# Patient Record
Sex: FEMALE | Race: BLACK OR AFRICAN AMERICAN | Employment: OTHER | URBAN - METROPOLITAN AREA
[De-identification: names, ages, dates, MRNs, and addresses within clinical notes are randomized per-mention and may not be internally consistent; named-entity substitution may affect disease eponyms.]

---

## 2017-01-26 ENCOUNTER — HOSPITAL ENCOUNTER (EMERGENCY)
Age: 82
Discharge: HOME | End: 2017-01-26
Attending: EMERGENCY MEDICINE
Payer: MEDICARE

## 2017-01-26 VITALS
OXYGEN SATURATION: 99 % | WEIGHT: 170 LBS | TEMPERATURE: 98.3 F | SYSTOLIC BLOOD PRESSURE: 113 MMHG | BODY MASS INDEX: 26.68 KG/M2 | RESPIRATION RATE: 18 BRPM | HEART RATE: 72 BPM | DIASTOLIC BLOOD PRESSURE: 55 MMHG | HEIGHT: 67 IN

## 2017-01-26 DIAGNOSIS — M25.562 ARTHRALGIA OF BOTH LOWER LEGS: Primary | ICD-10-CM

## 2017-01-26 DIAGNOSIS — M25.561 ARTHRALGIA OF BOTH LOWER LEGS: Primary | ICD-10-CM

## 2017-01-26 PROCEDURE — 99282 EMERGENCY DEPT VISIT SF MDM: CPT

## 2017-01-26 PROCEDURE — 93970 EXTREMITY STUDY: CPT

## 2017-01-26 NOTE — ED NOTES
RN reviewed discharge instructions with patient. Patient verbalized understanding. Time allotted for questions. A&O at time of discharge. VSS. Patient wheeled off unit.

## 2017-01-26 NOTE — PROCEDURES
[address]  *** FINAL REPORT ***    Name: Shelby Traore  MRN: [id number]  : 1933  HIS Order #: [id number]  [address] [facility] Visit #: [id number]  Date: 2017    TYPE OF TEST: Peripheral Venous Testing    REASON FOR TEST  Pain in limb    Right Leg:-  Deep venous thrombosis:           No  Superficial venous thrombosis:    No  Deep venous insufficiency:        Not examined  Superficial venous insufficiency: Not examined    Left Leg:-  Deep venous thrombosis:           No  Superficial venous thrombosis:    No  Deep venous insufficiency:        Not examined  Superficial venous insufficiency: Not examined      INTERPRETATION/FINDINGS  PROCEDURE:  Color duplex ultrasound imaging of lower extremity veins. FINDINGS:       Right: The common femoral, deep femoral, femoral, popliteal,  posterior tibial, peroneal, and great saphenous are patent and without   evidence of thrombus where visualized;  each is compressible and  there is no narrowing of the flow channel on color Doppler imaging. Phasic flow is observed in the common femoral vein. Left:   The common femoral, deep femoral, femoral, popliteal,  posterior tibial, peroneal, and great saphenous are patent and without   evidence of thrombus where visualized;  each is compressible and  there is no narrowing of the flow channel on color Doppler imaging. Phasic flow is observed in the common femoral vein. IMPRESSION:  No evidence of right or left lower extremity vein  thrombosis. ADDITIONAL COMMENTS    I have personally reviewed the data relevant to the interpretation of  this  study.     TECHNOLOGIST: SHANIKA Romo, RCS  Signed: 2017 01:57 PM    PHYSICIAN: Frieda Chang MD  Signed: 2017 07:34 AM

## 2017-01-26 NOTE — ED TRIAGE NOTES
Pt arrives with bilat leg pain, R knee pain & pain to top of feet. Also reports some swelling (not noticeable in triage). This all started on Sunday after traveling to [place] in a van.

## 2017-01-26 NOTE — ED PROVIDER NOTES
HPI Comments: 80 y.o. female with past medical history significant for CHF, HTN who presents with chief complaint of leg pain. Patient complains of b/l leg pain and some swelling with the most sore area at the insoles of her b/l feet. Patient states \"it feels like spurs at the top of my feet. \" Patient states she went up to [place] 5 days ago on 17 for a . Patient states she did not wear heels during the . Patient has no other complaints. There are no other acute medical concerns at this time. Social hx: never smoker, no alcohol  PCP: Ino Hamm MD    Note written by Vera Dominguez, as dictated by Angelo Bourgeois MD 12:58 PM     The history is provided by the patient. No  was used. Past Medical History:   Diagnosis Date    Arrhythmia      bradycardia,S/P pacemaker    CHF (congestive heart failure) ([address]     Heart failure ([address]     Hypertension     Irregular heart rate        Past Surgical History:   Procedure Laterality Date    Hx orthopaedic       left total knee replacement    Hx pacemaker           Family History:   Problem Relation Age of Onset    Hypertension Mother     Cancer Father      prostate    Hypertension Father        Social History     Social History    Marital status:      Spouse name: N/A    Number of children: N/A    Years of education: N/A     Occupational History    Not on file. Social History Main Topics    Smoking status: Never Smoker    Smokeless tobacco: Never Used    Alcohol use No    Drug use: No    Sexual activity: Not Currently      Comment: ,2 children,2children,lives at home     Other Topics Concern    Not on file     Social History Narrative         ALLERGIES: Codeine; Morphine; Pcn [penicillins]; and Pseudoephedrine    Review of Systems   Constitutional: Negative for chills and fever. HENT: Negative for ear pain and sore throat. Eyes: Negative for photophobia and pain. Respiratory: Negative for chest tightness and shortness of breath. Cardiovascular: Positive for leg swelling. Negative for chest pain. Gastrointestinal: Negative for abdominal pain, nausea and vomiting. Genitourinary: Negative for dysuria and flank pain. Musculoskeletal: Positive for arthralgias (b/l legs and feet). Negative for back pain and neck pain. Skin: Negative for rash and wound. Neurological: Negative for dizziness, light-headedness and headaches. Vitals:    01/26/17 1248   BP: 126/72   Pulse: 64   Resp: 18   Temp: 98.6 °F (37 °C)   SpO2: 97%   Weight: 77.1 kg (170 lb)   Height: 5' 7\" (1.702 m)            Physical Exam   Constitutional: She is oriented to person, place, and time. She appears well-developed and well-nourished. No distress. HENT:   Head: Normocephalic and atraumatic. Eyes: Conjunctivae and EOM are normal.   Neck: Normal range of motion. Cardiovascular: Normal rate, regular rhythm, normal heart sounds and intact distal pulses. No murmur heard. Pulmonary/Chest: Effort normal and breath sounds normal. No stridor. No respiratory distress. Abdominal: Soft. Bowel sounds are normal. There is no tenderness. Musculoskeletal: Normal range of motion. She exhibits no edema or deformity. Mild right calf tenderness   Neurological: She is alert and oriented to person, place, and time. No cranial nerve deficit. Skin: Skin is warm and dry. She is not diaphoretic. Psychiatric: She has a normal mood and affect. Nursing note and vitals reviewed. MDM  Number of Diagnoses or Management Options  Diagnosis management comments: Patient with leg pain and subjective swelling after long van trip a few days ago - doppler to r/o DVT  Next provider to review test result and disposition the patient.     1345 - Prelim doppler report neg for DVT, will d/c home       Amount and/or Complexity of Data Reviewed  Tests in the radiology section of CPT®: ordered and reviewed    Patient Progress  Patient progress: stable    ED Course       Procedures

## 2017-01-26 NOTE — DISCHARGE INSTRUCTIONS
Musculoskeletal Pain: Care Instructions  Your Care Instructions  Different problems with the bones, muscles, nerves, ligaments, and tendons in the body can cause pain. One or more areas of your body may ache or burn. Or they may feel tired, stiff, or sore. The medical term for this type of pain is musculoskeletal pain. It can have many different causes. Sometimes the pain is caused by an injury such as a strain or sprain. Or you might have pain from using one part of your body in the same way over and over again. This is called overuse. In some cases, the cause of the pain is another health problem such as arthritis or fibromyalgia. The doctor will examine you and ask you questions about your health to help find the cause of your pain. Blood tests or imaging tests like an X-ray may also be helpful. But sometimes doctors can't find a cause of the pain. Treatment depends on your symptoms and the cause of the pain, if known. The doctor has checked you carefully, but problems can develop later. If you notice any problems or new symptoms, get medical treatment right away. Follow-up care is a key part of your treatment and safety. Be sure to make and go to all appointments, and call your doctor if you are having problems. It's also a good idea to know your test results and keep a list of the medicines you take. How can you care for yourself at home? · Rest until you feel better. · Do not do anything that makes the pain worse. Return to exercise gradually if you feel better and your doctor says it's okay. · Be safe with medicines. Read and follow all instructions on the label. ¨ If the doctor gave you a prescription medicine for pain, take it as prescribed. ¨ If you are not taking a prescription pain medicine, ask your doctor if you can take an over-the-counter medicine. · Put ice or a cold pack on the area for 10 to 20 minutes at a time to ease pain. Put a thin cloth between the ice and your skin.   When should you call for help? Call your doctor now or seek immediate medical care if:  · You have new pain, or your pain gets worse. · You have new symptoms such as a fever, a rash, or chills. Watch closely for changes in your health, and be sure to contact your doctor if:  · You do not get better as expected. Where can you learn more? Go to [employer]  Enter Q624 in the search box to learn more about \"Musculoskeletal Pain: Care Instructions. \"   © 9258-2082 Healthwise, Incorporated. Care instructions adapted under license by Nick [employer] (which disclaims liability or warranty for this information). This care instruction is for use with your licensed healthcare professional. If you have questions about a medical condition or this instruction, always ask your healthcare professional. [address] any warranty or liability for your use of this information. Content Version: 24.5.258311; Current as of: November 20, 2015             We hope that we have addressed all of your medical concerns. The examination and treatment you received in the Emergency Department were for an emergent problem and were not intended as complete care. It is important that you follow up with your healthcare provider(s) for ongoing care. If your symptoms worsen or do not improve as expected, and you are unable to reach your usual health care provider(s), you should return to the Emergency Department. Today's healthcare is undergoing tremendous change, and patient satisfaction surveys are one of the many tools to assess the quality of medical care. You may receive a survey from the [employer] organization regarding your experience in the Emergency Department. I hope that your experience has been completely positive, particularly the medical care that I provided. As such, please participate in the survey; anything less than excellent does not meet my expectations or intentions. [address] [facility] and [address] [place] [address] participate in nationally recognized quality of care measures. If your blood pressure is greater than 120/80, as reported below, we urge that you seek medical care to address the potential of high blood pressure, commonly known as hypertension. Hypertension can be hereditary or can be caused by certain medical conditions, pain, stress, or \"white coat syndrome. \"       Please make an appointment with your health care provider(s) for follow up of your Emergency Department visit. VITALS:   Patient Vitals for the past 8 hrs:   Temp Pulse Resp BP SpO2   01/26/17 1248 98.6 °F (37 °C) 64 18 126/72 97 %          Thank you for allowing us to provide you with medical care today. We realize that you have many choices for your emergency care needs. Please choose us in the future for any continued health care needs. Cici Rico [facility], [address] [place] [address].   Office: [phone]

## 2017-03-27 ENCOUNTER — HOSPITAL ENCOUNTER (OUTPATIENT)
Dept: LAB | Age: 82
Discharge: HOME | End: 2017-03-27
Payer: MEDICARE

## 2017-03-27 ENCOUNTER — OFFICE VISIT (OUTPATIENT)
Dept: INTERNAL MEDICINE CLINIC | Age: 82
End: 2017-03-27

## 2017-03-27 VITALS
SYSTOLIC BLOOD PRESSURE: 110 MMHG | WEIGHT: 172 LBS | HEIGHT: 67 IN | OXYGEN SATURATION: 99 % | RESPIRATION RATE: 20 BRPM | DIASTOLIC BLOOD PRESSURE: 64 MMHG | TEMPERATURE: 97.5 F | BODY MASS INDEX: 27 KG/M2 | HEART RATE: 63 BPM

## 2017-03-27 DIAGNOSIS — M75.01 ADHESIVE CAPSULITIS OF BOTH SHOULDERS: Primary | ICD-10-CM

## 2017-03-27 DIAGNOSIS — E78.00 PURE HYPERCHOLESTEROLEMIA: ICD-10-CM

## 2017-03-27 DIAGNOSIS — I50.20 SYSTOLIC CONGESTIVE HEART FAILURE, UNSPECIFIED CONGESTIVE HEART FAILURE CHRONICITY: ICD-10-CM

## 2017-03-27 DIAGNOSIS — M25.511 CHRONIC PAIN OF BOTH SHOULDERS: ICD-10-CM

## 2017-03-27 DIAGNOSIS — I10 ESSENTIAL HYPERTENSION: ICD-10-CM

## 2017-03-27 DIAGNOSIS — M75.02 ADHESIVE CAPSULITIS OF BOTH SHOULDERS: Primary | ICD-10-CM

## 2017-03-27 DIAGNOSIS — M25.512 CHRONIC PAIN OF BOTH SHOULDERS: ICD-10-CM

## 2017-03-27 DIAGNOSIS — G89.29 CHRONIC PAIN OF BOTH SHOULDERS: ICD-10-CM

## 2017-03-27 DIAGNOSIS — E55.9 VITAMIN D DEFICIENCY: ICD-10-CM

## 2017-03-27 DIAGNOSIS — R68.2 DRY MOUTH: ICD-10-CM

## 2017-03-27 PROCEDURE — 36415 COLL VENOUS BLD VENIPUNCTURE: CPT

## 2017-03-27 PROCEDURE — 80061 LIPID PANEL: CPT

## 2017-03-27 PROCEDURE — 82306 VITAMIN D 25 HYDROXY: CPT

## 2017-03-27 PROCEDURE — 80053 COMPREHEN METABOLIC PANEL: CPT

## 2017-03-27 RX ORDER — AMLODIPINE BESYLATE 10 MG/1
10 TABLET ORAL DAILY
Qty: 90 TAB | Refills: 2 | Status: SHIPPED | OUTPATIENT
Start: 2017-03-27 | End: 2017-09-25 | Stop reason: SDUPTHER

## 2017-03-27 RX ORDER — DICLOFENAC SODIUM 10 MG/G
GEL TOPICAL
Qty: 100 G | Refills: 2 | Status: SHIPPED | OUTPATIENT
Start: 2017-03-27 | End: 2017-06-26 | Stop reason: SDUPTHER

## 2017-03-27 RX ORDER — TRAMADOL HYDROCHLORIDE 50 MG/1
50 TABLET ORAL
Qty: 30 TAB | Refills: 0 | Status: SHIPPED | OUTPATIENT
Start: 2017-03-27 | End: 2017-06-26 | Stop reason: SDUPTHER

## 2017-03-27 NOTE — LETTER
3/31/2017 1:33 PM 
 
Ms. Lisbeth Dance [address] [facility] Dear Lisbeth Dance: 
 
Please find your most recent results below. Resulted Orders METABOLIC PANEL, COMPREHENSIVE Result Value Ref Range Glucose 91 65 - 99 mg/dL BUN 14 8 - 27 mg/dL Creatinine 0.62 0.57 - 1.00 mg/dL GFR est non-AA 83 >59 mL/min/1.73 GFR est AA 96 >59 mL/min/1.73  
 BUN/Creatinine ratio 23 11 - 26 Comment: **Effective April 3, 2017 BUN/Creatinine Ratio** 
  reference interval will be changing to: Age                  Male          Female 
     0 days   -  7 days          9 - 25         9 - 26 
     8 days   - 30 days          8 - 28        10 - 33 
     1 month  -  6 months       11 - 62        11 - 47 
     7 months -  1 year         20 - 70        20 - 70 
     2 years  -  5 years        23 - 52        20 - 52 
     6 years  - 15 years        15 - 29        12 - 28 
    13 years  - 16 years        8 - 25        10 - 25 
    18 years  - 61 years         5 - 21         9 - 21 
               >59 years        10 - 25        15 - 29 Sodium 139 134 - 144 mmol/L Potassium 4.6 3.5 - 5.2 mmol/L Chloride 102 96 - 106 mmol/L  
 CO2 26 18 - 29 mmol/L Calcium 10.3 8.7 - 10.3 mg/dL Protein, total 7.8 6.0 - 8.5 g/dL Albumin 4.0 3.5 - 4.7 g/dL GLOBULIN, TOTAL 3.8 1.5 - 4.5 g/dL A-G Ratio 1.1 (L) 1.2 - 2.2 Comment: **Please note reference interval change** Bilirubin, total 0.8 0.0 - 1.2 mg/dL Alk. phosphatase 97 39 - 117 IU/L  
 AST (SGOT) 44 (H) 0 - 40 IU/L  
 ALT (SGPT) 46 (H) 0 - 32 IU/L Narrative Performed at:  [address]  [id number] : Fernando Koenig MD, Phone:  [phone] LIPID PANEL Result Value Ref Range Cholesterol, total 185 100 - 199 mg/dL Triglyceride 60 0 - 149 mg/dL HDL Cholesterol 83 >39 mg/dL VLDL, calculated 12 5 - 40 mg/dL LDL, calculated 90 0 - 99 mg/dL Narrative Performed at:  [address]  [id number] : Dunia Penaloza MD, Phone:  [phone] VITAMIN D, 25 HYDROXY Result Value Ref Range VITAMIN D, 25-HYDROXY 19.5 (L) 30.0 - 100.0 ng/mL Comment:  
   Vitamin D deficiency has been defined by the [address] [place] [address] practice guideline as a 
level of serum 25-OH vitamin D less than 20 ng/mL (1,2). The Endocrine Society went on to further define vitamin D 
insufficiency as a level between 21 and 29 ng/mL (2). 1. IOM ([employer]). 2010. Dietary reference 
   intakes for calcium and D. [facility]: [employer] 
   [employer]. 2. Lisa MF, Murray BENDER, Unique CONWAY, et al. 
   Evaluation, treatment, and prevention of vitamin D 
   deficiency: an Endocrine Society clinical practice 
   guideline. JCEM. 2011 Jul; 96(7):1911-30. Narrative Performed at:  [address]  [id number] : Dunia Penaloza MD, Phone:  [phone] CVD REPORT Result Value Ref Range INTERPRETATION Note Comment:  
   Supplement report is available. Narrative Performed at:  [address] [place] [address] [address] [place] [address]  [id number] : Sammie Ibarra PhD, Phone:  [phone] RECOMMENDATIONS: 
Alejandro Mirella your labs indicate that your liver functions are elevated, please avoid alcohol and tylenol for the next 2 months and we will repeat more labs at that time. I have enclosed that lab slip for your convenience. Also, Your Vitamin D level is low, start taking OTC Vitamin D 2000 units 1 x a day for 4 months. Also increase your consumption of milk and exposure to the sun for 20 minutes a day. We will recheck your Vitamin D level in 4 months All other labs are stable Please call me if you have any questions: [phone] Sincerely, Maura Saavedra MD

## 2017-03-27 NOTE — PROGRESS NOTES
HISTORY OF PRESENT ILLNESS  Tomás Meyers is a 80 y.o. female here to follow up. reports both shoulder stiffness and decreased ROM. has moderate pain ,more at night,askign tramadol refill. She has recent fall and she hit on right shoulder and chest wall which hurts. She accidentally missed steps in her house and fell. Reports dry mouth,using biotin drop,helps. Has HTN,stable. all lab done. has elevated lipid,want to know what food to eat. Has elevated lipid,need to get it checked. Need labs. Follow-up     Hypertension    Pertinent negatives include no palpitations and no blurred vision. CHF     Fall   Pertinent negatives include no fever. Review of Systems   Constitutional: Negative. Negative for chills and fever. HENT: Negative. Eyes: Negative. Negative for blurred vision and double vision. Respiratory: Negative. Cardiovascular: Negative. Negative for palpitations. Gastrointestinal: Negative. Genitourinary: Negative. Musculoskeletal: Positive for falls and joint pain. Skin: Negative. Neurological: Negative. Endo/Heme/Allergies: Negative. Psychiatric/Behavioral: Negative. Physical Exam   Constitutional: She appears well-developed and well-nourished. No distress. HENT:        Neck: Normal range of motion. Neck supple. No JVD present. No thyromegaly present. Cardiovascular: Normal rate, regular rhythm, normal heart sounds and intact distal pulses. Pulmonary/Chest: Effort normal and breath sounds normal. No respiratory distress. She has no wheezes. She has no rales. Musculoskeletal: She exhibits tenderness. She exhibits no edema. B/L shoulder:tender. ROM restricted in all direction. Psychiatric: She has a normal mood and affect. Her behavior is normal.       JHONATHAN and Franck Flood was seen today for follow-up, hypertension, chf and pain (chronic).     Diagnoses and all orders for this visit:    Adhesive capsulitis of both shoulders  Heating pad and exercise. Will call in,  -     diclofenac (VOLTAREN) 1 % gel; Apply  to affected area two (2) times daily as needed. -     REFERRAL TO PHYSICAL THERAPY  -     REFERRAL TO ORTHOPEDICS    Essential hypertension  Stable. will do,  -     METABOLIC PANEL, COMPREHENSIVE  -     LIPID PANEL  -     amLODIPine (NORVASC) 10 mg tablet; Take 1 Tab by mouth daily. Systolic congestive heart failure, unspecified congestive heart failure chronicity ([address]  Stable.,compensated. Dry mouth  On biotin drop. no thrush. Vitamin D deficiency  -     VITAMIN D, 25 HYDROXY    Pure hypercholesterolemia    Will check,  -     LIPID PANEL    Right shoulder injury, subsequent  -     traMADol (ULTRAM) 50 mg tablet; Take 1 Tab by mouth daily as needed for Pain. Discussed expected course/resolution/complications of diagnosis in detail with patient. Medication risks/benefits/costs/interactions/alternatives discussed with patient. Pt was given an after visit summary which includes diagnoses, current medications & vitals. Pt expressed understanding with the diagnosis and plan.

## 2017-03-27 NOTE — PROGRESS NOTES
Health Maintenance Due   Topic Date Due    DTaP/Tdap/Td series (1 - Tdap) 02/02/1954    ZOSTER VACCINE AGE 60>  02/02/1993    GLAUCOMA SCREENING Q2Y  02/02/1998    MEDICARE YEARLY EXAM  02/02/1998    INFLUENZA AGE 9 TO ADULT  08/01/2016    Pneumococcal 65+ Low/Medium Risk (2 of 2 - PPSV23) 10/28/2016       Chief Complaint   Patient presents with    Follow-up     4 month, states has dry mouth    Hypertension    CHF    Pain (Chronic)     had fall Nov 2015 bilateral shoulders are painful with decreased ROM and continues with bilateral knee oain       1. Have you been to the ER, urgent care clinic since your last visit? Hospitalized since your last visit? No    2. Have you seen or consulted any other health care providers outside of the [address] [place] [place] [address] since your last visit? Include any pap smears or colon screening. No    3) Do you have an Advance Directive on file? no    4) Are you interested in receiving information on Advance Directives? NO      Patient is accompanied by self I have received verbal consent from Fabiana Graham to discuss any/all medical information while they are present in the room.

## 2017-03-27 NOTE — PATIENT INSTRUCTIONS
Frozen Shoulder: Exercises  Your Care Instructions  Here are some examples of typical rehabilitation exercises for your condition. Start each exercise slowly. Ease off the exercise if you start to have pain. Your doctor or physical therapist will tell you when you can start these exercises and which ones will work best for you. How to do the exercises  Neck stretches    1. Look straight ahead, and tip your right ear to your right shoulder. Do not let your left shoulder rise up as you tip your head to the right. 2. Hold 15 to 30 seconds. 3. Tilt your head to the left. Do not let your right shoulder rise up as you tip your head to the left. 4. Hold for 15 to 30 seconds. 5. Repeat 2 to 4 times to each side. Shoulder rolls    1. Sit comfortably with your feet shoulder-width apart. You can also do this exercise while standing. 2. Roll your shoulders up, then back, and then down in a smooth, circular motion. 3. Repeat 2 to 4 times. Shoulder flexion (lying down)    Note: For this exercise, you will need a wand. To make a wand, use a piece of PVC pipe or a broom handle with the broom removed. Make the wand about a foot wider than your shoulders. 1. Lie on your back, holding a wand with your hands. Your palms should face down as you hold the wand. Place your hands slightly wider than your shoulders. 2. Keeping your elbows straight, slowly raise your arms over your head until you feel a stretch in your shoulders, upper back, and chest.  3. Hold 15 to 30 seconds. 4. Repeat 2 to 4 times. Shoulder rotation (lying down)    Note: To make a wand, use a piece of PVC pipe or a broom handle with the broom removed. Make the wand about a foot wider than your shoulders. 1. Lie on your back and hold a wand in both hands with your elbows bent and your palms up. 2. Keeping your elbows close to your body, move the wand across your body toward the arm that has pain. 3. Hold for 15 to 30 seconds.   4. Repeat 2 to 4 times.  Shoulder internal rotation with towel    1. Roll up a towel lengthwise. Hold the towel above and behind your head with the arm that is not sore. 2. With your sore arm, reach behind your back and grasp the towel. 3. Using the arm above your head, pull the towel upward until you feel a stretch on the front and outside of your sore shoulder. 4. Hold 15 to 30 seconds. 5. Relax and move the towel back down to the starting position. 6. Repeat 2 to 4 times. Shoulder blade squeeze    1. While standing with your arms at your sides, squeeze your shoulder blades together. Do not raise your shoulders up as you are squeezing. 2. Hold for 6 seconds. 3. Repeat 8 to 12 times. Follow-up care is a key part of your treatment and safety. Be sure to make and go to all appointments, and call your doctor if you are having problems. It's also a good idea to know your test results and keep a list of the medicines you take. Where can you learn more? Go to http://francis-gene.info/. Enter R144 in the search box to learn more about \"Frozen Shoulder: Exercises. \"  Current as of: May 23, 2016  Content Version: 11.1  © 1926-8477 [employer]. Care instructions adapted under license by [employer] (which disclaims liability or warranty for this information). If you have questions about a medical condition or this instruction, always ask your healthcare professional. [place] [address] any warranty or liability for your use of this information.

## 2017-03-27 NOTE — MR AVS SNAPSHOT
Visit Information Date & Time Provider Department Dept. Phone Encounter #  
 3/27/2017 10:30 AM Hernando Patterson MD [facility] Internal Medicine [phone] [id number] Upcoming Health Maintenance Date Due DTaP/Tdap/Td series (1 - Tdap) 2/2/1954 ZOSTER VACCINE AGE 60> 2/2/1993 GLAUCOMA SCREENING Q2Y 2/2/1998 MEDICARE YEARLY EXAM 2/2/1998 INFLUENZA AGE 9 TO ADULT 8/1/2016 Pneumococcal 65+ Low/Medium Risk (2 of 2 - PPSV23) 10/28/2016 Allergies as of 3/27/2017  Review Complete On: 3/27/2017 By: Hernando Patterson MD  
  
 Severity Noted Reaction Type Reactions Codeine  09/23/2011    Unknown (comments) Morphine  09/23/2011    Unknown (comments) Pcn [Penicillins]  09/23/2011    Unknown (comments) Pseudoephedrine  09/23/2011    Unknown (comments) Current Immunizations  Reviewed on 11/28/2016 Name Date Influenza High Dose Vaccine PF 10/28/2015 Pneumococcal Conjugate (PCV-13) 10/28/2015 Not reviewed this visit You Were Diagnosed With   
  
 Codes Comments Adhesive capsulitis of both shoulders    -  Primary ICD-10-CM: M75.01, M75.02 
ICD-9-CM: 726.0 Essential hypertension     ICD-10-CM: I10 
ICD-9-CM: 401.9 Systolic congestive heart failure, unspecified congestive heart failure chronicity (HCC)     ICD-10-CM: I50.20 ICD-9-CM: 428.20, 428.0 Dry mouth     ICD-10-CM: R68.2 ICD-9-CM: 527.7 Vitamin D deficiency     ICD-10-CM: E55.9 ICD-9-CM: 268.9 Pure hypercholesterolemia     ICD-10-CM: E78.00 ICD-9-CM: 272.0 Right shoulder injury, initial encounter     ICD-10-CM: S49.91XA ICD-9-CM: 187. 2 Vitals BP Pulse Temp Resp Height(growth percentile) Weight(growth percentile) 110/64 (BP 1 Location: Left arm, BP Patient Position: Sitting) 63 97.5 °F (36.4 °C) (Oral) 20 5' 7\" (1.702 m) 172 lb (78 kg) SpO2 BMI OB Status Smoking Status 99% 26.94 kg/m2 Postmenopausal Never Smoker Vitals History BMI and BSA Data Body Mass Index Body Surface Area  
 26.94 kg/m 2 1.92 m 2 Preferred Pharmacy Pharmacy Name Phone [id number] Lozano [phone] Your Updated Medication List  
  
   
This list is accurate as of: 3/27/17 11:13 AM.  Always use your most recent med list.  
  
  
  
  
 acetaminophen 500 mg tablet Commonly known as:  TYLENOL Take 1 Tab by mouth two (2) times daily as needed for Pain. albuterol 90 mcg/actuation inhaler Commonly known as:  PROAIR HFA Take 1 Puff by inhalation every four (4) hours as needed. amLODIPine 10 mg tablet Commonly known as:  Jennifer Pace Take 1 Tab by mouth daily. aspirin 81 mg tablet Take 81 mg by mouth.  
  
 calcium carbonate 500 mg calcium (1,250 mg) tablet Commonly known as:  OS-AYALA Take 1 Tab by mouth daily. calcium carbonate-vitamin D3 600 mg(1,500mg) -800 unit Tab Commonly known as:  CALTRATE WITH VITAMIN D3 Take 1 Tab by mouth daily. cetirizine 10 mg tablet Commonly known as:  ZYRTEC  
TAKE 1 TABLET BY MOUTH EVERY DAY  
  
 diclofenac 1 % Gel Commonly known as:  VOLTAREN Apply  to affected area two (2) times daily as needed. ergocalciferol 50,000 unit capsule Commonly known as:  ERGOCALCIFEROL Take 1 Cap by mouth every seven (7) days. fluticasone 50 mcg/actuation nasal spray Commonly known as:  Daniel Metro 2 Sprays by Both Nostrils route daily. nystatin 100,000 unit/mL suspension Commonly known as:  MYCOSTATIN Take 5 mL by mouth four (4) times daily. swish and spit  
  
 traMADol 50 mg tablet Commonly known as:  ULTRAM  
Take 1 Tab by mouth daily as needed for Pain. Prescriptions Printed Refills  
 traMADol (ULTRAM) 50 mg tablet 0 Sig: Take 1 Tab by mouth daily as needed for Pain. Class: Print Route: Oral  
  
Prescriptions Sent to Pharmacy  Refills  
 diclofenac (VOLTAREN) 1 % gel 2  
 Sig: Apply  to affected area two (2) times daily as needed. Class: Normal  
 Pharmacy: [address] [place] [address] Ph #: [phone] Route: Topical  
 amLODIPine (NORVASC) 10 mg tablet 2 Sig: Take 1 Tab by mouth daily. Class: Normal  
 Pharmacy: [address] [place] [address] Ph #: [phone] Route: Oral  
  
We Performed the Following LIPID PANEL [64168 CPT(R)] METABOLIC PANEL, COMPREHENSIVE [97652 CPT(R)] REFERRAL TO ORTHOPEDICS [THB940 Custom] Comments:  
 Please evaluate patient for frozen shoulder REFERRAL TO PHYSICAL THERAPY [IKH10 Custom] Comments:  
 Please evaluate patient for b/L frozen shoulder VITAMIN D, 25 HYDROXY [id number] CPT(R)] Referral Information Referral ID Referred By Referred To  
  
 [id number] Kylah Marino Not Available Visits Status Start Date End Date 1 New Request 3/27/17 3/27/18 If your referral has a status of pending review or denied, additional information will be sent to support the outcome of this decision. Referral ID Referred By Referred To  
 [id number] Dolly ZUNIGA, [address] [place] [address] Phone: [phone] Fax: [phone] Visits Status Start Date End Date 1 New Request 3/27/17 3/27/18 If your referral has a status of pending review or denied, additional information will be sent to support the outcome of this decision. Patient Instructions Frozen Shoulder: Exercises Your Care Instructions Here are some examples of typical rehabilitation exercises for your condition. Start each exercise slowly. Ease off the exercise if you start to have pain. Your doctor or physical therapist will tell you when you can start these exercises and which ones will work best for you. How to do the exercises Neck stretches 1. Look straight ahead, and tip your right ear to your right shoulder. Do not let your left shoulder rise up as you tip your head to the right. 2. Hold 15 to 30 seconds. 3. Tilt your head to the left. Do not let your right shoulder rise up as you tip your head to the left. 4. Hold for 15 to 30 seconds. 5. Repeat 2 to 4 times to each side. Shoulder rolls 1. Sit comfortably with your feet shoulder-width apart. You can also do this exercise while standing. 2. Roll your shoulders up, then back, and then down in a smooth, circular motion. 3. Repeat 2 to 4 times. Shoulder flexion (lying down) Note: For this exercise, you will need a wand. To make a wand, use a piece of PVC pipe or a broom handle with the broom removed. Make the wand about a foot wider than your shoulders. 1. Lie on your back, holding a wand with your hands. Your palms should face down as you hold the wand. Place your hands slightly wider than your shoulders. 2. Keeping your elbows straight, slowly raise your arms over your head until you feel a stretch in your shoulders, upper back, and chest. 
3. Hold 15 to 30 seconds. 4. Repeat 2 to 4 times. Shoulder rotation (lying down) Note: To make a wand, use a piece of PVC pipe or a broom handle with the broom removed. Make the wand about a foot wider than your shoulders. 1. Lie on your back and hold a wand in both hands with your elbows bent and your palms up. 2. Keeping your elbows close to your body, move the wand across your body toward the arm that has pain. 3. Hold for 15 to 30 seconds. 4. Repeat 2 to 4 times. Shoulder internal rotation with towel 1. Roll up a towel lengthwise. Hold the towel above and behind your head with the arm that is not sore. 2. With your sore arm, reach behind your back and grasp the towel. 3. Using the arm above your head, pull the towel upward until you feel a stretch on the front and outside of your sore shoulder. 4. Hold 15 to 30 seconds. 5. Relax and move the towel back down to the starting position. 6. Repeat 2 to 4 times. Shoulder blade squeeze 1. While standing with your arms at your sides, squeeze your shoulder blades together. Do not raise your shoulders up as you are squeezing. 2. Hold for 6 seconds. 3. Repeat 8 to 12 times. Follow-up care is a key part of your treatment and safety. Be sure to make and go to all appointments, and call your doctor if you are having problems. It's also a good idea to know your test results and keep a list of the medicines you take. Where can you learn more? Go to http://francis-gene.info/. Enter R144 in the search box to learn more about \"Frozen Shoulder: Exercises. \" Current as of: May 23, 2016 Content Version: 11.1 © 3569-0078 [employer]. Care instructions adapted under license by [employer] (which disclaims liability or warranty for this information). If you have questions about a medical condition or this instruction, always ask your healthcare professional. [address] any warranty or liability for your use of this information. Introducing [facility]! [facility] introduces [employer] patient portal. Now you can access parts of your medical record, email your doctor's office, and request medication refills online. 1. In your internet browser, go to [email or web address] 2. Click on the First Time User? Click Here link in the Sign In box. You will see the New Member Sign Up page. 3. Enter your [employer] Access Code exactly as it appears below. You will not need to use this code after youve completed the sign-up process. If you do not sign up before the expiration date, you must request a new code. · [employer] Access Code: [email or web address] Expires: 6/18/2017 11:19 AM 
 
4.  Enter the last four digits of your Social Security Number (xxxx) and Date of Birth (mm/dd/yyyy) as indicated and click Submit. You will be taken to the next sign-up page. 5. Create a [employer] ID. This will be your [employer] login ID and cannot be changed, so think of one that is secure and easy to remember. 6. Create a [employer] password. You can change your password at any time. 7. Enter your Password Reset Question and Answer. This can be used at a later time if you forget your password. 8. Enter your e-mail address. You will receive e-mail notification when new information is available in [address] 9. Click Sign Up. You can now view and download portions of your medical record. 10. Click the Download Summary menu link to download a portable copy of your medical information. If you have questions, please visit the Frequently Asked Questions section of the [employer] website. Remember, [employer] is NOT to be used for urgent needs. For medical emergencies, dial 911. Now available from your iPhone and Android! Please provide this summary of care documentation to your next provider. Your primary care clinician is listed as Elayne Soulier. If you have any questions after today's visit, please call [phone].

## 2017-03-28 LAB
25(OH)D3+25(OH)D2 SERPL-MCNC: 19.5 NG/ML (ref 30–100)
ALBUMIN SERPL-MCNC: 4 G/DL (ref 3.5–4.7)
ALBUMIN/GLOB SERPL: 1.1 {RATIO} (ref 1.2–2.2)
ALP SERPL-CCNC: 97 IU/L (ref 39–117)
ALT SERPL-CCNC: 46 IU/L (ref 0–32)
AST SERPL-CCNC: 44 IU/L (ref 0–40)
BILIRUB SERPL-MCNC: 0.8 MG/DL (ref 0–1.2)
BUN SERPL-MCNC: 14 MG/DL (ref 8–27)
BUN/CREAT SERPL: 23 (ref 11–26)
CALCIUM SERPL-MCNC: 10.3 MG/DL (ref 8.7–10.3)
CHLORIDE SERPL-SCNC: 102 MMOL/L (ref 96–106)
CHOLEST SERPL-MCNC: 185 MG/DL (ref 100–199)
CO2 SERPL-SCNC: 26 MMOL/L (ref 18–29)
CREAT SERPL-MCNC: 0.62 MG/DL (ref 0.57–1)
GLOBULIN SER CALC-MCNC: 3.8 G/DL (ref 1.5–4.5)
GLUCOSE SERPL-MCNC: 91 MG/DL (ref 65–99)
HDLC SERPL-MCNC: 83 MG/DL
INTERPRETATION, 910389: NORMAL
LDLC SERPL CALC-MCNC: 90 MG/DL (ref 0–99)
POTASSIUM SERPL-SCNC: 4.6 MMOL/L (ref 3.5–5.2)
PROT SERPL-MCNC: 7.8 G/DL (ref 6–8.5)
SODIUM SERPL-SCNC: 139 MMOL/L (ref 134–144)
TRIGL SERPL-MCNC: 60 MG/DL (ref 0–149)
VLDLC SERPL CALC-MCNC: 12 MG/DL (ref 5–40)

## 2017-03-30 NOTE — PROGRESS NOTES
Slightly elevated LFT. watch alcohol and avoid too much tylenol. repeat LFT in 2 months. Low vit D. Adv to take OTC vit D 2000 unit po every day for 4 months.

## 2017-03-31 DIAGNOSIS — R74.8 ELEVATED LIVER ENZYMES: Primary | ICD-10-CM

## 2017-04-07 ENCOUNTER — TELEPHONE (OUTPATIENT)
Dept: INTERNAL MEDICINE CLINIC | Age: 82
End: 2017-04-07

## 2017-05-31 DIAGNOSIS — R74.8 ELEVATED LIVER ENZYMES: ICD-10-CM

## 2017-06-26 ENCOUNTER — OFFICE VISIT (OUTPATIENT)
Dept: INTERNAL MEDICINE CLINIC | Age: 82
End: 2017-06-26

## 2017-06-26 VITALS
WEIGHT: 174.6 LBS | RESPIRATION RATE: 20 BRPM | SYSTOLIC BLOOD PRESSURE: 119 MMHG | OXYGEN SATURATION: 100 % | HEART RATE: 64 BPM | TEMPERATURE: 96.7 F | HEIGHT: 67 IN | DIASTOLIC BLOOD PRESSURE: 58 MMHG | BODY MASS INDEX: 27.4 KG/M2

## 2017-06-26 DIAGNOSIS — M25.511 CHRONIC PAIN OF BOTH SHOULDERS: ICD-10-CM

## 2017-06-26 DIAGNOSIS — I10 ESSENTIAL HYPERTENSION: Primary | ICD-10-CM

## 2017-06-26 DIAGNOSIS — Z23 ENCOUNTER FOR IMMUNIZATION: ICD-10-CM

## 2017-06-26 DIAGNOSIS — I50.20 SYSTOLIC CONGESTIVE HEART FAILURE, UNSPECIFIED CONGESTIVE HEART FAILURE CHRONICITY: ICD-10-CM

## 2017-06-26 DIAGNOSIS — G89.29 CHRONIC PAIN OF BOTH SHOULDERS: ICD-10-CM

## 2017-06-26 DIAGNOSIS — M25.512 CHRONIC PAIN OF BOTH SHOULDERS: ICD-10-CM

## 2017-06-26 DIAGNOSIS — Z00.00 MEDICARE ANNUAL WELLNESS VISIT, INITIAL: ICD-10-CM

## 2017-06-26 DIAGNOSIS — Z13.39 SCREENING FOR ALCOHOLISM: ICD-10-CM

## 2017-06-26 DIAGNOSIS — M17.11 PRIMARY OSTEOARTHRITIS OF RIGHT KNEE: ICD-10-CM

## 2017-06-26 DIAGNOSIS — R68.2 DRY MOUTH: ICD-10-CM

## 2017-06-26 DIAGNOSIS — Z13.31 SCREENING FOR DEPRESSION: ICD-10-CM

## 2017-06-26 DIAGNOSIS — Z00.00 ROUTINE GENERAL MEDICAL EXAMINATION AT A HEALTH CARE FACILITY: ICD-10-CM

## 2017-06-26 DIAGNOSIS — M75.02 ADHESIVE CAPSULITIS OF BOTH SHOULDERS: ICD-10-CM

## 2017-06-26 DIAGNOSIS — M75.01 ADHESIVE CAPSULITIS OF BOTH SHOULDERS: ICD-10-CM

## 2017-06-26 DIAGNOSIS — E78.00 PURE HYPERCHOLESTEROLEMIA: ICD-10-CM

## 2017-06-26 RX ORDER — TRAMADOL HYDROCHLORIDE 50 MG/1
50 TABLET ORAL
Qty: 30 TAB | Refills: 0 | Status: SHIPPED | OUTPATIENT
Start: 2017-06-26 | End: 2018-01-22 | Stop reason: SDUPTHER

## 2017-06-26 RX ORDER — DICLOFENAC SODIUM 10 MG/G
GEL TOPICAL
Qty: 100 G | Refills: 2 | Status: SHIPPED | OUTPATIENT
Start: 2017-06-26 | End: 2020-05-13 | Stop reason: SDUPTHER

## 2017-06-26 RX ORDER — ACETAMINOPHEN 500 MG
500 TABLET ORAL
Qty: 60 TAB | Refills: 5 | Status: SHIPPED | OUTPATIENT
Start: 2017-06-26 | End: 2020-05-04 | Stop reason: SDUPTHER

## 2017-06-26 NOTE — PROGRESS NOTES
This is an Initial Medicare Annual Wellness Exam (AWV) (Performed 12 months after IPPE or effective date of Medicare Part B enrollment, Once in a lifetime)    I have reviewed the patient's medical history in detail and updated the computerized patient record. History     Past Medical History:   Diagnosis Date    Arrhythmia     bradycardia,S/P pacemaker    [facility]     Heart failure ([facility] [facility]     Hypertension     Irregular heart rate     Pure hypercholesterolemia 3/27/2017      Past Surgical History:   Procedure Laterality Date    HX ORTHOPAEDIC      left total knee replacement    HX PACEMAKER       Current Outpatient Prescriptions   Medication Sig Dispense Refill    varicella zoster vacine live (ZOSTAVAX) 19,400 unit/0.65 mL susr injection 1 Vial by SubCUTAneous route once for 1 dose. 0.65 mL 0    acetaminophen (TYLENOL) 500 mg tablet Take 1 Tab by mouth two (2) times daily as needed for Pain. 60 Tab 5    traMADol (ULTRAM) 50 mg tablet Take 1 Tab by mouth daily as needed for Pain. 30 Tab 0    diclofenac (VOLTAREN) 1 % gel Apply  to affected area two (2) times daily as needed. 100 g 2    amLODIPine (NORVASC) 10 mg tablet Take 1 Tab by mouth daily. 90 Tab 2    calcium carbonate (OS-AYALA) 500 mg calcium (1,250 mg) tablet Take 1 Tab by mouth daily.  ergocalciferol (ERGOCALCIFEROL) 50,000 unit capsule Take 1 Cap by mouth every seven (7) days. (Patient taking differently: Take 50,000 Units by mouth every seven (7) days. FRIDAYS) 4 Cap 2    nystatin (MYCOSTATIN) 100,000 unit/mL suspension Take 5 mL by mouth four (4) times daily. swish and spit 240 mL 0    fluticasone (FLONASE) 50 mcg/actuation nasal spray 2 Sprays by Both Nostrils route daily. 1 Bottle 1    albuterol (PROAIR HFA) 90 mcg/actuation inhaler Take 1 Puff by inhalation every four (4) hours as needed.  1 Inhaler 3    calcium carbonate-vitamin D3 (CALTRATE WITH VITAMIN D3) 600 mg(1,500mg) -800 unit tab Take 1 Tab by mouth daily. 30 Tab 12    cetirizine (ZYRTEC) 10 mg tablet TAKE 1 TABLET BY MOUTH EVERY DAY 30 Tab 0    aspirin 81 mg tablet Take 81 mg by mouth. Allergies   Allergen Reactions    Codeine Unknown (comments)    Morphine Unknown (comments)    Pcn [Penicillins] Unknown (comments)    Pseudoephedrine Unknown (comments)     Family History   Problem Relation Age of Onset    Hypertension Mother    [facility] Father      prostate    Hypertension Father      Social History   Substance Use Topics    Smoking status: Never Smoker    Smokeless tobacco: Never Used    Alcohol use No     Patient Active Problem List   Diagnosis Code    Unstable angina ([facility]) I20.0    CHF (congestive heart failure) ([facility]) I50.9    Pacemaker Z95.0    Essential hypertension I10    Advance care planning Z71.89    Pure hypercholesterolemia E78.00         Depression Risk Factor Screening:     PHQ over the last two weeks 6/26/2017   Little interest or pleasure in doing things Not at all   Feeling down, depressed or hopeless Not at all   Total Score PHQ 2 0     Alcohol Risk Factor Screening: On any occasion during the past 3 months, have you had more than 3 drinks containing alcohol? No    Do you average more than 7 drinks per week? No      Functional Ability and Level of Safety:     Hearing Loss   none    Activities of Daily Living   Self-care.    Requires assistance with:   ADL Assessment 6/26/2017   Feeding yourself No Help Needed   Getting from bed to chair No Help Needed   Getting dressed No Help Needed   Bathing or showering No Help Needed   Walk across the room (includes cane/walker) Help Needed   Using the telphone No Help Needed   Taking your medications No Help Needed   Preparing meals No Help Needed   Managing money (expenses/bills) No Help Needed   Moderately strenuous housework (laundry) No Help Needed   Shopping for personal items (toiletries/medicines) No Help Needed   Shopping for groceries No Help Needed   Driving No Help Needed   Climbing a flight of stairs No Help Needed   Getting to places beyond walking distances Help Needed     Patient states she does not have an exercise program, but states she stays very busy with her daughter. Patient states her daughter had a stroke in 2005 and the patient is now caring for her on a daily basis. Patient lives with her  and daughter. Patient states uses a cane for ambulation, but will also use her daughter's rollator. Fall Risk   Fall Risk Assessment, last 12 mths 6/26/2017   Able to walk? Yes   Fall in past 12 months? No   Fall with injury? -   Number of falls in past 12 months -   Fall Risk Score -     Abuse Screen      Abuse Screening Questionnaire 6/26/2017   Do you ever feel afraid of your partner? N   Are you in a relationship with someone who physically or mentally threatens you? N   Is it safe for you to go home? Y         Review of Systems   Not required  Annual Medicare Wellness Visit    Physical Examination     No exam data present    Evaluation of Cognitive Function:  Mood/affect:  happy  Appearance: age appropriate, well dressed and within normal Limits  Family member/caregiver input: none present    No exam performed today, Annual Medicare Wellness Visit. Patient Care Team:  Franklin Pierce MD as PCP - General (Internal Medicine)  Rani Rojas LPN as Ambulatory Care Navigator (Internal Medicine)  Aliyah Ibarra RN as Nurse Navigator (Internal Medicine)    Advice/Referrals/Counseling   Education and counseling provided:  Are appropriate based on today's review and evaluation  End-of-Life planning (with patient's consent)  Pneumococcal Vaccine  Screening Mammography  Colorectal cancer screening tests  Screening for glaucoma      Assessment/Plan   1. Discussed advance care planning. Patient states her niece is assisting with completion of the paperwork. Patient states once it is complete she will bring copy for office to scan.   2. Discussed preventative screenings. Patient is due for mammogram. Patient states she will think about it and let office know if she wishes to continue the screening. Patient states she last saw her eye doctor(Dr. Zhang Weir) in September 2016. Patient states she has never had a colonoscopy and states the time she had one it was unsuccessful. 3. Discussed immunizations. Patient is due for Pneumovax, ordered by Dr. Nica Anderson and given by the LPN. Dr. Nica Anderson also ordered Zostavax and has sent vaccine to pharmacy. AVS and preventative screenings table printed, reviewed, and handed to patient. Patient verbalized understanding to all information.

## 2017-06-26 NOTE — PROGRESS NOTES
HISTORY OF PRESENT ILLNESS  Justina Kirkpatrick is a 80 y.o. female here to follow up. reports both shoulder stiffness and decreased ROM. has moderate pain ,more at night,need tramadol refill. Need shingle vaccine. Reports dry mouth,using biotin drop,helps. Has HTN,stable. all lab done. has elevated lipid,want to know what food to eat. Has elevated lipid,need to get it checked. Need labs. Hypertension    Pertinent negatives include no palpitations and no blurred vision. Cholesterol Problem     CHF     Shoulder Pain      Follow-up     Fall   Pertinent negatives include no fever. Review of Systems   Constitutional: Negative. Negative for chills and fever. HENT: Negative. Eyes: Negative. Negative for blurred vision and double vision. Respiratory: Negative. Cardiovascular: Negative. Negative for palpitations. Gastrointestinal: Negative. Genitourinary: Negative. Musculoskeletal: Positive for falls and joint pain. Skin: Negative. Neurological: Negative. Endo/Heme/Allergies: Negative. Psychiatric/Behavioral: Negative. Physical Exam   Constitutional: She appears well-developed and well-nourished. No distress. HENT:        Neck: Normal range of motion. Neck supple. No JVD present. No thyromegaly present. Cardiovascular: Normal rate, regular rhythm, normal heart sounds and intact distal pulses. Pulmonary/Chest: Effort normal and breath sounds normal. No respiratory distress. She has no wheezes. She has no rales. Musculoskeletal: She exhibits tenderness. She exhibits no edema. B/L shoulder:tender. ROM restricted in all direction. Psychiatric: She has a normal mood and affect. Her behavior is normal.       Yvonne Lovett was seen today for hypertension, cholesterol problem, chf, shoulder pain and annual wellness visit. Diagnoses and all orders for this visit:    Essential hypertension    Stable. on meds.     Systolic congestive heart failure, unspecified congestive heart failure chronicity (HCC)    Compensated. Pure hypercholesterolemia  On statin. Encounter for immunization  -     PNEUMOCOCCAL POLYSACCHARIDE VACCINE, 23-VALENT, ADULT OR IMMUNOSUPPRESSED PT DOSE,  -     varicella zoster vacine live (ZOSTAVAX) 19,400 unit/0.65 mL susr injection; 1 Vial by SubCUTAneous route once for 1 dose. Chronic pain of both shoulders  Will call in,  -     traMADol (ULTRAM) 50 mg tablet; Take 1 Tab by mouth daily as needed for Pain. #30 no refill. F/U in 3 months. Primary osteoarthritis of right knee  -     acetaminophen (TYLENOL) 500 mg tablet; Take 1 Tab by mouth two (2) times daily as needed for Pain. Adhesive capsulitis of both shoulders  -     diclofenac (VOLTAREN) 1 % gel; Apply  to affected area two (2) times daily as needed. Medicare annual wellness visit, initial  -     Depression Screen Annual    Dry mouth  On biotin spray. Routine general medical examination at a health care facility    Screening for alcoholism    Screening for depression  -     Depression Screen Annual    Discussed expected course/resolution/complications of diagnosis in detail with patient. Medication risks/benefits/costs/interactions/alternatives discussed with patient. Pt was given an after visit summary which includes diagnoses, current medications & vitals. Pt expressed understanding with the diagnosis and plan.

## 2017-06-26 NOTE — MR AVS SNAPSHOT
Visit Information Date & Time Provider Department Dept. Phone Encounter #  
 6/26/2017 10:15 AM Fatou Phillips, [facility] [phone] [id number] Upcoming Health Maintenance Date Due DTaP/Tdap/Td series (1 - Tdap) [birth year]2/1954 ZOSTER VACCINE AGE 60> 2/2/1993 Pneumococcal 65+ Low/Medium Risk (2 of 2 - PPSV23) 10/28/2016 INFLUENZA AGE 9 TO ADULT 8/1/2017 MEDICARE YEARLY EXAM 6/27/2018 GLAUCOMA SCREENING Q2Y 9/15/2018 Allergies as of 6/26/2017  Review Complete On: 6/26/2017 By: Fatou Phillips MD  
  
 Severity Noted Reaction Type Reactions Codeine  09/23/2011    Unknown (comments) Morphine  09/23/2011    Unknown (comments) Pcn [Penicillins]  09/23/2011    Unknown (comments) Pseudoephedrine  09/23/2011    Unknown (comments) Current Immunizations  Reviewed on 6/26/2017 Name Date Influenza High Dose Vaccine PF 10/28/2015 Pneumococcal Conjugate (PCV-13) 10/28/2015 Pneumococcal Polysaccharide (PPSV-23) 6/26/2017 Reviewed by Fatou Phillips MD on 6/26/2017 at 10:53 AM  
You Were Diagnosed With   
  
 Codes Comments Essential hypertension    -  Primary ICD-10-CM: I10 
ICD-9-CM: 401.9 Systolic congestive heart failure, unspecified congestive heart failure chronicity (HCC)     ICD-10-CM: I50.20 ICD-9-CM: 428.20, 428.0 Pure hypercholesterolemia     ICD-10-CM: E78.00 ICD-9-CM: 272.0 Encounter for immunization     ICD-10-CM: I69 ICD-9-CM: V03.89 Chronic pain of both shoulders     ICD-10-CM: M25.512, G89.29, M25.511 ICD-9-CM: 719.41, 338.29 Primary osteoarthritis of right knee     ICD-10-CM: M17.11 ICD-9-CM: 715.16 Adhesive capsulitis of both shoulders     ICD-10-CM: M75.01, M75.02 
ICD-9-CM: 726.0 Medicare annual wellness visit, initial     ICD-10-CM: Z00.00 ICD-9-CM: V70.0 Dry mouth     ICD-10-CM: R68.2 ICD-9-CM: 527.7 Vitals BP Pulse Temp Resp Height(growth percentile) Weight(growth percentile) 119/58 (BP 1 Location: Left arm, BP Patient Position: Sitting) 64 96.7 °F (35.9 °C) (Oral) 20 5' 7\" (1.702 m) 174 lb 9.6 oz (79.2 kg) SpO2 BMI OB Status Smoking Status 100% 27.35 kg/m2 Postmenopausal Never Smoker Vitals History BMI and BSA Data Body Mass Index Body Surface Area  
 27.35 kg/m 2 1.93 m 2 Preferred Pharmacy Pharmacy Name Phone [id number] [phone] Your Updated Medication List  
  
   
This list is accurate as of: 6/26/17 11:19 AM.  Always use your most recent med list.  
  
  
  
  
 acetaminophen 500 mg tablet Commonly known as:  TYLENOL Take 1 Tab by mouth two (2) times daily as needed for Pain. albuterol 90 mcg/actuation inhaler Commonly known as:  PROAIR HFA Take 1 Puff by inhalation every four (4) hours as needed. amLODIPine 10 mg tablet Commonly known as:  Amanda Million Take 1 Tab by mouth daily. aspirin 81 mg tablet Take 81 mg by mouth.  
  
 calcium carbonate 500 mg calcium (1,250 mg) tablet Commonly known as:  OS-AYALA Take 1 Tab by mouth daily. calcium carbonate-vitamin D3 600 mg(1,500mg) -800 unit Tab Commonly known as:  CALTRATE WITH VITAMIN D3 Take 1 Tab by mouth daily. cetirizine 10 mg tablet Commonly known as:  ZYRTEC  
TAKE 1 TABLET BY MOUTH EVERY DAY  
  
 diclofenac 1 % Gel Commonly known as:  VOLTAREN Apply  to affected area two (2) times daily as needed. ergocalciferol 50,000 unit capsule Commonly known as:  ERGOCALCIFEROL Take 1 Cap by mouth every seven (7) days. fluticasone 50 mcg/actuation nasal spray Commonly known as:  Jaclyn Posey 2 Sprays by Both Nostrils route daily. nystatin 100,000 unit/mL suspension Commonly known as:  MYCOSTATIN Take 5 mL by mouth four (4) times daily. swish and spit  
  
 traMADol 50 mg tablet Commonly known as:  ULTRAM  
Take 1 Tab by mouth daily as needed for Pain.  
  
 varicella zoster vacine live 19,400 unit/0.65 mL Susr injection Commonly known as:  ZOSTAVAX  
1 Vial by SubCUTAneous route once for 1 dose. Prescriptions Printed Refills  
 traMADol (ULTRAM) 50 mg tablet 0 Sig: Take 1 Tab by mouth daily as needed for Pain. Class: Print Route: Oral  
  
Prescriptions Sent to Pharmacy Refills  
 varicella zoster vacine live (ZOSTAVAX) 19,400 unit/0.65 mL susr injection 0 Si Vial by SubCUTAneous route once for 1 dose. Class: Normal  
 Pharmacy: [address] [place] [address] Ph #: [phone] Route: SubCUTAneous  
 acetaminophen (TYLENOL) 500 mg tablet 5 Sig: Take 1 Tab by mouth two (2) times daily as needed for Pain. Class: Normal  
 Pharmacy: [address] [place] [address] Ph #: [phone] Route: Oral  
 diclofenac (VOLTAREN) 1 % gel 2 Sig: Apply  to affected area two (2) times daily as needed. Class: Normal  
 Pharmacy: [address] [place] [address] Ph #: [phone] Route: Topical  
  
We Performed the Following PNEUMOCOCCAL POLYSACCHARIDE VACCINE, 23-VALENT, ADULT OR IMMUNOSUPPRESSED PT DOSE, [35632 CPT(R)] Patient Instructions Shoulder Arthritis: Exercises Your Care Instructions Here are some examples of typical rehabilitation exercises for your condition. Start each exercise slowly. Ease off the exercise if you start to have pain. Your doctor or physical therapist will tell you when you can start these exercises and which ones will work best for you. How to do the exercises Shoulder flexion (lying down) Note: To make a wand for this exercise, use a piece of PVC pipe or a broom handle with the broom removed. Make the wand about a foot wider than your shoulders. 1. Lie on your back, holding a wand with both hands. Your palms should face down as you hold the wand. 2. Keeping your elbows straight, slowly raise your arms over your head. Raise them until you feel a stretch in your shoulders, upper back, and chest. 
3. Hold for 15 to 30 seconds. 4. Repeat 2 to 4 times. Shoulder rotation (lying down) Note: To make a wand for this exercise, use a piece of PVC pipe or a broom handle with the broom removed. Make the wand about a foot wider than your shoulders. 1. Lie on your back. Hold a wand with both hands with your elbows bent and palms up. 2. Keep your elbows close to your body, and move the wand across your body toward the sore arm. 3. Hold for 8 to 12 seconds. 4. Repeat 2 to 4 times. Shoulder internal rotation with towel 1. Hold a towel above and behind your head with the arm that is not sore. 2. With your sore arm, reach behind your back and grasp the towel. 3. With the arm above your head, pull the towel upward. Do this until you feel a stretch on the front and outside of your sore shoulder. 4. Hold 15 to 30 seconds. 5. Repeat 2 to 4 times. Shoulder blade squeeze 1. Stand with your arms at your sides, and squeeze your shoulder blades together. Do not raise your shoulders up as you squeeze. 2. Hold 6 seconds. 3. Repeat 8 to 12 times. Resisted rows Note: For this exercise, you will need elastic exercise material, such as surgical tubing or Thera-Band. 1. Put the band around a solid object at about waist level. (A bedpost will work well.) Each hand should hold an end of the band. 2. With your elbows at your sides and bent to 90 degrees, pull the band back. Your shoulder blades should move toward each other. Return to the starting position. 3. Repeat 8 to 12 times. External rotator strengthening exercise 1. Start by tying a piece of elastic exercise material to a doorknob.  You can use surgical tubing or Thera-Band. (You may also hold one end of the band in each hand.) 2. Stand or sit with your shoulder relaxed and your elbow bent 90 degrees. Your upper arm should rest comfortably against your side. Squeeze a rolled towel between your elbow and your body for comfort. This will help keep your arm at your side. 3. Hold one end of the elastic band with the hand of the painful arm. 4. Start with your forearm across your belly. Slowly rotate the forearm out away from your body. Keep your elbow and upper arm tucked against the towel roll or the side of your body until you begin to feel tightness in your shoulder. Slowly move your arm back to where you started. 5. Repeat 8 to 12 times. Internal rotator strengthening exercise 1. Start by tying a piece of elastic exercise material to a doorknob. You can use surgical tubing or Thera-Band. 2. Stand or sit with your shoulder relaxed and your elbow bent 90 degrees. Your upper arm should rest comfortably against your side. Squeeze a rolled towel between your elbow and your body for comfort. This will help keep your arm at your side. 3. Hold one end of the elastic band in the hand of the painful arm. 4. Slowly rotate your forearm toward your body until it touches your belly. Slowly move it back to where you started. 5. Keep your elbow and upper arm firmly tucked against the towel roll or at your side. 6. Repeat 8 to 12 times. Pendulum swing Note: If you have pain in your back, do not do this exercise. 1. Hold on to a table or the back of a chair with your good arm. Then bend forward a little and let your sore arm hang straight down. This exercise does not use the arm muscles. Rather, use your legs and your hips to create movement that makes your arm swing freely. 2. Use the movement from your hips and legs to guide the slightly swinging arm back and forth like a pendulum (or elephant trunk).  Then guide it in circles that start small (about the size of a dinner plate). Make the circles a bit larger each day, as your pain allows. 3. Do this exercise for 5 minutes, 5 to 7 times each day. 4. As you have less pain, try bending over a little farther to do this exercise. This will increase the amount of movement at your shoulder. Follow-up care is a key part of your treatment and safety. Be sure to make and go to all appointments, and call your doctor if you are having problems. It's also a good idea to know your test results and keep a list of the medicines you take. Where can you learn more? Go to http://francis-gene.info/. Enter H562 in the search box to learn more about \"Shoulder Arthritis: Exercises. \" Current as of: March 21, 2017 Content Version: 11.3 © 1548-8652 [employer]. Care instructions adapted under license by [employer] (which disclaims liability or warranty for this information). If you have questions about a medical condition or this instruction, always ask your healthcare professional. [place] [address] any warranty or liability for your use of this information. Medicare Part B Preventive Services Guidelines/Limitations Date last completed and Frequency Due Date Bone Mass Measurement 
(age 72 & older, biennial) Requires diagnosis related to osteoporosis or estrogen deficiency. Biennial benefit unless patient has history of long-term glucocorticoid tx or baseline is needed because initial test was by other method Completed 11/13/2015 Recommended every 2 years Due 11/13/2017 Cardiovascular Screening Blood Tests (every 5 years) Total cholesterol, HDL, Triglycerides Order as a panel if possible Completed 3/27/2017 Recommended annually Due 3/27/2018 Colorectal Cancer Screening 
-Fecal occult blood test (annual) -Flexible sigmoidoscopy (5y) 
-Screening colonoscopy (10y) -Barium Enema  Completed Recommended every  years  Complete Counseling to Prevent Tobacco Use (up to 8 sessions per year) - Counseling greater than 3 and up to 10 minutes - Counseling greater than 10 minutes Patients must be asymptomatic of tobacco-related conditions to receive as preventive service  Not applicable Diabetes Screening Tests (at least every 3 years, Medicare covers annually or at 6-month intervals for prediabetic patients) Fasting blood sugar (FBS) or glucose tolerance test (GTT) Patient must be diagnosed with one of the following: 
-Hypertension, Dyslipidemia, obesity, previous impaired FBS or GTT 
Or any two of the following: overweight, FH of diabetes, age ? 72, history of gestational diabetes, birth of baby weighing more than 9 pounds Completed: 3/27/2017 Recommended every 3 years for non-diabetics Recommended every 3-6 months for Pre-Diabetics and Diabetics Due 3/27/2020 Diabetes Self-Management Training (DSMT) (no USPSTF recommendation) Requires referral by treating physician for patient with diabetes or renal disease. 10 hours of initial DSMT session of no less than 30 minutes each in a continuous 12-month period. 2 hours of follow-up DSMT in subsequent years. Not applicable Glaucoma Screening (no USPSTF recommendation) Diabetes mellitus, family history, , age 48 or over,  American, age 72 or over Completed Recommended annually Continue to follow up with Dr. Yousuf Grady as recommended Human Immunodeficiency Virus (HIV) Screening (annually for increased risk patients) HIV-1 and HIV-2 by EIA, KOLTON, rapid antibody test, or oral mucosa transudate Patient must be at increased risk for HIV infection per USPSTF guidelines or pregnant. Tests covered annually for patients at increased risk. Pregnant patients may receive up to 3 tests during pregnancy. Not applicable Medical Nutrition Therapy (MNT) (for diabetes or renal disease not recommended schedule) Requires referral by treating physician for patient with diabetes or renal disease. Can be provided in same year as diabetes self-management training (DSMT), and CMS recommends medical nutrition therapy take place after DSMT. Up to 3 hours for initial year and 2 hours in subsequent years. Not applicable Prostate Cancer Screening (annually up to age 76) - Digital rectal exam (NICK) - Prostate specific antigen (PSA) Annually (age 48 or over), NICK not paid separately when covered E/M service is provided on same date Completed Recommended annually to age 76 Not applicable Seasonal Influenza Vaccination (annually)  Completed 10/28/2015 Recommended Annually Due Fall 2017 Pneumococcal Vaccination (once after 65)  Pneumococcal 23 never Recommended once over the age of 72 Prevnar 13  10/28/2015 Recommended once over the age of 72 Complete Complete Hepatitis B Vaccinations (if medium/high risk) Medium/high risk factors:  End-stage renal disease, Hemophiliacs who received Factor VIII or IX concentrates, Clients of institutions for the mentally retarded, Persons who live in the same house as a HepB virus carrier, Homosexual men, Illicit injectable drug abusers. Not applicable Screening Mammography (biennial age 54-69)? Annually (age 36 or over) Completed Please let us know if you are interested in completing Screening Pap Tests and Pelvic Examination (up to age 79 and after 79 if unknown history or abnormal study last 10 years) Every 24 months except high risk Completed  Complete Ultrasound Screening for Abdominal Aortic Aneurysm (AAA) (once) Patient must be referred through IPPE and not have had a screening for abdominal aortic aneurysm before under Medicare. Limited to patients who meet one of the following criteria: 
- Men who are 73-68 years old and have smoked more than 100 cigarettes in their lifetime. 
-Anyone with a FH of AAA -Anyone recommended for screening by USPSTF  Not applicable Family Practice Management 2011 When you completed your advance medical directive please bring a copy to the office to add to your medical record. Introducing [facility] SERVICES! [employer] introduces [email or web address] patient portal. Now you can access parts of your medical record, email your doctor's office, and request medication refills online. 1. In your internet browser, go to [email or web address] 2. Click on the First Time User? Click Here link in the Sign In box. You will see the New Member Sign Up page. 3. Enter your [email or web address] Access Code exactly as it appears below. You will not need to use this code after youve completed the sign-up process. If you do not sign up before the expiration date, you must request a new code. · [email or web address] Access Code: [email or web address] Expires: 9/24/2017 10:57 AM 
 
4. Enter the last four digits of your Social Security Number (xxxx) and Date of Birth (mm/dd/yyyy) as indicated and click Submit. You will be taken to the next sign-up page. 5. Create a Teez.by ID. This will be your [email or web address] login ID and cannot be changed, so think of one that is secure and easy to remember. 6. Create a Teez.by password. You can change your password at any time. 7. Enter your Password Reset Question and Answer. This can be used at a later time if you forget your password. 8. Enter your e-mail address. You will receive e-mail notification when new information is available in [address] 9. Click Sign Up. You can now view and download portions of your medical record. 10. Click the Download Summary menu link to download a portable copy of your medical information. If you have questions, please visit the Frequently Asked Questions section of the [email or web address] website. Remember, [email or web address] is NOT to be used for urgent needs. For medical emergencies, dial 911. Now available from your iPhone and Android! Please provide this summary of care documentation to your next provider. Your primary care clinician is listed as Darcie Richardson. If you have any questions after today's visit, please call [phone].

## 2017-06-26 NOTE — PATIENT INSTRUCTIONS
Shoulder Arthritis: Exercises  Your Care Instructions  Here are some examples of typical rehabilitation exercises for your condition. Start each exercise slowly. Ease off the exercise if you start to have pain. Your doctor or physical therapist will tell you when you can start these exercises and which ones will work best for you. How to do the exercises  Shoulder flexion (lying down)    Note: To make a wand for this exercise, use a piece of PVC pipe or a broom handle with the broom removed. Make the wand about a foot wider than your shoulders. 1. Lie on your back, holding a wand with both hands. Your palms should face down as you hold the wand. 2. Keeping your elbows straight, slowly raise your arms over your head. Raise them until you feel a stretch in your shoulders, upper back, and chest.  3. Hold for 15 to 30 seconds. 4. Repeat 2 to 4 times. Shoulder rotation (lying down)    Note: To make a wand for this exercise, use a piece of PVC pipe or a broom handle with the broom removed. Make the wand about a foot wider than your shoulders. 1. Lie on your back. Hold a wand with both hands with your elbows bent and palms up. 2. Keep your elbows close to your body, and move the wand across your body toward the sore arm. 3. Hold for 8 to 12 seconds. 4. Repeat 2 to 4 times. Shoulder internal rotation with towel    1. Hold a towel above and behind your head with the arm that is not sore. 2. With your sore arm, reach behind your back and grasp the towel. 3. With the arm above your head, pull the towel upward. Do this until you feel a stretch on the front and outside of your sore shoulder. 4. Hold 15 to 30 seconds. 5. Repeat 2 to 4 times. Shoulder blade squeeze    1. Stand with your arms at your sides, and squeeze your shoulder blades together. Do not raise your shoulders up as you squeeze. 2. Hold 6 seconds. 3. Repeat 8 to 12 times.   Resisted rows    Note: For this exercise, you will need elastic exercise material, such as surgical tubing or Thera-Band. 1. Put the band around a solid object at about waist level. (A bedpost will work well.) Each hand should hold an end of the band. 2. With your elbows at your sides and bent to 90 degrees, pull the band back. Your shoulder blades should move toward each other. Return to the starting position. 3. Repeat 8 to 12 times. External rotator strengthening exercise    1. Start by tying a piece of elastic exercise material to a doorknob. You can use surgical tubing or Thera-Band. (You may also hold one end of the band in each hand.)  2. Stand or sit with your shoulder relaxed and your elbow bent 90 degrees. Your upper arm should rest comfortably against your side. Squeeze a rolled towel between your elbow and your body for comfort. This will help keep your arm at your side. 3. Hold one end of the elastic band with the hand of the painful arm. 4. Start with your forearm across your belly. Slowly rotate the forearm out away from your body. Keep your elbow and upper arm tucked against the towel roll or the side of your body until you begin to feel tightness in your shoulder. Slowly move your arm back to where you started. 5. Repeat 8 to 12 times. Internal rotator strengthening exercise    1. Start by tying a piece of elastic exercise material to a doorknob. You can use surgical tubing or Thera-Band. 2. Stand or sit with your shoulder relaxed and your elbow bent 90 degrees. Your upper arm should rest comfortably against your side. Squeeze a rolled towel between your elbow and your body for comfort. This will help keep your arm at your side. 3. Hold one end of the elastic band in the hand of the painful arm. 4. Slowly rotate your forearm toward your body until it touches your belly. Slowly move it back to where you started. 5. Keep your elbow and upper arm firmly tucked against the towel roll or at your side. 6. Repeat 8 to 12 times.   Pendulum swing    Note: If you have pain in your back, do not do this exercise. 1. Hold on to a table or the back of a chair with your good arm. Then bend forward a little and let your sore arm hang straight down. This exercise does not use the arm muscles. Rather, use your legs and your hips to create movement that makes your arm swing freely. 2. Use the movement from your hips and legs to guide the slightly swinging arm back and forth like a pendulum (or elephant trunk). Then guide it in circles that start small (about the size of a dinner plate). Make the circles a bit larger each day, as your pain allows. 3. Do this exercise for 5 minutes, 5 to 7 times each day. 4. As you have less pain, try bending over a little farther to do this exercise. This will increase the amount of movement at your shoulder. Follow-up care is a key part of your treatment and safety. Be sure to make and go to all appointments, and call your doctor if you are having problems. It's also a good idea to know your test results and keep a list of the medicines you take. Where can you learn more? Go to http://francis-gene.info/. Enter H562 in the search box to learn more about \"Shoulder Arthritis: Exercises. \"  Current as of: March 21, 2017  Content Version: 11.3  © 2524-9677 [employer]. Care instructions adapted under license by [employer] (which disclaims liability or warranty for this information). If you have questions about a medical condition or this instruction, always ask your healthcare professional. [place] [address] any warranty or liability for your use of this information. Medicare Part B Preventive Services Guidelines/Limitations Date last completed and Frequency Due Date   Bone Mass Measurement  (age 72 & older, biennial) Requires diagnosis related to osteoporosis or estrogen deficiency.  Biennial benefit unless patient has history of long-term glucocorticoid tx or baseline is needed because initial test was by other method Completed 11/13/2015  Recommended every 2 years Due 11/13/2017   Cardiovascular Screening Blood Tests (every 5 years)  Total cholesterol, HDL, Triglycerides Order as a panel if possible Completed 3/27/2017  Recommended annually Due 3/27/2018   Colorectal Cancer Screening  -Fecal occult blood test (annual)  -Flexible sigmoidoscopy (5y)  -Screening colonoscopy (10y)  -Barium Enema  Completed   Recommended every  years  Complete    Counseling to Prevent Tobacco Use (up to 8 sessions per year)  - Counseling greater than 3 and up to 10 minutes  - Counseling greater than 10 minutes Patients must be asymptomatic of tobacco-related conditions to receive as preventive service  Not applicable   Diabetes Screening Tests (at least every 3 years, Medicare covers annually or at 6-month intervals for prediabetic patients)    Fasting blood sugar (FBS) or glucose tolerance test (GTT) Patient must be diagnosed with one of the following:  -Hypertension, Dyslipidemia, obesity, previous impaired FBS or GTT  Or any two of the following: overweight, FH of diabetes, age ? 72, history of gestational diabetes, birth of baby weighing more than 9 pounds Completed: 3/27/2017    Recommended every 3 years for non-diabetics    Recommended every 3-6 months for Pre-Diabetics and Diabetics Due 3/27/2020   Diabetes Self-Management Training (DSMT) (no USPSTF recommendation) Requires referral by treating physician for patient with diabetes or renal disease. 10 hours of initial DSMT session of no less than 30 minutes each in a continuous 12-month period. 2 hours of follow-up DSMT in subsequent years.   Not applicable   Glaucoma Screening (no USPSTF recommendation) Diabetes mellitus, family history, , age 48 or over,  American, age 72 or over Completed  Recommended annually Continue to follow up with Dr. Tj Goodman as recommended   Human Immunodeficiency Virus (HIV) Screening (annually for increased risk patients)  HIV-1 and HIV-2 by EIA, KOLTON, rapid antibody test, or oral mucosa transudate Patient must be at increased risk for HIV infection per USPSTF guidelines or pregnant. Tests covered annually for patients at increased risk. Pregnant patients may receive up to 3 tests during pregnancy. Not applicable   Medical Nutrition Therapy (MNT) (for diabetes or renal disease not recommended schedule) Requires referral by treating physician for patient with diabetes or renal disease. Can be provided in same year as diabetes self-management training (DSMT), and CMS recommends medical nutrition therapy take place after DSMT. Up to 3 hours for initial year and 2 hours in subsequent years. Not applicable   Prostate Cancer Screening (annually up to age 76)  - Digital rectal exam (NICK)  - Prostate specific antigen (PSA) Annually (age 48 or over), NICK not paid separately when covered E/M service is provided on same date Completed  Recommended annually to age 76 Not applicable   Seasonal Influenza Vaccination (annually)  Completed 10/28/2015  Recommended Annually Due Fall 2017   Pneumococcal Vaccination (once after 72)  Pneumococcal 23 never    Recommended once over the age of 72    Prevnar 15  10/28/2015  Recommended once over the age of 72 Complete            Complete   Hepatitis B Vaccinations (if medium/high risk) Medium/high risk factors:  End-stage renal disease,  Hemophiliacs who received Factor VIII or IX concentrates, Clients of institutions for the mentally retarded, Persons who live in the same house as a HepB virus carrier, Homosexual men, Illicit injectable drug abusers. Not applicable   Screening Mammography (biennial age 54-69)?  Annually (age 36 or over) Completed    Please let us know if you are interested in completing   Screening Pap Tests and Pelvic Examination (up to age 79 and after 79 if unknown history or abnormal study last 10 years) Every 24 months except high risk Completed Complete   Ultrasound Screening for Abdominal Aortic Aneurysm (AAA) (once) Patient must be referred through IPPE and not have had a screening for abdominal aortic aneurysm before under Medicare. Limited to patients who meet one of the following criteria:  - Men who are 73-68 years old and have smoked more than 100 cigarettes in their lifetime.  -Anyone with a FH of AAA  -Anyone recommended for screening by USPSTF  Not applicable   Family Practice Management 2011  When you completed your advance medical directive please bring a copy to the office to add to your medical record.

## 2017-06-26 NOTE — PROGRESS NOTES
Health Maintenance Due   Topic Date Due    DTaP/Tdap/Td series (1 - Tdap) 02/02/1954    ZOSTER VACCINE AGE 60>  02/02/1993    GLAUCOMA SCREENING Q2Y  02/02/1998    MEDICARE YEARLY EXAM  02/02/1998    Pneumococcal 65+ Low/Medium Risk (2 of 2 - PPSV23) 10/28/2016       Chief Complaint   Patient presents with    Hypertension     3 month follow up    Cholesterol Problem    CHF       1. Have you been to the ER, urgent care clinic since your last visit? Hospitalized since your last visit? No    2. Have you seen or consulted any other health care providers outside of the [address] [place] [address] since your last visit? Include any pap smears or colon screening. No    3) Do you have an Advance Directive on file? no    4) Are you interested in receiving information on Advance Directives? NO      Patient is accompanied by self I have received verbal consent from Ebony Vera to discuss any/all medical information while they are present in the room. Ebony Vera is a 80 y.o. female  who presents for routine immunizations. She denies any symptoms , reactions or allergies that would exclude them from being immunized today. Risks and adverse reactions were discussed and the VIS was given to them. All questions were addressed. She was observed for 10 min post injection. There were no reactions observed.     Adam Mckeon LPN

## 2017-09-25 ENCOUNTER — OFFICE VISIT (OUTPATIENT)
Dept: INTERNAL MEDICINE CLINIC | Age: 82
End: 2017-09-25

## 2017-09-25 VITALS
HEART RATE: 62 BPM | WEIGHT: 168 LBS | DIASTOLIC BLOOD PRESSURE: 62 MMHG | TEMPERATURE: 97.4 F | SYSTOLIC BLOOD PRESSURE: 122 MMHG | BODY MASS INDEX: 26.37 KG/M2 | OXYGEN SATURATION: 100 % | HEIGHT: 67 IN | RESPIRATION RATE: 20 BRPM

## 2017-09-25 DIAGNOSIS — E55.9 VITAMIN D DEFICIENCY: ICD-10-CM

## 2017-09-25 DIAGNOSIS — Z23 ENCOUNTER FOR IMMUNIZATION: ICD-10-CM

## 2017-09-25 DIAGNOSIS — E78.00 PURE HYPERCHOLESTEROLEMIA: ICD-10-CM

## 2017-09-25 DIAGNOSIS — I10 ESSENTIAL HYPERTENSION: Primary | ICD-10-CM

## 2017-09-25 DIAGNOSIS — I50.20 SYSTOLIC CONGESTIVE HEART FAILURE, UNSPECIFIED CONGESTIVE HEART FAILURE CHRONICITY: ICD-10-CM

## 2017-09-25 DIAGNOSIS — B37.0 ORAL THRUSH: ICD-10-CM

## 2017-09-25 DIAGNOSIS — R68.2 DRY MOUTH: ICD-10-CM

## 2017-09-25 RX ORDER — NYSTATIN 100000 [USP'U]/ML
1 SUSPENSION ORAL 4 TIMES DAILY
Qty: 240 ML | Refills: 0 | Status: SHIPPED | OUTPATIENT
Start: 2017-09-25 | End: 2018-01-22 | Stop reason: ALTCHOICE

## 2017-09-25 RX ORDER — AMLODIPINE BESYLATE 10 MG/1
10 TABLET ORAL DAILY
Qty: 90 TAB | Refills: 2 | Status: SHIPPED | OUTPATIENT
Start: 2017-09-25 | End: 2019-03-14 | Stop reason: SDUPTHER

## 2017-09-25 NOTE — PROGRESS NOTES
HISTORY OF PRESENT ILLNESS  Buddy Homans is a 80 y.o. female here to follow up. Reports dry mouth.midl oral thrush. need refill on med. Lost weight. active,ambulate OK. Has HTN,stable. Has elevated lipid,need to get it checked. Need labs. Need flu shot. Hypertension    Pertinent negatives include no palpitations and no blurred vision. CHF     Cholesterol Problem     Follow-up         Review of Systems   Constitutional: Negative. Negative for chills and fever. HENT: Negative. Eyes: Negative. Negative for blurred vision and double vision. Respiratory: Negative. Cardiovascular: Negative. Negative for palpitations. Gastrointestinal: Negative. Genitourinary: Negative. Musculoskeletal: Positive for falls and joint pain. Skin: Negative. Neurological: Negative. Endo/Heme/Allergies: Negative. Psychiatric/Behavioral: Negative. Physical Exam   Constitutional: She appears well-developed and well-nourished. No distress. HENT:   Dry oral mucosa. mild thrush     Neck: Normal range of motion. Neck supple. No JVD present. No thyromegaly present. Cardiovascular: Normal rate, regular rhythm, normal heart sounds and intact distal pulses. Pulmonary/Chest: Effort normal and breath sounds normal. No respiratory distress. She has no wheezes. She has no rales. Musculoskeletal: She exhibits no edema or tenderness. Psychiatric: She has a normal mood and affect. Her behavior is normal.       ASSESSMENT and PLAN    Diagnoses and all orders for this visit:    1. Essential hypertension  Stable. will refill,  -     METABOLIC PANEL, COMPREHENSIVE  -     amLODIPine (NORVASC) 10 mg tablet; Take 1 Tab by mouth daily. 2. Pure hypercholesterolemia  Will check,  -     METABOLIC PANEL, COMPREHENSIVE    3. Systolic congestive heart failure, unspecified congestive heart failure chronicity ([address]    compensated. -     METABOLIC PANEL, COMPREHENSIVE    4.  Encounter for immunization  - INFLUENZA VIRUS VACCINE, HIGH DOSE SEASONAL, PRESERVATIVE FREE    5. Dry mouth    Need tod rink more fluid. 6. Vitamin D deficiency  -     VITAMIN D, 25 HYDROXY    7. Oral thrush  Not much. adv to take if has too much whitish patch.  -     nystatin (MYCOSTATIN) 100,000 unit/mL suspension; Take 5 mL by mouth four (4) times daily. swish and spit              Discussed expected course/resolution/complications of diagnosis in detail with patient. Medication risks/benefits/costs/interactions/alternatives discussed with patient. Pt was given an after visit summary which includes diagnoses, current medications & vitals. Pt expressed understanding with the diagnosis and plan.

## 2017-09-25 NOTE — PATIENT INSTRUCTIONS
Vaccine Information Statement    Influenza (Flu) Vaccine (Inactivated or Recombinant): What you need to know    Many Vaccine Information Statements are available in Japanese and other languages. See www.immunize.org/vis  Hojas de Información Sobre Vacunas están disponibles en Español y en muchos otros idiomas. Visite www.immunize.org/vis    1. Why get vaccinated? Influenza (flu) is a contagious disease that spreads around the [place] every year, usually between October and May. Flu is caused by influenza viruses, and is spread mainly by coughing, sneezing, and close contact. Anyone can get flu. Flu strikes suddenly and can last several days. Symptoms vary by age, but can include:   fever/chills   sore throat   muscle aches   fatigue   cough   headache    runny or stuffy nose    Flu can also lead to pneumonia and blood infections, and cause diarrhea and seizures in children. If you have a medical condition, such as heart or lung disease, flu can make it worse. Flu is more dangerous for some people. Infants and young children, people 72years of age and older, pregnant women, and people with certain health conditions or a weakened immune system are at greatest risk. Each year thousands of people in the [facility] die from flu, and many more are hospitalized. Flu vaccine can:   keep you from getting flu,   make flu less severe if you do get it, and   keep you from spreading flu to your family and other people. 2. Inactivated and recombinant flu vaccines    A dose of flu vaccine is recommended every flu season. Children 6 months through 6years of age may need two doses during the same flu season. Everyone else needs only one dose each flu season.        Some inactivated flu vaccines contain a very small amount of a mercury-based preservative called thimerosal. Studies have not shown thimerosal in vaccines to be harmful, but flu vaccines that do not contain thimerosal are available. There is no live flu virus in flu shots. They cannot cause the flu. There are many flu viruses, and they are always changing. Each year a new flu vaccine is made to protect against three or four viruses that are likely to cause disease in the upcoming flu season. But even when the vaccine doesnt exactly match these viruses, it may still provide some protection    Flu vaccine cannot prevent:   flu that is caused by a virus not covered by the vaccine, or   illnesses that look like flu but are not. It takes about 2 weeks for protection to develop after vaccination, and protection lasts through the flu season. 3. Some people should not get this vaccine    Tell the person who is giving you the vaccine:     If you have any severe, life-threatening allergies. If you ever had a life-threatening allergic reaction after a dose of flu vaccine, or have a severe allergy to any part of this vaccine, you may be advised not to get vaccinated. Most, but not all, types of flu vaccine contain a small amount of egg protein.  If you ever had Guillain-Barré Syndrome (also called GBS). Some people with a history of GBS should not get this vaccine. This should be discussed with your doctor.  If you are not feeling well. It is usually okay to get flu vaccine when you have a mild illness, but you might be asked to come back when you feel better. 4. Risks of a vaccine reaction    With any medicine, including vaccines, there is a chance of reactions. These are usually mild and go away on their own, but serious reactions are also possible. Most people who get a flu shot do not have any problems with it.      Minor problems following a flu shot include:    soreness, redness, or swelling where the shot was given     hoarseness   sore, red or itchy eyes   cough   fever   aches   headache   itching   fatigue  If these problems occur, they usually begin soon after the shot and last 1 or 2 days. More serious problems following a flu shot can include the following:     There may be a small increased risk of Guillain-Barré Syndrome (GBS) after inactivated flu vaccine. This risk has been estimated at 1 or 2 additional cases per million people vaccinated. This is much lower than the risk of severe complications from flu, which can be prevented by flu vaccine.  Young children who get the flu shot along with pneumococcal vaccine (PCV13) and/or DTaP vaccine at the same time might be slightly more likely to have a seizure caused by fever. Ask your doctor for more information. Tell your doctor if a child who is getting flu vaccine has ever had a seizure. Problems that could happen after any injected vaccine:      People sometimes faint after a medical procedure, including vaccination. Sitting or lying down for about 15 minutes can help prevent fainting, and injuries caused by a fall. Tell your doctor if you feel dizzy, or have vision changes or ringing in the ears.  Some people get severe pain in the shoulder and have difficulty moving the arm where a shot was given. This happens very rarely.  Any medication can cause a severe allergic reaction. Such reactions from a vaccine are very rare, estimated at about 1 in a million doses, and would happen within a few minutes to a few hours after the vaccination. As with any medicine, there is a very remote chance of a vaccine causing a serious injury or death. The safety of vaccines is always being monitored. For more information, visit: www.cdc.gov/vaccinesafety/    5. What if there is a serious reaction? What should I look for?  Look for anything that concerns you, such as signs of a severe allergic reaction, very high fever, or unusual behavior.     Signs of a severe allergic reaction can include hives, swelling of the face and throat, difficulty breathing, a fast heartbeat, dizziness, and weakness  usually within a few minutes to a few hours after the vaccination. What should I do?  If you think it is a severe allergic reaction or other emergency that cant wait, call 9-1-1 and get the person to the nearest hospital. Otherwise, call your doctor.  Reactions should be reported to the Vaccine Adverse Event Reporting System (VAERS). Your doctor should file this report, or you can do it yourself through  the VAERS web site at www.vaers. [facility].gov, or by calling 7-778.563.8784. VAERS does not give medical advice. 6. The National Vaccine Injury Compensation Program    The [facility] Vaccine Injury Compensation Program (VICP) is a federal program that was created to compensate people who may have been injured by certain vaccines. Persons who believe they may have been injured by a vaccine can learn about the program and about filing a claim by calling 6-516.101.5664 or visiting the [employer] [address] website at www.[facility].gov/vaccinecompensation. There is a time limit to file a claim for compensation. 7. How can I learn more?  Ask your healthcare provider. He or she can give you the vaccine package insert or suggest other sources of information.  Call your local or state health department.  Contact the Centers for Disease Control and Prevention (CDC):  - Call 7-759.878.7883 (1-800-CDC-INFO) or  - Visit CDCs website at www.cdc.gov/flu    Vaccine Information Statement   Inactivated Influenza Vaccine   8/7/2015  42 RAJAN Graham [id number]    Department of Health and Human Services  Centers for Disease Control and Prevention    Office Use Only

## 2017-09-25 NOTE — PROGRESS NOTES
Health Maintenance Due   Topic Date Due    DTaP/Tdap/Td series (1 - Tdap) 02/02/1954    ZOSTER VACCINE AGE 60>  12/02/1992    INFLUENZA AGE 9 TO ADULT  08/01/2017       Chief Complaint   Patient presents with    Hypertension     3 month follow up    CHF    Cholesterol Problem       1. Have you been to the ER, urgent care clinic since your last visit? Hospitalized since your last visit? No    2. Have you seen or consulted any other health care providers outside of the [address] [place] [address] since your last visit? Include any pap smears or colon screening. No    3) Do you have an Advance Directive on file? no    4) Are you interested in receiving information on Advance Directives? NO      Patient is accompanied by self I have received verbal consent from Awilda Castro to discuss any/all medical information while they are present in the room. Awilda Castro is a 80 y.o. female  who presents for routine immunizations. She denies any symptoms , reactions or allergies that would exclude them from being immunized today. Risks and adverse reactions were discussed and the VIS was given to them. All questions were addressed. She was observed for 10 min post injection. There were no reactions observed.     Clemencia Valentine LPN

## 2017-09-25 NOTE — MR AVS SNAPSHOT
Visit Information Date & Time Provider Department Dept. Phone Encounter #  
 9/25/2017 10:15 AM Ami Dinh, [address] [facility] Internal Medicine [phone] [id number] Upcoming Health Maintenance Date Due DTaP/Tdap/Td series (1 - Tdap) 2/2/1954 ZOSTER VACCINE AGE 60> 12/2/1992 INFLUENZA AGE 9 TO ADULT 8/1/2017 MEDICARE YEARLY EXAM 6/27/2018 GLAUCOMA SCREENING Q2Y 9/15/2018 Allergies as of 9/25/2017  Review Complete On: 9/25/2017 By: Ami Dinh MD  
  
 Severity Noted Reaction Type Reactions Codeine  09/23/2011    Unknown (comments) Morphine  09/23/2011    Unknown (comments) Pcn [Penicillins]  09/23/2011    Unknown (comments) Pseudoephedrine  09/23/2011    Unknown (comments) Current Immunizations  Reviewed on 6/26/2017 Name Date Influenza High Dose Vaccine PF  Incomplete, 10/28/2015 Pneumococcal Conjugate (PCV-13) 10/28/2015 Pneumococcal Polysaccharide (PPSV-23) 6/26/2017 Not reviewed this visit You Were Diagnosed With   
  
 Codes Comments Essential hypertension    -  Primary ICD-10-CM: I10 
ICD-9-CM: 401.9 Pure hypercholesterolemia     ICD-10-CM: E78.00 ICD-9-CM: 272.0 Systolic congestive heart failure, unspecified congestive heart failure chronicity (HCC)     ICD-10-CM: I50.20 ICD-9-CM: 428.20, 428.0 Encounter for immunization     ICD-10-CM: D50 ICD-9-CM: V03.89 Dry mouth     ICD-10-CM: R68.2 ICD-9-CM: 527.7 Vitamin D deficiency     ICD-10-CM: E55.9 ICD-9-CM: 268.9 Oral thrush     ICD-10-CM: B37.0 ICD-9-CM: 112.0 Vitals BP Pulse Temp Resp Height(growth percentile) Weight(growth percentile) 122/62 (BP 1 Location: Left arm, BP Patient Position: Sitting) 62 97.4 °F (36.3 °C) (Oral) 20 5' 7\" (1.702 m) 168 lb (76.2 kg) SpO2 BMI OB Status Smoking Status 100% 26.31 kg/m2 Postmenopausal Never Smoker Vitals History BMI and BSA Data Body Mass Index Body Surface Area 26.31 kg/m 2 1.9 m 2 Preferred Pharmacy Pharmacy Name Phone [facility]Y [id number] [place] [phone] Your Updated Medication List  
  
   
This list is accurate as of: 9/25/17 10:58 AM.  Always use your most recent med list.  
  
  
  
  
 acetaminophen 500 mg tablet Commonly known as:  TYLENOL Take 1 Tab by mouth two (2) times daily as needed for Pain. albuterol 90 mcg/actuation inhaler Commonly known as:  PROAIR HFA Take 1 Puff by inhalation every four (4) hours as needed. amLODIPine 10 mg tablet Commonly known as:  Arnold Rosalina Take 1 Tab by mouth daily. aspirin 81 mg tablet Take 81 mg by mouth.  
  
 calcium carbonate 500 mg calcium (1,250 mg) tablet Commonly known as:  OS-AYALA Take 1 Tab by mouth daily. cetirizine 10 mg tablet Commonly known as:  ZYRTEC  
TAKE 1 TABLET BY MOUTH EVERY DAY  
  
 diclofenac 1 % Gel Commonly known as:  VOLTAREN Apply  to affected area two (2) times daily as needed. ergocalciferol 50,000 unit capsule Commonly known as:  ERGOCALCIFEROL Take 1 Cap by mouth every seven (7) days. fluticasone 50 mcg/actuation nasal spray Commonly known as:  Chu Kenner 2 Sprays by Both Nostrils route daily. nystatin 100,000 unit/mL suspension Commonly known as:  MYCOSTATIN Take 5 mL by mouth four (4) times daily. swish and spit  
  
 traMADol 50 mg tablet Commonly known as:  ULTRAM  
Take 1 Tab by mouth daily as needed for Pain. Prescriptions Sent to Pharmacy Refills  
 amLODIPine (NORVASC) 10 mg tablet 2 Sig: Take 1 Tab by mouth daily. Class: Normal  
 Pharmacy: [address] Ph #: [phone] Route: Oral  
 nystatin (MYCOSTATIN) 100,000 unit/mL suspension 0 Sig: Take 5 mL by mouth four (4) times daily. swish and spit  Class: Normal  
 Pharmacy: [facility]/pharmacy #[id number] - [place] [address] Ph #: [phone] Route: Oral  
  
We Performed the Following INFLUENZA VIRUS VACCINE, HIGH DOSE SEASONAL, PRESERVATIVE FREE [66433 CPT(R)] METABOLIC PANEL, COMPREHENSIVE [46263 CPT(R)] VITAMIN D, 25 HYDROXY G772129 CPT(R)] Patient Instructions Vaccine Information Statement Influenza (Flu) Vaccine (Inactivated or Recombinant): What you need to know Many Vaccine Information Statements are available in Icelandic and other languages. See www.immunize.org/vis Hojas de Información Sobre Vacunas están disponibles en Español y en muchos otros idiomas. Visite www.immunize.org/vis 1. Why get vaccinated? Influenza (flu) is a contagious disease that spreads around the [place] [facility] every year, usually between October and May. Flu is caused by influenza viruses, and is spread mainly by coughing, sneezing, and close contact. Anyone can get flu. Flu strikes suddenly and can last several days. Symptoms vary by age, but can include: 
 fever/chills  sore throat  muscle aches  fatigue  cough  headache  runny or stuffy nose Flu can also lead to pneumonia and blood infections, and cause diarrhea and seizures in children. If you have a medical condition, such as heart or lung disease, flu can make it worse. Flu is more dangerous for some people. Infants and young children, people 72years of age and older, pregnant women, and people with certain health conditions or a weakened immune system are at greatest risk. Each year thousands of people in the [facility] die from flu, and many more are hospitalized. Flu vaccine can: 
 keep you from getting flu, 
 make flu less severe if you do get it, and 
 keep you from spreading flu to your family and other people. 2. Inactivated and recombinant flu vaccines A dose of flu vaccine is recommended every flu season.  Children 6 months through 6years of age may need two doses during the same flu season. Everyone else needs only one dose each flu season. Some inactivated flu vaccines contain a very small amount of a mercury-based preservative called thimerosal. Studies have not shown thimerosal in vaccines to be harmful, but flu vaccines that do not contain thimerosal are available. There is no live flu virus in flu shots. They cannot cause the flu. There are many flu viruses, and they are always changing. Each year a new flu vaccine is made to protect against three or four viruses that are likely to cause disease in the upcoming flu season. But even when the vaccine doesnt exactly match these viruses, it may still provide some protection Flu vaccine cannot prevent: 
 flu that is caused by a virus not covered by the vaccine, or 
 illnesses that look like flu but are not. It takes about 2 weeks for protection to develop after vaccination, and protection lasts through the flu season. 3. Some people should not get this vaccine Tell the person who is giving you the vaccine:  If you have any severe, life-threatening allergies. If you ever had a life-threatening allergic reaction after a dose of flu vaccine, or have a severe allergy to any part of this vaccine, you may be advised not to get vaccinated. Most, but not all, types of flu vaccine contain a small amount of egg protein.  If you ever had Guillain-Barré Syndrome (also called GBS). Some people with a history of GBS should not get this vaccine. This should be discussed with your doctor.  If you are not feeling well. It is usually okay to get flu vaccine when you have a mild illness, but you might be asked to come back when you feel better. 4. Risks of a vaccine reaction With any medicine, including vaccines, there is a chance of reactions. These are usually mild and go away on their own, but serious reactions are also possible. Most people who get a flu shot do not have any problems with it. Minor problems following a flu shot include:  
 soreness, redness, or swelling where the shot was given  hoarseness  sore, red or itchy eyes  cough  fever  aches  headache  itching  fatigue If these problems occur, they usually begin soon after the shot and last 1 or 2 days. More serious problems following a flu shot can include the following:  There may be a small increased risk of Guillain-Barré Syndrome (GBS) after inactivated flu vaccine. This risk has been estimated at 1 or 2 additional cases per million people vaccinated. This is much lower than the risk of severe complications from flu, which can be prevented by flu vaccine.  Young children who get the flu shot along with pneumococcal vaccine (PCV13) and/or DTaP vaccine at the same time might be slightly more likely to have a seizure caused by fever. Ask your doctor for more information. Tell your doctor if a child who is getting flu vaccine has ever had a seizure. Problems that could happen after any injected vaccine:  People sometimes faint after a medical procedure, including vaccination. Sitting or lying down for about 15 minutes can help prevent fainting, and injuries caused by a fall. Tell your doctor if you feel dizzy, or have vision changes or ringing in the ears.  Some people get severe pain in the shoulder and have difficulty moving the arm where a shot was given. This happens very rarely.  Any medication can cause a severe allergic reaction. Such reactions from a vaccine are very rare, estimated at about 1 in a million doses, and would happen within a few minutes to a few hours after the vaccination. As with any medicine, there is a very remote chance of a vaccine causing a serious injury or death. The safety of vaccines is always being monitored.  For more information, visit: www.cdc.gov/vaccinesafety/ 
 
 5. What if there is a serious reaction? What should I look for?  Look for anything that concerns you, such as signs of a severe allergic reaction, very high fever, or unusual behavior. Signs of a severe allergic reaction can include hives, swelling of the face and throat, difficulty breathing, a fast heartbeat, dizziness, and weakness  usually within a few minutes to a few hours after the vaccination. What should I do?  If you think it is a severe allergic reaction or other emergency that cant wait, call 9-1-1 and get the person to the nearest hospital. Otherwise, call your doctor.  Reactions should be reported to the Vaccine Adverse Event Reporting System (VAERS). Your doctor should file this report, or you can do it yourself through  the VAERS web site at www.vaers. [facility].gov, or by calling 2-956.283.4113. VAERS does not give medical advice. 6. The National Vaccine Injury Compensation Program 
 
The [facility] Vaccine Injury Compensation Program (VICP) is a federal program that was created to compensate people who may have been injured by certain vaccines. Persons who believe they may have been injured by a vaccine can learn about the program and about filing a claim by calling 5-221.432.2849 or visiting the [facility] website at www.[facility].gov/vaccinecompensation. There is a time limit to file a claim for compensation. 7. How can I learn more?  Ask your healthcare provider. He or she can give you the vaccine package insert or suggest other sources of information.  Call your local or state health department.  Contact the Centers for Disease Control and Prevention (CDC): 
- Call 5-134.321.5703 (1-800-CDC-INFO) or 
- Visit CDCs website at www.cdc.gov/flu Vaccine Information Statement Inactivated Influenza Vaccine 8/7/2015 
42 RAJAN Luque Everardo [id number] Department of [facility] and [employer] Centers for Disease Control and Prevention Office Use Only Introducing [facility]! Viv De La Garza introduces InstaGIS patient portal. Now you can access parts of your medical record, email your doctor's office, and request medication refills online. 1. In your internet browser, go to [email or web address] 2. Click on the First Time User? Click Here link in the Sign In box. You will see the New Member Sign Up page. 3. Enter your InstaGIS Access Code exactly as it appears below. You will not need to use this code after youve completed the sign-up process. If you do not sign up before the expiration date, you must request a new code. · InstaGIS Access Code: [email or web address] Expires: 12/24/2017 10:53 AM 
 
4. Enter the last four digits of your Social Security Number (xxxx) and Date of Birth (mm/dd/yyyy) as indicated and click Submit. You will be taken to the next sign-up page. 5. Create a InstaGIS ID. This will be your InstaGIS login ID and cannot be changed, so think of one that is secure and easy to remember. 6. Create a Insta[employer] password. You can change your password at any time. 7. Enter your Password Reset Question and Answer. This can be used at a later time if you forget your password. 8. Enter your e-mail address. You will receive e-mail notification when new information is available in [address] 9. Click Sign Up. You can now view and download portions of your medical record. 10. Click the Download Summary menu link to download a portable copy of your medical information. If you have questions, please visit the Frequently Asked Questions section of the Insta[employer] website. Remember, InstaGIS is NOT to be used for urgent needs. For medical emergencies, dial 911. Now available from your iPhone and Android! Please provide this summary of care documentation to your next provider. Your primary care clinician is listed as Fariba Romo. If you have any questions after today's visit, please call [phone].

## 2017-09-27 ENCOUNTER — HOSPITAL ENCOUNTER (OUTPATIENT)
Dept: LAB | Age: 82
Discharge: HOME | End: 2017-09-27
Payer: MEDICARE

## 2017-09-27 PROCEDURE — 80053 COMPREHEN METABOLIC PANEL: CPT

## 2017-09-27 PROCEDURE — 82306 VITAMIN D 25 HYDROXY: CPT

## 2017-09-27 PROCEDURE — 36415 COLL VENOUS BLD VENIPUNCTURE: CPT

## 2017-09-28 LAB
25(OH)D3+25(OH)D2 SERPL-MCNC: 19.6 NG/ML (ref 30–100)
ALBUMIN SERPL-MCNC: 4 G/DL (ref 3.5–4.7)
ALBUMIN/GLOB SERPL: 1.1 {RATIO} (ref 1.2–2.2)
ALP SERPL-CCNC: 82 IU/L (ref 39–117)
ALT SERPL-CCNC: 20 IU/L (ref 0–32)
AST SERPL-CCNC: 30 IU/L (ref 0–40)
BILIRUB SERPL-MCNC: 0.8 MG/DL (ref 0–1.2)
BUN SERPL-MCNC: 10 MG/DL (ref 8–27)
BUN/CREAT SERPL: 14 (ref 12–28)
CALCIUM SERPL-MCNC: 10.1 MG/DL (ref 8.7–10.3)
CHLORIDE SERPL-SCNC: 103 MMOL/L (ref 96–106)
CO2 SERPL-SCNC: 26 MMOL/L (ref 18–29)
CREAT SERPL-MCNC: 0.74 MG/DL (ref 0.57–1)
GLOBULIN SER CALC-MCNC: 3.7 G/DL (ref 1.5–4.5)
GLUCOSE SERPL-MCNC: 86 MG/DL (ref 65–99)
POTASSIUM SERPL-SCNC: 4.2 MMOL/L (ref 3.5–5.2)
PROT SERPL-MCNC: 7.7 G/DL (ref 6–8.5)
SODIUM SERPL-SCNC: 140 MMOL/L (ref 134–144)

## 2017-10-02 DIAGNOSIS — E55.9 VITAMIN D DEFICIENCY: ICD-10-CM

## 2017-10-02 RX ORDER — ERGOCALCIFEROL 1.25 MG/1
50000 CAPSULE ORAL
Qty: 4 CAP | Refills: 3 | Status: SHIPPED | OUTPATIENT
Start: 2017-10-02 | End: 2019-07-08 | Stop reason: ALTCHOICE

## 2017-10-03 DIAGNOSIS — J30.9 ALLERGIC RHINITIS: ICD-10-CM

## 2017-10-04 RX ORDER — ALBUTEROL SULFATE 90 UG/1
AEROSOL, METERED RESPIRATORY (INHALATION)
Qty: 8.5 INHALER | Refills: 0 | Status: SHIPPED | OUTPATIENT
Start: 2017-10-04 | End: 2020-05-04 | Stop reason: SDUPTHER

## 2018-01-22 ENCOUNTER — OFFICE VISIT (OUTPATIENT)
Dept: INTERNAL MEDICINE CLINIC | Age: 83
End: 2018-01-22

## 2018-01-22 ENCOUNTER — HOSPITAL ENCOUNTER (OUTPATIENT)
Dept: LAB | Age: 83
Discharge: HOME | End: 2018-01-22
Payer: MEDICARE

## 2018-01-22 VITALS
HEART RATE: 67 BPM | TEMPERATURE: 97.2 F | BODY MASS INDEX: 26.84 KG/M2 | WEIGHT: 171 LBS | DIASTOLIC BLOOD PRESSURE: 60 MMHG | HEIGHT: 67 IN | SYSTOLIC BLOOD PRESSURE: 129 MMHG | RESPIRATION RATE: 20 BRPM | OXYGEN SATURATION: 100 %

## 2018-01-22 DIAGNOSIS — M85.80 OSTEOPENIA, UNSPECIFIED LOCATION: ICD-10-CM

## 2018-01-22 DIAGNOSIS — I10 ESSENTIAL HYPERTENSION: ICD-10-CM

## 2018-01-22 DIAGNOSIS — L72.9 CYST OF SKIN: ICD-10-CM

## 2018-01-22 DIAGNOSIS — M25.511 CHRONIC PAIN OF BOTH SHOULDERS: ICD-10-CM

## 2018-01-22 DIAGNOSIS — M25.512 CHRONIC PAIN OF BOTH SHOULDERS: ICD-10-CM

## 2018-01-22 DIAGNOSIS — G89.29 CHRONIC PAIN OF BOTH SHOULDERS: ICD-10-CM

## 2018-01-22 DIAGNOSIS — M19.011 PRIMARY OSTEOARTHRITIS OF BOTH SHOULDERS: Primary | ICD-10-CM

## 2018-01-22 DIAGNOSIS — E55.9 VITAMIN D DEFICIENCY: ICD-10-CM

## 2018-01-22 DIAGNOSIS — J30.1 ALLERGIC RHINITIS DUE TO POLLEN, UNSPECIFIED CHRONICITY, UNSPECIFIED SEASONALITY: ICD-10-CM

## 2018-01-22 DIAGNOSIS — M19.012 PRIMARY OSTEOARTHRITIS OF BOTH SHOULDERS: Primary | ICD-10-CM

## 2018-01-22 PROCEDURE — 36415 COLL VENOUS BLD VENIPUNCTURE: CPT

## 2018-01-22 PROCEDURE — 82306 VITAMIN D 25 HYDROXY: CPT

## 2018-01-22 PROCEDURE — 85027 COMPLETE CBC AUTOMATED: CPT

## 2018-01-22 PROCEDURE — 80053 COMPREHEN METABOLIC PANEL: CPT

## 2018-01-22 RX ORDER — FLUTICASONE PROPIONATE 50 MCG
2 SPRAY, SUSPENSION (ML) NASAL DAILY
Qty: 1 BOTTLE | Refills: 1 | Status: SHIPPED | OUTPATIENT
Start: 2018-01-22 | End: 2018-05-22 | Stop reason: SDUPTHER

## 2018-01-22 RX ORDER — TRAMADOL HYDROCHLORIDE 50 MG/1
50 TABLET ORAL
Qty: 45 TAB | Refills: 0 | Status: SHIPPED | OUTPATIENT
Start: 2018-01-22 | End: 2019-07-08 | Stop reason: SDUPTHER

## 2018-01-22 RX ORDER — MELOXICAM 7.5 MG/1
7.5 TABLET ORAL DAILY
Qty: 30 TAB | Refills: 5 | Status: SHIPPED | OUTPATIENT
Start: 2018-01-22 | End: 2020-05-04 | Stop reason: ALTCHOICE

## 2018-01-22 RX ORDER — CALCIUM CARBONATE 500(1250)
1 TABLET ORAL 2 TIMES DAILY
Qty: 60 TAB | Refills: 12 | Status: SHIPPED | OUTPATIENT
Start: 2018-01-22 | End: 2019-12-02 | Stop reason: SDUPTHER

## 2018-01-22 NOTE — PROGRESS NOTES
Health Maintenance Due   Topic Date Due    DTaP/Tdap/Td series (1 - Tdap) 02/02/1954    ZOSTER VACCINE AGE 60>  12/02/1992       Chief Complaint   Patient presents with    Hypertension     4 month follow up    CHF    Cholesterol Problem    Shoulder Pain     left shoulder increase in pain    Skin Problem     states has \"bump or black head\" on the back of her neck wants to be checked       1. Have you been to the ER, urgent care clinic since your last visit? Hospitalized since your last visit? No    2. Have you seen or consulted any other health care providers outside of the [address] [place] [address] since your last visit? Include any pap smears or colon screening. No    3) Do you have an Advance Directive on file? no    4) Are you interested in receiving information on Advance Directives? NO      Patient is accompanied by self I have received verbal consent from Yimi Holland to discuss any/all medical information while they are present in the room.

## 2018-01-22 NOTE — PATIENT INSTRUCTIONS
Shoulder Arthritis: Exercises  Your Care Instructions  Here are some examples of typical rehabilitation exercises for your condition. Start each exercise slowly. Ease off the exercise if you start to have pain. Your doctor or physical therapist will tell you when you can start these exercises and which ones will work best for you. How to do the exercises  Shoulder flexion (lying down)    To make a wand for this exercise, use a piece of PVC pipe or a broom handle with the broom removed. Make the wand about a foot wider than your shoulders. 1. Lie on your back, holding a wand with both hands. Your palms should face down as you hold the wand. 2. Keeping your elbows straight, slowly raise your arms over your head. Raise them until you feel a stretch in your shoulders, upper back, and chest.  3. Hold for 15 to 30 seconds. 4. Repeat 2 to 4 times. Shoulder rotation (lying down)    To make a wand for this exercise, use a piece of PVC pipe or a broom handle with the broom removed. Make the wand about a foot wider than your shoulders. 1. Lie on your back. Hold a wand with both hands with your elbows bent and palms up. 2. Keep your elbows close to your body, and move the wand across your body toward the sore arm. 3. Hold for 8 to 12 seconds. 4. Repeat 2 to 4 times. Shoulder internal rotation with towel    1. Hold a towel above and behind your head with the arm that is not sore. 2. With your sore arm, reach behind your back and grasp the towel. 3. With the arm above your head, pull the towel upward. Do this until you feel a stretch on the front and outside of your sore shoulder. 4. Hold 15 to 30 seconds. 5. Repeat 2 to 4 times. Shoulder blade squeeze    1. Stand with your arms at your sides, and squeeze your shoulder blades together. Do not raise your shoulders up as you squeeze. 2. Hold 6 seconds. 3. Repeat 8 to 12 times.   Resisted rows    For this exercise, you will need elastic exercise material, such as surgical tubing or Thera-Band. 1. Put the band around a solid object at about waist level. (A bedpost will work well.) Each hand should hold an end of the band. 2. With your elbows at your sides and bent to 90 degrees, pull the band back. Your shoulder blades should move toward each other. Return to the starting position. 3. Repeat 8 to 12 times. External rotator strengthening exercise    1. Start by tying a piece of elastic exercise material to a doorknob. You can use surgical tubing or Thera-Band. (You may also hold one end of the band in each hand.)  2. Stand or sit with your shoulder relaxed and your elbow bent 90 degrees. Your upper arm should rest comfortably against your side. Squeeze a rolled towel between your elbow and your body for comfort. This will help keep your arm at your side. 3. Hold one end of the elastic band with the hand of the painful arm. 4. Start with your forearm across your belly. Slowly rotate the forearm out away from your body. Keep your elbow and upper arm tucked against the towel roll or the side of your body until you begin to feel tightness in your shoulder. Slowly move your arm back to where you started. 5. Repeat 8 to 12 times. Internal rotator strengthening exercise    1. Start by tying a piece of elastic exercise material to a doorknob. You can use surgical tubing or Thera-Band. 2. Stand or sit with your shoulder relaxed and your elbow bent 90 degrees. Your upper arm should rest comfortably against your side. Squeeze a rolled towel between your elbow and your body for comfort. This will help keep your arm at your side. 3. Hold one end of the elastic band in the hand of the painful arm. 4. Slowly rotate your forearm toward your body until it touches your belly. Slowly move it back to where you started. 5. Keep your elbow and upper arm firmly tucked against the towel roll or at your side. 6. Repeat 8 to 12 times.   Pendulum swing    If you have pain in your back, do not do this exercise. 1. Hold on to a table or the back of a chair with your good arm. Then bend forward a little and let your sore arm hang straight down. This exercise does not use the arm muscles. Rather, use your legs and your hips to create movement that makes your arm swing freely. 2. Use the movement from your hips and legs to guide the slightly swinging arm back and forth like a pendulum (or elephant trunk). Then guide it in circles that start small (about the size of a dinner plate). Make the circles a bit larger each day, as your pain allows. 3. Do this exercise for 5 minutes, 5 to 7 times each day. 4. As you have less pain, try bending over a little farther to do this exercise. This will increase the amount of movement at your shoulder. Follow-up care is a key part of your treatment and safety. Be sure to make and go to all appointments, and call your doctor if you are having problems. It's also a good idea to know your test results and keep a list of the medicines you take. Where can you learn more? Go to http://francis-gene.info/. Enter H562 in the search box to learn more about \"Shoulder Arthritis: Exercises. \"  Current as of: March 21, 2017  Content Version: 11.4  © 8757-7721 Healthwise, [employer]. Care instructions adapted under license by [employer] (which disclaims liability or warranty for this information). If you have questions about a medical condition or this instruction, always ask your healthcare professional. [place] [address] any warranty or liability for your use of this information.

## 2018-01-22 NOTE — PROGRESS NOTES
HISTORY OF PRESENT ILLNESS  Johnny Arreola is a 80 y.o. female here to follow up. Noticed to have a cyst on her nape. Not painful. reports both shoulder stiffness and decreased ROM. has moderate pain ,more at night,need tramadol refill. She is using Tylenol, not helping her. Has HTN,stable. all lab done. has elevated lipid,want to know what food to eat. Has elevated lipid, no chest pain palpitation or shortness of breath. Need labs. Reports compliance with medications and diet. Med list and most recent labs/studies reviewed with pt. active physically to control weight. Needs med refills. Reports no other new c/o. Hypertension    Pertinent negatives include no palpitations and no blurred vision. Shoulder Pain      Skin Problem     Follow-up         Review of Systems   Constitutional: Negative. Negative for chills and fever. HENT: Negative. Eyes: Negative. Negative for blurred vision and double vision. Respiratory: Negative. Cardiovascular: Negative. Negative for palpitations. Gastrointestinal: Negative. Genitourinary: Negative. Musculoskeletal: Positive for falls and joint pain. Skin: Negative. Neurological: Negative. Endo/Heme/Allergies: Negative. Psychiatric/Behavioral: Negative. Physical Exam   Constitutional: She appears well-developed and well-nourished. No distress. HENT:        Neck: Normal range of motion. Neck supple. No JVD present. No thyromegaly present. Cardiovascular: Normal rate, regular rhythm, normal heart sounds and intact distal pulses. Pulmonary/Chest: Effort normal and breath sounds normal. No respiratory distress. She has no wheezes. She has no rales. Musculoskeletal: She exhibits tenderness. She exhibits no edema. B/L shoulder:tender. ROM restricted in all direction. Skin:   Nape: Approximately 1 cm sized cyst with blood discoloration in the middle. Not tender. Fluctuant. Psychiatric: She has a normal mood and affect.  Her behavior is normal.       ASSESSMENT and PLAN    Diagnoses and all orders for this visit:    1. Primary osteoarthritis of both shoulders    Taking Tylenol twice a day and tramadol as needed. Pain is worse, we will try,  -     meloxicam (MOBIC) 7.5 mg tablet; Take 1 Tab by mouth daily.  -     REFERRAL TO ORTHOPEDICS for intra-articular shot. 2. Chronic pain of both shoulders  -     traMADol (ULTRAM) 50 mg tablet; Take 1 Tab by mouth daily as needed for Pain. 3. Essential hypertension  Stable on current regimen. Will check,      -     METABOLIC PANEL, COMPREHENSIVE  -     CBC W/O DIFF    4. Cyst of skin    Probable sebaceous cyst.  Will refer,  -     REFERRAL TO DERMATOLOGY    5. Osteopenia, unspecified     We will refill,  -     calcium carbonate (OS-AYALA) 500 mg calcium (1,250 mg) tablet; Take 1 Tab by mouth two (2) times a day. 6. Allergic rhinitis due to pollen, unspecified chronicity, unspecified seasonality    Will refill,  -     fluticasone (FLONASE) 50 mcg/actuation nasal spray; 2 Sprays by Both Nostrils route daily. 7. Vitamin D deficiency    Took supplement. Will check,  -     VITAMIN D, 25 HYDROXY        Discussed expected course/resolution/complications of diagnosis in detail with patient. Medication risks/benefits/costs/interactions/alternatives discussed with patient. Pt was given an after visit summary which includes diagnoses, current medications & vitals. Pt expressed understanding with the diagnosis and plan.

## 2018-01-22 NOTE — LETTER
1/25/2018 1:25 PM 
 
Ms. Parikh Dus [address] [facility] Dear Jl Jamil: 
 
Please find your most recent results below. Resulted Orders METABOLIC PANEL, COMPREHENSIVE Result Value Ref Range Glucose 92 65 - 99 mg/dL BUN 16 8 - 27 mg/dL Creatinine 0.65 0.57 - 1.00 mg/dL GFR est non-AA 82 >59 mL/min/1.73 GFR est AA 94 >59 mL/min/1.73  
 BUN/Creatinine ratio 25 12 - 28 Sodium 142 134 - 144 mmol/L Potassium 4.3 3.5 - 5.2 mmol/L Chloride 105 96 - 106 mmol/L  
 CO2 27 18 - 29 mmol/L Calcium 9.5 8.7 - 10.3 mg/dL Protein, total 7.6 6.0 - 8.5 g/dL Albumin 3.9 3.5 - 4.7 g/dL GLOBULIN, TOTAL 3.7 1.5 - 4.5 g/dL A-G Ratio 1.1 (L) 1.2 - 2.2 Bilirubin, total 0.6 0.0 - 1.2 mg/dL Alk. phosphatase 113 39 - 117 IU/L  
 AST (SGOT) 27 0 - 40 IU/L  
 ALT (SGPT) 18 0 - 32 IU/L Narrative Performed at:  [address]  [id number] : Michaela Acosta MD, Phone:  [phone] CBC W/O DIFF Result Value Ref Range WBC 3.8 3.4 - 10.8 x10E3/uL  
 RBC 5.38 (H) 3.77 - 5.28 x10E6/uL HGB 12.0 11.1 - 15.9 g/dL HCT 39.5 34.0 - 46.6 % MCV 73 (L) 79 - 97 fL  
 MCH 22.3 (L) 26.6 - 33.0 pg  
 MCHC 30.4 (L) 31.5 - 35.7 g/dL  
 RDW 16.0 (H) 12.3 - 15.4 % PLATELET 226 943 - 334 x10E3/uL Narrative Performed at:  [address]  [id number] : Michaela Acosta MD, Phone:  [phone] VITAMIN D, 25 HYDROXY Result Value Ref Range VITAMIN D, 25-HYDROXY 20.7 (L) 30.0 - 100.0 ng/mL Comment:  
   Vitamin D deficiency has been defined by the [address] practice guideline as a 
level of serum 25-OH vitamin D less than 20 ng/mL (1,2). The Endocrine Society went on to further define vitamin D 
insufficiency as a level between 21 and 29 ng/mL (2). 1. IOM ([place] of Medicine). 2010. Dietary reference 
   intakes for calcium and D. [facility]: The 
   [employer]. 2. Lisa MF, [place], [employer] CONWAY, et al. 
   Evaluation, treatment, and prevention of vitamin D 
   deficiency: an Endocrine Society clinical practice 
   guideline. JCEM. 2011 Jul; 96(7):1911-30. Narrative Performed at:  [address]  [id number] : Effie Dawson MD, Phone:  [phone] RECOMMENDATIONS: 
Shalonda Ramon your labs indicate that Your Vitamin D level is low, start taking OTC Vitamin D 2000 units 1 x a day for 4 months. Also increase your consumption of milk and exposure to the sun for 20 minutes a day. We will recheck your Vitamin D level in 4 months All other labs are stable Please call me if you have any questions: [phone] Sincerely, Jaylyn Marie MD

## 2018-01-22 NOTE — MR AVS SNAPSHOT
[age] y [address] [facility] 102 1400 [address] [place] [address] 
[phone] Patient: Michelet Kee MRN: [id number] Visit Information Date & Time Provider Department Dept. Phone Encounter #  
 1/22/2018  9:45 AM Jil Doss MD [facility] Internal Medicine [phone] [id number] Upcoming Health Maintenance Date Due DTaP/Tdap/Td series (1 - Tdap) 2/2/1954 ZOSTER VACCINE AGE 60> 12/2/1992 MEDICARE YEARLY EXAM 6/27/2018 GLAUCOMA SCREENING Q2Y 9/15/2018 Allergies as of 1/22/2018  Review Complete On: 1/22/2018 By: Jil Doss MD  
  
 Severity Noted Reaction Type Reactions Codeine  09/23/2011    Unknown (comments) Morphine  09/23/2011    Unknown (comments) Pcn [Penicillins]  09/23/2011    Unknown (comments) Pseudoephedrine  09/23/2011    Unknown (comments) Current Immunizations  Reviewed on 6/26/2017 Name Date Influenza High Dose Vaccine PF 9/25/2017, 10/28/2015 Pneumococcal Conjugate (PCV-13) 10/28/2015 Pneumococcal Polysaccharide (PPSV-23) 6/26/2017 Not reviewed this visit You Were Diagnosed With   
  
 Codes Comments Primary osteoarthritis of both shoulders    -  Primary ICD-10-CM: M19.011, M19.012 
ICD-9-CM: 715.11 Chronic pain of both shoulders     ICD-10-CM: M25.511, G89.29, M25.512 ICD-9-CM: 719.41, 338.29 Essential hypertension     ICD-10-CM: I10 
ICD-9-CM: 401.9 Cyst of skin     ICD-10-CM: L72.9 ICD-9-CM: 706.2 Osteopenia, unspecified location     ICD-10-CM: M85.80 ICD-9-CM: 733.90 Allergic rhinitis due to pollen, unspecified chronicity, unspecified seasonality     ICD-10-CM: J30.1 ICD-9-CM: 477.0 Vitamin D deficiency     ICD-10-CM: E55.9 ICD-9-CM: 268.9 Vitals BP Pulse Temp Resp Height(growth percentile) Weight(growth percentile)  129/60 (BP 1 Location: Left arm, BP Patient Position: Sitting) 67 97.2 °F (36.2 °C) (Oral) 20 5' 7\" (1.702 m) 171 lb (77.6 kg) SpO2 BMI OB Status Smoking Status 100% 26.78 kg/m2 Postmenopausal Never Smoker Vitals History BMI and BSA Data Body Mass Index Body Surface Area  
 26.78 kg/m 2 1.92 m 2 Preferred Pharmacy Pharmacy Name Phone [id number] [phone] Your Updated Medication List  
  
   
This list is accurate as of: 1/22/18 10:25 AM.  Always use your most recent med list.  
  
  
  
  
 acetaminophen 500 mg tablet Commonly known as:  TYLENOL Take 1 Tab by mouth two (2) times daily as needed for Pain. amLODIPine 10 mg tablet Commonly known as:  Lurdes Glatter Take 1 Tab by mouth daily. aspirin 81 mg tablet Take 81 mg by mouth.  
  
 calcium carbonate 500 mg calcium (1,250 mg) tablet Commonly known as:  OS-AYALA Take 1 Tab by mouth two (2) times a day. cetirizine 10 mg tablet Commonly known as:  ZYRTEC  
TAKE 1 TABLET BY MOUTH EVERY DAY  
  
 diclofenac 1 % Gel Commonly known as:  VOLTAREN Apply  to affected area two (2) times daily as needed. ergocalciferol 50,000 unit capsule Commonly known as:  ERGOCALCIFEROL Take 1 Cap by mouth every seven (7) days. FRIDAYS  
  
 fluticasone 50 mcg/actuation nasal spray Commonly known as:  Arnulfo Remedies 2 Sprays by Both Nostrils route daily. meloxicam 7.5 mg tablet Commonly known as:  MOBIC Take 1 Tab by mouth daily. PROAIR HFA 90 mcg/actuation inhaler Generic drug:  albuterol INHALE 1 PUFF BY INHALATION EVERY FOUR (4) HOURS AS NEEDED.  
  
 traMADol 50 mg tablet Commonly known as:  ULTRAM  
Take 1 Tab by mouth daily as needed for Pain. Prescriptions Printed Refills  
 traMADol (ULTRAM) 50 mg tablet 0 Sig: Take 1 Tab by mouth daily as needed for Pain. Class: Print Route: Oral  
  
Prescriptions Sent to Pharmacy Refills fluticasone (FLONASE) 50 mcg/actuation nasal spray 1 Si Sprays by Both Nostrils route daily. Class: Normal  
 Pharmacy: [address] [place] [address] Ph #: [phone] Route: Both Nostrils  
 calcium carbonate (OS-AYALA) 500 mg calcium (1,250 mg) tablet 12 Sig: Take 1 Tab by mouth two (2) times a day. Class: Normal  
 Pharmacy: [address] [place] [address] Ph #: [phone] Route: Oral  
 meloxicam (MOBIC) 7.5 mg tablet 5 Sig: Take 1 Tab by mouth daily. Class: Normal  
 Pharmacy: [address] [place] [address] Ph #: [phone] Route: Oral  
  
We Performed the Following CBC W/O DIFF [95124 CPT(R)] METABOLIC PANEL, COMPREHENSIVE [47637 CPT(R)] REFERRAL TO DERMATOLOGY [REF19 Custom] Comments:  
 Cyst skin REFERRAL TO ORTHOPEDICS [IKU372 Custom] Comments:  
 Shoulder arthritis VITAMIN D, 25 HYDROXY [id number] CPT(R)] Referral Information Referral ID Referred By Referred To  
  
 [id number] NADIA [address], [address] Phone: [phone] Fax: [phone] Visits Status Start Date End Date 1 New Request 18 If your referral has a status of pending review or denied, additional information will be sent to support the outcome of this decision. Referral ID Referred By Referred To  
 [id number] Angela ZUNIGA MD  
   [address] Phone: [phone] Fax: [phone] Visits Status Start Date End Date 1 New Request 18 If your referral has a status of pending review or denied, additional information will be sent to support the outcome of this decision. Patient Instructions Shoulder Arthritis: Exercises Your Care Instructions Here are some examples of typical rehabilitation exercises for your condition. Start each exercise slowly. Ease off the exercise if you start to have pain. Your doctor or physical therapist will tell you when you can start these exercises and which ones will work best for you. How to do the exercises Shoulder flexion (lying down) To make a wand for this exercise, use a piece of PVC pipe or a broom handle with the broom removed. Make the wand about a foot wider than your shoulders. 1. Lie on your back, holding a wand with both hands. Your palms should face down as you hold the wand. 2. Keeping your elbows straight, slowly raise your arms over your head. Raise them until you feel a stretch in your shoulders, upper back, and chest. 
3. Hold for 15 to 30 seconds. 4. Repeat 2 to 4 times. Shoulder rotation (lying down) To make a wand for this exercise, use a piece of PVC pipe or a broom handle with the broom removed. Make the wand about a foot wider than your shoulders. 1. Lie on your back. Hold a wand with both hands with your elbows bent and palms up. 2. Keep your elbows close to your body, and move the wand across your body toward the sore arm. 3. Hold for 8 to 12 seconds. 4. Repeat 2 to 4 times. Shoulder internal rotation with towel 1. Hold a towel above and behind your head with the arm that is not sore. 2. With your sore arm, reach behind your back and grasp the towel. 3. With the arm above your head, pull the towel upward. Do this until you feel a stretch on the front and outside of your sore shoulder. 4. Hold 15 to 30 seconds. 5. Repeat 2 to 4 times. Shoulder blade squeeze 1. Stand with your arms at your sides, and squeeze your shoulder blades together. Do not raise your shoulders up as you squeeze. 2. Hold 6 seconds. 3. Repeat 8 to 12 times. Resisted rows For this exercise, you will need elastic exercise material, such as surgical tubing or Thera-Band. 1. Put the band around a solid object at about waist level. (A bedpost will work well.) Each hand should hold an end of the band. 2. With your elbows at your sides and bent to 90 degrees, pull the band back. Your shoulder blades should move toward each other. Return to the starting position. 3. Repeat 8 to 12 times. External rotator strengthening exercise 1. Start by tying a piece of elastic exercise material to a doorknob. You can use surgical tubing or Thera-Band. (You may also hold one end of the band in each hand.) 2. Stand or sit with your shoulder relaxed and your elbow bent 90 degrees. Your upper arm should rest comfortably against your side. Squeeze a rolled towel between your elbow and your body for comfort. This will help keep your arm at your side. 3. Hold one end of the elastic band with the hand of the painful arm. 4. Start with your forearm across your belly. Slowly rotate the forearm out away from your body. Keep your elbow and upper arm tucked against the towel roll or the side of your body until you begin to feel tightness in your shoulder. Slowly move your arm back to where you started. 5. Repeat 8 to 12 times. Internal rotator strengthening exercise 1. Start by tying a piece of elastic exercise material to a doorknob. You can use surgical tubing or Thera-Band. 2. Stand or sit with your shoulder relaxed and your elbow bent 90 degrees. Your upper arm should rest comfortably against your side. Squeeze a rolled towel between your elbow and your body for comfort. This will help keep your arm at your side. 3. Hold one end of the elastic band in the hand of the painful arm. 4. Slowly rotate your forearm toward your body until it touches your belly. Slowly move it back to where you started. 5. Keep your elbow and upper arm firmly tucked against the towel roll or at your side. 6. Repeat 8 to 12 times. Pendulum swing If you have pain in your back, do not do this exercise. 1. Hold on to a table or the back of a chair with your good arm. Then bend forward a little and let your sore arm hang straight down. This exercise does not use the arm muscles. Rather, use your legs and your hips to create movement that makes your arm swing freely. 2. Use the movement from your hips and legs to guide the slightly swinging arm back and forth like a pendulum (or elephant trunk). Then guide it in circles that start small (about the size of a dinner plate). Make the circles a bit larger each day, as your pain allows. 3. Do this exercise for 5 minutes, 5 to 7 times each day. 4. As you have less pain, try bending over a little farther to do this exercise. This will increase the amount of movement at your shoulder. Follow-up care is a key part of your treatment and safety. Be sure to make and go to all appointments, and call your doctor if you are having problems. It's also a good idea to know your test results and keep a list of the medicines you take. Where can you learn more? Go to http://francis-gene.info/. Enter H562 in the search box to learn more about \"Shoulder Arthritis: Exercises. \" Current as of: March 21, 2017 Content Version: 11.4 © 2308-5876 Healthwise, Incorporated. Care instructions adapted under license by [employer] (which disclaims liability or warranty for this information). If you have questions about a medical condition or this instruction, always ask your healthcare professional. [place] [address] any warranty or liability for your use of this information. Introducing hospitals & HEALTH SERVICES! [address] introduces Opent[employer] patient portal. Now you can access parts of your medical record, email your doctor's office, and request medication refills online. 1. In your internet browser, go to [email or web address] 2. Click on the First Time User? Click Here link in the Sign In box.  You will see the New Member Sign Up page. 3. Enter your Dctio Access Code exactly as it appears below. You will not need to use this code after youve completed the sign-up process. If you do not sign up before the expiration date, you must request a new code. · Dctio Access Code: [email or web address] Expires: 4/22/2018 10:25 AM 
 
4. Enter the last four digits of your Social Security Number (xxxx) and Date of Birth (mm/dd/yyyy) as indicated and click Submit. You will be taken to the next sign-up page. 5. Create a Dctio ID. This will be your Dctio login ID and cannot be changed, so think of one that is secure and easy to remember. 6. Create a Dctio password. You can change your password at any time. 7. Enter your Password Reset Question and Answer. This can be used at a later time if you forget your password. 8. Enter your e-mail address. You will receive e-mail notification when new information is available in [address] 9. Click Sign Up. You can now view and download portions of your medical record. 10. Click the Download Summary menu link to download a portable copy of your medical information. If you have questions, please visit the Frequently Asked Questions section of the Dctio website. Remember, Dctio is NOT to be used for urgent needs. For medical emergencies, dial 911. Now available from your iPhone and Android! Please provide this summary of care documentation to your next provider. Your primary care clinician is listed as Denice Hernandez. If you have any questions after today's visit, please call [phone].

## 2018-01-23 LAB
25(OH)D3+25(OH)D2 SERPL-MCNC: 20.7 NG/ML (ref 30–100)
ALBUMIN SERPL-MCNC: 3.9 G/DL (ref 3.5–4.7)
ALBUMIN/GLOB SERPL: 1.1 {RATIO} (ref 1.2–2.2)
ALP SERPL-CCNC: 113 IU/L (ref 39–117)
ALT SERPL-CCNC: 18 IU/L (ref 0–32)
AST SERPL-CCNC: 27 IU/L (ref 0–40)
BILIRUB SERPL-MCNC: 0.6 MG/DL (ref 0–1.2)
BUN SERPL-MCNC: 16 MG/DL (ref 8–27)
BUN/CREAT SERPL: 25 (ref 12–28)
CALCIUM SERPL-MCNC: 9.5 MG/DL (ref 8.7–10.3)
CHLORIDE SERPL-SCNC: 105 MMOL/L (ref 96–106)
CO2 SERPL-SCNC: 27 MMOL/L (ref 18–29)
CREAT SERPL-MCNC: 0.65 MG/DL (ref 0.57–1)
ERYTHROCYTE [DISTWIDTH] IN BLOOD BY AUTOMATED COUNT: 16 % (ref 12.3–15.4)
GLOBULIN SER CALC-MCNC: 3.7 G/DL (ref 1.5–4.5)
GLUCOSE SERPL-MCNC: 92 MG/DL (ref 65–99)
HCT VFR BLD AUTO: 39.5 % (ref 34–46.6)
HGB BLD-MCNC: 12 G/DL (ref 11.1–15.9)
MCH RBC QN AUTO: 22.3 PG (ref 26.6–33)
MCHC RBC AUTO-ENTMCNC: 30.4 G/DL (ref 31.5–35.7)
MCV RBC AUTO: 73 FL (ref 79–97)
PLATELET # BLD AUTO: 190 X10E3/UL (ref 150–379)
POTASSIUM SERPL-SCNC: 4.3 MMOL/L (ref 3.5–5.2)
PROT SERPL-MCNC: 7.6 G/DL (ref 6–8.5)
RBC # BLD AUTO: 5.38 X10E6/UL (ref 3.77–5.28)
SODIUM SERPL-SCNC: 142 MMOL/L (ref 134–144)
WBC # BLD AUTO: 3.8 X10E3/UL (ref 3.4–10.8)

## 2018-05-22 DIAGNOSIS — J30.1 ALLERGIC RHINITIS DUE TO POLLEN: ICD-10-CM

## 2018-05-22 RX ORDER — FLUTICASONE PROPIONATE 50 MCG
SPRAY, SUSPENSION (ML) NASAL
Qty: 1 BOTTLE | Refills: 1 | Status: SHIPPED | OUTPATIENT
Start: 2018-05-22 | End: 2018-08-01 | Stop reason: SDUPTHER

## 2018-08-01 DIAGNOSIS — J30.1 ALLERGIC RHINITIS DUE TO POLLEN: ICD-10-CM

## 2018-08-01 RX ORDER — FLUTICASONE PROPIONATE 50 MCG
SPRAY, SUSPENSION (ML) NASAL
Qty: 1 BOTTLE | Refills: 1 | Status: SHIPPED | OUTPATIENT
Start: 2018-08-01 | End: 2018-10-07 | Stop reason: SDUPTHER

## 2018-10-07 DIAGNOSIS — J30.1 ALLERGIC RHINITIS DUE TO POLLEN: ICD-10-CM

## 2018-10-08 RX ORDER — FLUTICASONE PROPIONATE 50 MCG
SPRAY, SUSPENSION (ML) NASAL
Qty: 1 BOTTLE | Refills: 11 | Status: SHIPPED | OUTPATIENT
Start: 2018-10-08 | End: 2020-05-04 | Stop reason: SDUPTHER

## 2019-02-01 ENCOUNTER — TELEPHONE (OUTPATIENT)
Dept: INTERNAL MEDICINE CLINIC | Age: 84
End: 2019-02-01

## 2019-02-01 NOTE — TELEPHONE ENCOUNTER
Call placed to [facility], advised that patient would require a face to face appointment for knee braces, no OV noted where patient has had c/o knee pain since 06/2017

## 2019-03-14 DIAGNOSIS — I10 ESSENTIAL HYPERTENSION: ICD-10-CM

## 2019-03-14 RX ORDER — AMLODIPINE BESYLATE 10 MG/1
TABLET ORAL
Qty: 90 TAB | Refills: 0 | Status: SHIPPED | OUTPATIENT
Start: 2019-03-14 | End: 2019-07-08 | Stop reason: SDUPTHER

## 2019-07-08 ENCOUNTER — OFFICE VISIT (OUTPATIENT)
Dept: INTERNAL MEDICINE CLINIC | Age: 84
End: 2019-07-08

## 2019-07-08 ENCOUNTER — HOSPITAL ENCOUNTER (OUTPATIENT)
Dept: LAB | Age: 84
Discharge: HOME | End: 2019-07-08
Payer: MEDICARE

## 2019-07-08 VITALS
TEMPERATURE: 98.4 F | BODY MASS INDEX: 26.9 KG/M2 | HEIGHT: 67 IN | DIASTOLIC BLOOD PRESSURE: 86 MMHG | SYSTOLIC BLOOD PRESSURE: 130 MMHG | WEIGHT: 171.4 LBS | OXYGEN SATURATION: 98 % | RESPIRATION RATE: 16 BRPM | HEART RATE: 60 BPM

## 2019-07-08 DIAGNOSIS — E55.9 VITAMIN D DEFICIENCY: ICD-10-CM

## 2019-07-08 DIAGNOSIS — Z00.00 MEDICARE ANNUAL WELLNESS VISIT, SUBSEQUENT: ICD-10-CM

## 2019-07-08 DIAGNOSIS — M25.511 CHRONIC PAIN OF BOTH SHOULDERS: ICD-10-CM

## 2019-07-08 DIAGNOSIS — R68.2 DRY MOUTH: ICD-10-CM

## 2019-07-08 DIAGNOSIS — M85.80 OSTEOPENIA, UNSPECIFIED LOCATION: ICD-10-CM

## 2019-07-08 DIAGNOSIS — Z71.89 ACP (ADVANCE CARE PLANNING): ICD-10-CM

## 2019-07-08 DIAGNOSIS — G89.29 CHRONIC PAIN OF BOTH SHOULDERS: ICD-10-CM

## 2019-07-08 DIAGNOSIS — I10 ESSENTIAL HYPERTENSION: Primary | ICD-10-CM

## 2019-07-08 DIAGNOSIS — E78.00 PURE HYPERCHOLESTEROLEMIA: ICD-10-CM

## 2019-07-08 DIAGNOSIS — M25.512 CHRONIC PAIN OF BOTH SHOULDERS: ICD-10-CM

## 2019-07-08 DIAGNOSIS — Z13.5 SCREENING FOR GLAUCOMA: ICD-10-CM

## 2019-07-08 PROCEDURE — 82306 VITAMIN D 25 HYDROXY: CPT

## 2019-07-08 PROCEDURE — 36415 COLL VENOUS BLD VENIPUNCTURE: CPT

## 2019-07-08 PROCEDURE — 85027 COMPLETE CBC AUTOMATED: CPT

## 2019-07-08 PROCEDURE — 80053 COMPREHEN METABOLIC PANEL: CPT

## 2019-07-08 PROCEDURE — 80061 LIPID PANEL: CPT

## 2019-07-08 RX ORDER — TRAMADOL HYDROCHLORIDE 50 MG/1
50 TABLET ORAL
Qty: 45 TAB | Refills: 0 | Status: SHIPPED | OUTPATIENT
Start: 2019-07-08 | End: 2019-10-06

## 2019-07-08 RX ORDER — MELATONIN
1000 DAILY
Qty: 30 TAB | Refills: 3 | Status: SHIPPED | OUTPATIENT
Start: 2019-07-08 | End: 2019-10-07 | Stop reason: SDUPTHER

## 2019-07-08 RX ORDER — AMLODIPINE BESYLATE 10 MG/1
10 TABLET ORAL DAILY
Qty: 90 TAB | Refills: 1 | Status: SHIPPED | OUTPATIENT
Start: 2019-07-08 | End: 2020-05-04 | Stop reason: ALTCHOICE

## 2019-07-08 RX ORDER — FERROUS SULFATE, DRIED 160(50) MG
1 TABLET, EXTENDED RELEASE ORAL 2 TIMES DAILY WITH MEALS
Qty: 60 TAB | Refills: 12 | Status: SHIPPED | OUTPATIENT
Start: 2019-07-08 | End: 2020-08-09 | Stop reason: SDUPTHER

## 2019-07-08 NOTE — PATIENT INSTRUCTIONS
Medicare Wellness Visit, Female The best way to live healthy is to have a lifestyle where you eat a well-balanced diet, exercise regularly, limit alcohol use, and quit all forms of tobacco/nicotine, if applicable. Regular preventive services are another way to keep healthy. Preventive services (vaccines, screening tests, monitoring & exams) can help personalize your care plan, which helps you manage your own care. Screening tests can find health problems at the earliest stages, when they are easiest to treat. [facility] follows the current, evidence-based guidelines published by the [facility] ([facility]) when recommending preventive services for our patients. Because we follow these guidelines, sometimes recommendations change over time as research supports it. (For example, mammograms used to be recommended annually. Even though Medicare will still pay for an annual mammogram, the newer guidelines recommend a mammogram every two years for women of average risk.) Of course, you and your doctor may decide to screen more often for some diseases, based on your risk and your health status. Preventive services for you include: - Medicare offers their members a free annual wellness visit, which is time for you and your primary care provider to discuss and plan for your preventive service needs. Take advantage of this benefit every year! 
-All adults over the age of 72 should receive the recommended pneumonia vaccines. Current USPSTF guidelines recommend a series of two vaccines for the best pneumonia protection.  
-All adults should have a flu vaccine yearly and a tetanus vaccine every 10 years. All adults age 61 and older should receive a shingles vaccine once in their lifetime.   
-A bone mass density test is recommended when a woman turns 65 to screen for osteoporosis. This test is only recommended one time, as a screening. Some providers will use this same test as a disease monitoring tool if you already have osteoporosis. -All adults age 38-68 who are overweight should have a diabetes screening test once every three years.  
-Other screening tests and preventive services for persons with diabetes include: an eye exam to screen for diabetic retinopathy, a kidney function test, a foot exam, and stricter control over your cholesterol.  
-Cardiovascular screening for adults with routine risk involves an electrocardiogram (ECG) at intervals determined by your doctor.  
-Colorectal cancer screenings should be done for adults age 54-65 with no increased risk factors for colorectal cancer. There are a number of acceptable methods of screening for this type of cancer. Each test has its own benefits and drawbacks. Discuss with your doctor what is most appropriate for you during your annual wellness visit. The different tests include: colonoscopy (considered the best screening method), a fecal occult blood test, a fecal DNA test, and sigmoidoscopy. -Breast cancer screenings are recommended every other year for women of normal risk, age 54-69. 
-Cervical cancer screenings for women over age 72 are only recommended with certain risk factors.  
-All adults born between [facility] should be screened once for Hepatitis C. Here is a list of your current Health Maintenance items (your personalized list of preventive services) with a due date: 
Health Maintenance Due Topic Date Due  
 DTaP/Tdap/Td  (1 - Tdap) 02[birth year]02/1954  Shingles Vaccine (1 of 2) 02/02/1983  Glaucoma Screening   09/15/2018

## 2019-07-08 NOTE — ACP (ADVANCE CARE PLANNING)
Advance Care Planning (ACP) Provider Conversation Snapshot    Date of ACP Conversation: 07/08/19  Persons included in Conversation:  patient  Length of ACP Conversation in minutes:  16 minutes    Authorized Decision Maker (if patient is incapable of making informed decisions):    This person is:   Reji Welsh            For Patients with Decision Making Capacity:   Values/Goals: Exploration of values, goals, and preferences if recovery is not expected, even with continued medical treatment in the event of:  Imminent death    Conversation Outcomes / Follow-Up Plan:   Recommended completion of Advance Directive form after review of ACP materials and conversation with prospective healthcare agent

## 2019-07-08 NOTE — PROGRESS NOTES
Health Maintenance Due   Topic Date Due    DTaP/Tdap/Td series (1 - Tdap) 02/02/1954    Shingrix Vaccine Age 50> (1 of 2) 02/02/1983    MEDICARE YEARLY EXAM  06/27/2018    GLAUCOMA SCREENING Q2Y  09/15/2018       Chief Complaint   Patient presents with    Hypertension    Cholesterol Problem    Medication Refill       1. Have you been to the ER, urgent care clinic since your last visit? Hospitalized since your last visit? No    2. Have you seen or consulted any other health care providers outside of the [address] [place] [address] since your last visit? Include any pap smears or colon screening. No    3) Do you have an Advance Directive on file? no    4) Are you interested in receiving information on Advance Directives? NO      Patient is accompanied by self I have received verbal consent from Kris Draper to discuss any/all medical information while they are present in the room.

## 2019-07-08 NOTE — PROGRESS NOTES
This is the Subsequent Medicare Annual Wellness Exam, performed 12 months or more after the Initial AWV or the last Subsequent AWV    I have reviewed the patient's medical history in detail and updated the computerized patient record. History     Past Medical History:   Diagnosis Date    Arrhythmia     bradycardia,S/P pacemaker    [facility]est[facility]) [facility]     Heart failure ([facility]     Hypertension     Irregular heart rate     Pure hypercholesterolemia 3/27/2017      Past Surgical History:   Procedure Laterality Date    HX ORTHOPAEDIC      left total knee replacement    HX PACEMAKER       Current Outpatient Medications   Medication Sig Dispense Refill    amLODIPine (NORVASC) 10 mg tablet TAKE 1 TABLET BY MOUTH DAILY. 90 Tab 0    fluticasone (FLONASE) 50 mcg/actuation nasal spray USE 2 SPRAYS IN EACH NOSTRIL EVERY DAY 1 Bottle 11    traMADol (ULTRAM) 50 mg tablet Take 1 Tab by mouth daily as needed for Pain. 45 Tab 0    calcium carbonate (OS-AYALA) 500 mg calcium (1,250 mg) tablet Take 1 Tab by mouth two (2) times a day. 60 Tab 12    PROAIR HFA 90 mcg/actuation inhaler INHALE 1 PUFF BY INHALATION EVERY FOUR (4) HOURS AS NEEDED. 8.5 Inhaler 0    acetaminophen (TYLENOL) 500 mg tablet Take 1 Tab by mouth two (2) times daily as needed for Pain. 60 Tab 5    cetirizine (ZYRTEC) 10 mg tablet TAKE 1 TABLET BY MOUTH EVERY DAY 30 Tab 0    aspirin 81 mg tablet Take 81 mg by mouth.  meloxicam (MOBIC) 7.5 mg tablet Take 1 Tab by mouth daily. 30 Tab 5    diclofenac (VOLTAREN) 1 % gel Apply  to affected area two (2) times daily as needed.  100 g 2     Allergies   Allergen Reactions    Codeine Unknown (comments)    Morphine Unknown (comments)    Pcn [Penicillins] Unknown (comments)    Pseudoephedrine Unknown (comments)     Family History   Problem Relation Age of Onset    Hypertension Mother     Cancer Father         prostate    Hypertension Father      Social History     Tobacco Use    Smoking status: Never Smoker    Smokeless tobacco: Never Used   Substance Use Topics    Alcohol use: No     Patient Active Problem List   Diagnosis Code    Unstable angina (HCC) I20.0    CHF (congestive heart failure) (HCC) I50.9    Pacemaker Z95.0    Essential hypertension I10    Advance care planning Z71.89    Pure hypercholesterolemia E78.00       Depression Risk Factor Screening:     3 most recent PHQ Screens 7/8/2019   Little interest or pleasure in doing things Not at all   Feeling down, depressed, irritable, or hopeless Not at all   Total Score PHQ 2 0     Alcohol Risk Factor Screening: You do not drink alcohol or very rarely. Functional Ability and Level of Safety:   Hearing Loss  Hearing is good. Activities of Daily Living  The home contains: use quad cane  Patient does total self care    Fall Risk  Fall Risk Assessment, last 12 mths 7/8/2019   Able to walk? Yes   Fall in past 12 months?  No   Fall with injury? -   Number of falls in past 12 months -   Fall Risk Score -       Abuse Screen  Patient is not abused    Cognitive Screening   Evaluation of Cognitive Function:  Has your family/caregiver stated any concerns about your memory: no  Normal    Patient Care Team   Patient Care Team:  Dana Juarez MD as PCP - General (Internal Medicine)  Salena Mckeon LPN as Ambulatory Care Navigator (Internal Medicine)  Renetta Purcell RN as Nurse Navigator (Internal Medicine)    Assessment/Plan   Education and counseling provided:  Are appropriate based on today's review and evaluation  End-of-Life planning (with patient's consent)  Pneumococcal Vaccine  Screening Mammography  Bone mass measurement (DEXA)  Screening for glaucoma        Health Maintenance Due   Topic Date Due    DTaP/Tdap/Td series (1 - Tdap) 02[birth year]02/1954    Shingrix Vaccine Age 50> (1 of 2) 02/02/1983    GLAUCOMA SCREENING Q2Y  09/15/2018

## 2019-07-09 LAB
25(OH)D3+25(OH)D2 SERPL-MCNC: 19.9 NG/ML (ref 30–100)
ALBUMIN SERPL-MCNC: 4 G/DL (ref 3.5–4.7)
ALBUMIN/GLOB SERPL: 1.1 {RATIO} (ref 1.2–2.2)
ALP SERPL-CCNC: 88 IU/L (ref 39–117)
ALT SERPL-CCNC: 16 IU/L (ref 0–32)
AST SERPL-CCNC: 27 IU/L (ref 0–40)
BILIRUB SERPL-MCNC: 0.9 MG/DL (ref 0–1.2)
BUN SERPL-MCNC: 12 MG/DL (ref 8–27)
BUN/CREAT SERPL: 16 (ref 12–28)
CALCIUM SERPL-MCNC: 9.9 MG/DL (ref 8.7–10.3)
CHLORIDE SERPL-SCNC: 105 MMOL/L (ref 96–106)
CHOLEST SERPL-MCNC: 209 MG/DL (ref 100–199)
CO2 SERPL-SCNC: 25 MMOL/L (ref 20–29)
CREAT SERPL-MCNC: 0.76 MG/DL (ref 0.57–1)
ERYTHROCYTE [DISTWIDTH] IN BLOOD BY AUTOMATED COUNT: 16 % (ref 12.3–15.4)
GLOBULIN SER CALC-MCNC: 3.7 G/DL (ref 1.5–4.5)
GLUCOSE SERPL-MCNC: 85 MG/DL (ref 65–99)
HCT VFR BLD AUTO: 41.8 % (ref 34–46.6)
HDLC SERPL-MCNC: 73 MG/DL
HGB BLD-MCNC: 12.6 G/DL (ref 11.1–15.9)
INTERPRETATION, 910389: NORMAL
LDLC SERPL CALC-MCNC: 123 MG/DL (ref 0–99)
MCH RBC QN AUTO: 22.1 PG (ref 26.6–33)
MCHC RBC AUTO-ENTMCNC: 30.1 G/DL (ref 31.5–35.7)
MCV RBC AUTO: 74 FL (ref 79–97)
PLATELET # BLD AUTO: 187 X10E3/UL (ref 150–450)
POTASSIUM SERPL-SCNC: 3.6 MMOL/L (ref 3.5–5.2)
PROT SERPL-MCNC: 7.7 G/DL (ref 6–8.5)
RBC # BLD AUTO: 5.69 X10E6/UL (ref 3.77–5.28)
SODIUM SERPL-SCNC: 142 MMOL/L (ref 134–144)
TRIGL SERPL-MCNC: 63 MG/DL (ref 0–149)
VLDLC SERPL CALC-MCNC: 13 MG/DL (ref 5–40)
WBC # BLD AUTO: 3.5 X10E3/UL (ref 3.4–10.8)

## 2019-07-11 NOTE — PROGRESS NOTES
LDL is high with high total cholesterol. adv to be on low cholesterol diet and exercise. will repeat lipid in 6 months. Low vit D. Adv to take OTC vit D 2000 unit po every day for 4 months.

## 2019-10-07 RX ORDER — CHOLECALCIFEROL (VITAMIN D3) 25 MCG
TABLET ORAL
Qty: 90 TAB | Refills: 1 | Status: SHIPPED | OUTPATIENT
Start: 2019-10-07 | End: 2019-12-02 | Stop reason: SDUPTHER

## 2019-11-12 ENCOUNTER — APPOINTMENT (OUTPATIENT)
Dept: VASCULAR SURGERY | Age: 84
End: 2019-11-12
Attending: STUDENT IN AN ORGANIZED HEALTH CARE EDUCATION/TRAINING PROGRAM
Payer: MEDICARE

## 2019-11-12 ENCOUNTER — HOSPITAL ENCOUNTER (EMERGENCY)
Age: 84
Discharge: HOME | End: 2019-11-12
Attending: STUDENT IN AN ORGANIZED HEALTH CARE EDUCATION/TRAINING PROGRAM | Admitting: STUDENT IN AN ORGANIZED HEALTH CARE EDUCATION/TRAINING PROGRAM
Payer: MEDICARE

## 2019-11-12 ENCOUNTER — APPOINTMENT (OUTPATIENT)
Dept: GENERAL RADIOLOGY | Age: 84
End: 2019-11-12
Attending: STUDENT IN AN ORGANIZED HEALTH CARE EDUCATION/TRAINING PROGRAM
Payer: MEDICARE

## 2019-11-12 VITALS
HEART RATE: 64 BPM | BODY MASS INDEX: 26.9 KG/M2 | DIASTOLIC BLOOD PRESSURE: 64 MMHG | SYSTOLIC BLOOD PRESSURE: 134 MMHG | WEIGHT: 171.4 LBS | RESPIRATION RATE: 16 BRPM | TEMPERATURE: 98 F | HEIGHT: 67 IN

## 2019-11-12 DIAGNOSIS — M79.89 LEG SWELLING: Primary | ICD-10-CM

## 2019-11-12 LAB
ALBUMIN SERPL-MCNC: 3.7 G/DL (ref 3.5–5)
ALBUMIN/GLOB SERPL: 0.8 {RATIO} (ref 1.1–2.2)
ALP SERPL-CCNC: 97 U/L (ref 45–117)
ALT SERPL-CCNC: 24 U/L (ref 12–78)
ANION GAP SERPL CALC-SCNC: 3 MMOL/L (ref 5–15)
AST SERPL-CCNC: 27 U/L (ref 15–37)
BASOPHILS # BLD: 0.1 K/UL (ref 0–0.1)
BASOPHILS NFR BLD: 2 % (ref 0–1)
BILIRUB SERPL-MCNC: 0.6 MG/DL (ref 0.2–1)
BNP SERPL-MCNC: 187 PG/ML
BUN SERPL-MCNC: 16 MG/DL (ref 6–20)
BUN/CREAT SERPL: 20 (ref 12–20)
CALCIUM SERPL-MCNC: 9.6 MG/DL (ref 8.5–10.1)
CHLORIDE SERPL-SCNC: 107 MMOL/L (ref 97–108)
CO2 SERPL-SCNC: 27 MMOL/L (ref 21–32)
COMMENT, HOLDF: NORMAL
CREAT SERPL-MCNC: 0.81 MG/DL (ref 0.55–1.02)
DIFFERENTIAL METHOD BLD: ABNORMAL
EOSINOPHIL # BLD: 0.3 K/UL (ref 0–0.4)
EOSINOPHIL NFR BLD: 9 % (ref 0–7)
ERYTHROCYTE [DISTWIDTH] IN BLOOD BY AUTOMATED COUNT: 16.1 % (ref 11.5–14.5)
GLOBULIN SER CALC-MCNC: 4.5 G/DL (ref 2–4)
GLUCOSE SERPL-MCNC: 84 MG/DL (ref 65–100)
HCT VFR BLD AUTO: 40 % (ref 35–47)
HGB BLD-MCNC: 12.3 G/DL (ref 11.5–16)
IMM GRANULOCYTES # BLD AUTO: 0 K/UL (ref 0–0.04)
IMM GRANULOCYTES NFR BLD AUTO: 0 % (ref 0–0.5)
LYMPHOCYTES # BLD: 0.9 K/UL (ref 0.8–3.5)
LYMPHOCYTES NFR BLD: 26 % (ref 12–49)
MAGNESIUM SERPL-MCNC: 1.8 MG/DL (ref 1.6–2.4)
MCH RBC QN AUTO: 22.3 PG (ref 26–34)
MCHC RBC AUTO-ENTMCNC: 30.8 G/DL (ref 30–36.5)
MCV RBC AUTO: 72.6 FL (ref 80–99)
MONOCYTES # BLD: 0.6 K/UL (ref 0–1)
MONOCYTES NFR BLD: 17 % (ref 5–13)
NEUTS SEG # BLD: 1.6 K/UL (ref 1.8–8)
NEUTS SEG NFR BLD: 46 % (ref 32–75)
NRBC # BLD: 0 K/UL (ref 0–0.01)
NRBC BLD-RTO: 0 PER 100 WBC
PLATELET # BLD AUTO: 188 K/UL (ref 150–400)
PMV BLD AUTO: 10 FL (ref 8.9–12.9)
POTASSIUM SERPL-SCNC: 4 MMOL/L (ref 3.5–5.1)
PROT SERPL-MCNC: 8.2 G/DL (ref 6.4–8.2)
RBC # BLD AUTO: 5.51 M/UL (ref 3.8–5.2)
SAMPLES BEING HELD,HOLD: NORMAL
SODIUM SERPL-SCNC: 137 MMOL/L (ref 136–145)
TROPONIN I SERPL-MCNC: <0.05 NG/ML
WBC # BLD AUTO: 3.5 K/UL (ref 3.6–11)

## 2019-11-12 PROCEDURE — 80053 COMPREHEN METABOLIC PANEL: CPT

## 2019-11-12 PROCEDURE — 83735 ASSAY OF MAGNESIUM: CPT

## 2019-11-12 PROCEDURE — 99283 EMERGENCY DEPT VISIT LOW MDM: CPT

## 2019-11-12 PROCEDURE — 74011250637 HC RX REV CODE- 250/637: Performed by: STUDENT IN AN ORGANIZED HEALTH CARE EDUCATION/TRAINING PROGRAM

## 2019-11-12 PROCEDURE — 93005 ELECTROCARDIOGRAM TRACING: CPT

## 2019-11-12 PROCEDURE — 36415 COLL VENOUS BLD VENIPUNCTURE: CPT

## 2019-11-12 PROCEDURE — 83880 ASSAY OF NATRIURETIC PEPTIDE: CPT

## 2019-11-12 PROCEDURE — 71046 X-RAY EXAM CHEST 2 VIEWS: CPT

## 2019-11-12 PROCEDURE — 84484 ASSAY OF TROPONIN QUANT: CPT

## 2019-11-12 PROCEDURE — 85025 COMPLETE CBC W/AUTO DIFF WBC: CPT

## 2019-11-12 PROCEDURE — 93971 EXTREMITY STUDY: CPT

## 2019-11-12 RX ORDER — ACETAMINOPHEN 325 MG/1
650 TABLET ORAL ONCE
Status: COMPLETED | OUTPATIENT
Start: 2019-11-12 | End: 2019-11-12

## 2019-11-12 RX ADMIN — ACETAMINOPHEN 650 MG: 325 TABLET, FILM COATED ORAL at 12:49

## 2019-11-12 NOTE — ED PROVIDER NOTES
Osei Willson is a 80 y.o. female with past medical history notable for chf had a normal coronary angiography with an EF of 60% 2014 presenting with 1 wk L>R leg swelling without pain. [facility] and prescribed Keflex which she is finishing. Symptoms have not improved. States that she had shortness of breath today although it may be associated with anxiety. 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 States that she does not have any orthopnea, nocturnal dyspnea, walking makes her shortness of breath better. Denies pleurisy. Denies any chest discomfort. No history of VTE. No leg pain however she has had knee discomfort which is chronic and fluctuates. No fevers or chills.            Past Medical History:   Diagnosis Date    Arrhythmia     bradycardia,S/P pacemaker    [facility]     Heart failure ([facility] [facility]     Hypertension     Irregular heart rate     Pure hypercholesterolemia 3/27/2017       Past Surgical History:   Procedure Laterality Date    HX ORTHOPAEDIC      left total knee replacement    HX PACEMAKER           Family History:   Problem Relation Age of Onset    Hypertension Mother     Cancer Father         prostate    Hypertension Father        Social History     Socioeconomic History    Marital status:      Spouse name: Not on file    Number of children: Not on file    Years of education: Not on file    Highest education level: Not on file   Occupational History    Not on file   Social Needs    Financial resource strain: Not on file    Food insecurity:     Worry: Not on file     Inability: Not on file    Transportation needs:     Medical: Not on file     Non-medical: Not on file   Tobacco Use    Smoking status: Never Smoker    Smokeless tobacco: Never Used   Substance and Sexual Activity    Alcohol use: No    Drug use: No    Sexual activity: Not Currently     Comment: ,2 children,2children,lives at home   Lifestyle    Physical activity:     Days per week: Not on file     Minutes per session: Not on file    Stress: Not on file   Relationships    Social connections:     Talks on phone: Not on file     Gets together: Not on file     Attends Yarsanism service: Not on file     Active member of club or organization: Not on file     Attends meetings of clubs or organizations: Not on file     Relationship status: Not on file    Intimate partner violence:     Fear of current or ex partner: Not on file     Emotionally abused: Not on file     Physically abused: Not on file     Forced sexual activity: Not on file   Other Topics Concern    Not on file   Social History Narrative    Not on file         ALLERGIES: Codeine; Morphine; Pcn [penicillins]; and Pseudoephedrine    Review of Systems   Constitutional: Negative for chills and fever. Eyes: Negative for photophobia. Respiratory: Positive for shortness of breath. Cardiovascular: Negative for chest pain and leg swelling. Gastrointestinal: Negative for abdominal pain, nausea and vomiting. Genitourinary: Negative for dysuria. Musculoskeletal: Negative for back pain. Neurological: Negative for headaches. Psychiatric/Behavioral: Negative for confusion. All other systems reviewed and are negative. Vitals:    11/12/19 1154   BP: 134/64   Pulse: 64   Resp: 16   Temp: 98 °F (36.7 °C)   Weight: 77.7 kg (171 lb 6.4 oz)   Height: 5' 7\" (1.702 m)            Physical Exam   Constitutional: She appears well-nourished. No distress. HENT:   Head: Normocephalic. Eyes: Pupils are equal, round, and reactive to light. Cardiovascular: Normal rate, regular rhythm and intact distal pulses. Pulmonary/Chest: Effort normal and breath sounds normal.   Abdominal: Soft. She exhibits no distension. Musculoskeletal: She exhibits edema. Left-sided 1+ pitting edema. Intact pedal pulses. Right lower extremity within normal limits. Left knee with a vertical scar from previous total knee replacement. Neurological: She is alert. Skin: Skin is warm. Psychiatric: Her behavior is normal.        MDM  Number of Diagnoses or Management Options  Leg swelling:   Diagnosis management comments: Left lower extremity swelling without pain. There is no warmth or evidence of cellulitis on exam.  Her feet have bilateral onychomycosis but no evidence of ulcers or active infection. Vital signs within normal limits. Plan to check DVT ultrasound, chest x-ray, BNP, EKG. If reassuring patient may follow-up as an outpatient. Differential includes DVT, venous insufficiency, less likely cellulitis, necrotizing infection, arterial insufficiency. 2:24 PM   No evidence of DVT, lab work reassuring. Patient discharged with conservative measures, compression stockings. Primary care follow-up.          Procedures

## 2019-11-12 NOTE — ED TRIAGE NOTES
Pt arrives with bilat leg swelling that [facility] for [facility] and prescribed Cephalexin 250 mg. Today is still having leg swelling but also is feeling itchy bilaterally.

## 2019-11-12 NOTE — DISCHARGE INSTRUCTIONS
There is no sign of a blood clot, severe heart failure. Please follow-up with your primary care doctor for further management. Compression stockings may be helpful as well as elevation. If your symptoms worsen or you develop any new concerning symptoms please return immediately to the emergency department.

## 2019-11-13 ENCOUNTER — APPOINTMENT (OUTPATIENT)
Dept: GENERAL RADIOLOGY | Age: 84
End: 2019-11-13
Attending: EMERGENCY MEDICINE
Payer: MEDICARE

## 2019-11-13 ENCOUNTER — HOSPITAL ENCOUNTER (EMERGENCY)
Age: 84
Discharge: HOME | End: 2019-11-14
Attending: EMERGENCY MEDICINE | Admitting: EMERGENCY MEDICINE
Payer: MEDICARE

## 2019-11-13 DIAGNOSIS — R09.81 MILD NASAL CONGESTION: Primary | ICD-10-CM

## 2019-11-13 LAB
ATRIAL RATE: 65 BPM
CALCULATED P AXIS, ECG09: 62 DEGREES
CALCULATED R AXIS, ECG10: -9 DEGREES
CALCULATED T AXIS, ECG11: 26 DEGREES
DIAGNOSIS, 93000: NORMAL
P-R INTERVAL, ECG05: 168 MS
Q-T INTERVAL, ECG07: 442 MS
QRS DURATION, ECG06: 82 MS
QTC CALCULATION (BEZET), ECG08: 459 MS
VENTRICULAR RATE, ECG03: 65 BPM

## 2019-11-13 PROCEDURE — 93005 ELECTROCARDIOGRAM TRACING: CPT

## 2019-11-13 PROCEDURE — 71046 X-RAY EXAM CHEST 2 VIEWS: CPT

## 2019-11-13 PROCEDURE — 74011250637 HC RX REV CODE- 250/637: Performed by: EMERGENCY MEDICINE

## 2019-11-13 PROCEDURE — 99284 EMERGENCY DEPT VISIT MOD MDM: CPT

## 2019-11-13 RX ORDER — FLUTICASONE PROPIONATE 50 MCG
2 SPRAY, SUSPENSION (ML) NASAL ONCE
Status: COMPLETED | OUTPATIENT
Start: 2019-11-13 | End: 2019-11-13

## 2019-11-13 RX ADMIN — FLUTICASONE PROPIONATE 2 SPRAY: 50 SPRAY, METERED NASAL at 23:45

## 2019-11-14 VITALS
DIASTOLIC BLOOD PRESSURE: 88 MMHG | RESPIRATION RATE: 16 BRPM | HEART RATE: 75 BPM | OXYGEN SATURATION: 100 % | SYSTOLIC BLOOD PRESSURE: 140 MMHG | TEMPERATURE: 98 F

## 2019-11-14 LAB
ATRIAL RATE: 60 BPM
CALCULATED P AXIS, ECG09: 63 DEGREES
CALCULATED R AXIS, ECG10: -11 DEGREES
CALCULATED T AXIS, ECG11: 21 DEGREES
DIAGNOSIS, 93000: NORMAL
P-R INTERVAL, ECG05: 174 MS
Q-T INTERVAL, ECG07: 436 MS
QRS DURATION, ECG06: 74 MS
QTC CALCULATION (BEZET), ECG08: 436 MS
VENTRICULAR RATE, ECG03: 60 BPM

## 2019-11-14 NOTE — ED NOTES
MD spoke with patient regarding caregiver fatigue. The pt stated that she is asymptomatic at this time and does not wish to speak with a . At this time she would like to go home. She states she will follow up with a primary care doctor later regarding caregiver fatigue.

## 2019-11-14 NOTE — ED PROVIDER NOTES
10year-old female who presents for transient episode of nasal congestion prior to arrival.  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 Notes that he is a smoker and when he came around her after smoking a cigarette she experienced nasal congestion prompting ER visit. Reports his symptoms have improved, and no recent illnesses. No sick contacts, no cough, fever, chills, rhinorrhea. Speaking full sentences, no increased work of breathing noted during exam.  Reports she was seen yesterday for lower extremity swelling which is unchanged at this time.              Past Medical History:   Diagnosis Date    Arrhythmia     bradycardia,S/P pacemaker    CHF (congestive heart failure) ([facility]     Heart failure ([facility]     Hypertension     Irregular heart rate     Pure hypercholesterolemia 3/27/2017       Past Surgical History:   Procedure Laterality Date    HX ORTHOPAEDIC      left total knee replacement    HX PACEMAKER           Family History:   Problem Relation Age of Onset    Hypertension Mother     Cancer Father         prostate    Hypertension Father        Social History     Socioeconomic History    Marital status:      Spouse name: Not on file    Number of children: Not on file    Years of education: Not on file    Highest education level: Not on file   Occupational History    Not on file   Social Needs    Financial resource strain: Not on file    Food insecurity:     Worry: Not on file     Inability: Not on file    Transportation needs:     Medical: Not on file     Non-medical: Not on file   Tobacco Use    Smoking status: Never Smoker    Smokeless tobacco: Never Used   Substance and Sexual Activity    Alcohol use: No    Drug use: No    Sexual activity: Not Currently     Comment: ,2 children,2children,lives at home   Lifestyle    Physical activity:     Days per week: Not on file     Minutes per session: Not on file    Stress: Not on file   Relationships    Social connections:     Talks on phone: Not on file     Gets together: Not on file     Attends Baptism service: Not on file     Active member of club or organization: Not on file     Attends meetings of clubs or organizations: Not on file     Relationship status: Not on file    Intimate partner violence:     Fear of current or ex partner: Not on file     Emotionally abused: Not on file     Physically abused: Not on file     Forced sexual activity: Not on file   Other Topics Concern    Not on file   Social History Narrative    Not on file         ALLERGIES: Codeine; Morphine; Pcn [penicillins]; and Pseudoephedrine    Review of Systems   Constitutional: Negative for chills and fever. HENT: Positive for congestion. Negative for drooling, facial swelling, nosebleeds, postnasal drip, rhinorrhea, sinus pressure, sinus pain, sneezing, sore throat, tinnitus, trouble swallowing and voice change. Eyes: Negative for pain and itching. Respiratory: Negative for choking and stridor. Cardiovascular: Negative for leg swelling. Gastrointestinal: Negative for abdominal distention and rectal pain. Endocrine: Negative for heat intolerance and polyphagia. Genitourinary: Negative for enuresis and genital sores. Musculoskeletal: Negative for arthralgias and joint swelling. Skin: Negative for color change. Allergic/Immunologic: Negative for immunocompromised state. Neurological: Negative for tremors and speech difficulty. Hematological: Negative for adenopathy. Psychiatric/Behavioral: Negative for dysphoric mood and sleep disturbance. Vitals:    11/13/19 2255   BP: 144/66   Pulse: 62   Resp: 18   Temp: 98 °F (36.7 °C)   SpO2: 100%            Physical Exam   Constitutional: She appears well-developed and well-nourished. HENT:   Head: Normocephalic. Nose: Nose normal. No mucosal edema, rhinorrhea, sinus tenderness or nasal deformity.    Mouth/Throat: No oropharyngeal exudate or posterior oropharyngeal edema. Eyes: Conjunctivae and EOM are normal.   Neck: Normal range of motion. Neck supple. Cardiovascular: Regular rhythm and normal heart sounds. Pulmonary/Chest: Effort normal. No respiratory distress. Abdominal: Soft. She exhibits no distension. Musculoskeletal: Normal range of motion. She exhibits no deformity. Neurological: She is alert. Coordination normal.   Skin: Skin is warm and dry. Psychiatric: She has a normal mood and affect. Her behavior is normal.   Nursing note and vitals reviewed. MDM  Number of Diagnoses or Management Options  Mild nasal congestion:   Diagnosis management comments: She reports nasal congestion after being exposed to cigarette smoke that has resolved prior to my evaluation. Flonase administered and patient tolerated. No evidence of URI, and patient is well-appearing. No evidence of lower extremity cellulitis during my examination. Long discussion with patient regarding caregiver fatigue. Patient reports she is tired being primary caregiver for her daughter and . Is refusing to meet with the  at this time for further resources. Reports she will discuss with her primary care doctor later this week. Also notes she will discuss with her daughter's primary care physician who will visit home later this week to see if she can arrange for home care. She is ambulating with a steady gait to the bathroom during ED stay. Knows she can return at any time for further help. She is stable for discharge at this time. ED Course as of Nov 14 0004   Thu Nov 14, 2019   0002 ED EKG interpretation:  Rhythm: atrial paced rhythm; and regular . Rate (approx.): 60; Axis: normal; ST/T wave: normal; No evidence of acute coronary ischemia. [AL]      ED Course User Index  [AL] Nunu Haines MD       Procedures    Patient's results have been reviewed with them.   Patient and/or family have verbally conveyed their understanding and agreement of the patient's signs, symptoms, diagnosis, treatment and prognosis and additionally agree to follow up as recommended or return to the Emergency Room should their condition change prior to follow-up. Discharge instructions have also been provided to the patient with some educational information regarding their diagnosis as well a list of reasons why they would want to return to the ER prior to their follow-up appointment should their condition change.

## 2019-11-14 NOTE — ED TRIAGE NOTES
Patient arrives ambulatory from home with CC nasal congestion and cough and shortness of breath that started yesterday. Denies chest pain. Denies fevers. Denies N/V/D. Pt reports her  is a smoker and \"it bothers her breathing\". Respirations even and unlabored in triage, speaking in clear easy sentences. Patient was seen here yesterday and diagnosed with cellulitis.

## 2019-11-14 NOTE — DISCHARGE INSTRUCTIONS
Please follow up with your family doctor later this week to discuss caregiver fatigue that you are experiencing. She will be able to provide you with resources to better manage her symptoms. If it anytime you want to return to discuss with the  please do so. Use Flonase as needed for the nasal congestion. Please recommend that you  stop smoking around you which is likely the cause of his symptoms like you reported today. Thank you.

## 2019-12-02 ENCOUNTER — OFFICE VISIT (OUTPATIENT)
Dept: INTERNAL MEDICINE CLINIC | Age: 84
End: 2019-12-02

## 2019-12-02 VITALS
RESPIRATION RATE: 15 BRPM | WEIGHT: 161.4 LBS | TEMPERATURE: 98.2 F | BODY MASS INDEX: 25.33 KG/M2 | HEIGHT: 67 IN | SYSTOLIC BLOOD PRESSURE: 126 MMHG | HEART RATE: 97 BPM | DIASTOLIC BLOOD PRESSURE: 84 MMHG | OXYGEN SATURATION: 99 %

## 2019-12-02 DIAGNOSIS — M19.012 PRIMARY OSTEOARTHRITIS OF BOTH SHOULDERS: ICD-10-CM

## 2019-12-02 DIAGNOSIS — I10 ESSENTIAL HYPERTENSION: Primary | ICD-10-CM

## 2019-12-02 DIAGNOSIS — Z78.0 POST-MENOPAUSE: ICD-10-CM

## 2019-12-02 DIAGNOSIS — M19.011 PRIMARY OSTEOARTHRITIS OF BOTH SHOULDERS: ICD-10-CM

## 2019-12-02 DIAGNOSIS — E78.00 PURE HYPERCHOLESTEROLEMIA: ICD-10-CM

## 2019-12-02 DIAGNOSIS — M85.80 OSTEOPENIA, UNSPECIFIED LOCATION: ICD-10-CM

## 2019-12-02 RX ORDER — CEPHALEXIN 250 MG/1
CAPSULE ORAL
COMMUNITY
Start: 2019-11-02 | End: 2019-12-02 | Stop reason: ALTCHOICE

## 2019-12-02 NOTE — PROGRESS NOTES
Health Maintenance Due   Topic Date Due    DTaP/Tdap/Td series (1 - Tdap) 02/02/1944    Shingrix Vaccine Age 50> (1 of 2) 02/02/1983    GLAUCOMA SCREENING Q2Y  09/15/2018    Influenza Age 9 to Adult  08/01/2019       Chief Complaint   Patient presents with    Hypertension    Cholesterol Problem    COPD       1. Have you been to the ER, urgent care clinic since your last visit? Hospitalized since your last visit? Yes When: [facility] of November for bilateral leg swelling and dx w/ cellulitis    2. Have you seen or consulted any other health care providers outside of the [address] [place] [address] since your last visit? Include any pap smears or colon screening. No    3) Do you have an Advance Directive on file? no    4) Are you interested in receiving information on Advance Directives? NO      Patient is accompanied by self I have received verbal consent from Dominik Ly to discuss any/all medical information while they are present in the room.

## 2019-12-02 NOTE — PROGRESS NOTES
HISTORY OF PRESENT ILLNESS  Cornelio Grimm is a 80 y.o. female here to follow up. She went to emergency room with aches and pains. Had a EKG done was normal.  Lab work stable. 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 She has to lift her daughters. She was advised to talk to her daughter's  for for skilled nursing for her. She underwent foot surgery/capsulotomy by Dr. Slava Phipps several months back. Surgical sites healed up. She went to urgent care last week. Was prescribed Bactrim DS for cellulitis. She is wondering if she should continue it. There is no cellulitis right now. Report multiple aches and pains in the joints. Has DJD in multiple joints. Taking Tylenol every day, not helping all the time. Has HTN,stable. all lab done. did not do bone density yet. Has elevated lipid, no chest pain palpitation or shortness of breath. Reports compliance with medications and diet. Med list and most recent labs/studies reviewed with pt. active physically to control weight. Needs med refills. Reports no other new c/o. [place] Hypertension    Pertinent negatives include no palpitations and no blurred vision. Skin Problem     Follow-up     COPD         Review of Systems   Constitutional: Negative. Negative for chills and fever. HENT: Negative. Eyes: Negative. Negative for blurred vision and double vision. Respiratory: Negative. Cardiovascular: Negative. Negative for palpitations. Gastrointestinal: Negative. Genitourinary: Negative. Musculoskeletal: Positive for joint pain. Skin: Positive for rash. Neurological: Negative. Endo/Heme/Allergies: Negative. Psychiatric/Behavioral: Negative. Physical Exam  Constitutional:       General: She is not in acute distress. Appearance: She is well-developed. HENT:      Nose: Nose normal.   Neck:      Musculoskeletal: Normal range of motion and neck supple. Thyroid: No thyromegaly. Vascular: No JVD. Cardiovascular:      Rate and Rhythm: Normal rate and regular rhythm. Heart sounds: Normal heart sounds. Pulmonary:      Effort: Pulmonary effort is normal. No respiratory distress. Breath sounds: Normal breath sounds. No wheezing or rales. Musculoskeletal:         General: Tenderness present. Comments: B/L shoulder:tender. ROM restricted in all direction. Skin:     General: Skin is warm. Comments: Both feet:Surgical site healed up completely. No sign of cellulitis. Psychiatric:         Mood and Affect: Mood normal.         Behavior: Behavior normal.         ASSESSMENT and PLAN    Diagnoses and all orders for this visit:    1. Essential hypertension      On amlodipine. Stable blood pressure. Be on DASH diet. 2. Primary osteoarthritis of both shoulders  She is only taking Tylenol. Not helping her at all. She is quite miserable. Will call in meloxicam 7.5 mg once a day. 3. Osteopenia, unspecified location     taking calcium with vitamin D. Will order,      -     DEXA BONE DENSITY STUDY AXIAL; Future    4. Pure hypercholesterolemia  Be on low-cholesterol diet. 5. Post-menopause  Will order,    -     DEXA BONE DENSITY STUDY AXIAL; Future    6. Cellulitis     resolved. No need to take Bactrim DS now. Discussed expected course/resolution/complications of diagnosis in detail with patient. Medication risks/benefits/costs/interactions/alternatives discussed with patient. Pt was given an after visit summary which includes diagnoses, current medications & vitals. Pt expressed understanding with the diagnosis and plan.

## 2020-05-04 ENCOUNTER — HOSPITAL ENCOUNTER (EMERGENCY)
Age: 85
Discharge: HOME | End: 2020-05-05
Attending: EMERGENCY MEDICINE | Admitting: EMERGENCY MEDICINE
Payer: MEDICARE

## 2020-05-04 ENCOUNTER — VIRTUAL VISIT (OUTPATIENT)
Dept: INTERNAL MEDICINE CLINIC | Age: 85
End: 2020-05-04

## 2020-05-04 VITALS — BODY MASS INDEX: 25.28 KG/M2 | HEIGHT: 67 IN

## 2020-05-04 DIAGNOSIS — M17.11 PRIMARY OSTEOARTHRITIS OF RIGHT KNEE: ICD-10-CM

## 2020-05-04 DIAGNOSIS — I10 ESSENTIAL HYPERTENSION: ICD-10-CM

## 2020-05-04 DIAGNOSIS — R06.02 SOB (SHORTNESS OF BREATH): Primary | ICD-10-CM

## 2020-05-04 DIAGNOSIS — H65.90 FLUID COLLECTION OF MIDDLE EAR: Primary | ICD-10-CM

## 2020-05-04 DIAGNOSIS — J30.1 ALLERGIC RHINITIS DUE TO POLLEN, UNSPECIFIED SEASONALITY: ICD-10-CM

## 2020-05-04 DIAGNOSIS — R51.9 NONINTRACTABLE HEADACHE, UNSPECIFIED CHRONICITY PATTERN, UNSPECIFIED HEADACHE TYPE: ICD-10-CM

## 2020-05-04 DIAGNOSIS — R06.2 WHEEZING: ICD-10-CM

## 2020-05-04 DIAGNOSIS — F43.21 GRIEF: ICD-10-CM

## 2020-05-04 PROCEDURE — 99285 EMERGENCY DEPT VISIT HI MDM: CPT

## 2020-05-04 PROCEDURE — 99284 EMERGENCY DEPT VISIT MOD MDM: CPT

## 2020-05-04 RX ORDER — PREDNISONE 5 MG/1
5 TABLET ORAL DAILY
Qty: 7 TAB | Refills: 0 | Status: SHIPPED | OUTPATIENT
Start: 2020-05-04 | End: 2020-05-27 | Stop reason: ALTCHOICE

## 2020-05-04 RX ORDER — CETIRIZINE HCL 10 MG
10 TABLET ORAL
Qty: 30 TAB | Refills: 0 | Status: SHIPPED | OUTPATIENT
Start: 2020-05-04 | End: 2020-05-26

## 2020-05-04 RX ORDER — DILTIAZEM HYDROCHLORIDE 180 MG/1
CAPSULE, COATED, EXTENDED RELEASE ORAL
COMMUNITY
Start: 2020-02-26 | End: 2021-01-06 | Stop reason: ALTCHOICE

## 2020-05-04 RX ORDER — FLUTICASONE PROPIONATE 50 MCG
2 SPRAY, SUSPENSION (ML) NASAL DAILY
Qty: 1 BOTTLE | Refills: 3 | Status: SHIPPED | OUTPATIENT
Start: 2020-05-04 | End: 2020-07-27

## 2020-05-04 RX ORDER — ALBUTEROL SULFATE 90 UG/1
2 AEROSOL, METERED RESPIRATORY (INHALATION)
Qty: 1 INHALER | Refills: 1 | Status: SHIPPED | OUTPATIENT
Start: 2020-05-04 | End: 2020-06-29

## 2020-05-04 RX ORDER — ACETAMINOPHEN 500 MG
500 TABLET ORAL
Qty: 60 TAB | Refills: 5 | Status: SHIPPED | OUTPATIENT
Start: 2020-05-04

## 2020-05-04 NOTE — PROGRESS NOTES
Emily Posadas is a 80 y.o. female who was seen by synchronous (real-time) audio-video technology on 5/4/2020. Consent: Emily Posadas, who was seen by synchronous (real-time) audio-video technology, and/or her healthcare decision maker, is aware that this patient-initiated, Telehealth encounter on 5/4/2020 is a billable service, with coverage as determined by her insurance carrier. She is aware that she may receive a bill and has provided verbal consent to proceed: Yes. Assessment & Plan:   Diagnoses and all orders for this visit:    1. Fluid collection of middle ear  -     predniSONE (DELTASONE) 5 mg tablet; Take 1 Tab by mouth daily. 2. Allergic rhinitis due to pollen, unspecified seasonality  -     cetirizine (ZYRTEC) 10 mg tablet; Take 1 Tab by mouth daily as needed for Allergies. Will call in Flonase  3. Grief  Just lost her . She is sad, but she mentioned that she is able to sleep well. She refused to take any medicine. .    5. Wheezing  -     albuterol (ProAir HFA) 90 mcg/actuation inhaler; Take 2 Puffs by inhalation every six (6) hours as needed for Wheezing. 6. Primary osteoarthritis of right knee  -     acetaminophen (TYLENOL) 500 mg tablet; Take 1 Tab by mouth two (2) times daily as needed for Pain. 7. Nonintractable headache, unspecified chronicity pattern, unspecified headache type  -     acetaminophen (TYLENOL) 500 mg tablet; Take 1 Tab by mouth two (2) times daily as needed for Pain. I spent at least 40 minutes on this visit with this established patient. ([id number])    Subjective:   Emily Posadas is a 80 y.o. female who was seen for Ringing in Ear (humming in ears; \"equilibrium is off\"); Constipation; Hypertension; [facility] ( passed away last month); and Allergies (SOB, possibly related to dust while cleaning)   Ms. Milka Andrews reports ringing in both ears and nasal congestion. She feels her both ears are  Blocked.   She has been suffering from it for past 2 to 3 weeks. She used to take Zyrtec and Flonase for allergy, almost ran out of it. Has on and off headache and knee pain. He would like to get refill on Tylenol. She has lost her  1 month back. Very sad and grieving. She is working too hard at home in order to forget him. Her son mentioned because she is cleaning whole house, she is probably exposed exposing to more dust and possible asbestos since her house is old. That might be the reason she is having more allergy flareup. Has hypertension, compliant with medications. Denies chest pain palpitation or shortness of breath. She wheezes sometimes. Would like to do refill on inhaler. Prior to Admission medications    Medication Sig Start Date End Date Taking? Authorizing Provider   dilTIAZem ER (CARDIZEM CD) 180 mg capsule TAKE 1 CAPSULE BY MOUTH EVERY DAY 2/26/20  Yes Provider, Historical   cetirizine (ZYRTEC) 10 mg tablet Take 1 Tab by mouth daily as needed for Allergies. 5/4/20  Yes Lucrecia Arthur MD   predniSONE (DELTASONE) 5 mg tablet Take 1 Tab by mouth daily. 5/4/20  Yes Lucrecia Arthur MD   fluticasone propionate (FLONASE) 50 mcg/actuation nasal spray 2 Sprays by Both Nostrils route daily. 5/4/20  Yes Lucrecia Arthur MD   albuterol (ProAir HFA) 90 mcg/actuation inhaler Take 2 Puffs by inhalation every six (6) hours as needed for Wheezing. 5/4/20  Yes Lucrecia Arthur MD   acetaminophen (TYLENOL) 500 mg tablet Take 1 Tab by mouth two (2) times daily as needed for Pain. 5/4/20  Yes Lucrecia Arthur MD   calcium-vitamin D (OYSTER SHELL) 500 mg(1,250mg) -200 unit per tablet Take 1 Tab by mouth two (2) times daily (with meals). 7/8/19  Yes Lucrecia Arthur MD   diclofenac (VOLTAREN) 1 % gel Apply  to affected area two (2) times daily as needed. 6/26/17  Yes Lucrecia Arthur MD   aspirin 81 mg tablet Take 81 mg by mouth. Yes Other, MD Royer   amLODIPine (NORVASC) 10 mg tablet Take 1 Tab by mouth daily.  7/8/19 5/4/20  Lucrecia Arthur MD fluticasone (FLONASE) 50 mcg/actuation nasal spray USE 2 SPRAYS IN EACH NOSTRIL EVERY DAY 10/8/18 5/4/20  Elizabeth Lacy NP   meloxicam (MOBIC) 7.5 mg tablet Take 1 Tab by mouth daily. 1/22/18 5/4/20  Rolan Lizarraga MD   PROAIR HFA 90 mcg/actuation inhaler INHALE 1 PUFF BY INHALATION EVERY FOUR (4) HOURS AS NEEDED. 10/4/17 5/4/20  Rolan Lizarraga MD   acetaminophen (TYLENOL) 500 mg tablet Take 1 Tab by mouth two (2) times daily as needed for Pain. 6/26/17 5/4/20  Rolan Lizarraga MD   cetirizine (ZYRTEC) 10 mg tablet TAKE 1 TABLET BY MOUTH EVERY DAY 1/27/16 5/4/20  Rolan Lizarraga MD     Allergies   Allergen Reactions    Codeine Unknown (comments)    Morphine Unknown (comments)    Pcn [Penicillins] Unknown (comments)    Pseudoephedrine Unknown (comments)       Past Medical History:   Diagnosis Date    Arrhythmia     bradycardia,S/P pacemaker    [facility] [address] [facility]     Heart failure [facility] [address]     Hypertension     Irregular heart rate     Pure hypercholesterolemia 3/27/2017       ROS    Objective:   Vital Signs: (As obtained by patient/caregiver at home)  Visit Vitals  Ht 5' 7\" (1.702 m)   BMI 25.28 kg/m²          Constitutional: [x] Appears well-developed and well-nourished [x] No apparent distress      [] Abnormal -     Mental status: [x] Alert and awake  [x] Oriented to person/place/time [x] Able to follow commands    [] Abnormal -       HENT: [x] Normocephalic, atraumatic  [] Abnormal -   [x] Mouth/Throat: Mucous membranes are moist    External Ears [x] Normal  [] Abnormal -  Mild nasal congestion present.   Neck: [x] No visualized mass [] Abnormal -     Pulmonary/Chest: [x] Respiratory effort normal   [x] No visualized signs of difficulty breathing or respiratory distress        [] Abnormal -      Musculoskeletal:   [x] Normal gait with no signs of ataxia         [x] Normal range of motion of neck        [] Abnormal -     Neurological:        [x] No Facial Asymmetry (Cranial nerve 7 motor function) (limited exam due to video visit)          [x] No gaze palsy        [] Abnormal -          Skin:        [x] No significant exanthematous lesions or discoloration noted on facial skin         [] Abnormal -            Psychiatric: She is sad and grieving. She lost her  1 month back. Other pertinent observable physical exam findings:-        We discussed the expected course, resolution and complications of the diagnosis(es) in detail. Medication risks, benefits, costs, interactions, and alternatives were discussed as indicated. I advised her to contact the office if her condition worsens, changes or fails to improve as anticipated. She expressed understanding with the diagnosis(es) and plan. Mary Medina is a 80 y.o. female who was evaluated by a video visit encounter for concerns as above. Patient identification was verified prior to start of the visit. A caregiver was present when appropriate. Due to this being a TeleHealth encounter (During [facility] public [facility] emergency), evaluation of the following organ systems was limited: Vitals/Constitutional/EENT/Resp/CV/GI//MS/Neuro/Skin/Heme-Lymph-Imm. Pursuant to the emergency declaration under the [facility] waiver authority and the [employer] Act, this Virtual  Visit was conducted, with patient's (and/or legal guardian's) consent, to reduce the patient's risk of exposure to COVID-19 and provide necessary medical care. Services were provided through a video synchronous discussion virtually to substitute for in-person clinic visit. Patient and provider were located at their individual homes.       Balta Flood MD

## 2020-05-04 NOTE — PROGRESS NOTES
Health Maintenance Due   Topic Date Due    DTaP/Tdap/Td series (1 - Tdap) 02/02/1954    Shingrix Vaccine Age 50> (1 of 2) 02/02/1983    GLAUCOMA SCREENING Q2Y  09/15/2018       No chief complaint on file. 1. Have you been to the ER, urgent care clinic since your last visit? Hospitalized since your last visit? No    2. Have you seen or consulted any other health care providers outside of the [address] [place] [address] since your last visit? Include any pap smears or colon screening. No    3) Do you have an Advance Directive on file? no    4) Are you interested in receiving information on Advance Directives? NO      Patient is accompanied by self I have received verbal consent from Donita Neal to discuss any/all medical information while they are present in the room.

## 2020-05-05 ENCOUNTER — APPOINTMENT (OUTPATIENT)
Dept: GENERAL RADIOLOGY | Age: 85
End: 2020-05-05
Attending: EMERGENCY MEDICINE
Payer: MEDICARE

## 2020-05-05 ENCOUNTER — APPOINTMENT (OUTPATIENT)
Dept: CT IMAGING | Age: 85
End: 2020-05-05
Attending: EMERGENCY MEDICINE
Payer: MEDICARE

## 2020-05-05 ENCOUNTER — PATIENT OUTREACH (OUTPATIENT)
Dept: CASE MANAGEMENT | Age: 85
End: 2020-05-05

## 2020-05-05 VITALS
TEMPERATURE: 98 F | SYSTOLIC BLOOD PRESSURE: 119 MMHG | RESPIRATION RATE: 14 BRPM | HEART RATE: 60 BPM | DIASTOLIC BLOOD PRESSURE: 56 MMHG | OXYGEN SATURATION: 98 %

## 2020-05-05 LAB
ALBUMIN SERPL-MCNC: 3.7 G/DL (ref 3.5–5)
ALBUMIN/GLOB SERPL: 0.8 {RATIO} (ref 1.1–2.2)
ALP SERPL-CCNC: 94 U/L (ref 45–117)
ALT SERPL-CCNC: 45 U/L (ref 12–78)
ANION GAP SERPL CALC-SCNC: 7 MMOL/L (ref 5–15)
AST SERPL-CCNC: 40 U/L (ref 15–37)
ATRIAL RATE: 65 BPM
BASOPHILS # BLD: 0.1 K/UL (ref 0–0.1)
BASOPHILS NFR BLD: 2 % (ref 0–1)
BILIRUB SERPL-MCNC: 0.8 MG/DL (ref 0.2–1)
BNP SERPL-MCNC: 89 PG/ML
BUN SERPL-MCNC: 12 MG/DL (ref 6–20)
BUN/CREAT SERPL: 14 (ref 12–20)
CALCIUM SERPL-MCNC: 10 MG/DL (ref 8.5–10.1)
CALCULATED P AXIS, ECG09: 47 DEGREES
CALCULATED R AXIS, ECG10: -11 DEGREES
CALCULATED T AXIS, ECG11: 25 DEGREES
CHLORIDE SERPL-SCNC: 106 MMOL/L (ref 97–108)
CO2 SERPL-SCNC: 24 MMOL/L (ref 21–32)
COMMENT, HOLDF: NORMAL
CREAT SERPL-MCNC: 0.86 MG/DL (ref 0.55–1.02)
D DIMER PPP FEU-MCNC: 1.16 MG/L FEU (ref 0–0.65)
DIAGNOSIS, 93000: NORMAL
DIFFERENTIAL METHOD BLD: ABNORMAL
EOSINOPHIL # BLD: 0.3 K/UL (ref 0–0.4)
EOSINOPHIL NFR BLD: 7 % (ref 0–7)
ERYTHROCYTE [DISTWIDTH] IN BLOOD BY AUTOMATED COUNT: 17.3 % (ref 11.5–14.5)
GLOBULIN SER CALC-MCNC: 4.5 G/DL (ref 2–4)
GLUCOSE SERPL-MCNC: 99 MG/DL (ref 65–100)
HCT VFR BLD AUTO: 41.2 % (ref 35–47)
HGB BLD-MCNC: 12.8 G/DL (ref 11.5–16)
IMM GRANULOCYTES # BLD AUTO: 0 K/UL (ref 0–0.04)
IMM GRANULOCYTES NFR BLD AUTO: 0 % (ref 0–0.5)
LYMPHOCYTES # BLD: 1.1 K/UL (ref 0.8–3.5)
LYMPHOCYTES NFR BLD: 27 % (ref 12–49)
MCH RBC QN AUTO: 21.5 PG (ref 26–34)
MCHC RBC AUTO-ENTMCNC: 31.1 G/DL (ref 30–36.5)
MCV RBC AUTO: 69.1 FL (ref 80–99)
MONOCYTES # BLD: 0.7 K/UL (ref 0–1)
MONOCYTES NFR BLD: 18 % (ref 5–13)
NEUTS SEG # BLD: 1.8 K/UL (ref 1.8–8)
NEUTS SEG NFR BLD: 46 % (ref 32–75)
NRBC # BLD: 0 K/UL (ref 0–0.01)
NRBC BLD-RTO: 0 PER 100 WBC
P-R INTERVAL, ECG05: 182 MS
PLATELET # BLD AUTO: 210 K/UL (ref 150–400)
PMV BLD AUTO: 10 FL (ref 8.9–12.9)
POTASSIUM SERPL-SCNC: 3.6 MMOL/L (ref 3.5–5.1)
PROT SERPL-MCNC: 8.2 G/DL (ref 6.4–8.2)
Q-T INTERVAL, ECG07: 432 MS
QRS DURATION, ECG06: 84 MS
QTC CALCULATION (BEZET), ECG08: 449 MS
RBC # BLD AUTO: 5.96 M/UL (ref 3.8–5.2)
RBC MORPH BLD: ABNORMAL
RBC MORPH BLD: ABNORMAL
SAMPLES BEING HELD,HOLD: NORMAL
SODIUM SERPL-SCNC: 137 MMOL/L (ref 136–145)
TROPONIN I SERPL-MCNC: <0.05 NG/ML
TROPONIN I SERPL-MCNC: <0.05 NG/ML
VENTRICULAR RATE, ECG03: 65 BPM
WBC # BLD AUTO: 4 K/UL (ref 3.6–11)

## 2020-05-05 PROCEDURE — 83880 ASSAY OF NATRIURETIC PEPTIDE: CPT

## 2020-05-05 PROCEDURE — 84484 ASSAY OF TROPONIN QUANT: CPT

## 2020-05-05 PROCEDURE — 85379 FIBRIN DEGRADATION QUANT: CPT

## 2020-05-05 PROCEDURE — 85025 COMPLETE CBC W/AUTO DIFF WBC: CPT

## 2020-05-05 PROCEDURE — 74011636320 HC RX REV CODE- 636/320: Performed by: RADIOLOGY

## 2020-05-05 PROCEDURE — 36415 COLL VENOUS BLD VENIPUNCTURE: CPT

## 2020-05-05 PROCEDURE — 93005 ELECTROCARDIOGRAM TRACING: CPT

## 2020-05-05 PROCEDURE — 74011000258 HC RX REV CODE- 258: Performed by: RADIOLOGY

## 2020-05-05 PROCEDURE — 71275 CT ANGIOGRAPHY CHEST: CPT

## 2020-05-05 PROCEDURE — 71045 X-RAY EXAM CHEST 1 VIEW: CPT

## 2020-05-05 PROCEDURE — 80053 COMPREHEN METABOLIC PANEL: CPT

## 2020-05-05 RX ORDER — SODIUM CHLORIDE 0.9 % (FLUSH) 0.9 %
10 SYRINGE (ML) INJECTION
Status: COMPLETED | OUTPATIENT
Start: 2020-05-05 | End: 2020-05-05

## 2020-05-05 RX ADMIN — IOPAMIDOL 70 ML: 755 INJECTION, SOLUTION INTRAVENOUS at 01:09

## 2020-05-05 RX ADMIN — SODIUM CHLORIDE 100 ML: 900 INJECTION, SOLUTION INTRAVENOUS at 01:19

## 2020-05-05 RX ADMIN — Medication 10 ML: at 01:19

## 2020-05-05 NOTE — DISCHARGE INSTRUCTIONS
Patient Education      Work up in the emergency room was reassuring tonight- chest xray, EKG, cat scan of your chest and blood work. The exact cause of your symptoms was not found. We offered admission to rule out this being a symptom of a heart issue, but you have decided to follow up with Dr. Andressa Tello in the office instead. Please call in the morning, let them know you were in the Emergency Room with shortness of breath and need close follow up in the office this week. If you feel your symptoms are getting worse in any way-such as persistent shortness of breath, chest pain or fever,  return here. Shortness of Breath: Care Instructions  Your Care Instructions  Shortness of breath has many causes. Sometimes conditions such as anxiety can lead to shortness of breath. Some people get mild shortness of breath when they exercise. Trouble breathing also can be a symptom of a serious problem, such as asthma, lung disease, emphysema, heart problems, and pneumonia. If your shortness of breath continues, you may need tests and treatment. Watch for any changes in your breathing and other symptoms. Follow-up care is a key part of your treatment and safety. Be sure to make and go to all appointments, and call your doctor if you are having problems. It's also a good idea to know your test results and keep a list of the medicines you take. How can you care for yourself at home? · Do not smoke or allow others to smoke around you. If you need help quitting, talk to your doctor about stop-smoking programs and medicines. These can increase your chances of quitting for good. · Get plenty of rest and sleep. · Take your medicines exactly as prescribed. Call your doctor if you think you are having a problem with your medicine. · Find healthy ways to deal with stress. ? Exercise daily. ? Get plenty of sleep. ? Eat regularly and well. When should you call for help? Call 911 anytime you think you may need emergency care.  For example, call if:    · You have severe shortness of breath.     · You have symptoms of a heart attack. These may include:  ? Chest pain or pressure, or a strange feeling in the chest.  ? Sweating. ? Shortness of breath. ? Nausea or vomiting. ? Pain, pressure, or a strange feeling in the back, neck, jaw, or upper belly or in one or both shoulders or arms. ? Lightheadedness or sudden weakness. ? A fast or irregular heartbeat. After you call  911, the  may tell you to chew 1 adult-strength or 2 to 4 low-dose aspirin. Wait for an ambulance. Do not try to drive yourself.    Call your doctor now or seek immediate medical care if:    · Your shortness of breath gets worse or you start to wheeze. Wheezing is a high-pitched sound when you breathe.     · You wake up at night out of breath or have to prop your head up on several pillows to breathe.     · You are short of breath after only light activity or while at rest.    Watch closely for changes in your health, and be sure to contact your doctor if:    · You do not get better over the next 1 to 2 days. Where can you learn more? Go to http://francis-gene.info/  Enter S780 in the search box to learn more about \"Shortness of Breath: Care Instructions. \"  Current as of: June 9, 2019Content Version: 12.4  © 2579-3754 Healthwise, Incorporated. Care instructions adapted under license by [employer] (which disclaims liability or warranty for this information). If you have questions about a medical condition or this instruction, always ask your healthcare professional. [address] any warranty or liability for your use of this information.

## 2020-05-05 NOTE — PROGRESS NOTES
20   Care transitions nurse- Call to and reached pt who said she is feeling better - she went to ER for ear pain and for shortness of breath - She had labs, CT, and CXR - and she will see cardiology NP tomorrow  -   She does not have a scale - and we discussed the benefits of getting one. She has a bp cuff - will take with her to the visit - re: using it correctly. Patient contacted regarding recent discharge and COVID-19 risk   Care Transition Nurse/ Ambulatory Care Manager contacted the patient by telephone to perform post discharge assessment. Verified name and  with patient as identifiers. Patient has following risk factors of: heart failure. CTN/ACM reviewed discharge instructions, medical action plan and red flags related to discharge diagnosis. Reviewed and educated them on any new and changed medications related to discharge diagnosis. Advised obtaining a 90-day supply of all daily and as-needed medications. Education provided regarding infection prevention, and signs and symptoms of COVID-19 and when to seek medical attention with patient who verbalized understanding. Discussed exposure protocols and quarantine from [address] you at higher risk for severe illness  and given an opportunity for questions and concerns. The patient agrees to contact the COVID-19 hotline [phone] or PCP office for questions related to their healthcare. CTN/ACM provided contact information for future reference. From CDC: Are you at higher risk for severe illness?  Wash your hands often.  Avoid close contact (6 feet, which is about two arm lengths) with people who are sick.  Put distance between yourself and other people if COVID-19 is spreading in your community.  Clean and disinfect frequently touched surfaces.  Avoid all cruise travel and non-essential air travel.    Call your healthcare professional if you have concerns about COVID-19 and your underlying condition or if you are sick. For more information on steps you can take to protect yourself, see CDC's How to Protect Yourself      Patient/family/caregiver given information for [employer] Loop and agrees to enroll no/ no email or technology  Patient's preferred e-mail:  n/a  Patient's preferred phone number: none  Based on Loop alert triggers, patient will be contacted by nurse care manager for worsening symptoms. Plan for follow-up call in 7-14 days based on severity of symptoms and risk factors. Follow up in 14 days - re: Covid s/s - and cardiology follow up. Luca Oliver RN, [facility], [facility]  Care transitions nurse [phone]  [facility] Coordination T5/4/20 Chest CT  IMPRESSION: No evidence of pulmonary embolus. No acute intrathoracic pathology. _____________________________________________________________________________________  5/4/20 CXR  FINDINGS: A portable AP radiograph of the chest was obtained at 0009 hours. The  patient is on a cardiac monitor. The subclavian pacemaker is noted. The lungs  are clear. . The cardiac and mediastinal contours and pulmonary vascularity are  normal.  Osteoarthritis of bilateral shoulders. .     NT pro-BNP 89   <450 PG/ML Final     Troponin-I, Qt. <0.05   <0.05 ng/mL Final     D-dimer 1.16  High   0.00 - 0.65 mg/L FEU Final

## 2020-05-05 NOTE — ED PROVIDER NOTES
HPI   45-year-old female with a history of congestive heart failure, hypertension, pacemaker, high cholesterol, presents the emergency department with intermittent episodes of shortness of breath for 1 week. She states today she had discomfort in both ears and pressure in her face. She denies associated chest pain nausea or diaphoresis. She denies increase in swelling in her legs. She denies a fever. She has had a cough. She denies nausea vomiting or diarrhea. She denies a sore throat. She denies any exposure to COVID-19 that she is aware of. She states she has been homebound. He denies any underlying lung disease. She states she has been eating and drinking normally. She states her shortness of breath usually comes on when she is doing housework. She denies a history of coronary artery disease or stents.     Past Medical History:   Diagnosis Date    Arrhythmia     bradycardia,S/P pacemaker    [facility] [address]     Heart failure ([facility] [address]     Hypertension     Irregular heart rate     Pure hypercholesterolemia 3/27/2017       Past Surgical History:   Procedure Laterality Date    HX ORTHOPAEDIC      left total knee replacement    HX OTHER SURGICAL  11/2019    Toe surgery    HX PACEMAKER           Family History:   Problem Relation Age of Onset    Hypertension Mother     Cancer Father         prostate    Hypertension Father        Social History     Socioeconomic History    Marital status:      Spouse name: Not on file    Number of children: Not on file    Years of education: Not on file    Highest education level: Not on file   Occupational History    Not on file   Social Needs    Financial resource strain: Not on file    Food insecurity     Worry: Not on file     Inability: Not on file    Transportation needs     Medical: Not on file     Non-medical: Not on file   Tobacco Use    Smoking status: Never Smoker    Smokeless tobacco: Never Used   Substance and Sexual Activity    Alcohol use: No    Drug use: No    Sexual activity: Not Currently     Comment: ,2 children,2children,lives at home   Lifestyle    Physical activity     Days per week: Not on file     Minutes per session: Not on file    Stress: Not on file   Relationships    Social connections     Talks on phone: Not on file     Gets together: Not on file     Attends Oriental orthodox service: Not on file     Active member of club or organization: Not on file     Attends meetings of clubs or organizations: Not on file     Relationship status: Not on file    Intimate partner violence     Fear of current or ex partner: Not on file     Emotionally abused: Not on file     Physically abused: Not on file     Forced sexual activity: Not on file   Other Topics Concern    Not on file   Social History Narrative    Not on file         ALLERGIES: Codeine; Morphine; Pcn [penicillins]; and Pseudoephedrine    Review of Systems   Constitutional: Negative for fever. HENT: Positive for ear pain. Negative for congestion and sore throat. Respiratory: Positive for cough and shortness of breath. Negative for chest tightness. Cardiovascular: Negative for chest pain, palpitations and leg swelling. Gastrointestinal: Negative for abdominal pain, nausea and vomiting. Genitourinary: Negative for dysuria. Musculoskeletal: Negative for gait problem. Skin: Negative for rash. Neurological: Negative for headaches. Psychiatric/Behavioral: Negative for dysphoric mood. Vitals:    05/04/20 2353   BP: 149/57   Pulse: 63   Resp: 16   Temp: 97.8 °F (36.6 °C)   SpO2: 99%            Physical Exam  Constitutional:       General: She is not in acute distress. Appearance: She is well-developed. HENT:      Head: Normocephalic and atraumatic. Right Ear: Tympanic membrane normal.      Left Ear: Tympanic membrane normal.      Mouth/Throat:      Pharynx: No oropharyngeal exudate. Eyes:      General: No scleral icterus. Right eye: No discharge. Left eye: No discharge. Pupils: Pupils are equal, round, and reactive to light. Neck:      Musculoskeletal: Normal range of motion and neck supple. Vascular: No JVD. Cardiovascular:      Rate and Rhythm: Normal rate and regular rhythm. Heart sounds: Normal heart sounds. No murmur. Pulmonary:      Effort: Pulmonary effort is normal. No respiratory distress. Breath sounds: Normal breath sounds. No stridor. No wheezing or rales. Chest:      Chest wall: No tenderness. Abdominal:      General: Bowel sounds are normal. There is no distension. Palpations: Abdomen is soft. There is no mass. Tenderness: There is no abdominal tenderness. There is no guarding or rebound. Musculoskeletal: Normal range of motion. Right lower leg: Edema present. Left lower leg: Edema present. Skin:     General: Skin is warm and dry. Capillary Refill: Capillary refill takes less than 2 seconds. Findings: No rash. Neurological:      Mental Status: She is oriented to person, place, and time. Psychiatric:         Behavior: Behavior normal.         Thought Content: Thought content normal.         Judgment: Judgment normal.          MDM       Procedures    ED EKG interpretation:  Rhythm: paced; and regular . Rate (approx.): 65; Axis: left axis deviation; P wave: normal; QRS interval: normal ; ST/T wave: non-specific changes This EKG was interpreted by Alvarado March MD,ED Provider. Work up reassuring thus far including troponin, bnp and CTA of chest.  Patient states she feels good. She had a clean cath in 2014. Offered admission for further work up of her episodic sob but she states she has a duaghter she cares for and does not want to be admitted. Will check serial troponin, if that is negative, she states she can follow up in Dr. Mulu Segura office for further work up.

## 2020-05-05 NOTE — ED TRIAGE NOTES
She is complaining of shortness of breath for a week intermittently. She also has an earache. The shortness of breath resolved en route.

## 2020-05-09 ENCOUNTER — APPOINTMENT (OUTPATIENT)
Dept: GENERAL RADIOLOGY | Age: 85
End: 2020-05-09
Attending: EMERGENCY MEDICINE
Payer: MEDICARE

## 2020-05-09 ENCOUNTER — HOSPITAL ENCOUNTER (EMERGENCY)
Age: 85
Discharge: HOME | End: 2020-05-09
Attending: EMERGENCY MEDICINE | Admitting: EMERGENCY MEDICINE
Payer: MEDICARE

## 2020-05-09 VITALS
TEMPERATURE: 97.6 F | SYSTOLIC BLOOD PRESSURE: 130 MMHG | RESPIRATION RATE: 18 BRPM | DIASTOLIC BLOOD PRESSURE: 62 MMHG | HEART RATE: 68 BPM | OXYGEN SATURATION: 100 %

## 2020-05-09 DIAGNOSIS — R06.02 SOB (SHORTNESS OF BREATH): Primary | ICD-10-CM

## 2020-05-09 LAB
ALBUMIN SERPL-MCNC: 3.5 G/DL (ref 3.5–5)
ALBUMIN/GLOB SERPL: 0.8 {RATIO} (ref 1.1–2.2)
ALP SERPL-CCNC: 97 U/L (ref 45–117)
ALT SERPL-CCNC: 38 U/L (ref 12–78)
ANION GAP SERPL CALC-SCNC: 5 MMOL/L (ref 5–15)
AST SERPL-CCNC: 36 U/L (ref 15–37)
ATRIAL RATE: 66 BPM
BASOPHILS # BLD: 0.1 K/UL (ref 0–0.1)
BASOPHILS NFR BLD: 2 % (ref 0–1)
BILIRUB SERPL-MCNC: 0.6 MG/DL (ref 0.2–1)
BNP SERPL-MCNC: 84 PG/ML
BUN SERPL-MCNC: 16 MG/DL (ref 6–20)
BUN/CREAT SERPL: 19 (ref 12–20)
CALCIUM SERPL-MCNC: 9.9 MG/DL (ref 8.5–10.1)
CALCULATED P AXIS, ECG09: 113 DEGREES
CALCULATED R AXIS, ECG10: -17 DEGREES
CALCULATED T AXIS, ECG11: 18 DEGREES
CHLORIDE SERPL-SCNC: 107 MMOL/L (ref 97–108)
CO2 SERPL-SCNC: 27 MMOL/L (ref 21–32)
COMMENT, HOLDF: NORMAL
CREAT SERPL-MCNC: 0.85 MG/DL (ref 0.55–1.02)
DIAGNOSIS, 93000: NORMAL
DIFFERENTIAL METHOD BLD: ABNORMAL
EOSINOPHIL # BLD: 0.3 K/UL (ref 0–0.4)
EOSINOPHIL NFR BLD: 8 % (ref 0–7)
ERYTHROCYTE [DISTWIDTH] IN BLOOD BY AUTOMATED COUNT: 17.3 % (ref 11.5–14.5)
GLOBULIN SER CALC-MCNC: 4.4 G/DL (ref 2–4)
GLUCOSE SERPL-MCNC: 91 MG/DL (ref 65–100)
HCT VFR BLD AUTO: 40.1 % (ref 35–47)
HGB BLD-MCNC: 12.1 G/DL (ref 11.5–16)
IMM GRANULOCYTES # BLD AUTO: 0 K/UL (ref 0–0.04)
IMM GRANULOCYTES NFR BLD AUTO: 0 % (ref 0–0.5)
INR PPP: 1.1 (ref 0.9–1.1)
LYMPHOCYTES # BLD: 1.2 K/UL (ref 0.8–3.5)
LYMPHOCYTES NFR BLD: 30 % (ref 12–49)
MCH RBC QN AUTO: 21.4 PG (ref 26–34)
MCHC RBC AUTO-ENTMCNC: 30.2 G/DL (ref 30–36.5)
MCV RBC AUTO: 71 FL (ref 80–99)
MONOCYTES # BLD: 0.8 K/UL (ref 0–1)
MONOCYTES NFR BLD: 21 % (ref 5–13)
NEUTS SEG # BLD: 1.5 K/UL (ref 1.8–8)
NEUTS SEG NFR BLD: 39 % (ref 32–75)
NRBC # BLD: 0 K/UL (ref 0–0.01)
NRBC BLD-RTO: 0 PER 100 WBC
P-R INTERVAL, ECG05: 174 MS
PLATELET # BLD AUTO: 194 K/UL (ref 150–400)
PMV BLD AUTO: 10.4 FL (ref 8.9–12.9)
POTASSIUM SERPL-SCNC: 4.3 MMOL/L (ref 3.5–5.1)
PROT SERPL-MCNC: 7.9 G/DL (ref 6.4–8.2)
PROTHROMBIN TIME: 10.9 SEC (ref 9–11.1)
Q-T INTERVAL, ECG07: 416 MS
QRS DURATION, ECG06: 80 MS
QTC CALCULATION (BEZET), ECG08: 436 MS
RBC # BLD AUTO: 5.65 M/UL (ref 3.8–5.2)
RBC MORPH BLD: ABNORMAL
SAMPLES BEING HELD,HOLD: NORMAL
SODIUM SERPL-SCNC: 139 MMOL/L (ref 136–145)
TROPONIN I SERPL-MCNC: <0.05 NG/ML
VENTRICULAR RATE, ECG03: 66 BPM
WBC # BLD AUTO: 3.9 K/UL (ref 3.6–11)

## 2020-05-09 PROCEDURE — 85610 PROTHROMBIN TIME: CPT

## 2020-05-09 PROCEDURE — 85025 COMPLETE CBC W/AUTO DIFF WBC: CPT

## 2020-05-09 PROCEDURE — 36415 COLL VENOUS BLD VENIPUNCTURE: CPT

## 2020-05-09 PROCEDURE — 93005 ELECTROCARDIOGRAM TRACING: CPT

## 2020-05-09 PROCEDURE — 71045 X-RAY EXAM CHEST 1 VIEW: CPT

## 2020-05-09 PROCEDURE — 83880 ASSAY OF NATRIURETIC PEPTIDE: CPT

## 2020-05-09 PROCEDURE — 84484 ASSAY OF TROPONIN QUANT: CPT

## 2020-05-09 PROCEDURE — 99284 EMERGENCY DEPT VISIT MOD MDM: CPT

## 2020-05-09 PROCEDURE — 80053 COMPREHEN METABOLIC PANEL: CPT

## 2020-05-09 NOTE — ED TRIAGE NOTES
Patient arrives via EMS from home for intermittent shortness of breath, muscle aches, and fatigue for \"a couple weeks\". Patient recently seen last week for similar symptoms.  Denies current SOB, states \"my shoulders hurt\"

## 2020-05-09 NOTE — ED PROVIDER NOTES
55-year-old female presents from home via EMS with chief complaint of shortness of breath. Symptoms have been intermittent over and on over the past couple of weeks. She denies any cough, fever, nausea, vomiting, diarrhea, chest pain. There seem to be no exacerbating or alleviating factors. Patient was seen here on May 5 for the symptoms and had a work-up that included cardiac enzymes, chest x-ray, CTA of her chest, that were unremarkable. She was discharged home to follow-up with her cardiologist as previously scheduled. Patient states she saw the cardiology nurse practitioner yesterday who reassured her that things checked out okay. She presents today because of ongoing symptoms. She says she feels no better or worse from when she was here May 5. She has no known exposure to COVID-19 and has not been tested for it. Past medical history significant for bradycardia, congestive heart failure, hypertension.            Past Medical History:   Diagnosis Date    Arrhythmia     bradycardia,S/P pacemaker    CHF (congestive heart failure) ([address]     Heart failure ([address]     Hypertension     Irregular heart rate     Pure hypercholesterolemia 3/27/2017       Past Surgical History:   Procedure Laterality Date    HX ORTHOPAEDIC      left total knee replacement    HX OTHER SURGICAL  11/2019    Toe surgery    HX PACEMAKER           Family History:   Problem Relation Age of Onset    Hypertension Mother     Cancer Father         prostate    Hypertension Father        Social History     Socioeconomic History    Marital status:      Spouse name: Not on file    Number of children: Not on file    Years of education: Not on file    Highest education level: Not on file   Occupational History    Not on file   Social Needs    Financial resource strain: Not on file    Food insecurity     Worry: Not on file     Inability: Not on file    Transportation needs     Medical: Not on file     Non-medical: Not on file   Tobacco Use    Smoking status: Never Smoker    Smokeless tobacco: Never Used   Substance and Sexual Activity    Alcohol use: No    Drug use: No    Sexual activity: Not Currently     Comment: ,2 children,2children,lives at home   Lifestyle    Physical activity     Days per week: Not on file     Minutes per session: Not on file    Stress: Not on file   Relationships    Social connections     Talks on phone: Not on file     Gets together: Not on file     Attends Christianity service: Not on file     Active member of club or organization: Not on file     Attends meetings of clubs or organizations: Not on file     Relationship status: Not on file    Intimate partner violence     Fear of current or ex partner: Not on file     Emotionally abused: Not on file     Physically abused: Not on file     Forced sexual activity: Not on file   Other Topics Concern    Not on file   Social History Narrative    Not on file         ALLERGIES: Codeine; Morphine; Pcn [penicillins]; and Pseudoephedrine    Review of Systems   Constitutional: Negative for fever. HENT: Negative for facial swelling. Eyes: Negative for visual disturbance. Respiratory: Positive for shortness of breath. Negative for chest tightness. Cardiovascular: Negative for chest pain. Gastrointestinal: Negative for abdominal pain. Genitourinary: Negative for difficulty urinating and dysuria. Musculoskeletal: Negative for arthralgias. Skin: Negative for rash. Neurological: Negative for headaches. Hematological: Negative for adenopathy. Psychiatric/Behavioral: Negative for suicidal ideas. Vitals:    05/09/20 1248   BP: 134/65   Pulse: 70   Resp: 20   Temp: 97.9 °F (36.6 °C)   SpO2: 100%            Physical Exam  Vitals signs and nursing note reviewed. Constitutional:       General: She is not in acute distress. Appearance: She is well-developed. HENT:      Head: Normocephalic and atraumatic.    Eyes:      General: No scleral icterus. Conjunctiva/sclera: Conjunctivae normal.      Pupils: Pupils are equal, round, and reactive to light. Neck:      Musculoskeletal: Normal range of motion and neck supple. Cardiovascular:      Rate and Rhythm: Normal rate. Heart sounds: No murmur. Pulmonary:      Effort: Pulmonary effort is normal. No respiratory distress. Abdominal:      General: There is no distension. Musculoskeletal: Normal range of motion. Skin:     General: Skin is warm and dry. Findings: No rash. Neurological:      Mental Status: She is alert and oriented to person, place, and time. MDM  Number of Diagnoses or Management Options  SOB (shortness of breath):      Amount and/or Complexity of Data Reviewed  Clinical lab tests: reviewed  Tests in the medicine section of CPT®: reviewed      ED Course as of May 09 1400   Sat May 09, 2020   1302 ED EKG interpretation:  Rhythm: atrial paced. Rate (approx.): 66  Axis: normal.  ST segment:  No concerning ST elevations or depressions. This EKG was interpreted by Emmanuelle Steinberg MD,ED Provider. EKG 12 LEAD INITIAL [JM]      ED Course User Index  [JM] Zayda Collier MD     MEDICAL DECISION MAKIN y.o. female with past medical history significant for CHF, htn, pacemaker, htn presents with sob  Differential diagnosis includes but not limited to:  Pneumonia, CHF, bronchitis, effusions, metabolic disorder       LABORATORY TESTS:  Labs Reviewed   CBC WITH AUTOMATED DIFF - Abnormal; Notable for the following components:       Result Value    RBC 5.65 (*)     MCV 71.0 (*)     MCH 21.4 (*)     RDW 17.3 (*)     MONOCYTES 21 (*)     EOSINOPHILS 8 (*)     BASOPHILS 2 (*)     ABS.  NEUTROPHILS 1.5 (*)     All other components within normal limits   METABOLIC PANEL, COMPREHENSIVE - Abnormal; Notable for the following components:    Globulin 4.4 (*)     A-G Ratio 0.8 (*)     All other components within normal limits   PROTHROMBIN TIME + INR   NT-PRO BNP TROPONIN I   SAMPLES BEING HELD   SAMPLE TO BLOOD BANK       IMAGING RESULTS:  Xr Chest Port    Result Date: 5/9/2020  INDICATION: Shortness of breath. Portable AP upright view of the chest. Direct comparison made to prior chest x-ray dated 5/4/2020. Cardiomediastinal silhouette is stable. Intact pacer leads overlie the right atrium and right ventricle. Lungs are clear bilaterally. No pleural fluid is visualized. The osseous structures are diffusely demineralized, but intact. IMPRESSION: No acute cardiopulmonary disease.        MEDICATIONS GIVEN:  Medications - No data to display    ED COURSE:  Triage note reviewed  Medical Records Reviewed  Vital signs reviewed  ECG reviewed  Lab work reviewed  Radiology report reviewed  Radiology images reviewed by me  IVF given  Discussed results and reviewed plan with the patient  Discharged    IMPRESSION:  1. SOB (shortness of breath)        PLAN:  -   Discharge  Current Discharge Medication List        Follow-up Information     Follow up With Specialties Details Why Contact Info    Eric Callejas MD Internal Medicine Schedule an appointment as soon as possible for a visit   [place] [address]  [address] [facility] [address]  [phone]      [facility] [address] [facility] [address] [facility] Emergency Medicine  If symptoms worsen [address] [place] [address]  [phone]        Return precautions given    CONDITION:  stable    Total critical care time spent exclusive of procedures:  0 minutes    Sesar Orlando MD        Procedures

## 2020-05-11 ENCOUNTER — PATIENT OUTREACH (OUTPATIENT)
Dept: FAMILY MEDICINE CLINIC | Age: 85
End: 2020-05-11

## 2020-05-11 NOTE — PROGRESS NOTES
Patient contacted regarding recent discharge and COVID-19 risk   Care Transition Nurse/ Ambulatory Care Manager contacted the patient by telephone to perform post discharge assessment. Verified name and  with patient as identifiers. Patient has following risk factors of: SOB, muscle aches, fatique. CTN/ACM reviewed discharge instructions, medical action plan and red flags related to discharge diagnosis. Reviewed and educated them on any new and changed medications related to discharge diagnosis. Advised obtaining a 90-day supply of all daily and as-needed medications. Education provided regarding infection prevention, and signs and symptoms of COVID-19 and when to seek medical attention with patient who verbalized understanding. Discussed exposure protocols and quarantine from [address] you at higher risk for severe illness  and given an opportunity for questions and concerns. The patient agrees to contact the COVID-19 hotline [phone] or PCP office for questions related to their healthcare. CTN/ACM provided contact information for future reference. From CDC: Are you at higher risk for severe illness?  Wash your hands often.  Avoid close contact (6 feet, which is about two arm lengths) with people who are sick.  Put distance between yourself and other people if COVID-19 is spreading in your community.  Clean and disinfect frequently touched surfaces.  Avoid all cruise travel and non-essential air travel.  Call your healthcare professional if you have concerns about COVID-19 and your underlying condition or if you are sick.     For more information on steps you can take to protect yourself, see CDC's How to Protect Yourself      Patient/family/caregiver given information for Messi Perkins and agrees to enroll yes  Patient's preferred e-mail: [email or web address]  Patient's preferred phone number: [phone]  Based on Loop alert triggers, patient will be contacted by nurse care manager for worsening symptoms. Pt will be further monitored by COVID Loop Team based on severity of symptoms and risk factors.     Follow up:  5/13/2020 2:30 PM Payton Honeycutt MD

## 2020-05-13 ENCOUNTER — VIRTUAL VISIT (OUTPATIENT)
Dept: INTERNAL MEDICINE CLINIC | Age: 85
End: 2020-05-13

## 2020-05-13 VITALS — HEIGHT: 67 IN | BODY MASS INDEX: 25.28 KG/M2

## 2020-05-13 DIAGNOSIS — F41.8 ANXIETY WITH DEPRESSION: ICD-10-CM

## 2020-05-13 DIAGNOSIS — M19.012 OSTEOARTHRITIS OF LEFT SHOULDER, UNSPECIFIED OSTEOARTHRITIS TYPE: ICD-10-CM

## 2020-05-13 DIAGNOSIS — M75.01 ADHESIVE CAPSULITIS OF BOTH SHOULDERS: ICD-10-CM

## 2020-05-13 DIAGNOSIS — R26.89 BALANCE DISORDER: Primary | ICD-10-CM

## 2020-05-13 DIAGNOSIS — H93.11 TINNITUS OF RIGHT EAR: ICD-10-CM

## 2020-05-13 DIAGNOSIS — M75.02 ADHESIVE CAPSULITIS OF BOTH SHOULDERS: ICD-10-CM

## 2020-05-13 RX ORDER — SERTRALINE HYDROCHLORIDE 25 MG/1
25 TABLET, FILM COATED ORAL DAILY
Qty: 30 TAB | Refills: 1 | Status: SHIPPED | OUTPATIENT
Start: 2020-05-13 | End: 2020-05-27 | Stop reason: ALTCHOICE

## 2020-05-13 RX ORDER — DICLOFENAC SODIUM 10 MG/G
GEL TOPICAL
Qty: 100 G | Refills: 2 | Status: SHIPPED | OUTPATIENT
Start: 2020-05-13 | End: 2021-01-06 | Stop reason: ALTCHOICE

## 2020-05-13 NOTE — PROGRESS NOTES
Avelina Baumgarten is a 80 y.o. female who was seen by synchronous (real-time) audio-video technology on 5/13/2020. Consent: Avelina Baumgarten, who was seen by synchronous (real-time) audio-video technology, and/or her healthcare decision maker, is aware that this patient-initiated, Telehealth encounter on 5/13/2020 is a billable service, with coverage as determined by her insurance carrier. She is aware that she may receive a bill and has provided verbal consent to proceed: Yes. Assessment & Plan:   Diagnoses and all orders for this visit:    1. Balance disorder    Might have fluid in the ear. Need a Hallpike maneuver. Will refer,  -     REFERRAL TO ENT-OTOLARYNGOLOGY    2. Tinnitus of right ear    Will refer,  -     REFERRAL TO ENT-OTOLARYNGOLOGY    3. 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 Advised her to go back to the orthopedic. Will give,  -     diclofenac (VOLTAREN) 1 % gel; Apply  to affected area two (2) times daily as needed (shoulder pain). -     REFERRAL TO ORTHOPEDICS    4. Osteoarthritis of left shoulder, unspecified osteoarthritis type    Will refer,  -     REFERRAL TO ORTHOPEDICS    5. Anxiety with depression    Her  just passed away several weeks back. She remains very anxious. 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 She is not willing to take any high-dose of anxiety or depression medications. But she is willing to take a low-dose of Zoloft. Will try,  -     sertraline (ZOLOFT) 25 mg tablet; Take 1 Tab by mouth daily. Follow-up in a month. I spent at least 40 minutes on this visit with this established patient. ([id number])    Subjective:   Avelina Baumgarten is a 80 y.o. female who was seen for Shoulder Pain (right side   ); Breathing Problem; Sinus Pain; Ringing in Ear; and Dizziness  In past 2 weeks. She has been suffering from dizziness and off balance lately.   Had all blood work and CT scan done, all came back negative. Her dizziness did not improve. She has been taking Citrucel which is helping her a little bit. She reports ringing in her ear. She mentioned her right side of the sinus and right ear feels heavy. No pain,  No fever. She is not having any cough or wheezing. Report bilateral shoulder pain and stiffness. Shoulder pain is in moderate intensity. She had x-ray done in the shoulder in the past, showed early DJD several years back. Did not fall or did not have any injury. Prior to Admission medications    Medication Sig Start Date End Date Taking? Authorizing Provider   diclofenac (VOLTAREN) 1 % gel Apply  to affected area two (2) times daily as needed (shoulder pain). 5/13/20  Yes Evelyne Dillon MD   sertraline (ZOLOFT) 25 mg tablet Take 1 Tab by mouth daily. 5/13/20  Yes Evelyne Dillon MD   dilTIAZem ER (CARDIZEM CD) 180 mg capsule TAKE 1 CAPSULE BY MOUTH EVERY DAY 2/26/20  Yes Provider, Arin   cetirizine (ZYRTEC) 10 mg tablet Take 1 Tab by mouth daily as needed for Allergies. 5/4/20  Yes Evelyne Dillon MD   predniSONE (DELTASONE) 5 mg tablet Take 1 Tab by mouth daily. 5/4/20  Yes Evelyne Dillon MD   fluticasone propionate (FLONASE) 50 mcg/actuation nasal spray 2 Sprays by Both Nostrils route daily. 5/4/20  Yes Evelyne Dillon MD   albuterol (ProAir HFA) 90 mcg/actuation inhaler Take 2 Puffs by inhalation every six (6) hours as needed for Wheezing. 5/4/20  Yes Evelyne Dillon MD   acetaminophen (TYLENOL) 500 mg tablet Take 1 Tab by mouth two (2) times daily as needed for Pain. 5/4/20  Yes vEelyne Dillon MD   calcium-vitamin D (OYSTER SHELL) 500 mg(1,250mg) -200 unit per tablet Take 1 Tab by mouth two (2) times daily (with meals). 7/8/19  Yes Evelyne Dillon MD   aspirin 81 mg tablet Take 81 mg by mouth. Yes Other, MD Royer   diclofenac (VOLTAREN) 1 % gel Apply  to affected area two (2) times daily as needed.  6/26/17 5/13/20  Evelyne Dillon MD     Allergies   Allergen Reactions    Codeine Unknown (comments)    Morphine Unknown (comments)    Pcn [Penicillins] Unknown (comments)    Pseudoephedrine Unknown (comments)       Past Medical History:   Diagnosis Date    Arrhythmia     bradycardia,S/P pacemaker    [facility]     Heart failure ([facility])     Hypertension     Irregular heart rate     Pure hypercholesterolemia 3/27/2017       ROS significant for sinus pressure, dizziness, off balance and shoulder pain. Objective:   Vital Signs: (As obtained by patient/caregiver at home)  Visit Vitals  Ht 5' 7\" (1.702 m)   BMI 25.28 kg/m²            Constitutional: [x] Appears well-developed and well-nourished [x] No apparent distress      [] Abnormal -     Mental status: [x] Alert and awake  [x] Oriented to person/place/time [x] Able to follow commands    [] Abnormal -     Eyes:   EOM    [x]  Normal    [] Abnormal -   Sclera  [x]  Normal    [] Abnormal -          Discharge [x]  None visible   [] Abnormal -     HENT: [x] Normocephalic, atraumatic  [] Abnormal -   [x] Mouth/Throat: Mucous membranes are moist    External Ears [x] Normal  [] Abnormal -    Neck: [x] No visualized mass [] Abnormal -     Pulmonary/Chest: [x] Respiratory effort normal   [x] No visualized signs of difficulty breathing or respiratory distress        [] Abnormal -      Musculoskeletal:   [x] Normal gait with no signs of ataxia   Bilateral shoulder: Pain present. Range of motion mildly restricted.     Neurological:        [x] No Facial Asymmetry (Cranial nerve 7 motor function) (limited exam due to video visit)          [x] No gaze palsy        [] Abnormal -          Skin:        [x] No significant exanthematous lesions or discoloration noted on facial skin         [] Abnormal -            Psychiatric:       [x] Normal Affect [] Abnormal -        [x] No Hallucinations    Other pertinent observable physical exam findings:-        We discussed the expected course, resolution and complications of the diagnosis(es) in detail. Medication risks, benefits, costs, interactions, and alternatives were discussed as indicated. I advised her to contact the office if her condition worsens, changes or fails to improve as anticipated. She expressed understanding with the diagnosis(es) and plan. Thi Sandoval is a 80 y.o. female who was evaluated by a video visit encounter for concerns as above. Patient identification was verified prior to start of the visit. A caregiver was present when appropriate. Due to this being a TeleHealth encounter (During [facility] public [facility] emergency), evaluation of the following organ systems was limited: Vitals/Constitutional/EENT/Resp/CV/GI//MS/Neuro/Skin/Heme-Lymph-Imm. Pursuant to the emergency declaration under the [facility] waiver authority and the [employer] Act, this Virtual  Visit was conducted, with patient's (and/or legal guardian's) consent, to reduce the patient's risk of exposure to COVID-19 and provide necessary medical care. Services were provided through a video synchronous discussion virtually to substitute for in-person clinic visit. Patient and provider were located at their individual homes. Hilda Valle MD Ms.  Ashley Nurbs had a 2 ER visit

## 2020-05-13 NOTE — PROGRESS NOTES
Bre East is a 80 y.o. female    HIPAA verified by two patient identifiers. Health Maintenance Due   Topic Date Due    DTaP/Tdap/Td series (1 - Tdap) 02[birth year]02/1954    Shingrix Vaccine Age 50> (1 of 2) 02/02/1983    GLAUCOMA SCREENING Q2Y  09/15/2018       Chief Complaint   Patient presents with    Shoulder Pain     right side   /    phone only [phone]    Breathing Problem    Sinus Pain    Ringing in Ear    Dizziness         Visit Vitals  Ht 5' 7\" (1.702 m)   BMI 25.28 kg/m²       Pain Scale: 5/10  Pain Location: Shoulder    1. Have you been to the ER, urgent care clinic since your last visit? Hospitalized since your last visit? ER 5/9/2020 sob,muscle aches, fatigue     2. Have you seen or consulted any other health care providers outside of the [address] [place] [address] since your last visit? Include any pap smears or colon screening.  No

## 2020-05-19 ENCOUNTER — PATIENT OUTREACH (OUTPATIENT)
Dept: CASE MANAGEMENT | Age: 85
End: 2020-05-19

## 2020-05-19 NOTE — PROGRESS NOTES
5/19/20  Attempt to reach pt - Lm on all listed  # - request return call. Check on ear pain / medication reconciliation/ cardiology follow up 5/6 -   Covid resources - daily temperature - symptoms?      Kristina North RN, [facility], [facility]  Care transitions nurse [phone]  [facility] Coordination Team

## 2020-05-26 ENCOUNTER — TELEPHONE (OUTPATIENT)
Dept: INTERNAL MEDICINE CLINIC | Age: 85
End: 2020-05-26

## 2020-05-26 DIAGNOSIS — J30.1 ALLERGIC RHINITIS DUE TO POLLEN, UNSPECIFIED SEASONALITY: ICD-10-CM

## 2020-05-26 RX ORDER — CETIRIZINE HCL 10 MG
10 TABLET ORAL
Qty: 30 TAB | Refills: 0 | Status: SHIPPED | OUTPATIENT
Start: 2020-05-26 | End: 2020-06-22

## 2020-05-26 NOTE — TELEPHONE ENCOUNTER
Pt went to [facility] may 24th. She was treated for edema in her legs. Right leg is worse. pt told to elevate legs and wear compression socks. . Pt went to [address] last week for ringing in her ears  Pt was given b-12 and magnesium. Pt is scheduled for follow up on . Pt still grieving the loss of her  who  in March. Surgery on right toe .  Please advise what to do  Hurts to touch   [phone] 22-Jun-2019 20:12

## 2020-05-27 ENCOUNTER — VIRTUAL VISIT (OUTPATIENT)
Dept: INTERNAL MEDICINE CLINIC | Age: 85
End: 2020-05-27

## 2020-05-27 VITALS — BODY MASS INDEX: 25.28 KG/M2 | HEIGHT: 67 IN

## 2020-05-27 DIAGNOSIS — I50.9 CONGESTIVE HEART FAILURE, UNSPECIFIED HF CHRONICITY, UNSPECIFIED HEART FAILURE TYPE (HCC): ICD-10-CM

## 2020-05-27 DIAGNOSIS — I10 ESSENTIAL HYPERTENSION: ICD-10-CM

## 2020-05-27 DIAGNOSIS — R26.89 BALANCE DISORDER: Primary | ICD-10-CM

## 2020-05-27 DIAGNOSIS — I87.2 CHRONIC VENOUS INSUFFICIENCY: ICD-10-CM

## 2020-05-27 RX ORDER — LANOLIN ALCOHOL/MO/W.PET/CERES
400 CREAM (GRAM) TOPICAL DAILY
COMMUNITY
End: 2021-01-06 | Stop reason: ALTCHOICE

## 2020-05-27 RX ORDER — FUROSEMIDE 20 MG/1
TABLET ORAL
Qty: 20 TAB | Refills: 1 | Status: SHIPPED | OUTPATIENT
Start: 2020-05-27 | End: 2020-08-13

## 2020-05-27 RX ORDER — MECLIZINE HCL 12.5 MG 12.5 MG/1
12.5 TABLET ORAL
Qty: 20 TAB | Refills: 0 | Status: SHIPPED | OUTPATIENT
Start: 2020-05-27 | End: 2020-06-06

## 2020-05-27 NOTE — TELEPHONE ENCOUNTER
Patient called back stating that she was returning a call. please call her back at [phone] or [phone]

## 2020-05-27 NOTE — TELEPHONE ENCOUNTER
Called pt and after verifying HIPAA scheduled a same day VV for Dr. Kip Correa at 3:15 pm. The pt voiced thanks and understanding.

## 2020-05-27 NOTE — PROGRESS NOTES
Health Maintenance Due   Topic Date Due    DTaP/Tdap/Td series (1 - Tdap) 02/02/1954    Shingrix Vaccine Age 50> (1 of 2) 02/02/1983    GLAUCOMA SCREENING Q2Y  09/15/2018       No chief complaint on file. 1. Have you been to the ER, urgent care clinic since your last visit? Hospitalized since your last visit? No    2. Have you seen or consulted any other health care providers outside of the [address] [place] [address] since your last visit? Include any pap smears or colon screening. No    3) Do you have an Advance Directive on file? no    4) Are you interested in receiving information on Advance Directives? NO      Patient is accompanied by self I have received verbal consent from Kike Bateman to discuss any/all medical information while they are present in the room.

## 2020-06-01 ENCOUNTER — TELEPHONE (OUTPATIENT)
Dept: INTERNAL MEDICINE CLINIC | Age: 85
End: 2020-06-01

## 2020-06-01 DIAGNOSIS — M79.89 LEG SWELLING: Primary | ICD-10-CM

## 2020-06-01 NOTE — TELEPHONE ENCOUNTER
Pt needs a new referral  [facility]. Pt is requesting a referral to see a vascular md since the referral she got from [facility] she is unable to get an appointment.  Please contact pt with info

## 2020-06-03 ENCOUNTER — TELEPHONE (OUTPATIENT)
Dept: INTERNAL MEDICINE CLINIC | Age: 85
End: 2020-06-03

## 2020-06-03 NOTE — TELEPHONE ENCOUNTER
Patient called to let Hever Pina know that Arelis Lim has taken her off of lasix and put her on metoprolol.

## 2020-06-05 DIAGNOSIS — F41.8 ANXIETY WITH DEPRESSION: ICD-10-CM

## 2020-06-08 RX ORDER — SERTRALINE HYDROCHLORIDE 25 MG/1
25 TABLET, FILM COATED ORAL DAILY
Qty: 30 TAB | Refills: 2 | Status: SHIPPED | OUTPATIENT
Start: 2020-06-08 | End: 2020-07-14 | Stop reason: SDUPTHER

## 2020-06-13 ENCOUNTER — HOSPITAL ENCOUNTER (OUTPATIENT)
Dept: CT IMAGING | Age: 85
Discharge: HOME | End: 2020-06-13
Attending: OTOLARYNGOLOGY
Payer: MEDICARE

## 2020-06-13 ENCOUNTER — HOSPITAL ENCOUNTER (EMERGENCY)
Age: 85
Discharge: STILL ADMITTED OR ADMINISTRATIVE | End: 2020-06-13
Attending: EMERGENCY MEDICINE

## 2020-06-13 DIAGNOSIS — R42 DIZZINESS AND GIDDINESS: ICD-10-CM

## 2020-06-13 PROCEDURE — 70450 CT HEAD/BRAIN W/O DYE: CPT

## 2020-06-19 ENCOUNTER — VIRTUAL VISIT (OUTPATIENT)
Dept: INTERNAL MEDICINE CLINIC | Age: 85
End: 2020-06-19

## 2020-06-19 DIAGNOSIS — I10 ESSENTIAL HYPERTENSION: ICD-10-CM

## 2020-06-19 DIAGNOSIS — J30.1 ALLERGIC RHINITIS DUE TO POLLEN, UNSPECIFIED SEASONALITY: ICD-10-CM

## 2020-06-19 DIAGNOSIS — R21 RASH: ICD-10-CM

## 2020-06-19 DIAGNOSIS — B37.2 YEAST INFECTION OF THE SKIN: Primary | ICD-10-CM

## 2020-06-19 DIAGNOSIS — F41.8 ANXIETY WITH DEPRESSION: ICD-10-CM

## 2020-06-19 RX ORDER — TERBINAFINE HYDROCHLORIDE 250 MG/1
TABLET ORAL
COMMUNITY
Start: 2020-06-18 | End: 2021-01-06 | Stop reason: ALTCHOICE

## 2020-06-19 RX ORDER — TRAMADOL HYDROCHLORIDE 50 MG/1
TABLET ORAL
COMMUNITY
Start: 2020-06-09 | End: 2021-01-06 | Stop reason: ALTCHOICE

## 2020-06-19 RX ORDER — NYSTATIN 100000 U/G
CREAM TOPICAL 2 TIMES DAILY
Qty: 15 G | Refills: 0 | Status: SHIPPED | OUTPATIENT
Start: 2020-06-19 | End: 2021-01-06 | Stop reason: ALTCHOICE

## 2020-06-19 RX ORDER — METOPROLOL SUCCINATE 25 MG/1
TABLET, EXTENDED RELEASE ORAL
COMMUNITY
Start: 2020-06-03 | End: 2021-01-06 | Stop reason: SDUPTHER

## 2020-06-19 RX ORDER — HYDROCORTISONE 1 %
CREAM (GRAM) TOPICAL 2 TIMES DAILY
Qty: 30 G | Refills: 0 | Status: SHIPPED | OUTPATIENT
Start: 2020-06-19 | End: 2021-01-06 | Stop reason: ALTCHOICE

## 2020-06-19 NOTE — PROGRESS NOTES
Health Maintenance Due   Topic Date Due    DTaP/Tdap/Td series (1 - Tdap) 02/02/1954    Shingrix Vaccine Age 50> (1 of 2) 02/02/1983    GLAUCOMA SCREENING Q2Y  09/15/2018    Medicare Yearly Exam  07/08/2020       No chief complaint on file. 1. Have you been to the ER, urgent care clinic since your last visit? Hospitalized since your last visit? Yes When: 6/13/20 [facility] ED    2. Have you seen or consulted any other health care providers outside of the [address] [place] [address] since your last visit? Include any pap smears or colon screening. No    3) Do you have an Advance Directive on file? no    4) Are you interested in receiving information on Advance Directives? NO      Patient is accompanied by self I have received verbal consent from Mary Medina to discuss any/all medical information while they are present in the room.

## 2020-06-19 NOTE — PROGRESS NOTES
Garrett Cancino is a 80 y.o. female evaluated via audio only technology on 6/19/2020. Consent: She and/or her health care decision maker is aware that she may receive a bill for this audio only encounter, depending on her insurance coverage, and has provided verbal consent to proceed: Yes    I communicated with the patient and/or health care decision maker about the nature and details of the following:  Assessment & Plan:   Diagnoses and all orders for this visit:    1. Yeast infection of the skin  -     nystatin (MYCOSTATIN) topical cream; Apply  to affected area two (2) times a day. 2. Rash  -     hydrocortisone (CORTAID) 1 % topical cream; Apply  to affected area two (2) times a day. use thin layer    3. Anxiety with depression    4. Essential hypertension        12  Subjective:   Garrett Cancino is a 80 y.o. female who was seen for Rash (on bottom near rectum, c/o itchiness; wears Depends); Skin Problem (pruritis (generalized)); and ED Follow-up (6/13/2020)    Patient with a history of HTN, anxiety, depression, HF, osteoarthritis    Patient was spoken to today for complaints of itching around groin that extends to her return. Reports that she wears depends and has noticed it became worse with sitting in them. Denies fever, denies using new products. Denies dysuria     Also reports that she has itching to her shoulders. Reports that it appeared a week of so ago and only warm baths calms it down. Has not changed detergents or lotions     HTN: stable taking all medications, and monitoring salt     Prior to Admission medications    Medication Sig Start Date End Date Taking?  Authorizing Provider   traMADoL (ULTRAM) 50 mg tablet TAKE ONE HALF TO ONE TABLET BY MOUTH EVERY 6 HOURS AS NEEDED FOR POST OP PAIN 6/9/20  Yes Provider, Historical   terbinafine HCL (LAMISIL) 250 mg tablet  6/18/20  Yes Provider, Historical   metoprolol succinate (TOPROL-XL) 25 mg XL tablet TAKE 1/2 (12.5MG)TABLET BY ORAL ROUTE EVERY DAY 6/3/20  Yes Provider, Historical   sertraline (ZOLOFT) 25 mg tablet Take 1 Tab by mouth daily. 6/8/20  Yes Whitney Perkins MD   magnesium oxide (MAG-OX) 400 mg tablet Take 400 mg by mouth daily. Yes Provider, Historical   OTHER 15 mm Hg below need stockings for both legs. please measure leg width 5/27/20  Yes Whitney Perkins MD   furosemide (LASIX) 20 mg tablet Take 1 tab po Monday and Friday for leg edema 5/27/20  Yes Whitney Perkins MD   cetirizine (ZYRTEC) 10 mg tablet TAKE 1 TAB BY MOUTH DAILY AS NEEDED FOR ALLERGIES. 5/26/20  Yes Whitney Perkins MD   diclofenac (VOLTAREN) 1 % gel Apply  to affected area two (2) times daily as needed (shoulder pain). 5/13/20  Yes Whitney Perkins MD   dilTIAZem ER (CARDIZEM CD) 180 mg capsule TAKE 1 CAPSULE BY MOUTH EVERY DAY 2/26/20  Yes Provider, Historical   fluticasone propionate (FLONASE) 50 mcg/actuation nasal spray 2 Sprays by Both Nostrils route daily. 5/4/20  Yes Whitney Perkins MD   albuterol (ProAir HFA) 90 mcg/actuation inhaler Take 2 Puffs by inhalation every six (6) hours as needed for Wheezing. 5/4/20  Yes Whitney Perkins MD   acetaminophen (TYLENOL) 500 mg tablet Take 1 Tab by mouth two (2) times daily as needed for Pain. 5/4/20  Yes Whitney Perkins MD   calcium-vitamin D (OYSTER SHELL) 500 mg(1,250mg) -200 unit per tablet Take 1 Tab by mouth two (2) times daily (with meals). 7/8/19  Yes Whitney Perkins MD   aspirin 81 mg tablet Take 81 mg by mouth.    Yes Other, MD Royer     Allergies   Allergen Reactions    Codeine Unknown (comments)    Morphine Unknown (comments)    Pcn [Penicillins] Unknown (comments)    Pseudoephedrine Unknown (comments)       Patient Active Problem List   Diagnosis Code    Unstable angina (HCC) I20.0    CHF (congestive heart failure) ([facility] [address] I50.9    Pacemaker Z95.0    Essential hypertension I10    Advance care planning Z71.89    Pure hypercholesterolemia E78.00     Patient Active Problem List    Diagnosis Date Noted    Pure hypercholesterolemia 03/27/2017    Advance care planning 01/20/2016    Pacemaker 10/28/2015    Essential hypertension 10/28/2015    CHF (congestive heart failure) ([facility] [address] 07/16/2014    Unstable angina (HCC) 07/15/2014     Current Outpatient Medications   Medication Sig Dispense Refill    traMADoL (ULTRAM) 50 mg tablet TAKE ONE HALF TO ONE TABLET BY MOUTH EVERY 6 HOURS AS NEEDED FOR POST OP PAIN      terbinafine HCL (LAMISIL) 250 mg tablet       metoprolol succinate (TOPROL-XL) 25 mg XL tablet TAKE 1/2 (12.5MG)TABLET BY ORAL ROUTE EVERY DAY      nystatin (MYCOSTATIN) topical cream Apply  to affected area two (2) times a day. 15 g 0    hydrocortisone (CORTAID) 1 % topical cream Apply  to affected area two (2) times a day. use thin layer 30 g 0    sertraline (ZOLOFT) 25 mg tablet Take 1 Tab by mouth daily. 30 Tab 2    magnesium oxide (MAG-OX) 400 mg tablet Take 400 mg by mouth daily.  OTHER 15 mm Hg below need stockings for both legs. please measure leg width 2 Each 0    furosemide (LASIX) 20 mg tablet Take 1 tab po Monday and Friday for leg edema 20 Tab 1    cetirizine (ZYRTEC) 10 mg tablet TAKE 1 TAB BY MOUTH DAILY AS NEEDED FOR ALLERGIES. 30 Tab 0    diclofenac (VOLTAREN) 1 % gel Apply  to affected area two (2) times daily as needed (shoulder pain). 100 g 2    dilTIAZem ER (CARDIZEM CD) 180 mg capsule TAKE 1 CAPSULE BY MOUTH EVERY DAY      fluticasone propionate (FLONASE) 50 mcg/actuation nasal spray 2 Sprays by Both Nostrils route daily. 1 Bottle 3    albuterol (ProAir HFA) 90 mcg/actuation inhaler Take 2 Puffs by inhalation every six (6) hours as needed for Wheezing. 1 Inhaler 1    acetaminophen (TYLENOL) 500 mg tablet Take 1 Tab by mouth two (2) times daily as needed for Pain. 60 Tab 5    calcium-vitamin D (OYSTER SHELL) 500 mg(1,250mg) -200 unit per tablet Take 1 Tab by mouth two (2) times daily (with meals). 60 Tab 12    aspirin 81 mg tablet Take 81 mg by mouth.        Allergies   Allergen Reactions    Codeine Unknown (comments)    Morphine Unknown (comments)    Pcn [Penicillins] Unknown (comments)    Pseudoephedrine Unknown (comments)     Past Medical History:   Diagnosis Date    Arrhythmia     bradycardia,S/P pacemaker    [facility] failure) ([facility] [address]     Heart failure ([facility] [address]     Hypertension     Irregular heart rate     Pure hypercholesterolemia 3/27/2017     Past Surgical History:   Procedure Laterality Date    HX ORTHOPAEDIC      left total knee replacement    HX OTHER SURGICAL  11/2019    Toe surgery    HX PACEMAKER       Family History   Problem Relation Age of Onset    Hypertension Mother     Cancer Father         prostate    Hypertension Father      Social History     Tobacco Use    Smoking status: Never Smoker    Smokeless tobacco: Never Used   Substance Use Topics    Alcohol use: No       Review of Systems   Constitutional: Negative for chills and fever. Respiratory: Negative. Cardiovascular: Negative. Gastrointestinal: Negative. Genitourinary: Negative. Musculoskeletal: Positive for back pain and joint pain. Negative for falls. Skin: Positive for itching and rash. Neurological: Negative. Psychiatric/Behavioral: Negative. I affirm this is a Patient-Initiated Episode with a Patient who has not had a related appointment within my department in the past 7 days or scheduled within the next 24 hours.     Total Time: minutes: 21-30 minutes    Note: not billable if this call serves to triage the patient into an appointment for the relevant concern      Farrukh Terrazas NP

## 2020-06-22 RX ORDER — CETIRIZINE HCL 10 MG
10 TABLET ORAL
Qty: 30 TAB | Refills: 0 | Status: SHIPPED | OUTPATIENT
Start: 2020-06-22 | End: 2020-07-06 | Stop reason: SDUPTHER

## 2020-06-27 DIAGNOSIS — R06.2 WHEEZING: ICD-10-CM

## 2020-06-29 RX ORDER — ALBUTEROL SULFATE 90 UG/1
AEROSOL, METERED RESPIRATORY (INHALATION)
Qty: 6.7 INHALER | Refills: 1 | Status: SHIPPED | OUTPATIENT
Start: 2020-06-29

## 2020-07-06 DIAGNOSIS — J30.1 ALLERGIC RHINITIS DUE TO POLLEN, UNSPECIFIED SEASONALITY: ICD-10-CM

## 2020-07-06 NOTE — TELEPHONE ENCOUNTER
Pharmacy requesting 90 day supply  Requested Prescriptions     Pending Prescriptions Disp Refills    cetirizine (ZYRTEC) 10 mg tablet 30 Tab 0     Sig: Take 1 Tab by mouth daily as needed for Allergies.      06/19/2020 08/05/2020  [facility] on file

## 2020-07-07 RX ORDER — CETIRIZINE HCL 10 MG
10 TABLET ORAL
Qty: 30 TAB | Refills: 5 | Status: SHIPPED | OUTPATIENT
Start: 2020-07-07 | End: 2021-01-06 | Stop reason: ALTCHOICE

## 2020-07-14 DIAGNOSIS — F41.8 ANXIETY WITH DEPRESSION: ICD-10-CM

## 2020-07-15 RX ORDER — SERTRALINE HYDROCHLORIDE 25 MG/1
25 TABLET, FILM COATED ORAL DAILY
Qty: 30 TAB | Refills: 2 | Status: SHIPPED | OUTPATIENT
Start: 2020-07-15 | End: 2021-01-06 | Stop reason: ALTCHOICE

## 2020-07-16 ENCOUNTER — DOCUMENTATION ONLY (OUTPATIENT)
Dept: INTERNAL MEDICINE CLINIC | Age: 85
End: 2020-07-16

## 2020-07-16 NOTE — PROGRESS NOTES
[facility] received a request for coverage of Diclofenac Sodium 1% TD GEL for  you.   As long as you remain covered by your prescription drug plan and there are no  changes to your plan benefits, this request is approved for the following time period:  07/16/2020 - 07/16/2023

## 2020-07-26 DIAGNOSIS — J30.1 ALLERGIC RHINITIS DUE TO POLLEN, UNSPECIFIED SEASONALITY: ICD-10-CM

## 2020-07-27 RX ORDER — FLUTICASONE PROPIONATE 50 MCG
SPRAY, SUSPENSION (ML) NASAL
Qty: 1 BOTTLE | Refills: 1 | Status: SHIPPED | OUTPATIENT
Start: 2020-07-27 | End: 2020-08-18

## 2020-08-05 ENCOUNTER — VIRTUAL VISIT (OUTPATIENT)
Dept: INTERNAL MEDICINE CLINIC | Age: 85
End: 2020-08-05
Payer: MEDICARE

## 2020-08-05 DIAGNOSIS — I10 ESSENTIAL HYPERTENSION: Primary | ICD-10-CM

## 2020-08-05 DIAGNOSIS — Z78.0 POST-MENOPAUSE: ICD-10-CM

## 2020-08-05 DIAGNOSIS — R26.89 BALANCE PROBLEM: ICD-10-CM

## 2020-08-05 DIAGNOSIS — Z00.00 MEDICARE ANNUAL WELLNESS VISIT, SUBSEQUENT: ICD-10-CM

## 2020-08-05 DIAGNOSIS — E55.9 VITAMIN D DEFICIENCY: ICD-10-CM

## 2020-08-05 DIAGNOSIS — Z71.89 ACP (ADVANCE CARE PLANNING): ICD-10-CM

## 2020-08-05 DIAGNOSIS — I50.9 CONGESTIVE HEART FAILURE, UNSPECIFIED HF CHRONICITY, UNSPECIFIED HEART FAILURE TYPE (HCC): ICD-10-CM

## 2020-08-05 PROCEDURE — G0439 PPPS, SUBSEQ VISIT: HCPCS | Performed by: INTERNAL MEDICINE

## 2020-08-05 PROCEDURE — G8419 CALC BMI OUT NRM PARAM NOF/U: HCPCS | Performed by: INTERNAL MEDICINE

## 2020-08-05 PROCEDURE — 1090F PRES/ABSN URINE INCON ASSESS: CPT | Performed by: INTERNAL MEDICINE

## 2020-08-05 PROCEDURE — 99497 ADVNCD CARE PLAN 30 MIN: CPT | Performed by: INTERNAL MEDICINE

## 2020-08-05 PROCEDURE — G8510 SCR DEP NEG, NO PLAN REQD: HCPCS | Performed by: INTERNAL MEDICINE

## 2020-08-05 PROCEDURE — 99214 OFFICE O/P EST MOD 30 MIN: CPT | Performed by: INTERNAL MEDICINE

## 2020-08-05 PROCEDURE — 1100F PTFALLS ASSESS-DOCD GE2>/YR: CPT | Performed by: INTERNAL MEDICINE

## 2020-08-05 PROCEDURE — 3288F FALL RISK ASSESSMENT DOCD: CPT | Performed by: INTERNAL MEDICINE

## 2020-08-05 PROCEDURE — G8536 NO DOC ELDER MAL SCRN: HCPCS | Performed by: INTERNAL MEDICINE

## 2020-08-05 PROCEDURE — G8427 DOCREV CUR MEDS BY ELIG CLIN: HCPCS | Performed by: INTERNAL MEDICINE

## 2020-08-05 NOTE — ACP (ADVANCE CARE PLANNING)

## 2020-08-05 NOTE — PATIENT INSTRUCTIONS
Medicare Wellness Visit, Female The best way to live healthy is to have a lifestyle where you eat a well-balanced diet, exercise regularly, limit alcohol use, and quit all forms of tobacco/nicotine, if applicable. Regular preventive services are another way to keep healthy. Preventive services (vaccines, screening tests, monitoring & exams) can help personalize your care plan, which helps you manage your own care. Screening tests can find health problems at the earliest stages, when they are easiest to treat. [facility] follows the current, evidence-based guidelines published by the [facility] [employer] [facility] [employer] ([facility]) when recommending preventive services for our patients. Because we follow these guidelines, sometimes recommendations change over time as research supports it. (For example, mammograms used to be recommended annually. Even though Medicare will still pay for an annual mammogram, the newer guidelines recommend a mammogram every two years for women of average risk). Of course, you and your doctor may decide to screen more often for some diseases, based on your risk and your co-morbidities (chronic disease you are already diagnosed with). Preventive services for you include: - Medicare offers their members a free annual wellness visit, which is time for you and your primary care provider to discuss and plan for your preventive service needs. Take advantage of this benefit every year! 
-All adults over the age of 72 should receive the recommended pneumonia vaccines. Current USPSTF guidelines recommend a series of two vaccines for the best pneumonia protection.  
-All adults should have a flu vaccine yearly and a tetanus vaccine every 10 years.  
-All adults age 48 and older should receive the shingles vaccines (series of two vaccines). -All adults age 38-68 who are overweight should have a diabetes screening test once every three years. -All adults born between [birth year] and 1965 should be screened once for Hepatitis C. 
-Other screening tests and preventive services for persons with diabetes include: an eye exam to screen for diabetic retinopathy, a kidney function test, a foot exam, and stricter control over your cholesterol.  
-Cardiovascular screening for adults with routine risk involves an electrocardiogram (ECG) at intervals determined by your doctor.  
-Colorectal cancer screenings should be done for adults age 54-65 with no increased risk factors for colorectal cancer. There are a number of acceptable methods of screening for this type of cancer. Each test has its own benefits and drawbacks. Discuss with your doctor what is most appropriate for you during your annual wellness visit. The different tests include: colonoscopy (considered the best screening method), a fecal occult blood test, a fecal DNA test, and sigmoidoscopy. 
 
-A bone mass density test is recommended when a woman turns 65 to screen for osteoporosis. This test is only recommended one time, as a screening. Some providers will use this same test as a disease monitoring tool if you already have osteoporosis. -Breast cancer screenings are recommended every other year for women of normal risk, age 54-69. 
-Cervical cancer screenings for women over age 72 are only recommended with certain risk factors. Here is a list of your current Health Maintenance items (your personalized list of preventive services) with a due date: 
Health Maintenance Due Topic Date Due  
 DTaP/Tdap/Td  (1 - Tdap) 02/02/1954  Shingles Vaccine (1 of 2) 02/02/1983  Glaucoma Screening   09/15/2018  Flu Vaccine  08/01/2020

## 2020-08-05 NOTE — PROGRESS NOTES
Eliud Rueda is a 80 y.o. female who was seen by synchronous (real-time) audio-video technology on 8/5/2020 for Annual Wellness Visit; Hypertension; and Cholesterol Problem        Assessment & Plan:   Diagnoses and all orders for this visit:    1. Essential hypertension  Stable blood pressure. On Cardizem CD, Toprol-XL. Doing well. -     METABOLIC PANEL, COMPREHENSIVE    2. Congestive heart failure, unspecified HF chronicity, unspecified heart failure type ([address]    Compensated. On Lasix 2 times a week. No shortness of breath or orthopnea. Will continue same dosage. She will see cardiologist at some point.  -     METABOLIC PANEL, COMPREHENSIVE    3. Medicare annual wellness visit, subsequent    4. ACP (advance care planning)    5. Balance problem  -     REFERRAL TO PHYSICAL THERAPY    6. Post-menopause    Will order,  -     DEXA BONE DENSITY STUDY AXIAL; Future    7. Vitamin D deficiency  -     VITAMIN D, 25 HYDROXY        I spent at least 25 minutes on this visit with this established patient. Subjective:   Ms. Telma Silver is staying at home. She has hypertension and congestive heart failure. On multiple medications. No shortness of breath orthopnea. Denies chest pain or palpitation. She did not notice any leg swelling. Taking Lasix twice a week. Need bone density. Here for Medicare wellness visit. Has no living will. Prior to Admission medications    Medication Sig Start Date End Date Taking? Authorizing Provider   fluticasone propionate (FLONASE) 50 mcg/actuation nasal spray SPRAY 2 SPRAYS INTO EACH NOSTRIL EVERY DAY 7/27/20  Yes Carlota Mckeon MD   sertraline (ZOLOFT) 25 mg tablet Take 1 Tab by mouth daily. 7/15/20  Yes Carlota Mckeon MD   cetirizine (ZYRTEC) 10 mg tablet Take 1 Tab by mouth daily as needed for Allergies.  7/7/20  Yes Carlota Mckeon MD   albuterol (PROVENTIL HFA, VENTOLIN HFA, PROAIR HFA) 90 mcg/actuation inhaler INHALE 2 PUFFS EVERY 6 HOURS AS NEEDED FOR WHEEZE 6/29/20  Yes Madelyn Velasco MD   traMADoL (ULTRAM) 50 mg tablet TAKE ONE HALF TO ONE TABLET BY MOUTH EVERY 6 HOURS AS NEEDED FOR POST OP PAIN 6/9/20  Yes Provider, Historical   terbinafine HCL (LAMISIL) 250 mg tablet  6/18/20  Yes Provider, Historical   metoprolol succinate (TOPROL-XL) 25 mg XL tablet TAKE 1/2 (12.5MG)TABLET BY ORAL ROUTE EVERY DAY 6/3/20  Yes Provider, Historical   hydrocortisone (CORTAID) 1 % topical cream Apply  to affected area two (2) times a day. use thin layer 6/19/20  Yes Saundra Keller, NP   magnesium oxide (MAG-OX) 400 mg tablet Take 400 mg by mouth daily. Yes Provider, Historical   furosemide (LASIX) 20 mg tablet Take 1 tab po Monday and Friday for leg edema 5/27/20  Yes Madelyn Velasco MD   diclofenac (VOLTAREN) 1 % gel Apply  to affected area two (2) times daily as needed (shoulder pain). 5/13/20  Yes Madelyn Velasco MD   dilTIAZem ER (CARDIZEM CD) 180 mg capsule TAKE 1 CAPSULE BY MOUTH EVERY DAY 2/26/20  Yes Provider, Historical   acetaminophen (TYLENOL) 500 mg tablet Take 1 Tab by mouth two (2) times daily as needed for Pain. 5/4/20  Yes Madelyn Velasco MD   calcium-vitamin D (OYSTER SHELL) 500 mg(1,250mg) -200 unit per tablet Take 1 Tab by mouth two (2) times daily (with meals). 7/8/19  Yes Madelyn Velasco MD   aspirin 81 mg tablet Take 81 mg by mouth. Yes Other, MD Royer   nystatin (MYCOSTATIN) topical cream Apply  to affected area two (2) times a day. 6/19/20   Saundra Keller, NP   OTHER 15 mm Hg below need stockings for both legs. please measure leg width 5/27/20   Madelyn Velasco MD     Past Medical History:   Diagnosis Date    Arrhythmia     bradycardia,S/P pacemaker    CHF (congestive heart failure) ([address]     Heart failure ([facility] [address]     Hypertension     Irregular heart rate     Pure hypercholesterolemia 3/27/2017       ROS negative    Objective:     Patient-Reported Vitals 8/5/2020   Patient-Reported Height 5f7          Constitutional: [x] Appears well-developed and well-nourished [x] No apparent distress      [] Abnormal -     Mental status: [x] Alert and awake  [x] Oriented to person/place/time [x] Able to follow commands    [] Abnormal -       HENT: [x] Normocephalic, atraumatic  [] Abnormal -   [x] Mouth/Throat: Mucous membranes are moist    External Ears [x] Normal  [] Abnormal -    Neck: [x] No visualized mass [] Abnormal -     Pulmonary/Chest: [x] Respiratory effort normal   [x] No visualized signs of difficulty breathing or respiratory distress        [] Abnormal -      Musculoskeletal:   [x] Normal gait with no signs of ataxia         [x] Normal range of motion of neck        [] Abnormal -     Neurological:        [x] No Facial Asymmetry (Cranial nerve 7 motor function) (limited exam due to video visit)          [x] No gaze palsy        [] Abnormal -          Skin:        [x] No significant exanthematous lesions or discoloration noted on facial skin         [] Abnormal -            Psychiatric:       [x] Normal Affect [] Abnormal -        [x] No Hallucinations    Other pertinent observable physical exam findings:-        We discussed the expected course, resolution and complications of the diagnosis(es) in detail. Medication risks, benefits, costs, interactions, and alternatives were discussed as indicated. I advised her to contact the office if her condition worsens, changes or fails to improve as anticipated. She expressed understanding with the diagnosis(es) and plan. Celina Foster, who was evaluated through a patient-initiated, synchronous (real-time) audio-video encounter, and/or her healthcare decision maker, is aware that it is a billable service, with coverage as determined by her insurance carrier. She provided verbal consent to proceed: Yes, and patient identification was verified.  It was conducted pursuant to the emergency declaration under the [facility] waiver authority and the [employer] Appropriations Act. A caregiver was present when appropriate. Ability to conduct physical exam was limited. I was in the office. The patient was at home.       Michel Severin, MD

## 2020-08-05 NOTE — PROGRESS NOTES
This is the Subsequent Medicare Annual Wellness Exam, performed 12 months or more after the Initial AWV or the last Subsequent AWV    I have reviewed the patient's medical history in detail and updated the computerized patient record. History     Patient Active Problem List   Diagnosis Code    Unstable angina ([facility]) I20.0    CHF (congestive heart failure) ([facility]) I50.9    Pacemaker Z95.0    Essential hypertension I10    Advance care planning Z71.89    Pure hypercholesterolemia E78.00     Past Medical History:   Diagnosis Date    Arrhythmia     bradycardia,S/P pacemaker    [facility]) [facility] [address]     Heart failure ([facility] [address]     Hypertension     Irregular heart rate     Pure hypercholesterolemia 3/27/2017      Past Surgical History:   Procedure Laterality Date    HX ORTHOPAEDIC      left total knee replacement    HX OTHER SURGICAL  11/2019    Toe surgery    HX PACEMAKER       Current Outpatient Medications   Medication Sig Dispense Refill    fluticasone propionate (FLONASE) 50 mcg/actuation nasal spray SPRAY 2 SPRAYS INTO EACH NOSTRIL EVERY DAY 1 Bottle 1    sertraline (ZOLOFT) 25 mg tablet Take 1 Tab by mouth daily. 30 Tab 2    cetirizine (ZYRTEC) 10 mg tablet Take 1 Tab by mouth daily as needed for Allergies. 30 Tab 5    albuterol (PROVENTIL HFA, VENTOLIN HFA, PROAIR HFA) 90 mcg/actuation inhaler INHALE 2 PUFFS EVERY 6 HOURS AS NEEDED FOR WHEEZE 6.7 Inhaler 1    traMADoL (ULTRAM) 50 mg tablet TAKE ONE HALF TO ONE TABLET BY MOUTH EVERY 6 HOURS AS NEEDED FOR POST OP PAIN      terbinafine HCL (LAMISIL) 250 mg tablet       metoprolol succinate (TOPROL-XL) 25 mg XL tablet TAKE 1/2 (12.5MG)TABLET BY ORAL ROUTE EVERY DAY      hydrocortisone (CORTAID) 1 % topical cream Apply  to affected area two (2) times a day. use thin layer 30 g 0    magnesium oxide (MAG-OX) 400 mg tablet Take 400 mg by mouth daily.       furosemide (LASIX) 20 mg tablet Take 1 tab po Monday and Friday for leg edema 20 Tab 1    diclofenac (VOLTAREN) 1 % gel Apply  to affected area two (2) times daily as needed (shoulder pain). 100 g 2    dilTIAZem ER (CARDIZEM CD) 180 mg capsule TAKE 1 CAPSULE BY MOUTH EVERY DAY      acetaminophen (TYLENOL) 500 mg tablet Take 1 Tab by mouth two (2) times daily as needed for Pain. 60 Tab 5    calcium-vitamin D (OYSTER SHELL) 500 mg(1,250mg) -200 unit per tablet Take 1 Tab by mouth two (2) times daily (with meals). 60 Tab 12    aspirin 81 mg tablet Take 81 mg by mouth.  nystatin (MYCOSTATIN) topical cream Apply  to affected area two (2) times a day. 15 g 0    OTHER 15 mm Hg below need stockings for both legs. please measure leg width 2 Each 0     Allergies   Allergen Reactions    Codeine Unknown (comments)    Morphine Unknown (comments)    Pcn [Penicillins] Unknown (comments)    Pseudoephedrine Unknown (comments)       Family History   Problem Relation Age of Onset    Hypertension Mother     Cancer Father         prostate    Hypertension Father      Social History     Tobacco Use    Smoking status: Never Smoker    Smokeless tobacco: Never Used   Substance Use Topics    Alcohol use: No       Depression Risk Factor Screening:     3 most recent PHQ Screens 8/5/2020   Little interest or pleasure in doing things Not at all   Feeling down, depressed, irritable, or hopeless Not at all   Total Score PHQ 2 0       Alcohol Risk Factor Screening:   Do you average 1 drink per night or more than 7 drinks a week:  No    On any one occasion in the past three months have you have had more than 3 drinks containing alcohol:  No      Functional Ability and Level of Safety:   Hearing: Hearing is good. Activities of Daily Living: The home contains: cane  Patient does total self care     Ambulation: with mild difficulty     Fall Risk:  Fall Risk Assessment, last 12 mths 8/5/2020   Able to walk? Yes   Fall in past 12 months? Yes   Fall with injury?  Yes   Number of falls in past 12 months 1   Fall Risk Score 2     Abuse Screen:  Patient is not abused       Cognitive Screening   Has your family/caregiver stated any concerns about your memory: no    Cognitive Screening: Normal - MMSE (Mini Mental Status Exam)    Patient Care Team   Patient Care Team:  Zaira Carlson MD as PCP - General (Internal Medicine)  Zaira Carlson MD as PCP - [facility], [facility], [facility] as [facility] (Internal Medicine)  Boris Guerrier RN as Nurse Navigator (Internal Medicine)    Assessment/Plan   Education and counseling provided:  Are appropriate based on today's review and evaluation  End-of-Life planning (with patient's consent)  Bone mass measurement (DEXA)  Screening for glaucoma        Health Maintenance Due   Topic Date Due    DTaP/Tdap/Td series (1 - Tdap) 02/02/1954    Shingrix Vaccine Age 50> (1 of 2) 02/02/1983    GLAUCOMA SCREENING Q2Y  09/15/2018    Influenza Age 9 to Adult  08/01/2020       Libertyramila Garcia, who was evaluated through a synchronous (real-time) audio-video encounter, and/or her healthcare decision maker, is aware that it is a billable service, with coverage as determined by her insurance carrier. She provided verbal consent to proceed: Yes, and patient identification was verified. It was conducted pursuant to the emergency declaration under the [facility], [address] [place] [address] authority and the [employer] Act. A caregiver was present when appropriate. Ability to conduct physical exam was limited. I was in the office. The patient was at home.     Evans Pineda MD

## 2020-08-05 NOTE — PROGRESS NOTES
Health Maintenance Due   Topic Date Due    DTaP/Tdap/Td series (1 - Tdap) 02/02/1954    Shingrix Vaccine Age 50> (1 of 2) 02/02/1983    GLAUCOMA SCREENING Q2Y  09/15/2018    Influenza Age 5 to Adult  08/01/2020    Medicare Yearly Exam  07/08/2020       Chief Complaint   Patient presents with    Annual Wellness Visit    Hypertension    Cholesterol Problem       1. Have you been to the ER, urgent care clinic since your last visit? Hospitalized since your last visit? No    2. Have you seen or consulted any other health care providers outside of the [address] [place] [address] since your last visit? Include any pap smears or colon screening. No    3) Do you have an Advance Directive on file? yes    4) Are you interested in receiving information on Advance Directives? NO      Patient is accompanied by self I have received verbal consent from Summer Moore to discuss any/all medical information while they are present in the room.

## 2020-08-08 DIAGNOSIS — M85.80 OSTEOPENIA, UNSPECIFIED LOCATION: ICD-10-CM

## 2020-08-09 RX ORDER — LANOLIN ALCOHOL/MO/W.PET/CERES
CREAM (GRAM) TOPICAL
Qty: 180 TAB | Refills: 4 | Status: SHIPPED | OUTPATIENT
Start: 2020-08-09 | End: 2022-09-19 | Stop reason: ALTCHOICE

## 2020-08-13 DIAGNOSIS — I87.2 CHRONIC VENOUS INSUFFICIENCY: ICD-10-CM

## 2020-08-13 RX ORDER — FUROSEMIDE 20 MG/1
TABLET ORAL
Qty: 24 TAB | Refills: 1 | Status: SHIPPED | OUTPATIENT
Start: 2020-08-13 | End: 2021-01-06 | Stop reason: ALTCHOICE

## 2020-08-18 DIAGNOSIS — J30.1 ALLERGIC RHINITIS DUE TO POLLEN, UNSPECIFIED SEASONALITY: ICD-10-CM

## 2020-08-18 RX ORDER — FLUTICASONE PROPIONATE 50 MCG
SPRAY, SUSPENSION (ML) NASAL
Qty: 1 BOTTLE | Refills: 1 | Status: SHIPPED | OUTPATIENT
Start: 2020-08-18

## 2020-12-10 ENCOUNTER — TELEPHONE (OUTPATIENT)
Dept: INTERNAL MEDICINE CLINIC | Age: 85
End: 2020-12-10

## 2020-12-10 NOTE — TELEPHONE ENCOUNTER
Pt on medication for swelling in legs. Pt states she is losing too much weight. Please contact pt to advise. Last week she was 164.  This morning weight was 156

## 2020-12-11 ENCOUNTER — VIRTUAL VISIT (OUTPATIENT)
Dept: INTERNAL MEDICINE CLINIC | Age: 85
End: 2020-12-11
Payer: MEDICARE

## 2020-12-11 DIAGNOSIS — R63.4 WEIGHT LOSS: ICD-10-CM

## 2020-12-11 DIAGNOSIS — R63.4 WEIGHT LOSS: Primary | ICD-10-CM

## 2020-12-11 DIAGNOSIS — R63.0 DECREASED APPETITE: ICD-10-CM

## 2020-12-11 DIAGNOSIS — I10 ESSENTIAL HYPERTENSION: ICD-10-CM

## 2020-12-11 DIAGNOSIS — I50.9 CONGESTIVE HEART FAILURE, UNSPECIFIED HF CHRONICITY, UNSPECIFIED HEART FAILURE TYPE (HCC): ICD-10-CM

## 2020-12-11 PROCEDURE — 99442 PR PHYS/QHP TELEPHONE EVALUATION 11-20 MIN: CPT | Performed by: NURSE PRACTITIONER

## 2020-12-11 NOTE — PROGRESS NOTES
Arden Laird is a 80 y.o. female, evaluated via audio-only technology on 12/11/2020 for Weight Loss and Decreased Appetite  . Assessment & Plan:   Diagnoses and all orders for this visit:    1. Weight loss    2. Decreased appetite    3. Congestive heart failure, unspecified HF chronicity, unspecified heart failure type ([facility] [address]    4. Essential hypertension    Will order   CBC WITH AUTOMATED DIFF    METABOLIC PANEL, COMPREHENSIVE    TSH 3RD GENERATION    MAGNESIUM    PHOSPHORUS     Encourage oral intake, as tolerated. Follow-up with cardiology to discuss CHF, fluid status, diuretic dosing, as scheduled. Follow-up with Dr. Cameron Carlton in 2 weeks, or sooner as needed. Patient encouraged to call or return to office if symptoms do not improve or worsen. Reviewed plan of care with patient who acknowledges understanding and agrees. 12  Subjective: This is a patient of Dr. Cameron Carlton who presents today with concerns about weight loss. The patient states her weight was 179 lbs in October 2020 and is 154 lbs now. She is concerned that her fluid pill may be causing her to loose too much weight. She is currently taking Lasix 20 mg daily. She states dosing was increased to daily from twice weekly related to fluid overload by cardiology some months back. She says she has appt scheduled for follow-up with Dr. Derrick Billings later this month. Patient still has some intermittent leg swelling when she has been up on her feet. She states swelling resolves when she props her feet up. Patient believes she has lost weight all over, mentioning that her arms are now skinny and her clothes too big. She states her appetite is fair, but she is not eating regular meals. She says she often eats cereal in the morning and then has a few snacks the rest of the day. 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     She denies any nausea, vomiting, diarrhea or constipation. She has no chest pain, shortness of breath, or fevers. Prior to Admission medications    Medication Sig Start Date End Date Taking? Authorizing Provider   fluticasone propionate (FLONASE) 50 mcg/actuation nasal spray SPRAY 2 SPRAYS INTO EACH NOSTRIL EVERY DAY 8/18/20  Yes Nicolette Keller NP   furosemide (LASIX) 20 mg tablet TAKE 1 TAB BY MOUTH MONDAY AND FRIDAY FOR LEG EDEMA 8/13/20  Yes Zoë De La Rosa MD   [facility] Calcium-Vit D3 500 mg(1,250mg) -200 unit per tablet TAKE 1 TABLET BY MOUTH TWICE A DAY WITH MEALS 8/9/20  Yes Zoë De La Rosa MD   cetirizine (ZYRTEC) 10 mg tablet Take 1 Tab by mouth daily as needed for Allergies. 7/7/20  Yes Zoë De La Rosa MD   albuterol (PROVENTIL HFA, VENTOLIN HFA, PROAIR HFA) 90 mcg/actuation inhaler INHALE 2 PUFFS EVERY 6 HOURS AS NEEDED FOR WHEEZE 6/29/20  Yes Zoë De La Rosa MD   terbinafine HCL (LAMISIL) 250 mg tablet  6/18/20  Yes Provider, Historical   metoprolol succinate (TOPROL-XL) 25 mg XL tablet TAKE 1/2 (12.5MG)TABLET BY ORAL ROUTE EVERY DAY 6/3/20  Yes Provider, Historical   nystatin (MYCOSTATIN) topical cream Apply  to affected area two (2) times a day. 6/19/20  Yes Nicolette Keller NP   hydrocortisone (CORTAID) 1 % topical cream Apply  to affected area two (2) times a day. use thin layer 6/19/20  Yes Nicolette Keller NP   magnesium oxide (MAG-OX) 400 mg tablet Take 400 mg by mouth daily. Yes Provider, Historical   OTHER 15 mm Hg below need stockings for both legs. please measure leg width 5/27/20  Yes Zoë De La Rosa MD   diclofenac (VOLTAREN) 1 % gel Apply  to affected area two (2) times daily as needed (shoulder pain). 5/13/20  Yes Zoë De La Rosa MD   dilTIAZem ER (CARDIZEM CD) 180 mg capsule TAKE 1 CAPSULE BY MOUTH EVERY DAY 2/26/20  Yes Provider, Historical   acetaminophen (TYLENOL) 500 mg tablet Take 1 Tab by mouth two (2) times daily as needed for Pain. 5/4/20  Yes Zoë De La Rosa MD   aspirin 81 mg tablet Take 81 mg by mouth.    Yes Other, MD Royer sertraline (ZOLOFT) 25 mg tablet Take 1 Tab by mouth daily. 7/15/20   Jacobo Kaur MD   traMADoL (ULTRAM) 50 mg tablet TAKE ONE HALF TO ONE TABLET BY MOUTH EVERY 6 HOURS AS NEEDED FOR POST OP PAIN 6/9/20   Provider, Historical     Allergies   Allergen Reactions    Codeine Unknown (comments)    Morphine Unknown (comments)    Pcn [Penicillins] Unknown (comments)    Pseudoephedrine Unknown (comments)     Past Medical History:   Diagnosis Date    Arrhythmia     bradycardia,S/P pacemaker    [facility]congest[facility] failure) ([facility] [address]     Heart failure ([facility] [address]     Hypertension     Irregular heart rate     Pure hypercholesterolemia 3/27/2017     Past Surgical History:   Procedure Laterality Date    HX ORTHOPAEDIC      left total knee replacement    HX OTHER SURGICAL  11/2019    Toe surgery    HX PACEMAKER         Review of Systems   Constitutional: Positive for weight loss. Negative for chills, fever and malaise/fatigue. HENT: Negative. Respiratory: Negative. Cardiovascular: Negative. Genitourinary: Negative. Musculoskeletal: Positive for joint pain. Skin: Negative. Neurological: Negative for dizziness, tingling, tremors, sensory change, speech change, focal weakness and headaches. Balance problem, ongoing   Endo/Heme/Allergies: Negative. Psychiatric/Behavioral: Negative. Patient-Reported Vitals 12/11/2020   Patient-Reported Weight 154lb   Patient-Reported Height -        Ashley Ortega, who was evaluated through a patient-initiated, synchronous (real-time) audio only encounter, and/or her healthcare decision maker, is aware that it is a billable service, with coverage as determined by her insurance carrier. She provided verbal consent to proceed: Yes. She has not had a related appointment within my department in the past 7 days or scheduled within the next 24 hours.       Total Time: minutes: 11-20 minutes    Sarah Clarke NP

## 2020-12-11 NOTE — TELEPHONE ENCOUNTER
Call placed to patient and she states her appetite is decreased as well as weight loss, virtual visit scheduled with Charissa ANDERSON for further evaluation

## 2020-12-11 NOTE — PROGRESS NOTES
Health Maintenance Due   Topic Date Due    DTaP/Tdap/Td series (1 - Tdap) 02/02/1954    Shingrix Vaccine Age 50> (1 of 2) 02/02/1983    GLAUCOMA SCREENING Q2Y  09/15/2018    Flu Vaccine (1) 09/01/2020       Chief Complaint   Patient presents with    Weight Loss    Decreased Appetite       1. Have you been to the ER, urgent care clinic since your last visit? Hospitalized since your last visit? No    2. Have you seen or consulted any other health care providers outside of the [address] [place] [address] since your last visit? Include any pap smears or colon screening. No    3) Do you have an Advance Directive on file? no    4) Are you interested in receiving information on Advance Directives? NO      Patient is accompanied by self I have received verbal consent from Dorothy Stallworth to discuss any/all medical information while they are present in the room.

## 2020-12-24 ENCOUNTER — HOSPITAL ENCOUNTER (OUTPATIENT)
Dept: LAB | Age: 85
Discharge: HOME | End: 2020-12-24
Payer: MEDICARE

## 2020-12-24 PROCEDURE — 83735 ASSAY OF MAGNESIUM: CPT

## 2020-12-24 PROCEDURE — 84100 ASSAY OF PHOSPHORUS: CPT

## 2020-12-24 PROCEDURE — 36415 COLL VENOUS BLD VENIPUNCTURE: CPT

## 2020-12-24 PROCEDURE — 80053 COMPREHEN METABOLIC PANEL: CPT

## 2020-12-24 PROCEDURE — 85025 COMPLETE CBC W/AUTO DIFF WBC: CPT

## 2020-12-24 PROCEDURE — 84443 ASSAY THYROID STIM HORMONE: CPT

## 2020-12-25 LAB
ALBUMIN SERPL-MCNC: 4 G/DL (ref 3.6–4.6)
ALBUMIN/GLOB SERPL: 1.1 {RATIO} (ref 1.2–2.2)
ALP SERPL-CCNC: 85 IU/L (ref 39–117)
ALT SERPL-CCNC: 24 IU/L (ref 0–32)
AST SERPL-CCNC: 42 IU/L (ref 0–40)
BASOPHILS # BLD AUTO: 0 X10E3/UL (ref 0–0.2)
BASOPHILS NFR BLD AUTO: 1 %
BILIRUB SERPL-MCNC: 0.6 MG/DL (ref 0–1.2)
BUN SERPL-MCNC: 14 MG/DL (ref 8–27)
BUN/CREAT SERPL: 19 (ref 12–28)
CALCIUM SERPL-MCNC: 10.5 MG/DL (ref 8.7–10.3)
CHLORIDE SERPL-SCNC: 105 MMOL/L (ref 96–106)
CO2 SERPL-SCNC: 24 MMOL/L (ref 20–29)
CREAT SERPL-MCNC: 0.73 MG/DL (ref 0.57–1)
EOSINOPHIL # BLD AUTO: 0.1 X10E3/UL (ref 0–0.4)
EOSINOPHIL NFR BLD AUTO: 3 %
ERYTHROCYTE [DISTWIDTH] IN BLOOD BY AUTOMATED COUNT: 15.1 % (ref 11.7–15.4)
GLOBULIN SER CALC-MCNC: 3.8 G/DL (ref 1.5–4.5)
GLUCOSE SERPL-MCNC: 114 MG/DL (ref 65–99)
HCT VFR BLD AUTO: 42.1 % (ref 34–46.6)
HGB BLD-MCNC: 12.5 G/DL (ref 11.1–15.9)
IMM GRANULOCYTES # BLD AUTO: 0 X10E3/UL (ref 0–0.1)
IMM GRANULOCYTES NFR BLD AUTO: 0 %
LYMPHOCYTES # BLD AUTO: 1.1 X10E3/UL (ref 0.7–3.1)
LYMPHOCYTES NFR BLD AUTO: 29 %
MAGNESIUM SERPL-MCNC: 1.6 MG/DL (ref 1.6–2.3)
MCH RBC QN AUTO: 22 PG (ref 26.6–33)
MCHC RBC AUTO-ENTMCNC: 29.7 G/DL (ref 31.5–35.7)
MCV RBC AUTO: 74 FL (ref 79–97)
MONOCYTES # BLD AUTO: 0.6 X10E3/UL (ref 0.1–0.9)
MONOCYTES NFR BLD AUTO: 17 %
NEUTROPHILS # BLD AUTO: 1.8 X10E3/UL (ref 1.4–7)
NEUTROPHILS NFR BLD AUTO: 50 %
PHOSPHATE SERPL-MCNC: 2.5 MG/DL (ref 3–4.3)
PLATELET # BLD AUTO: 186 X10E3/UL (ref 150–450)
POTASSIUM SERPL-SCNC: 4 MMOL/L (ref 3.5–5.2)
PROT SERPL-MCNC: 7.8 G/DL (ref 6–8.5)
RBC # BLD AUTO: 5.67 X10E6/UL (ref 3.77–5.28)
SODIUM SERPL-SCNC: 140 MMOL/L (ref 134–144)
TSH SERPL DL<=0.005 MIU/L-ACNC: 2.04 UIU/ML (ref 0.45–4.5)
WBC # BLD AUTO: 3.7 X10E3/UL (ref 3.4–10.8)

## 2021-01-05 NOTE — PROGRESS NOTES
Calcium level high, please have her calcium supplement. Low phosphorus. Please call in K-Phos 1 tablet twice a day with meal for 2 weeks. Repeat phosphorus level after that.

## 2021-01-06 ENCOUNTER — VIRTUAL VISIT (OUTPATIENT)
Dept: INTERNAL MEDICINE CLINIC | Age: 86
End: 2021-01-06
Payer: MEDICARE

## 2021-01-06 DIAGNOSIS — M85.80 OSTEOPENIA, UNSPECIFIED LOCATION: ICD-10-CM

## 2021-01-06 DIAGNOSIS — I10 ESSENTIAL HYPERTENSION: ICD-10-CM

## 2021-01-06 DIAGNOSIS — M20.41 HAMMER TOE OF RIGHT FOOT: ICD-10-CM

## 2021-01-06 DIAGNOSIS — I50.9 CONGESTIVE HEART FAILURE, UNSPECIFIED HF CHRONICITY, UNSPECIFIED HEART FAILURE TYPE (HCC): Primary | ICD-10-CM

## 2021-01-06 DIAGNOSIS — F41.8 ANXIETY WITH DEPRESSION: ICD-10-CM

## 2021-01-06 DIAGNOSIS — E83.39 HYPOPHOSPHATEMIA: ICD-10-CM

## 2021-01-06 DIAGNOSIS — H91.90 HEARING LOSS, UNSPECIFIED HEARING LOSS TYPE, UNSPECIFIED LATERALITY: ICD-10-CM

## 2021-01-06 PROCEDURE — 1090F PRES/ABSN URINE INCON ASSESS: CPT | Performed by: INTERNAL MEDICINE

## 2021-01-06 PROCEDURE — G8536 NO DOC ELDER MAL SCRN: HCPCS | Performed by: INTERNAL MEDICINE

## 2021-01-06 PROCEDURE — 1100F PTFALLS ASSESS-DOCD GE2>/YR: CPT | Performed by: INTERNAL MEDICINE

## 2021-01-06 PROCEDURE — 99214 OFFICE O/P EST MOD 30 MIN: CPT | Performed by: INTERNAL MEDICINE

## 2021-01-06 PROCEDURE — G8427 DOCREV CUR MEDS BY ELIG CLIN: HCPCS | Performed by: INTERNAL MEDICINE

## 2021-01-06 PROCEDURE — G8419 CALC BMI OUT NRM PARAM NOF/U: HCPCS | Performed by: INTERNAL MEDICINE

## 2021-01-06 PROCEDURE — G8510 SCR DEP NEG, NO PLAN REQD: HCPCS | Performed by: INTERNAL MEDICINE

## 2021-01-06 PROCEDURE — G0463 HOSPITAL OUTPT CLINIC VISIT: HCPCS | Performed by: INTERNAL MEDICINE

## 2021-01-06 PROCEDURE — 3288F FALL RISK ASSESSMENT DOCD: CPT | Performed by: INTERNAL MEDICINE

## 2021-01-06 RX ORDER — METOPROLOL SUCCINATE 25 MG/1
12.5 TABLET, EXTENDED RELEASE ORAL DAILY
Qty: 45 TAB | Refills: 1 | Status: SHIPPED | OUTPATIENT
Start: 2021-01-06 | End: 2021-07-26

## 2021-01-06 NOTE — PROGRESS NOTES
Ana Luisa Alves is a 80 y.o. female who was seen by synchronous (real-time) audio-video technology on 1/6/2021 for Weight Loss and Hearing Problem        Assessment & Plan:   Diagnoses and all orders for this visit:    1. Congestive heart failure, unspecified HF chronicity, unspecified heart failure type ([facility] [address]    Compensated. On Lasix twice a week. Will refer,  -     REFERRAL TO CARDIOLOGY    2. Anxiety with depression  Not on Zoloft anymore. She is not sure about her medicine. 3. Essential hypertension    She was on Cardizem CD she mentioned she is not taking it anymore. She is only on Toprol-XL. Not able to check her blood pressure.  -     metoprolol succinate (TOPROL-XL) 25 mg XL tablet; Take 0.5 Tabs by mouth daily.  -     REFERRAL TO CARDIOLOGY    4. Osteopenia, unspecified location  -     DEXA BONE DENSITY STUDY AXIAL; Future    5. Hearing loss, unspecified hearing loss type, unspecified laterality    6. Hypophosphatemia    Recent lab work done, all reviewed with her. Has low phosphorus. Will give,  -     phosphorus (K PHOS NEUTRAL) 250 mg tablet; Take 1 Tab by mouth two (2) times a day for 14 days. 7. Hammer toe of right foot  Has recent hammertoe surgery done. Having numbness and tingling in the feet. Advised her to see podiatrist and get orthotics. I spent at least 25 minutes on this visit with this established patient. Subjective:     Ms. Ulisses Bai is here for follow-up. Report tingling sensation in both feet. She recently had hammertoe surgery done. She believes her toes has healed up but still having tingling and burning. Has hypertension, taking Toprol XL only half tablet a day. She was on Cardizem CD. She mentioned she is not taking it anymore. Has history of CHF, seeing cardiologist Dr. Paras Lindquist. Did not see him for long time. No shortness of breath orthopnea. Has anxiety and depression. Not taking Zoloft anymore. She thinks she is doing well.   She is active, ambulating well.  Labs discussed with her. No anemia. Prior to Admission medications    Medication Sig Start Date End Date Taking? Authorizing Provider   metoprolol succinate (TOPROL-XL) 25 mg XL tablet Take 0.5 Tabs by mouth daily. 1/6/21  Yes Rolan Lizarraga MD   phosphorus (K PHOS NEUTRAL) 250 mg tablet Take 1 Tab by mouth two (2) times a day for 14 days. 1/6/21 1/20/21 Yes Rolan Lizarraga MD   fluticasone propionate [facility]) 50 mcg/actuation nasal spray SPRAY 2 SPRAYS INTO EACH NOSTRIL EVERY DAY 8/18/20  Yes Darrick Keller NP   [facility] Calcium-Vit D3 500 mg(1,250mg) -200 unit per tablet TAKE 1 TABLET BY MOUTH TWICE A DAY WITH MEALS 8/9/20  Yes Rolan Lizarraga MD   albuterol (PROVENTIL HFA, VENTOLIN HFA, PROAIR HFA) 90 mcg/actuation inhaler INHALE 2 PUFFS EVERY 6 HOURS AS NEEDED FOR WHEEZE 6/29/20  Yes Rolan Lizarraga MD   OTHER 15 mm Hg below need stockings for both legs. please measure leg width 5/27/20  Yes Rolan Lizarraga MD   acetaminophen (TYLENOL) 500 mg tablet Take 1 Tab by mouth two (2) times daily as needed for Pain. 5/4/20  Yes Rolan Lizarraga MD   aspirin 81 mg tablet Take 81 mg by mouth. Yes Royer Grace MD   furosemide (LASIX) 20 mg tablet TAKE 1 TAB BY MOUTH MONDAY AND FRIDAY FOR LEG EDEMA 8/13/20 1/6/21  Rolan Lizarraga MD   sertraline (ZOLOFT) 25 mg tablet Take 1 Tab by mouth daily. 7/15/20 1/6/21  Rolan Lizarraga MD   cetirizine (ZYRTEC) 10 mg tablet Take 1 Tab by mouth daily as needed for Allergies. 7/7/20 1/6/21  Rolan Lizarraga MD   traMADoL (ULTRAM) 50 mg tablet TAKE ONE HALF TO ONE TABLET BY MOUTH EVERY 6 HOURS AS NEEDED FOR POST OP PAIN 6/9/20 1/6/21  Provider, Historical   terbinafine HCL (LAMISIL) 250 mg tablet  6/18/20 1/6/21  Provider, Historical   metoprolol succinate (TOPROL-XL) 25 mg XL tablet TAKE 1/2 (12.5MG)TABLET BY ORAL ROUTE EVERY DAY 6/3/20 1/6/21  Provider, Historical   nystatin (MYCOSTATIN) topical cream Apply  to affected area two (2) times a day.  6/19/20 1/6/21  Vikas Sparrow NP hydrocortisone (CORTAID) 1 % topical cream Apply  to affected area two (2) times a day. use thin layer 6/19/20 1/6/21  Demetrio Keller NP   magnesium oxide (MAG-OX) 400 mg tablet Take 400 mg by mouth daily. 1/6/21  Provider, Historical   diclofenac (VOLTAREN) 1 % gel Apply  to affected area two (2) times daily as needed (shoulder pain).  5/13/20 1/6/21  Devang Reinoso MD   dilTIAZem ER (CARDIZEM CD) 180 mg capsule TAKE 1 CAPSULE BY MOUTH EVERY DAY 2/26/20 1/6/21  Provider, Historical     Past Medical History:   Diagnosis Date    Arrhythmia     bradycardia,S/P pacemaker    CHF (congestive heart failure) ([address]     Heart failure ([facility] [address]     Hypertension     Irregular heart rate     Pure hypercholesterolemia 3/27/2017       ROS    Objective:     Patient-Reported Vitals 1/6/2021   Patient-Reported Weight 158lb   Patient-Reported Height -        Constitutional: [x] Appears well-developed and well-nourished [x] No apparent distress      [] Abnormal -     Mental status: [x] Alert and awake  [x] Oriented to person/place/time [x] Able to follow commands    [] Abnormal -        HENT: [x] Normocephalic, atraumatic  [] Abnormal -   [x] Mouth/Throat: Mucous membranes are moist    External Ears [x] Normal  [] Abnormal -    Neck: [x] No visualized mass [] Abnormal -     Pulmonary/Chest: [x] Respiratory effort normal   [x] No visualized signs of difficulty breathing or respiratory distress        [] Abnormal -      Musculoskeletal:   [x] Normal gait with no signs of ataxia         [x] Normal range of motion of neck        [] Abnormal -     Neurological:        [x] No Facial Asymmetry (Cranial nerve 7 motor function) (limited exam due to video visit)          [x] No gaze palsy        [] Abnormal -          Skin:        [x] No significant exanthematous lesions or discoloration noted on facial skin         [] Abnormal -            Psychiatric:       [x] Normal Affect [] Abnormal -        [x] No Hallucinations    Other pertinent observable physical exam findings:-        We discussed the expected course, resolution and complications of the diagnosis(es) in detail. Medication risks, benefits, costs, interactions, and alternatives were discussed as indicated. I advised her to contact the office if her condition worsens, changes or fails to improve as anticipated. She expressed understanding with the diagnosis(es) and plan. General Cavazos, who was evaluated through a patient-initiated, synchronous (real-time) audio-video encounter, and/or her healthcare decision maker, is aware that it is a billable service, with coverage as determined by her insurance carrier. She provided verbal consent to proceed: Yes, and patient identification was verified. It was conducted pursuant to the emergency declaration under the [address] [place] [address] authority and the [employer] Act. A caregiver was present when appropriate. Ability to conduct physical exam was limited. I was in the office. The patient was at home.       Norma Mora MD

## 2021-01-06 NOTE — PROGRESS NOTES
Health Maintenance Due   Topic Date Due    DTaP/Tdap/Td series (1 - Tdap) 02/02/1954    Shingrix Vaccine Age 50> (1 of 2) 02/02/1983    GLAUCOMA SCREENING Q2Y  09/15/2018    Flu Vaccine (1) 09/01/2020       Chief Complaint   Patient presents with    Weight Loss    Hearing Problem       1. Have you been to the ER, urgent care clinic since your last visit? Hospitalized since your last visit? No    2. Have you seen or consulted any other health care providers outside of the [address] [place] [address] since your last visit? Include any pap smears or colon screening. No    3) Do you have an Advance Directive on file? no    4) Are you interested in receiving information on Advance Directives? NO      Patient is accompanied by self I have received verbal consent from Haylee Sprague to discuss any/all medical information while they are present in the room.

## 2021-01-08 DIAGNOSIS — E83.39 LOW PHOSPHATE LEVELS: ICD-10-CM

## 2021-01-08 DIAGNOSIS — E83.52 HIGH CALCIUM LEVELS: ICD-10-CM

## 2021-01-08 DIAGNOSIS — E83.52 HIGH CALCIUM LEVELS: Primary | ICD-10-CM

## 2021-01-08 NOTE — PROGRESS NOTES
Reviewed results and recommendations with patient via telephone after confirming name and date of birth.

## 2021-01-27 ENCOUNTER — VIRTUAL VISIT (OUTPATIENT)
Dept: INTERNAL MEDICINE CLINIC | Age: 86
End: 2021-01-27
Payer: MEDICARE

## 2021-01-27 DIAGNOSIS — I50.9 CONGESTIVE HEART FAILURE, UNSPECIFIED HF CHRONICITY, UNSPECIFIED HEART FAILURE TYPE (HCC): ICD-10-CM

## 2021-01-27 DIAGNOSIS — R04.0 EPISTAXIS: ICD-10-CM

## 2021-01-27 DIAGNOSIS — I10 ESSENTIAL HYPERTENSION: Primary | ICD-10-CM

## 2021-01-27 DIAGNOSIS — R60.0 BILATERAL LEG EDEMA: ICD-10-CM

## 2021-01-27 DIAGNOSIS — E83.39 HYPOPHOSPHATEMIA: ICD-10-CM

## 2021-01-27 PROCEDURE — 3288F FALL RISK ASSESSMENT DOCD: CPT | Performed by: INTERNAL MEDICINE

## 2021-01-27 PROCEDURE — G8427 DOCREV CUR MEDS BY ELIG CLIN: HCPCS | Performed by: INTERNAL MEDICINE

## 2021-01-27 PROCEDURE — G8510 SCR DEP NEG, NO PLAN REQD: HCPCS | Performed by: INTERNAL MEDICINE

## 2021-01-27 PROCEDURE — 1100F PTFALLS ASSESS-DOCD GE2>/YR: CPT | Performed by: INTERNAL MEDICINE

## 2021-01-27 PROCEDURE — G0463 HOSPITAL OUTPT CLINIC VISIT: HCPCS | Performed by: INTERNAL MEDICINE

## 2021-01-27 PROCEDURE — G8419 CALC BMI OUT NRM PARAM NOF/U: HCPCS | Performed by: INTERNAL MEDICINE

## 2021-01-27 PROCEDURE — 1090F PRES/ABSN URINE INCON ASSESS: CPT | Performed by: INTERNAL MEDICINE

## 2021-01-27 PROCEDURE — G8536 NO DOC ELDER MAL SCRN: HCPCS | Performed by: INTERNAL MEDICINE

## 2021-01-27 PROCEDURE — 99214 OFFICE O/P EST MOD 30 MIN: CPT | Performed by: INTERNAL MEDICINE

## 2021-01-27 RX ORDER — FUROSEMIDE 20 MG/1
20 TABLET ORAL
COMMUNITY
End: 2021-04-16

## 2021-01-27 NOTE — PROGRESS NOTES
Nely Vincent is a 80 y.o. female who was seen by synchronous (real-time) audio-video technology on 1/27/2021 for Weight Loss, Medication Evaluation, Epistaxis, and Leg Swelling        Assessment & Plan:   Diagnoses and all orders for this visit:    1. Essential hypertension    Stable blood pressure. On Toprol-XL half tablet a day. She is on monitoring blood pressure. Advised her to monitor blood pressure and notify me about her blood pressure and pulse. Be on DASH diet. 2. Congestive heart failure, unspecified HF chronicity, unspecified heart failure type ([address]  Compensated. No leg swelling. No orthopnea PND. She is losing too much weight and feeling very dry with a dry skin. Advised her to take Lasix 4 days a week. Be on low-salt diet. 3. Hypophosphatemia  I have given her K-Phos for 2 weeks. Patient is worried about it and not taking medicine. Advised her to take K-Phos for 2 weeks. 4. Epistaxis    From dry environment. Advised to use nasal saline and use Vaseline in the nose. -     sodium chloride-aloe vera (AYR) topical gel; Apply  to affected area two (2) times daily as needed (nose bleeding). 5. Bilateral leg edema  No leg edema. Taking Lasix. Will reduce Lasix dosage. I spent at least 30 minutes on this visit with this established patient. Subjective:     Ms. Arturo Tracey is here for follow-up. She has lost 6 pound weight. She is worried about it. Taking Lasix 20 mg every day. No leg swelling, no orthopnea or shortness of breath. Has hypertension, taking Toprol-XL half tablet a day. Not monitoring blood pressure. Denies chest pain or palpitation. Has asthma, using albuterol inhaler as needed. Doing well. Noticed to have some dry nose though with some blood in the nose which is crusted sometimes. She is not on blood thinner. No sinus pain or pressure pressure. Need lab work. Bone density is up-to-date. She is active, able to do all ADL. Doing well.   Needs Covid vaccine. Prior to Admission medications    Medication Sig Start Date End Date Taking? Authorizing Provider   furosemide (LASIX) 20 mg tablet Take 20 mg by mouth daily. Yes Provider, Historical   sodium chloride-aloe vera (AYR) topical gel Apply  to affected area two (2) times daily as needed (nose bleeding). 1/27/21  Yes Theresa Paul MD   metoprolol succinate (TOPROL-XL) 25 mg XL tablet Take 0.5 Tabs by mouth daily. 1/6/21  Yes Theresa Paul MD   fluticasone propionate [facility]) 50 mcg/actuation nasal spray SPRAY 2 SPRAYS INTO EACH NOSTRIL EVERY DAY 8/18/20  Yes Corine Keller, NP   [employer] Calcium-Vit D3 500 mg(1,250mg) -200 unit per tablet TAKE 1 TABLET BY MOUTH TWICE A DAY WITH MEALS 8/9/20  Yes Theresa Paul MD   albuterol (PROVENTIL HFA, VENTOLIN HFA, PROAIR HFA) 90 mcg/actuation inhaler INHALE 2 PUFFS EVERY 6 HOURS AS NEEDED FOR WHEEZE 6/29/20  Yes Theresa Paul MD   OTHER 15 mm Hg below need stockings for both legs. please measure leg width 5/27/20  Yes Theresa Paul MD   aspirin 81 mg tablet Take 81 mg by mouth. Yes Royer Grace MD   acetaminophen (TYLENOL) 500 mg tablet Take 1 Tab by mouth two (2) times daily as needed for Pain. 5/4/20   Theresa Paul MD     Past Medical History:   Diagnosis Date    Arrhythmia     bradycardia,S/P pacemaker    [facility] [address]     Heart failure ([facility] [address]     Hypertension     Irregular heart rate     Pure hypercholesterolemia 3/27/2017       ROS significant for weight loss, and nosebleed.     Objective:     Patient-Reported Vitals 1/27/2021   Patient-Reported Weight 155lb   Patient-Reported Height -        Constitutional: [x] Appears well-developed and well-nourished [x] No apparent distress      [] Abnormal -     Mental status: [x] Alert and awake  [x] Oriented to person/place/time [x] Able to follow commands    [] Abnormal -       HENT: [x] Normocephalic, atraumatic  [] Abnormal -   [x] Mouth/Throat: Mucous membranes are moist    External Ears [x] Normal  [] Abnormal -    Neck: [x] No visualized mass [] Abnormal -     Pulmonary/Chest: [x] Respiratory effort normal   [x] No visualized signs of difficulty breathing or respiratory distress        [] Abnormal -      Musculoskeletal:   [x] Normal gait with no signs of ataxia         [x] Normal range of motion of neck        [] Abnormal -     Neurological:        [x] No Facial Asymmetry (Cranial nerve 7 motor function) (limited exam due to video visit)          [x] No gaze palsy        [] Abnormal -          Skin:        [x] No significant exanthematous lesions or discoloration noted on facial skin         [] Abnormal -            Psychiatric:       [x] Normal Affect [] Abnormal -        [x] No Hallucinations    Other pertinent observable physical exam findings:-        We discussed the expected course, resolution and complications of the diagnosis(es) in detail. Medication risks, benefits, costs, interactions, and alternatives were discussed as indicated. I advised her to contact the office if her condition worsens, changes or fails to improve as anticipated. She expressed understanding with the diagnosis(es) and plan. Veronica Monserrat, who was evaluated through a patient-initiated, synchronous (real-time) audio-video encounter, and/or her healthcare decision maker, is aware that it is a billable service, with coverage as determined by her insurance carrier. She provided verbal consent to proceed: Yes, and patient identification was verified. It was conducted pursuant to the emergency declaration under the [address] [place] [address], [address] [place] [address] authority and the [employer] General Act. A caregiver was present when appropriate. Ability to conduct physical exam was limited. I was in the office. The patient was at home.       Manuelito Graham MD

## 2021-02-12 ENCOUNTER — VIRTUAL VISIT (OUTPATIENT)
Dept: INTERNAL MEDICINE CLINIC | Age: 86
End: 2021-02-12
Payer: MEDICARE

## 2021-02-12 DIAGNOSIS — R04.0 EPISTAXIS: ICD-10-CM

## 2021-02-12 DIAGNOSIS — M79.89 LEG SWELLING: Primary | ICD-10-CM

## 2021-02-12 DIAGNOSIS — R63.5 WEIGHT GAIN: ICD-10-CM

## 2021-02-12 DIAGNOSIS — I50.9 CONGESTIVE HEART FAILURE, UNSPECIFIED HF CHRONICITY, UNSPECIFIED HEART FAILURE TYPE (HCC): ICD-10-CM

## 2021-02-12 DIAGNOSIS — J30.9 ALLERGIC RHINITIS, UNSPECIFIED SEASONALITY, UNSPECIFIED TRIGGER: ICD-10-CM

## 2021-02-12 PROCEDURE — 99443 PR PHYS/QHP TELEPHONE EVALUATION 21-30 MIN: CPT | Performed by: NURSE PRACTITIONER

## 2021-02-12 RX ORDER — LORATADINE 10 MG/1
10 TABLET ORAL
Qty: 30 TAB | Refills: 0 | Status: SHIPPED | OUTPATIENT
Start: 2021-02-12 | End: 2021-03-10

## 2021-02-12 NOTE — PROGRESS NOTES
Meghana Chakraborty is a 80 y.o. female, evaluated via audio-only technology on 2/12/2021 for Swelling (legs and knee ), Epistaxis, CHF, and Cholesterol Problem  . Assessment & Plan:   Diagnoses and all orders for this visit:    1. Leg swelling  Increase Lasix to 20 mg every other day. Repeat BMP in 1 week, as ordered    Elevate lower extremities when able. Exercise by walking as tolerated. Compression stockings/ socks may be worn during the day. Low salt intake. 2. Weight gain  As above. Continue to monitor weight daily. Notify of >3 lbs weight gain in one day and 5 lbs in 1 week. 3. Congestive heart failure, unspecified HF chronicity, unspecified heart failure type ([address]  As above    4. Allergic rhinitis, unspecified seasonality, unspecified trigger  Will order  -     loratadine (CLARITIN) 10 mg tablet; Take 1 Tab by mouth nightly as needed for Allergies. 5. Epistaxis  Will order  -     CBC WITH AUTOMATED DIFF; Future    Recommend humidifier in home. May continue saline gel to nose PRN. Patient encouraged to call or return to office if symptoms do not improve or worsen. Reviewed medications. If experiencing severe shortness of breath, chest pain, or other emergency, present to the ER. Reviewed plan of care with patient who acknowledges understanding and agrees. Follow-up with Dr. Naty Wharton in 2 weeks or sooner as needed. Other orders          1  Subjective: This is a patient of Dr. Naty Wharton who presents today with complaints of leg swelling and nasal congestion. The patient states she has had intermittent swelling in her feet and ankles for about 1 year. Over the last two weeks, she has had increased swelling to her feet and also in her knees. She states swelling does improve overnight with elevation. She says her weight is up some too, to 163 lbs. She is currently taking Lasix 20 mg on Mondays and Fridays. She denies shortness of breath when lying flat.   She denies shortness of breath with activity. Patient states she continues to care for her daughter who has history of stroke. The patient also says she has had some rhinorrhea. She describes clear drainage, with history of allergies. She used Flonase in the past, but stopped this due to nose bleeds. She has been using Saline gel, this has helped, but she did notice small amount of blood when she blew her nose this morning. Prior to Admission medications    Medication Sig Start Date End Date Taking? Authorizing Provider   furosemide (LASIX) 20 mg tablet Take 20 mg by mouth daily. Yes Provider, Historical   sodium chloride-aloe vera (AYR) topical gel Apply  to affected area two (2) times daily as needed (nose bleeding). 1/27/21  Yes Terell Perrin MD   metoprolol succinate (TOPROL-XL) 25 mg XL tablet Take 0.5 Tabs by mouth daily. 1/6/21  Yes Terell Perrin MD   fluticasone propionate [facility]) 50 mcg/actuation nasal spray SPRAY 2 SPRAYS INTO EACH NOSTRIL EVERY DAY 8/18/20  Yes Matteo Keller NP   [facility] Calcium-Vit D3 500 mg(1,250mg) -200 unit per tablet TAKE 1 TABLET BY MOUTH TWICE A DAY WITH MEALS 8/9/20  Yes Terell Perrin MD   albuterol (PROVENTIL HFA, VENTOLIN HFA, PROAIR HFA) 90 mcg/actuation inhaler INHALE 2 PUFFS EVERY 6 HOURS AS NEEDED FOR WHEEZE 6/29/20  Yes Terell Perrin MD   OTHER 15 mm Hg below need stockings for both legs. please measure leg width 5/27/20  Yes Terell Perrin MD   acetaminophen (TYLENOL) 500 mg tablet Take 1 Tab by mouth two (2) times daily as needed for Pain. 5/4/20  Yes Terell Perrin MD   aspirin 81 mg tablet Take 81 mg by mouth. Yes Other, MD Royer     Allergies   Allergen Reactions    Codeine Unknown (comments)     Other reaction(s): GI Intolerance    Morphine Unknown (comments)     Other reaction(s):  Other (See Comments)  Pt states that she was unable to speak    Pcn [Penicillins] Unknown (comments)    Pseudoephedrine Unknown (comments)     Past Medical History:   Diagnosis Date    Arrhythmia     bradycardia,S/P pacemaker    [facility]     Heart failure ([facility]     Hypertension     Irregular heart rate     Pure hypercholesterolemia 3/27/2017     Past Surgical History:   Procedure Laterality Date    HX ORTHOPAEDIC      left total knee replacement    HX OTHER SURGICAL  11/2019    Toe surgery    HX PACEMAKER         Review of Systems   Constitutional: Negative for chills, fever and malaise/fatigue. HENT: Positive for congestion and nosebleeds. Negative for ear discharge, ear pain and sore throat. Eyes: Negative. Respiratory: Negative for cough, shortness of breath and wheezing. Cardiovascular: Positive for leg swelling. Negative for chest pain and palpitations. Gastrointestinal: Negative. Genitourinary: Positive for frequency. Musculoskeletal: Positive for joint pain. Skin: Negative. Neurological: Negative. Endo/Heme/Allergies: Negative. Psychiatric/Behavioral: Negative. Patient-Reported Vitals 1/27/2021   Patient-Reported Weight 155lb   Patient-Reported Height -        Crissie Tierra, who was evaluated through a patient-initiated, synchronous (real-time) audio only encounter, and/or her healthcare decision maker, is aware that it is a billable service, with coverage as determined by her insurance carrier. She provided verbal consent to proceed: Yes. She has not had a related appointment within my department in the past 7 days or scheduled within the next 24 hours.       Total Time: minutes: 21-30 minutes    Payal Figueredo NP

## 2021-02-12 NOTE — PROGRESS NOTES
ADVISED PATIENT OF THE FOLLOWING HEALTH MAINTAINCE DUE  Health Maintenance Due   Topic Date Due    COVID-19 Vaccine (1 of 2) 02/02/1949    DTaP/Tdap/Td series (1 - Tdap) 02/02/1954    Shingrix Vaccine Age 50> (1 of 2) 02/02/1983    GLAUCOMA SCREENING Q2Y  09/15/2018    Flu Vaccine (1) 09/01/2020      Chief Complaint   Patient presents with    Swelling     legs and knee     Epistaxis    CHF    Cholesterol Problem       1. Have you been to the ER, urgent care clinic since your last visit? Hospitalized since your last visit? No    2. Have you seen or consulted any other health care providers outside of the [address] [place] [address] since your last visit? Include any DEXA scan, mammography  or colon screening. No    3. Do you have an Advance Directive on file? no    4. Do you have a DNR on file? no    Patient is accompanied by self I have received verbal consent from Jumana Freitas to discuss any/all medical information while they are present in the room. No flowsheet data found. [facility]pharm[facility] #[id number] [place] [address]  Phone: [phone] Fax: [phone]        Patient reminded during visit to bring all medication bottles, OTC medications to all appointments.

## 2021-02-19 DIAGNOSIS — R04.0 EPISTAXIS: ICD-10-CM

## 2021-02-26 ENCOUNTER — VIRTUAL VISIT (OUTPATIENT)
Dept: INTERNAL MEDICINE CLINIC | Age: 86
End: 2021-02-26
Payer: MEDICARE

## 2021-02-26 DIAGNOSIS — I50.9 CONGESTIVE HEART FAILURE, UNSPECIFIED HF CHRONICITY, UNSPECIFIED HEART FAILURE TYPE (HCC): ICD-10-CM

## 2021-02-26 DIAGNOSIS — J30.1 ALLERGIC RHINITIS DUE TO POLLEN, UNSPECIFIED SEASONALITY: Primary | ICD-10-CM

## 2021-02-26 DIAGNOSIS — M85.80 OSTEOPENIA, UNSPECIFIED LOCATION: ICD-10-CM

## 2021-02-26 DIAGNOSIS — R04.0 EPISTAXIS: ICD-10-CM

## 2021-02-26 PROCEDURE — G8427 DOCREV CUR MEDS BY ELIG CLIN: HCPCS | Performed by: INTERNAL MEDICINE

## 2021-02-26 PROCEDURE — G8510 SCR DEP NEG, NO PLAN REQD: HCPCS | Performed by: INTERNAL MEDICINE

## 2021-02-26 PROCEDURE — G8419 CALC BMI OUT NRM PARAM NOF/U: HCPCS | Performed by: INTERNAL MEDICINE

## 2021-02-26 PROCEDURE — 3288F FALL RISK ASSESSMENT DOCD: CPT | Performed by: INTERNAL MEDICINE

## 2021-02-26 PROCEDURE — 99214 OFFICE O/P EST MOD 30 MIN: CPT | Performed by: INTERNAL MEDICINE

## 2021-02-26 PROCEDURE — 1090F PRES/ABSN URINE INCON ASSESS: CPT | Performed by: INTERNAL MEDICINE

## 2021-02-26 PROCEDURE — G0463 HOSPITAL OUTPT CLINIC VISIT: HCPCS | Performed by: INTERNAL MEDICINE

## 2021-02-26 PROCEDURE — 1100F PTFALLS ASSESS-DOCD GE2>/YR: CPT | Performed by: INTERNAL MEDICINE

## 2021-02-26 PROCEDURE — G8536 NO DOC ELDER MAL SCRN: HCPCS | Performed by: INTERNAL MEDICINE

## 2021-02-26 NOTE — PROGRESS NOTES
Health Maintenance Due   Topic Date Due    COVID-19 Vaccine (1 of 2) 02/02/1949    DTaP/Tdap/Td series (1 - Tdap) 02/02/1954    Shingrix Vaccine Age 50> (1 of 2) 02/02/1983    GLAUCOMA SCREENING Q2Y  09/15/2018    Flu Vaccine (1) 09/01/2020       Chief Complaint   Patient presents with    Leg Swelling     both legs and feet       1. Have you been to the ER, urgent care clinic since your last visit? Hospitalized since your last visit? No    2. Have you seen or consulted any other health care providers outside of the [address] [place] [address] since your last visit? Include any pap smears or colon screening. No    3) Do you have an Advance Directive on file? no    4) Are you interested in receiving information on Advance Directives? NO      Patient is accompanied by self I have received verbal consent from Jose Srivastava to discuss any/all medical information while they are present in the room.

## 2021-02-26 NOTE — PROGRESS NOTES
Nely Vincent is a 80 y.o. female who was seen by synchronous (real-time) audio-video technology on 2/26/2021 for Leg Swelling (both legs and feet) and Nasal Congestion (states lots of nasal congestion, no cough, no fever)        Assessment & Plan:   Diagnoses and all orders for this visit:    1. Allergic rhinitis due to pollen, unspecified seasonality    Advised to use use nasal saline. She may use Claritin 1 tablet every day as needed. 2. Epistaxis  Significantly improved. She is using nasal saline which is helping her. She is also using nasal gel which is helping her to.  3. Congestive heart failure, unspecified HF chronicity, unspecified heart failure type ([address]  On low-dose metoprolol, doing well. Taking Lasix every other day. She seems compensated. Finished up K-Phos therapy. She needs to do blood work  4. Osteopenia, unspecified location  She is scheduled to have bone density soon. I spent at least 30 minutes on this visit with this established patient. Subjective:     Ms. Arturo Tracey is here for follow-up. She is active, able to do all ADL. She is taking care of her daughter who had stroke and bedridden. Has congestive heart failure. Taking Lasix every other day. No shortness of breath or orthopnea. No more muscle cramping. Report nasal congestion and postnasal drip. Mild cough. No fever. She has osteopenia, supposed to do bone density. She is going to schedule it. Did not do lab work yet. She is going to schedule lab. She is leery about taking Covid vaccine. I have requested her and explained the pros and cons of Covid vaccine. She she is going to take vaccine. Prior to Admission medications    Medication Sig Start Date End Date Taking? Authorizing Provider   loratadine (CLARITIN) 10 mg tablet Take 1 Tab by mouth nightly as needed for Allergies. 2/12/21  Yes Rose Edward NP   furosemide (LASIX) 20 mg tablet Take 20 mg by mouth every fourty-eight (48) hours. Yes Provider, Historical   sodium chloride-aloe vera (AYR) topical gel Apply  to affected area two (2) times daily as needed (nose bleeding). 1/27/21  Yes Tana Dobbs MD   metoprolol succinate (TOPROL-XL) 25 mg XL tablet Take 0.5 Tabs by mouth daily. 1/6/21  Yes Tana Dobbs MD   fluticasone propionate [facility]) 50 mcg/actuation nasal spray SPRAY 2 SPRAYS INTO EACH NOSTRIL EVERY DAY 8/18/20  Yes Waylon Keller NP   [facility] Calcium-Vit D3 500 mg(1,250mg) -200 unit per tablet TAKE 1 TABLET BY MOUTH TWICE A DAY WITH MEALS 8/9/20  Yes Tana Dobbs MD   albuterol (PROVENTIL HFA, VENTOLIN HFA, PROAIR HFA) 90 mcg/actuation inhaler INHALE 2 PUFFS EVERY 6 HOURS AS NEEDED FOR WHEEZE 6/29/20  Yes Tana Dobbs MD   acetaminophen (TYLENOL) 500 mg tablet Take 1 Tab by mouth two (2) times daily as needed for Pain. 5/4/20  Yes Tana Dobbs MD   aspirin 81 mg tablet Take 81 mg by mouth. Yes Lesly, MD Royer   OTHER 15 mm Hg below need stockings for both legs. please measure leg width 5/27/20   Tana Dobbs MD     Past Medical History:   Diagnosis Date    Arrhythmia     bradycardia,S/P pacemaker    [facility]     Heart failure ([facility]     Hypertension     Irregular heart rate     Pure hypercholesterolemia 3/27/2017       ROS significant for nasal congestion. Objective:     Patient-Reported Vitals 2/26/2021   Patient-Reported Weight 162.8lb   Patient-Reported Height -            Constitutional: [x] Appears well-developed and well-nourished [x] No apparent distress      [] Abnormal -     Mental status: [x] Alert and awake  [x] Oriented to person/place/time [x] Able to follow commands    [] Abnormal -     HENT: [x] Normocephalic, atraumatic  [] Abnormal -   [x] Mouth/Throat: Mucous membranes are moist  Nasal congestion present. Occasional nosebleed.   External Ears [x] Normal  [] Abnormal -    Neck: [x] No visualized mass [] Abnormal -     Pulmonary/Chest: [x] Respiratory effort normal   [x] No visualized signs of difficulty breathing or respiratory distress        [] Abnormal -      Musculoskeletal:   [x] Normal gait with no signs of ataxia         [x] Normal range of motion of neck  Trace leg edema present      [] Abnormal -     Neurological:        [x] No Facial Asymmetry (Cranial nerve 7 motor function) (limited exam due to video visit)          [x] No gaze palsy        [] Abnormal -                    Psychiatric:       [x] Normal Affect [] Abnormal -        [x] No Hallucinations    Other pertinent observable physical exam findings:-        We discussed the expected course, resolution and complications of the diagnosis(es) in detail. Medication risks, benefits, costs, interactions, and alternatives were discussed as indicated. I advised her to contact the office if her condition worsens, changes or fails to improve as anticipated. She expressed understanding with the diagnosis(es) and plan. Bryn Hassan, who was evaluated through a patient-initiated, synchronous (real-time) audio-video encounter, and/or her healthcare decision maker, is aware that it is a billable service, with coverage as determined by her insurance carrier. She provided verbal consent to proceed: Yes, and patient identification was verified. It was conducted pursuant to the emergency declaration under the [facility], [address] [place] [address] authority and the [employer] General Act. A caregiver was present when appropriate. Ability to conduct physical exam was limited. I was in the office. The patient was at home.       Chari Song MD

## 2021-03-09 ENCOUNTER — TRANSCRIBE ORDER (OUTPATIENT)
Dept: SCHEDULING | Age: 86
End: 2021-03-09

## 2021-03-09 DIAGNOSIS — M81.0 OSTEOPOROSIS: Primary | ICD-10-CM

## 2021-03-15 ENCOUNTER — HOSPITAL ENCOUNTER (OUTPATIENT)
Dept: MAMMOGRAPHY | Age: 86
Discharge: HOME | End: 2021-03-15
Attending: INTERNAL MEDICINE
Payer: MEDICARE

## 2021-03-15 DIAGNOSIS — M81.0 OSTEOPOROSIS: ICD-10-CM

## 2021-03-15 PROCEDURE — 77080 DXA BONE DENSITY AXIAL: CPT

## 2021-03-16 LAB
BASOPHILS # BLD AUTO: 0.1 X10E3/UL (ref 0–0.2)
BASOPHILS NFR BLD AUTO: 2 %
BUN SERPL-MCNC: 13 MG/DL (ref 8–27)
BUN/CREAT SERPL: 17 (ref 12–28)
CALCIUM SERPL-MCNC: 10.4 MG/DL (ref 8.7–10.3)
CHLORIDE SERPL-SCNC: 103 MMOL/L (ref 96–106)
CO2 SERPL-SCNC: 23 MMOL/L (ref 20–29)
CREAT SERPL-MCNC: 0.77 MG/DL (ref 0.57–1)
EOSINOPHIL # BLD AUTO: 0.1 X10E3/UL (ref 0–0.4)
EOSINOPHIL NFR BLD AUTO: 5 %
ERYTHROCYTE [DISTWIDTH] IN BLOOD BY AUTOMATED COUNT: 15 % (ref 11.7–15.4)
GLUCOSE SERPL-MCNC: 87 MG/DL (ref 65–99)
HCT VFR BLD AUTO: 44.2 % (ref 34–46.6)
HGB BLD-MCNC: 13.3 G/DL (ref 11.1–15.9)
IMM GRANULOCYTES # BLD AUTO: 0 X10E3/UL (ref 0–0.1)
IMM GRANULOCYTES NFR BLD AUTO: 0 %
LYMPHOCYTES # BLD AUTO: 0.8 X10E3/UL (ref 0.7–3.1)
LYMPHOCYTES NFR BLD AUTO: 28 %
MAGNESIUM SERPL-MCNC: 1.9 MG/DL (ref 1.6–2.3)
MCH RBC QN AUTO: 22 PG (ref 26.6–33)
MCHC RBC AUTO-ENTMCNC: 30.1 G/DL (ref 31.5–35.7)
MCV RBC AUTO: 73 FL (ref 79–97)
MONOCYTES # BLD AUTO: 0.4 X10E3/UL (ref 0.1–0.9)
MONOCYTES NFR BLD AUTO: 15 %
NEUTROPHILS # BLD AUTO: 1.4 X10E3/UL (ref 1.4–7)
NEUTROPHILS NFR BLD AUTO: 50 %
PHOSPHATE SERPL-MCNC: 2.7 MG/DL (ref 3–4.3)
PLATELET # BLD AUTO: 203 X10E3/UL (ref 150–450)
POTASSIUM SERPL-SCNC: 4 MMOL/L (ref 3.5–5.2)
RBC # BLD AUTO: 6.04 X10E6/UL (ref 3.77–5.28)
SODIUM SERPL-SCNC: 139 MMOL/L (ref 134–144)
WBC # BLD AUTO: 2.8 X10E3/UL (ref 3.4–10.8)

## 2021-03-19 ENCOUNTER — TELEPHONE (OUTPATIENT)
Dept: INTERNAL MEDICINE CLINIC | Age: 86
End: 2021-03-19

## 2021-03-19 DIAGNOSIS — E83.39 LOW PHOSPHATE LEVELS: ICD-10-CM

## 2021-03-19 DIAGNOSIS — D72.819 LEUKOPENIA, UNSPECIFIED TYPE: Primary | ICD-10-CM

## 2021-03-19 RX ORDER — SODIUM PHOSPHATE, DIBASIC, ANHYDROUS, POTASSIUM PHOSPHATE, MONOBASIC, AND SODIUM PHOSPHATE, MONOBASIC, MONOHYDRATE 852; 155; 130 MG/1; MG/1; MG/1
1 TABLET, COATED ORAL 2 TIMES DAILY
Qty: 28 TAB | Refills: 0 | Status: SHIPPED | OUTPATIENT
Start: 2021-03-19 | End: 2021-04-02

## 2021-03-19 NOTE — TELEPHONE ENCOUNTER
----- Message from Shwetha Aaron NP sent at 3/19/2021  8:29 AM EDT -----  Phosphorus low, though improved from previous. Will order K-Phos bid x 2 weeks. WBC is low. Repeat CBC, BMP, Mg, Phos in 4 weeks.

## 2021-03-19 NOTE — PROGRESS NOTES
Phosphorus low, though improved from previous. Will order K-Phos bid x 2 weeks. WBC is low. Repeat CBC, BMP, Mg, Phos in 4 weeks.

## 2021-03-22 ENCOUNTER — TELEPHONE (OUTPATIENT)
Dept: INTERNAL MEDICINE CLINIC | Age: 86
End: 2021-03-22

## 2021-03-22 NOTE — TELEPHONE ENCOUNTER
----- Message from Elidia Mendez NP sent at 3/19/2021  8:29 AM EDT -----  Phosphorus low, though improved from previous. Will order K-Phos bid x 2 weeks. WBC is low. Repeat CBC, BMP, Mg, Phos in 4 weeks.

## 2021-04-16 DIAGNOSIS — I87.2 VENOUS INSUFFICIENCY (CHRONIC) (PERIPHERAL): ICD-10-CM

## 2021-04-16 RX ORDER — FUROSEMIDE 20 MG/1
TABLET ORAL
Qty: 24 TAB | Refills: 1 | Status: SHIPPED | OUTPATIENT
Start: 2021-04-16 | End: 2021-10-11

## 2021-06-16 ENCOUNTER — VIRTUAL VISIT (OUTPATIENT)
Dept: INTERNAL MEDICINE CLINIC | Age: 86
End: 2021-06-16
Payer: MEDICARE

## 2021-06-16 DIAGNOSIS — M85.80 OSTEOPENIA, UNSPECIFIED LOCATION: ICD-10-CM

## 2021-06-16 DIAGNOSIS — R79.89 ABNORMAL CBC: ICD-10-CM

## 2021-06-16 DIAGNOSIS — I10 ESSENTIAL HYPERTENSION: Primary | ICD-10-CM

## 2021-06-16 DIAGNOSIS — R26.89 BALANCE PROBLEM: ICD-10-CM

## 2021-06-16 DIAGNOSIS — E78.00 PURE HYPERCHOLESTEROLEMIA: ICD-10-CM

## 2021-06-16 DIAGNOSIS — R79.0 LOW MAGNESIUM LEVEL: ICD-10-CM

## 2021-06-16 DIAGNOSIS — E55.9 VITAMIN D DEFICIENCY: ICD-10-CM

## 2021-06-16 DIAGNOSIS — F41.8 ANXIETY WITH DEPRESSION: ICD-10-CM

## 2021-06-16 DIAGNOSIS — E83.39 HYPOPHOSPHATEMIA: ICD-10-CM

## 2021-06-16 PROCEDURE — G8421 BMI NOT CALCULATED: HCPCS | Performed by: INTERNAL MEDICINE

## 2021-06-16 PROCEDURE — G8427 DOCREV CUR MEDS BY ELIG CLIN: HCPCS | Performed by: INTERNAL MEDICINE

## 2021-06-16 PROCEDURE — G8432 DEP SCR NOT DOC, RNG: HCPCS | Performed by: INTERNAL MEDICINE

## 2021-06-16 PROCEDURE — 1090F PRES/ABSN URINE INCON ASSESS: CPT | Performed by: INTERNAL MEDICINE

## 2021-06-16 PROCEDURE — 1100F PTFALLS ASSESS-DOCD GE2>/YR: CPT | Performed by: INTERNAL MEDICINE

## 2021-06-16 PROCEDURE — 99214 OFFICE O/P EST MOD 30 MIN: CPT | Performed by: INTERNAL MEDICINE

## 2021-06-16 PROCEDURE — G0463 HOSPITAL OUTPT CLINIC VISIT: HCPCS | Performed by: INTERNAL MEDICINE

## 2021-06-16 PROCEDURE — 3288F FALL RISK ASSESSMENT DOCD: CPT | Performed by: INTERNAL MEDICINE

## 2021-06-16 PROCEDURE — G8536 NO DOC ELDER MAL SCRN: HCPCS | Performed by: INTERNAL MEDICINE

## 2021-06-16 NOTE — PROGRESS NOTES
Chief Complaint   Patient presents with    Hypertension     3m f/u, will be a telephone call [phone] (home)    CHF       1. Have you been to the ER, urgent care clinic since your last visit? Hospitalized since your last visit? No    2. Have you seen or consulted any other health care providers outside of the [address] [place] [address] since your last visit? Include any pap smears or colon screening.  No

## 2021-06-16 NOTE — PROGRESS NOTES
Peggy Ledesma is a 80 y.o. female who was seen by synchronous (real-time) audio-video technology on 6/16/2021 for Hypertension (3m f/u, ), CHF, and Fall (balance issue)        Assessment & Plan:   Diagnoses and all orders for this visit:    1. Essential hypertension  Stable blood pressure. On Toprol-XL. Will check,    -     METABOLIC PANEL, COMPREHENSIVE; Future    2. Hypophosphatemia    Received K-Phos in the past.  Will check,  -     PHOSPHORUS; Future    3. Anxiety with depression  Stable for now, not on medication.    -     METABOLIC PANEL, COMPREHENSIVE; Future    4. Osteopenia, unspecified location  On calcium and vitamin D.  5. Vitamin D deficiency  We will recheck vitamin D level. 6. Pure hypercholesterolemia  -     METABOLIC PANEL, COMPREHENSIVE; Future  -     LIPID PANEL; Future    7. Low magnesium level    We will check,  -     MAGNESIUM; Future    8. Abnormal CBC    We will repeat,  -     CBC WITH AUTOMATED DIFF; Future    9. Balance problem    Has gait and balance problem. Need physical therapy and ablation therapy for core exercise. Will refer,  -     REFERRAL TO PHYSICAL THERAPY        I spent at least 30 minutes on this visit with this established patient. Subjective:   Ms. Lizzette Deleon is here for follow-up. He is getting frail, has gait and balance issue. Had a fall recently face forward, has some pain and injury in the face. No fracture noticed. She takes care of her daughter who had stroke. Has hypertension, compliant with medication. Denies chest pain palpitation or shortness of breath. Had history of anxiety depression, doing well, not on any medication. Bone density done recently, showed osteopenia, taking calcium with vitamin D. She had electrolyte deficiency, was replaced before. Need lab work again. I had nasal congestion and postnasal drip. Using nasal saline, helping her a lot. Need lab work. Did not take Covid vaccine yet, she is thinking about it.   I have reassured her and advised her to take Covid vaccine. Prior to Admission medications    Medication Sig Start Date End Date Taking? Authorizing Provider   furosemide (LASIX) 20 mg tablet TAKE 1 TAB BY MOUTH MONDAY AND FRIDAY FOR LEG EDEMA 4/16/21  Yes Berenice Oliver NP   loratadine (CLARITIN) 10 mg tablet TAKE 1 TABLET BY MOUTH NIGHTLY AS NEEDED FOR ALLERGIES 3/10/21  Yes Berenice Oliver NP   sodium chloride-aloe vera (AYR) topical gel Apply  to affected area two (2) times daily as needed (nose bleeding). 1/27/21  Yes Ce Singh MD   metoprolol succinate (TOPROL-XL) 25 mg XL tablet Take 0.5 Tabs by mouth daily. 1/6/21  Yes Ce Singh MD   fluticasone propionate [facility]) 50 mcg/actuation nasal spray SPRAY 2 SPRAYS INTO EACH NOSTRIL EVERY DAY 8/18/20  Yes Nikolas Keller NP   [employer] Calcium-Vit D3 500 mg(1,250mg) -200 unit per tablet TAKE 1 TABLET BY MOUTH TWICE A DAY WITH MEALS 8/9/20  Yes Ce Singh MD   albuterol (PROVENTIL HFA, VENTOLIN HFA, PROAIR HFA) 90 mcg/actuation inhaler INHALE 2 PUFFS EVERY 6 HOURS AS NEEDED FOR WHEEZE 6/29/20  Yes Ce Singh MD   acetaminophen (TYLENOL) 500 mg tablet Take 1 Tab by mouth two (2) times daily as needed for Pain. 5/4/20  Yes Ce Singh MD   aspirin 81 mg tablet Take 81 mg by mouth. Yes Other, MD Royer   OTHER 15 mm Hg below need stockings for both legs. please measure leg width  Patient not taking: Reported on 6/16/2021 5/27/20   Ce Singh MD     Past Medical History:   Diagnosis Date    Arrhythmia     bradycardia,S/P pacemaker    CHF (congestive heart failure) ([address]     [facility]     Hypertension     Irregular heart rate     Pure hypercholesterolemia 3/27/2017       ROS significant for gait and balance problem.     Objective:     Patient-Reported Vitals 6/16/2021   Patient-Reported Weight 158lb   Patient-Reported Height -          Constitutional: [x] Appears well-developed and well-nourished [x] No apparent distress      [] Abnormal - Mental status: [x] Alert and awake  [x] Oriented to person/place/time [x] Able to follow commands    [] Abnormal -       HENT: [x] Normocephalic, atraumatic  [] Abnormal -   [x] Mouth/Throat: Mucous membranes are moist    External Ears [x] Normal  [] Abnormal -    Neck: [x] No visualized mass [] Abnormal -     Pulmonary/Chest: [x] Respiratory effort normal   [x] No visualized signs of difficulty breathing or respiratory distress        [] Abnormal -      Musculoskeletal:   [x] Normal gait with no signs of ataxia         [x] Normal range of motion of neck        [] Abnormal -     Neurological:        [x] No Facial Asymmetry (Cranial nerve 7 motor function) (limited exam due to video visit)          [x] No gaze palsy        [] Abnormal -                   Psychiatric:       [x] Normal Affect [] Abnormal -        [x] No Hallucinations    Other pertinent observable physical exam findings:-        We discussed the expected course, resolution and complications of the diagnosis(es) in detail. Medication risks, benefits, costs, interactions, and alternatives were discussed as indicated. I advised her to contact the office if her condition worsens, changes or fails to improve as anticipated. She expressed understanding with the diagnosis(es) and plan. Ama Olivier, was evaluated through a synchronous (real-time) audio-video encounter. The patient (or guardian if applicable) is aware that this is a billable service. Verbal consent to proceed has been obtained within the past 12 months. The visit was conducted pursuant to the emergency declaration under the [address] [place] [address] authority and the [employer] Act. Patient identification was verified, and a caregiver was present when appropriate. The patient was located in a state where the provider was credentialed to provide care.       Nadege Walker MD

## 2021-07-23 DIAGNOSIS — I10 ESSENTIAL HYPERTENSION: ICD-10-CM

## 2021-07-26 RX ORDER — METOPROLOL SUCCINATE 25 MG/1
TABLET, EXTENDED RELEASE ORAL
Qty: 45 TABLET | Refills: 1 | Status: SHIPPED | OUTPATIENT
Start: 2021-07-26 | End: 2022-01-24 | Stop reason: SDUPTHER

## 2021-10-09 DIAGNOSIS — I87.2 VENOUS INSUFFICIENCY (CHRONIC) (PERIPHERAL): ICD-10-CM

## 2021-10-11 RX ORDER — FUROSEMIDE 20 MG/1
TABLET ORAL
Qty: 24 TABLET | Refills: 1 | Status: SHIPPED | OUTPATIENT
Start: 2021-10-11 | End: 2022-09-19 | Stop reason: SDUPTHER

## 2021-12-16 ENCOUNTER — HOSPITAL ENCOUNTER (EMERGENCY)
Age: 86
Discharge: HOME HEALTH | End: 2021-12-16
Attending: EMERGENCY MEDICINE | Admitting: EMERGENCY MEDICINE
Payer: MEDICARE

## 2021-12-16 VITALS
OXYGEN SATURATION: 96 % | DIASTOLIC BLOOD PRESSURE: 60 MMHG | TEMPERATURE: 98.9 F | SYSTOLIC BLOOD PRESSURE: 129 MMHG | RESPIRATION RATE: 16 BRPM | HEART RATE: 87 BPM

## 2021-12-16 DIAGNOSIS — R63.4 WEIGHT LOSS: Primary | ICD-10-CM

## 2021-12-16 LAB
ALBUMIN SERPL-MCNC: 3.3 G/DL (ref 3.5–5)
ALBUMIN/GLOB SERPL: 0.6 {RATIO} (ref 1.1–2.2)
ALP SERPL-CCNC: 83 U/L (ref 45–117)
ALT SERPL-CCNC: 36 U/L (ref 12–78)
ANION GAP SERPL CALC-SCNC: 2 MMOL/L (ref 5–15)
APPEARANCE UR: CLEAR
AST SERPL-CCNC: 52 U/L (ref 15–37)
BACTERIA URNS QL MICRO: NEGATIVE /HPF
BASOPHILS # BLD: 0.1 K/UL (ref 0–0.1)
BASOPHILS NFR BLD: 2 % (ref 0–1)
BILIRUB SERPL-MCNC: 0.7 MG/DL (ref 0.2–1)
BILIRUB UR QL: NEGATIVE
BUN SERPL-MCNC: 20 MG/DL (ref 6–20)
BUN/CREAT SERPL: 22 (ref 12–20)
CALCIUM SERPL-MCNC: 10 MG/DL (ref 8.5–10.1)
CHLORIDE SERPL-SCNC: 108 MMOL/L (ref 97–108)
CO2 SERPL-SCNC: 27 MMOL/L (ref 21–32)
COLOR UR: ABNORMAL
COMMENT, HOLDF: NORMAL
CREAT SERPL-MCNC: 0.9 MG/DL (ref 0.55–1.02)
DIFFERENTIAL METHOD BLD: ABNORMAL
EOSINOPHIL # BLD: 0.2 K/UL (ref 0–0.4)
EOSINOPHIL NFR BLD: 6 % (ref 0–7)
EPITH CASTS URNS QL MICRO: ABNORMAL /LPF
ERYTHROCYTE [DISTWIDTH] IN BLOOD BY AUTOMATED COUNT: 18 % (ref 11.5–14.5)
GLOBULIN SER CALC-MCNC: 5.1 G/DL (ref 2–4)
GLUCOSE SERPL-MCNC: 101 MG/DL (ref 65–100)
GLUCOSE UR STRIP.AUTO-MCNC: NEGATIVE MG/DL
HCT VFR BLD AUTO: 36.9 % (ref 35–47)
HGB BLD-MCNC: 11.7 G/DL (ref 11.5–16)
HGB UR QL STRIP: NEGATIVE
IMM GRANULOCYTES # BLD AUTO: 0 K/UL (ref 0–0.04)
IMM GRANULOCYTES NFR BLD AUTO: 0 % (ref 0–0.5)
KETONES UR QL STRIP.AUTO: NEGATIVE MG/DL
LEUKOCYTE ESTERASE UR QL STRIP.AUTO: ABNORMAL
LYMPHOCYTES # BLD: 0.8 K/UL (ref 0.8–3.5)
LYMPHOCYTES NFR BLD: 26 % (ref 12–49)
MCH RBC QN AUTO: 22.8 PG (ref 26–34)
MCHC RBC AUTO-ENTMCNC: 31.7 G/DL (ref 30–36.5)
MCV RBC AUTO: 71.9 FL (ref 80–99)
MONOCYTES # BLD: 0.7 K/UL (ref 0–1)
MONOCYTES NFR BLD: 22 % (ref 5–13)
NEUTS SEG # BLD: 1.3 K/UL (ref 1.8–8)
NEUTS SEG NFR BLD: 44 % (ref 32–75)
NITRITE UR QL STRIP.AUTO: NEGATIVE
NRBC # BLD: 0 K/UL (ref 0–0.01)
NRBC BLD-RTO: 0 PER 100 WBC
PH UR STRIP: 7 [PH] (ref 5–8)
PLATELET # BLD AUTO: 145 K/UL (ref 150–400)
POTASSIUM SERPL-SCNC: 5.4 MMOL/L (ref 3.5–5.1)
PROT SERPL-MCNC: 8.4 G/DL (ref 6.4–8.2)
PROT UR STRIP-MCNC: NEGATIVE MG/DL
RBC # BLD AUTO: 5.13 M/UL (ref 3.8–5.2)
RBC #/AREA URNS HPF: ABNORMAL /HPF (ref 0–5)
RBC MORPH BLD: ABNORMAL
SAMPLES BEING HELD,HOLD: NORMAL
SODIUM SERPL-SCNC: 137 MMOL/L (ref 136–145)
SP GR UR REFRACTOMETRY: 1.02 (ref 1–1.03)
UR CULT HOLD, URHOLD: NORMAL
UROBILINOGEN UR QL STRIP.AUTO: 1 EU/DL (ref 0.2–1)
WBC # BLD AUTO: 3.1 K/UL (ref 3.6–11)
WBC URNS QL MICRO: ABNORMAL /HPF (ref 0–4)

## 2021-12-16 PROCEDURE — 85025 COMPLETE CBC W/AUTO DIFF WBC: CPT

## 2021-12-16 PROCEDURE — 36415 COLL VENOUS BLD VENIPUNCTURE: CPT

## 2021-12-16 PROCEDURE — 99283 EMERGENCY DEPT VISIT LOW MDM: CPT

## 2021-12-16 PROCEDURE — 80053 COMPREHEN METABOLIC PANEL: CPT

## 2021-12-16 PROCEDURE — 81001 URINALYSIS AUTO W/SCOPE: CPT

## 2021-12-16 RX ORDER — NEOMYCIN SULFATE, POLYMYXIN B SULFATE, AND DEXAMETHASONE 3.5; 10000; 1 MG/G; [USP'U]/G; MG/G
OINTMENT OPHTHALMIC
COMMUNITY
Start: 2021-12-16

## 2021-12-16 NOTE — SENIOR SERVICES NOTE
Senior services consult received and appreciated. Chart Reviewed. Discussion with Dr. Aruna Pinedo prior to greeting patient. Concerns for failure to thrive, increased weight loss. Patient pulled into triage room for discussion while pending lab work. Patient alert, oriented x 3. Discussed recent's events, patient states that she has not properly grieved the loss of her husbands death last year, patient able to state number of years, months and days of marriage. Marriage of 53+ years. Decreased appetite, eating only one meal a day. -We discussed dietary supplements and changes to be made, patient receptive, verbalized understanding.   -Denies suicidal or homicidal ideations, denying the need for being evaluated by [employer] counselor.   -She states that her daughter sees a psychiatrist, I asked if she would consider speaking to one, she if refusing the need at this time.   -We discussed follow-up appt. With PCP to include discussion for consideration of adding an antidepressant and getting her ear wax out.   -Patient is the primary caregiver for her 62year old daughter, daughter had a grand mal seizure in 2005, then had a stroke in 2008 with right side hemiparalysis. States that she has someone coming into the home to assist with bathing her, as well as a  for her daughter to assess in home needs.   -Patient no longer driving due to son taking car keys away for safety, son assist with needs.   -Patient gave me verbal permission to contact son, Yolanda AmayaRobbie. -Patient assisted back to the waiting room, pending urinalysis, labs reviewed with patient, reassurring.  -Receptive to [facility] for RN/SW to evaluate patients needs in home for safety. Home:   -Lives with daughter, 1 small step to get up walkway, 3 steps to get into home.   -2 story house, bedrooms are on the first floor.   -Ambulates with 4 point cane, 1 fall a couple months ago. 1630-  I called and spoke with Maite Silveira. [phone]. He was able to verify patients name and  for HIPAA. Concerns:  -Increased weight loss since spouse passed away last year, lack of self-care.   -Fall a couple months ago. -Concern for her being the primary caregiver for his sister whom is 61 y/o. -Discussed HH option with adding an RN/SW to assess patients needs and assist, receptive and requesting to be the POC for arranging services.   -States that he is waiting in the parking lot and will be able to transport patient home if/when discharged. Made aware that the MD is awaiting urinalysis results. I have placed orders for Home Health, collaboration has been conducted with Dr. Brandon Aquino and Nikki Hung CM to assist with these efforts. Thank you for the opportunity to participate and assist with this patients care.      Mayra Gay NP  [facility]  4:50 PM  [phone]

## 2021-12-16 NOTE — ED PROVIDER NOTES
Lost 10 pounds in last couple of days. Not eating much. Still eating but just snacking. Not sleeping very much. Denies fevers, pain, vomiting, diarrhea, urinating normally but more because of fluid pill.      3:26 PM  Spoke to son Kyara Lindsay:  Not eating much, not grieving husbands passing,   Started about a week ago. Weight Loss          Past Medical History:   Diagnosis Date    Arrhythmia     bradycardia,S/P pacemaker    [facility]     Heart failure [facility]     Hypertension     Irregular heart rate     Pure hypercholesterolemia 3/27/2017       Past Surgical History:   Procedure Laterality Date    HX ORTHOPAEDIC      left total knee replacement    HX OTHER SURGICAL  11/2019    Toe surgery    HX PACEMAKER           Family History:   Problem Relation Age of Onset    Hypertension Mother     Cancer Father         prostate    Hypertension Father        Social History     Socioeconomic History    Marital status:      Spouse name: Not on file    Number of children: Not on file    Years of education: Not on file    Highest education level: Not on file   Occupational History    Not on file   Tobacco Use    Smoking status: Never Smoker    Smokeless tobacco: Never Used   Vaping Use    Vaping Use: Never used   Substance and Sexual Activity    Alcohol use: No    Drug use: No    Sexual activity: Not Currently     Comment: ,2 children,2children,lives at home   Other Topics Concern    Not on file   Social History Narrative    Not on file     Social Determinants of Health     Financial Resource Strain:     Difficulty of Paying Living Expenses: Not on file   Food Insecurity:     Worried About Running Out of Food in the Last Year: Not on file    Akira of Food in the Last Year: Not on file   Transportation Needs:     Lack of Transportation (Medical): Not on file    Lack of Transportation (Non-Medical):  Not on file   Physical Activity:     Days of Exercise per Week: Not on file    Minutes of Exercise per Session: Not on file   Stress:     Feeling of Stress : Not on file   Social Connections:     Frequency of Communication with Friends and Family: Not on file    Frequency of Social Gatherings with Friends and Family: Not on file    Attends Faith Services: Not on file    Active Member of [address] [place] [address] [employer] or Organizations: Not on file    Attends Club or Organization Meetings: Not on file    Marital Status: Not on file   Intimate Partner Violence:     Fear of Current or Ex-Partner: Not on file    Emotionally Abused: Not on file    Physically Abused: Not on file    Sexually Abused: Not on file   Housing Stability:     Unable to Pay for Housing in the Last Year: Not on file    Number of [place] in the Last Year: Not on file    Unstable Housing in the Last Year: Not on file         ALLERGIES: Codeine, Morphine, Pcn [penicillins], and Pseudoephedrine    Review of Systems   All other systems reviewed and are negative. Vitals:    12/16/21 1324   BP: 129/60   Pulse: 87   Resp: 16   Temp: 98.9 °F (37.2 °C)   SpO2: 96%            Physical Exam  Vitals and nursing note reviewed. Constitutional:       General: She is not in acute distress. HENT:      Head: Normocephalic and atraumatic. Mouth/Throat:      Mouth: Mucous membranes are moist.   Eyes:      General: No scleral icterus. Conjunctiva/sclera: Conjunctivae normal.      Pupils: Pupils are equal, round, and reactive to light. Neck:      Trachea: No tracheal deviation. Cardiovascular:      Rate and Rhythm: Normal rate and regular rhythm. Pulmonary:      Effort: Pulmonary effort is normal. No respiratory distress. Breath sounds: No wheezing or rales. Abdominal:      General: There is no distension. Palpations: Abdomen is soft. Tenderness: There is no abdominal tenderness. Genitourinary:     Comments: deferred  Musculoskeletal:         General: No deformity. Cervical back: Neck supple. Skin:     General: Skin is warm and dry. Neurological:      General: No focal deficit present. Mental Status: She is alert. Psychiatric:         Mood and Affect: Mood normal.          MDM  Number of Diagnoses or Management Options  Weight loss  Diagnosis management comments: Work-up unremarkable. Patient seen by Senior services. Home health care being set up. No emergent condition found. She is well-appearing on exam.  She is tolerating p.o. fluids and p.o. food. She is advised to follow-up closely with her primary care doctor she is given return precautions.          Procedures

## 2021-12-16 NOTE — PROGRESS NOTES
Date of previous inpatient admission/ ED visit? 6/13/20 ED visit    What brought the patient back to ED? Patient presents to the ED w/ chief compliant of weight loss    Did patient decline recommended services during last admission/ ED visit (if yes, what)? No    Has patient seen a provider since their last inpatient admission/ED visit (if yes, when)? Yes    PCP: First and Last name: Nyra Dandy   Name of Practice: [facility]   Are you a current patient: Yes/No:  Yes   Approximate date of last visit:   6/16/21  CM Interventions:  From previous inpatient admission/ED visit: CM assessment in 2014 no needs identified  From current inpatient admission/ED visit  SSED/CM consult received and appreciated. EMR reviewed. Updates received from [address] [place] [address]. Patient has son Dior Cardoza [phone] as point of contact for services set up. Ms. Krista Galloway is receptive of [facility] plan. Call placed to son introduced to role of CM. Discussed plan and in agreement [address] offered no preference referral sent to [facility]. Dior Nani to drive home (currently in parking lot awaiting d/c). No additional needs verbalized at this time.

## 2021-12-16 NOTE — ED TRIAGE NOTES
Pt stated her son brought her here for weight loss, pt stated she is not eating much just snacking , denies fever, denies n/v, denies any pain except to the bottom of her feet

## 2021-12-17 ENCOUNTER — DOCUMENTATION ONLY (OUTPATIENT)
Dept: CASE MANAGEMENT | Age: 86
End: 2021-12-17

## 2021-12-17 NOTE — PROGRESS NOTES
Updates received from [facility] - agency unable to accept PCP office will need appointment before services . Informed appointment was 2 years ago - noted virtual appointment 6/16/21. VM left for Gissell Drake Liaison (outcome pending).

## 2021-12-17 NOTE — PROGRESS NOTES
VM left for Derick Moseley. Informed will need f/u appointment w/ PCP for [facility] . Provided CM name/number if additional needs arise.

## 2022-01-07 ENCOUNTER — OFFICE VISIT (OUTPATIENT)
Dept: INTERNAL MEDICINE CLINIC | Age: 87
End: 2022-01-07
Payer: MEDICARE

## 2022-01-07 VITALS
OXYGEN SATURATION: 98 % | TEMPERATURE: 97.3 F | SYSTOLIC BLOOD PRESSURE: 124 MMHG | HEIGHT: 67 IN | BODY MASS INDEX: 24.01 KG/M2 | DIASTOLIC BLOOD PRESSURE: 80 MMHG | WEIGHT: 153 LBS | HEART RATE: 78 BPM | RESPIRATION RATE: 20 BRPM

## 2022-01-07 DIAGNOSIS — R30.0 DYSURIA: Primary | ICD-10-CM

## 2022-01-07 DIAGNOSIS — H61.21 IMPACTED CERUMEN OF RIGHT EAR: ICD-10-CM

## 2022-01-07 DIAGNOSIS — I10 ESSENTIAL HYPERTENSION: ICD-10-CM

## 2022-01-07 DIAGNOSIS — Z78.9 IMPAIRED MOBILITY AND ADLS: ICD-10-CM

## 2022-01-07 DIAGNOSIS — R26.89 BALANCE PROBLEM: ICD-10-CM

## 2022-01-07 DIAGNOSIS — R63.0 DECREASED APPETITE: ICD-10-CM

## 2022-01-07 DIAGNOSIS — Z74.09 IMPAIRED MOBILITY AND ADLS: ICD-10-CM

## 2022-01-07 LAB
BILIRUB UR QL STRIP: NEGATIVE
GLUCOSE UR-MCNC: NEGATIVE MG/DL
KETONES P FAST UR STRIP-MCNC: NEGATIVE MG/DL
PH UR STRIP: 5.5 [PH] (ref 4.6–8)
PROT UR QL STRIP: NEGATIVE
SP GR UR STRIP: 1.03 (ref 1–1.03)
UA UROBILINOGEN AMB POC: NORMAL (ref 0.2–1)
URINALYSIS CLARITY POC: CLEAR
URINALYSIS COLOR POC: YELLOW
URINE BLOOD POC: NORMAL
URINE LEUKOCYTES POC: NORMAL
URINE NITRITES POC: NEGATIVE

## 2022-01-07 PROCEDURE — G8427 DOCREV CUR MEDS BY ELIG CLIN: HCPCS | Performed by: INTERNAL MEDICINE

## 2022-01-07 PROCEDURE — 81002 URINALYSIS NONAUTO W/O SCOPE: CPT | Performed by: INTERNAL MEDICINE

## 2022-01-07 PROCEDURE — 1101F PT FALLS ASSESS-DOCD LE1/YR: CPT | Performed by: INTERNAL MEDICINE

## 2022-01-07 PROCEDURE — 81003 URINALYSIS AUTO W/O SCOPE: CPT | Performed by: INTERNAL MEDICINE

## 2022-01-07 PROCEDURE — G8536 NO DOC ELDER MAL SCRN: HCPCS | Performed by: INTERNAL MEDICINE

## 2022-01-07 PROCEDURE — 99214 OFFICE O/P EST MOD 30 MIN: CPT | Performed by: INTERNAL MEDICINE

## 2022-01-07 PROCEDURE — G8420 CALC BMI NORM PARAMETERS: HCPCS | Performed by: INTERNAL MEDICINE

## 2022-01-07 PROCEDURE — G8432 DEP SCR NOT DOC, RNG: HCPCS | Performed by: INTERNAL MEDICINE

## 2022-01-07 PROCEDURE — G0463 HOSPITAL OUTPT CLINIC VISIT: HCPCS | Performed by: INTERNAL MEDICINE

## 2022-01-07 PROCEDURE — 1090F PRES/ABSN URINE INCON ASSESS: CPT | Performed by: INTERNAL MEDICINE

## 2022-01-07 RX ORDER — NITROFURANTOIN 25; 75 MG/1; MG/1
100 CAPSULE ORAL 2 TIMES DAILY
Qty: 10 CAPSULE | Refills: 0 | Status: SHIPPED | OUTPATIENT
Start: 2022-01-07 | End: 2022-01-24 | Stop reason: ALTCHOICE

## 2022-01-07 NOTE — PROGRESS NOTES
Health Maintenance Due   Topic Date Due    COVID-19 Vaccine (1) Never done    DTaP/Tdap/Td series (1 - Tdap) Never done    Shingrix Vaccine Age 50> (1 of 2) Never done    Medicare Yearly Exam  08/06/2021    Flu Vaccine (1) 09/01/2021       Chief Complaint   Patient presents with    Bladder Infection       1. Have you been to the ER, urgent care clinic since your last visit? Hospitalized since your last visit? No    2. Have you seen or consulted any other health care providers outside of the [address] [place] [address] since your last visit? Include any pap smears or colon screening. No    3) Do you have an Advance Directive on file? no    4) Are you interested in receiving information on Advance Directives? NO      Patient is accompanied by daughter I have received verbal consent from Suzy Harris to discuss any/all medical information while they are present in the room.

## 2022-01-07 NOTE — PROGRESS NOTES
HISTORY OF PRESENT ILLNESS  Latoya Pyle is a 80 y.o. female. Patient was seen with her niece. Reports that she was seen in the ER after she began to lose weight. Had lost 10 lbs in a week per the son. Continues to grieve the lost of her . Reports that at that time she thought she had a UTI because she was having some burning. Was not checked for  UTI. States that she then got better, but recently her discomfort came back. Reports that she was placed on a fluid pill for her leg swelling. Has had some imbalance and gait concerns. Uses a walker. No falls. Reports that she just feels off some quickly and took her a few minutes to gather herself. Visit Vitals  /80 (BP 1 Location: Right arm, BP Patient Position: Sitting, BP Cuff Size: Adult)   Pulse 78   Temp 97.3 °F (36.3 °C) (Oral)   Resp 20   Ht 5' 7\" (1.702 m)   Wt 153 lb (69.4 kg)   SpO2 98%   BMI 23.96 kg/m²     Past Medical History:   Diagnosis Date    Arrhythmia     bradycardia,S/P pacemaker    CHF (congestive heart failure) (HCC)     Heart failure (HCC)     Hypertension     Irregular heart rate     Pure hypercholesterolemia 3/27/2017     Past Surgical History:   Procedure Laterality Date    HX ORTHOPAEDIC      left total knee replacement    HX OTHER SURGICAL  11/2019    Toe surgery    HX PACEMAKER       Family History   Problem Relation Age of Onset    Hypertension Mother     Cancer Father         prostate    Hypertension Father      Outpatient Encounter Medications as of 1/7/2022   Medication Sig Dispense Refill    nitrofurantoin, macrocrystal-monohydrate, (MACROBID) 100 mg capsule Take 1 Capsule by mouth two (2) times a day.  10 Capsule 0    neomycin-polymyxin-dexamethasone (DEXACINE) 3.5 mg/g-10,000 unit/g-0.1 % ophthalmic ointment       furosemide (LASIX) 20 mg tablet TAKE 1 TABLET BY MOUTH MONDAY AND FRIDAY FOR LEG EDEMA 24 Tablet 1    metoprolol succinate (TOPROL-XL) 25 mg XL tablet TAKE 1/2 TABLET BY MOUTH EVERY DAY 45 Tablet 1    loratadine (CLARITIN) 10 mg tablet TAKE 1 TABLET BY MOUTH NIGHTLY AS NEEDED FOR ALLERGIES 30 Tab 4    sodium chloride-aloe vera (AYR) topical gel Apply  to affected area two (2) times daily as needed (nose bleeding). 1 Tube 1    fluticasone propionate (FLONASE) 50 mcg/actuation nasal spray SPRAY 2 SPRAYS INTO EACH NOSTRIL EVERY DAY 1 Bottle 1    Oyster Shell Calcium-Vit D3 500 mg(1,250mg) -200 unit per tablet TAKE 1 TABLET BY MOUTH TWICE A DAY WITH MEALS 180 Tab 4    albuterol (PROVENTIL HFA, VENTOLIN HFA, PROAIR HFA) 90 mcg/actuation inhaler INHALE 2 PUFFS EVERY 6 HOURS AS NEEDED FOR WHEEZE 6.7 Inhaler 1    OTHER 15 mm Hg below need stockings for both legs. please measure leg width 2 Each 0    acetaminophen (TYLENOL) 500 mg tablet Take 1 Tab by mouth two (2) times daily as needed for Pain. 60 Tab 5    aspirin 81 mg tablet Take 81 mg by mouth. No facility-administered encounter medications on file as of 1/7/2022. HPI    Review of Systems   Constitutional: Negative. Respiratory: Negative. Cardiovascular: Negative. Gastrointestinal: Negative. Genitourinary: Positive for dysuria. Negative for flank pain and hematuria. Musculoskeletal: Positive for joint pain. Negative for falls. Neurological: Positive for dizziness. Physical Exam  Vitals and nursing note reviewed. Constitutional:       Comments: Smaller woman with a cane    HENT:      Head: Normocephalic. Right Ear: There is impacted cerumen. Left Ear: There is no impacted cerumen. Cardiovascular:      Rate and Rhythm: Normal rate and regular rhythm. Pulmonary:      Effort: Pulmonary effort is normal.      Breath sounds: Normal breath sounds. Abdominal:      Palpations: Abdomen is soft. Tenderness: There is no right CVA tenderness or left CVA tenderness. Skin:     General: Skin is warm. Neurological:      Mental Status: She is alert and oriented to person, place, and time. Psychiatric:         Behavior: Behavior normal.         ASSESSMENT and PLAN  Diagnoses and all orders for this visit:    1. Dysuria  -     nitrofurantoin, macrocrystal-monohydrate, (MACROBID) 100 mg capsule; Take 1 Capsule by mouth two (2) times a day. -     AMB POC URINALYSIS DIP STICK MANUAL W/O MICRO    2. Impaired mobility and ADLs  -     REFERRAL TO HOME HEALTH    3. Balance problem  -     REFERRAL TO HOME HEALTH    4. Essential hypertension    5. Decreased appetite       -     Encouraged increased protein and supplements   6.  Impacted cerumen of right ear       -     Attempted to soften the wax     Follow-up and Dispositions    · Return if symptoms worsen or fail to improve.       lab results and schedule of future lab studies reviewed with patient  reviewed diet, exercise and weight control  reviewed medications and side effects in detail

## 2022-01-12 ENCOUNTER — TELEPHONE (OUTPATIENT)
Dept: INTERNAL MEDICINE CLINIC | Age: 87
End: 2022-01-12

## 2022-01-12 NOTE — TELEPHONE ENCOUNTER
Spoke w/ all about care and gave verbal order for   Requesting verbal order for physical therapy: balance training 2x per week for 8 wks.

## 2022-01-13 ENCOUNTER — TELEPHONE (OUTPATIENT)
Dept: INTERNAL MEDICINE CLINIC | Age: 87
End: 2022-01-13

## 2022-01-13 LAB — BACTERIA UR CULT: ABNORMAL

## 2022-01-14 ENCOUNTER — TELEPHONE (OUTPATIENT)
Dept: INTERNAL MEDICINE CLINIC | Age: 87
End: 2022-01-14

## 2022-01-20 ENCOUNTER — APPOINTMENT (OUTPATIENT)
Dept: GENERAL RADIOLOGY | Age: 87
End: 2022-01-20
Attending: EMERGENCY MEDICINE
Payer: MEDICARE

## 2022-01-20 ENCOUNTER — APPOINTMENT (OUTPATIENT)
Dept: CT IMAGING | Age: 87
End: 2022-01-20
Attending: EMERGENCY MEDICINE
Payer: MEDICARE

## 2022-01-20 ENCOUNTER — HOSPITAL ENCOUNTER (EMERGENCY)
Age: 87
Discharge: HOME | End: 2022-01-20
Attending: EMERGENCY MEDICINE
Payer: MEDICARE

## 2022-01-20 VITALS
OXYGEN SATURATION: 98 % | TEMPERATURE: 97.3 F | DIASTOLIC BLOOD PRESSURE: 76 MMHG | SYSTOLIC BLOOD PRESSURE: 136 MMHG | RESPIRATION RATE: 16 BRPM | HEART RATE: 75 BPM

## 2022-01-20 DIAGNOSIS — S42.135A CLOSED NONDISPLACED FRACTURE OF CORACOID PROCESS OF LEFT SHOULDER, INITIAL ENCOUNTER: ICD-10-CM
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
## 2022-01-20 LAB
ALBUMIN SERPL-MCNC: 3.5 G/DL (ref 3.5–5)
ALBUMIN/GLOB SERPL: 0.6 {RATIO} (ref 1.1–2.2)
ALP SERPL-CCNC: 79 U/L (ref 45–117)
ALT SERPL-CCNC: 34 U/L (ref 12–78)
ANION GAP SERPL CALC-SCNC: 5 MMOL/L (ref 5–15)
APPEARANCE UR: CLEAR
AST SERPL-CCNC: 37 U/L (ref 15–37)
ATRIAL RATE: 66 BPM
BACTERIA URNS QL MICRO: NEGATIVE /HPF
BASOPHILS # BLD: 0 K/UL (ref 0–0.1)
BASOPHILS NFR BLD: 0 % (ref 0–1)
BILIRUB SERPL-MCNC: 0.8 MG/DL (ref 0.2–1)
BILIRUB UR QL: NEGATIVE
BUN SERPL-MCNC: 18 MG/DL (ref 6–20)
BUN/CREAT SERPL: 20 (ref 12–20)
CALCIUM SERPL-MCNC: 10.4 MG/DL (ref 8.5–10.1)
CALCULATED P AXIS, ECG09: 87 DEGREES
CALCULATED R AXIS, ECG10: 8 DEGREES
CALCULATED T AXIS, ECG11: 2 DEGREES
CHLORIDE SERPL-SCNC: 108 MMOL/L (ref 97–108)
CO2 SERPL-SCNC: 25 MMOL/L (ref 21–32)
COLOR UR: ABNORMAL
COMMENT, HOLDF: NORMAL
CREAT SERPL-MCNC: 0.9 MG/DL (ref 0.55–1.02)
DIAGNOSIS, 93000: NORMAL
DIFFERENTIAL METHOD BLD: ABNORMAL
EOSINOPHIL # BLD: 0 K/UL (ref 0–0.4)
EOSINOPHIL NFR BLD: 0 % (ref 0–7)
EPITH CASTS URNS QL MICRO: ABNORMAL /LPF
ERYTHROCYTE [DISTWIDTH] IN BLOOD BY AUTOMATED COUNT: 17.6 % (ref 11.5–14.5)
GLOBULIN SER CALC-MCNC: 5.4 G/DL (ref 2–4)
GLUCOSE BLD STRIP.AUTO-MCNC: 115 MG/DL (ref 65–117)
GLUCOSE SERPL-MCNC: 130 MG/DL (ref 65–100)
GLUCOSE UR STRIP.AUTO-MCNC: NEGATIVE MG/DL
HCT VFR BLD AUTO: 39.4 % (ref 35–47)
HGB BLD-MCNC: 12.3 G/DL (ref 11.5–16)
HGB UR QL STRIP: NEGATIVE
IMM GRANULOCYTES # BLD AUTO: 0 K/UL (ref 0–0.04)
IMM GRANULOCYTES NFR BLD AUTO: 0 % (ref 0–0.5)
KETONES UR QL STRIP.AUTO: ABNORMAL MG/DL
LEUKOCYTE ESTERASE UR QL STRIP.AUTO: ABNORMAL
LYMPHOCYTES # BLD: 0.8 K/UL (ref 0.8–3.5)
LYMPHOCYTES NFR BLD: 10 % (ref 12–49)
MCH RBC QN AUTO: 22.5 PG (ref 26–34)
MCHC RBC AUTO-ENTMCNC: 31.2 G/DL (ref 30–36.5)
MCV RBC AUTO: 72 FL (ref 80–99)
MONOCYTES # BLD: 1.7 K/UL (ref 0–1)
MONOCYTES NFR BLD: 20 % (ref 5–13)
NEUTS SEG # BLD: 5.9 K/UL (ref 1.8–8)
NEUTS SEG NFR BLD: 70 % (ref 32–75)
NITRITE UR QL STRIP.AUTO: NEGATIVE
NRBC # BLD: 0 K/UL (ref 0–0.01)
NRBC BLD-RTO: 0 PER 100 WBC
P-R INTERVAL, ECG05: 152 MS
PH UR STRIP: 5 [PH] (ref 5–8)
PLATELET # BLD AUTO: 133 K/UL (ref 150–400)
POTASSIUM SERPL-SCNC: 3.8 MMOL/L (ref 3.5–5.1)
PROT SERPL-MCNC: 8.9 G/DL (ref 6.4–8.2)
PROT UR STRIP-MCNC: ABNORMAL MG/DL
Q-T INTERVAL, ECG07: 416 MS
QRS DURATION, ECG06: 78 MS
QTC CALCULATION (BEZET), ECG08: 436 MS
RBC # BLD AUTO: 5.47 M/UL (ref 3.8–5.2)
RBC #/AREA URNS HPF: ABNORMAL /HPF (ref 0–5)
RBC MORPH BLD: ABNORMAL
SAMPLES BEING HELD,HOLD: NORMAL
SERVICE CMNT-IMP: NORMAL
SODIUM SERPL-SCNC: 138 MMOL/L (ref 136–145)
SP GR UR REFRACTOMETRY: 1.02 (ref 1–1.03)
UR CULT HOLD, URHOLD: NORMAL
UROBILINOGEN UR QL STRIP.AUTO: 1 EU/DL (ref 0.2–1)
VENTRICULAR RATE, ECG03: 66 BPM
WBC # BLD AUTO: 8.4 K/UL (ref 3.6–11)
WBC URNS QL MICRO: ABNORMAL /HPF (ref 0–4)

## 2022-01-20 PROCEDURE — 82962 GLUCOSE BLOOD TEST: CPT

## 2022-01-20 PROCEDURE — 80053 COMPREHEN METABOLIC PANEL: CPT

## 2022-01-20 PROCEDURE — 81001 URINALYSIS AUTO W/SCOPE: CPT

## 2022-01-20 PROCEDURE — 99284 EMERGENCY DEPT VISIT MOD MDM: CPT

## 2022-01-20 PROCEDURE — 93005 ELECTROCARDIOGRAM TRACING: CPT

## 2022-01-20 PROCEDURE — 74011250636 HC RX REV CODE- 250/636: Performed by: EMERGENCY MEDICINE

## 2022-01-20 PROCEDURE — 85025 COMPLETE CBC W/AUTO DIFF WBC: CPT

## 2022-01-20 PROCEDURE — 96360 HYDRATION IV INFUSION INIT: CPT

## 2022-01-20 PROCEDURE — 73030 X-RAY EXAM OF SHOULDER: CPT

## 2022-01-20 PROCEDURE — 71100 X-RAY EXAM RIBS UNI 2 VIEWS: CPT

## 2022-01-20 PROCEDURE — 74011000250 HC RX REV CODE- 250: Performed by: EMERGENCY MEDICINE

## 2022-01-20 PROCEDURE — 70450 CT HEAD/BRAIN W/O DYE: CPT

## 2022-01-20 RX ORDER — LIDOCAINE 4 G/100G
1 PATCH TOPICAL EVERY 24 HOURS
Qty: 6 EACH | Refills: 0 | Status: SHIPPED | OUTPATIENT
Start: 2022-01-20

## 2022-01-20 RX ORDER — LIDOCAINE 4 G/100G
1 PATCH TOPICAL
Status: DISCONTINUED | OUTPATIENT
Start: 2022-01-20 | End: 2022-01-21 | Stop reason: HOSPADM

## 2022-01-20 RX ADMIN — SODIUM CHLORIDE 500 ML: 9 INJECTION, SOLUTION INTRAVENOUS at 19:58

## 2022-01-20 NOTE — ED TRIAGE NOTES
Brought by family over concern for AMS for 2 days. Patient states intermittently falling asleep, falling over. Reports she fell onto the floor and hit her head and left side of her body. Reporting L arm pain and L leg pain. Family is concerned she may have a UTI. Falling asleep in triage. Oriented to self and situation, not time in triage.

## 2022-01-21 ENCOUNTER — DOCUMENTATION ONLY (OUTPATIENT)
Dept: CASE MANAGEMENT | Age: 87
End: 2022-01-21

## 2022-01-21 NOTE — ED NOTES
Pt received discharge instructions and verbalized understanding. She left via wheelchair in no acute distress.

## 2022-01-21 NOTE — ED PROVIDER NOTES
58-year-old female with past medical history significant for hypertension, heart failure, high cholesterol presents with family with concerns for altered mental status x1 day patient reports she has been taking care of her daughter who recently had a stroke and not sleeping well and so gets very tired during the day and falls asleep while standing. Patient reports that she fell twice yesterday while standing up. Denies syncope. Denies any recent illness, fever, chills, nausea, vomiting or chest pain, shortness of breath. Patient complains of left shoulder pain after fall. Family concerned about possible urinary tract infection. Denies urinary complaints.     Denies tobacco use, drug use, alcohol use  Primary careAli           Past Medical History:   Diagnosis Date    Arrhythmia     bradycardia,S/P pacemaker    CHF (congestive heart failure) ([address]     Heart failure ([address]     Hypertension     Irregular heart rate     Pure hypercholesterolemia 3/27/2017       Past Surgical History:   Procedure Laterality Date    HX ORTHOPAEDIC      left total knee replacement    HX OTHER SURGICAL  11/2019    Toe surgery    HX PACEMAKER           Family History:   Problem Relation Age of Onset    Hypertension Mother     Cancer Father         prostate    Hypertension Father        Social History     Socioeconomic History    Marital status:      Spouse name: Not on file    Number of children: Not on file    Years of education: Not on file    Highest education level: Not on file   Occupational History    Not on file   Tobacco Use    Smoking status: Never Smoker    Smokeless tobacco: Never Used   Vaping Use    Vaping Use: Never used   Substance and Sexual Activity    Alcohol use: No    Drug use: No    Sexual activity: Not Currently     Comment: ,2 children,2children,lives at home   Other Topics Concern    Not on file   Social History Narrative    Not on file     Social Determinants of Health Financial Resource Strain:     Difficulty of Paying Living Expenses: Not on file   Food Insecurity:     Worried About Running Out of Food in the Last Year: Not on file    Akira of Food in the Last Year: Not on file   Transportation Needs:     Lack of Transportation (Medical): Not on file    Lack of Transportation (Non-Medical): Not on file   Physical Activity:     Days of Exercise per Week: Not on file    Minutes of Exercise per Session: Not on file   Stress:     Feeling of Stress : Not on file   Social Connections:     Frequency of Communication with Friends and Family: Not on file    Frequency of Social Gatherings with Friends and Family: Not on file    Attends Buddhism Services: Not on file    Active Member of [address] [place] [address] or Organizations: Not on file    Attends Club or Organization Meetings: Not on file    Marital Status: Not on file   Intimate Partner Violence:     Fear of Current or Ex-Partner: Not on file    Emotionally Abused: Not on file    Physically Abused: Not on file    Sexually Abused: Not on file   Housing Stability:     Unable to Pay for Housing in the Last Year: Not on file    Number of [place] in the Last Year: Not on file    Unstable Housing in the Last Year: Not on file         ALLERGIES: Codeine, Morphine, Pcn [penicillins], and Pseudoephedrine    Review of Systems   Constitutional: Negative for chills and fever. HENT: Negative for congestion, nosebleeds and rhinorrhea. Eyes: Negative for pain and redness. Respiratory: Negative for cough and shortness of breath. Cardiovascular: Negative for chest pain and palpitations. Gastrointestinal: Negative for abdominal pain, nausea and vomiting. Genitourinary: Negative for dysuria, frequency, vaginal bleeding and vaginal pain. Musculoskeletal: Positive for arthralgias. Negative for myalgias. Skin: Negative for rash and wound. Neurological: Negative for seizures, syncope and weakness.    Hematological: Does not bruise/bleed easily. Psychiatric/Behavioral: Negative for agitation, confusion, dysphoric mood and suicidal ideas. The patient is not nervous/anxious. Vitals:    01/20/22 1616   BP: 126/72   Pulse: 78   Resp: 15   Temp: 98.4 °F (36.9 °C)   SpO2: 96%            Physical Exam  Vitals and nursing note reviewed. Constitutional:       Appearance: She is well-developed. HENT:      Head: Normocephalic and atraumatic. Eyes:      Pupils: Pupils are equal, round, and reactive to light. Neck:      Trachea: No tracheal deviation. Cardiovascular:      Rate and Rhythm: Normal rate and regular rhythm. Heart sounds: Normal heart sounds. Pulmonary:      Effort: Pulmonary effort is normal. No respiratory distress. Breath sounds: Normal breath sounds. No stridor. No wheezing or rales. Chest:      Chest wall: No tenderness. Abdominal:      General: Bowel sounds are normal. There is no distension. Palpations: Abdomen is soft. Tenderness: There is no abdominal tenderness. There is no rebound. Musculoskeletal:         General: No tenderness. Left shoulder: Bony tenderness present. No swelling or deformity. Decreased range of motion. Cervical back: Normal range of motion and neck supple. Comments: Decreased flexion left shoulder secondary to pain   Skin:     General: Skin is warm and dry. Coloration: Skin is not pale. Findings: No rash. Neurological:      Mental Status: She is alert and oriented to person, place, and time. Cranial Nerves: No cranial nerve deficit. MDM  Number of Diagnoses or Management Options  Diagnosis management comments: 20-year-old female presents with family with concerns of falls over the past day with patient falling asleep and falling with increasing fatigue. Complains of left shoulder pain. Denies left leg or hip pain.   Patient alert and oriented x4, answering questions appropriately, no signs of head trauma, well-appearing, hemodynamically stable, no respiratory distress, clear to auscultation bilaterally, afebrile, nontoxic. Decreased left shoulder range of motion secondary to pain. Plan head CT, EKG, CBC/CMP/UA, IV fluid hydration, left rib x-ray, left shoulder x-ray, head CT. Head CT with no acute abnormality  Left shoulder-coracoid process fracture         Amount and/or Complexity of Data Reviewed  Clinical lab tests: ordered and reviewed  Tests in the radiology section of CPT®: ordered and reviewed  Review and summarize past medical records: yes  Discuss the patient with other providers: yes  Independent visualization of images, tracings, or specimens: yes           Procedures      7:43 PM  Patient's results have been reviewed with them. Patient and/or family have verbally conveyed their understanding and agreement of the patient's signs, symptoms, diagnosis, treatment and prognosis and additionally agree to follow up as recommended or return to the Emergency Room should their condition change prior to follow-up. Discharge instructions have also been provided to the patient with some educational information regarding their diagnosis as well a list of reasons why they would want to return to the ER prior to their follow-up appointment should their condition change.

## 2022-01-21 NOTE — PROGRESS NOTES
SSED/CM referral received and appreciated. F/U call placed to patient spoke w/ sister Ian Thomas states she's resting will get patient to the phone. Contact made w/ Ms. Reese Hillman introduced self and confirmed HIPAA identifier. Patient states she is achy and permission received to talk w/her sister. Spoke w/ Ms. Altaf Paul states family did not realize patient was in current condition and has been falling. Ms. Reese Hillman is Caregiver of her daughter (hx. CVA) and spouse  >1 yr ago. Informed CM will f/u w/ Rosanne Ambriz [phone] niece of patient and daughter of Lulu. Spoke w/  introduced to role of CM [facility] BRITTANY Dick [phone]) was at house yesterday and recommended patient be evaluated at ER. States patient has a SW assigned does not know if from [address] [place] [address] or [facility] agency. Acknowledged family wanted patient to stay longer in the hospital since son Phyliss Gottron. is quarantining. Provided name/contact of this writer.  left for Janett OT - CM contacted [facility] and [facility]  neither agency is providing services.

## 2022-01-21 NOTE — PROGRESS NOTES
Patient is followed by [facility]  Contact made w/ agency spoke w/ Latosha, Intake current services PT/OT - RN added this week however patient sent to the ED informed of d/c from ED and request for [facility]. CM obtained order and faxed to [phone].

## 2022-01-24 ENCOUNTER — OFFICE VISIT (OUTPATIENT)
Dept: INTERNAL MEDICINE CLINIC | Age: 87
End: 2022-01-24
Payer: MEDICARE

## 2022-01-24 VITALS
SYSTOLIC BLOOD PRESSURE: 132 MMHG | TEMPERATURE: 98 F | HEIGHT: 67 IN | DIASTOLIC BLOOD PRESSURE: 80 MMHG | RESPIRATION RATE: 16 BRPM | WEIGHT: 150 LBS | HEART RATE: 83 BPM | OXYGEN SATURATION: 99 % | BODY MASS INDEX: 23.54 KG/M2

## 2022-01-24 DIAGNOSIS — M85.80 OSTEOPENIA, UNSPECIFIED LOCATION: ICD-10-CM

## 2022-01-24 DIAGNOSIS — I10 ESSENTIAL HYPERTENSION: Primary | ICD-10-CM

## 2022-01-24 DIAGNOSIS — I50.9 CONGESTIVE HEART FAILURE, UNSPECIFIED HF CHRONICITY, UNSPECIFIED HEART FAILURE TYPE (HCC): ICD-10-CM

## 2022-01-24 DIAGNOSIS — Z13.5 SCREENING FOR GLAUCOMA: ICD-10-CM

## 2022-01-24 DIAGNOSIS — Z71.89 ACP (ADVANCE CARE PLANNING): ICD-10-CM

## 2022-01-24 DIAGNOSIS — I87.2 VENOUS INSUFFICIENCY (CHRONIC) (PERIPHERAL): ICD-10-CM

## 2022-01-24 DIAGNOSIS — Z00.00 MEDICARE ANNUAL WELLNESS VISIT, SUBSEQUENT: ICD-10-CM

## 2022-01-24 PROCEDURE — 99214 OFFICE O/P EST MOD 30 MIN: CPT | Performed by: INTERNAL MEDICINE

## 2022-01-24 PROCEDURE — 1090F PRES/ABSN URINE INCON ASSESS: CPT | Performed by: INTERNAL MEDICINE

## 2022-01-24 PROCEDURE — 1101F PT FALLS ASSESS-DOCD LE1/YR: CPT | Performed by: INTERNAL MEDICINE

## 2022-01-24 PROCEDURE — 99497 ADVNCD CARE PLAN 30 MIN: CPT | Performed by: INTERNAL MEDICINE

## 2022-01-24 PROCEDURE — G0463 HOSPITAL OUTPT CLINIC VISIT: HCPCS | Performed by: INTERNAL MEDICINE

## 2022-01-24 PROCEDURE — G0439 PPPS, SUBSEQ VISIT: HCPCS | Performed by: INTERNAL MEDICINE

## 2022-01-24 PROCEDURE — G8536 NO DOC ELDER MAL SCRN: HCPCS | Performed by: INTERNAL MEDICINE

## 2022-01-24 PROCEDURE — G8420 CALC BMI NORM PARAMETERS: HCPCS | Performed by: INTERNAL MEDICINE

## 2022-01-24 PROCEDURE — G8510 SCR DEP NEG, NO PLAN REQD: HCPCS | Performed by: INTERNAL MEDICINE

## 2022-01-24 PROCEDURE — G8427 DOCREV CUR MEDS BY ELIG CLIN: HCPCS | Performed by: INTERNAL MEDICINE

## 2022-01-24 RX ORDER — METOPROLOL SUCCINATE 25 MG/1
12.5 TABLET, EXTENDED RELEASE ORAL DAILY
Qty: 45 TABLET | Refills: 1 | Status: SHIPPED | OUTPATIENT
Start: 2022-01-24 | End: 2022-09-19 | Stop reason: SDUPTHER

## 2022-01-24 NOTE — ACP (ADVANCE CARE PLANNING)

## 2022-01-24 NOTE — PROGRESS NOTES
HISTORY OF PRESENT ILLNESS  Brad Gutierrez is a 80 y.o. female here for follow-up. She is accompanied by her 2 daughters. Has history of congestive heart failure. Has lost weight over time. Taking Lasix 2 times a week. Right now no shortness of breath or PND. Savanah Crape She is using 2 pillows to sleep. Has hypertension, on Toprol-XL. Need refill on medication. Has back pain, using lidocaine patch as needed. Monitor symptoms, shows osteopenia, take calcium with vitamin D. Memory seems fine. Here for Medicare wellness visit. Has no living will. Need eye checkup. Refused any vaccinations including Covid vaccine. HPI    Review of Systems   Constitutional: Negative. HENT: Negative. Eyes: Negative. Respiratory: Negative. Cardiovascular: Negative. Gastrointestinal: Negative. Genitourinary: Negative. Musculoskeletal: Negative. Skin: Negative. Neurological: Negative. Endo/Heme/Allergies: Negative. Psychiatric/Behavioral: Negative. Physical Exam  Constitutional:       Appearance: Normal appearance. She is normal weight. Cardiovascular:      Rate and Rhythm: Normal rate and regular rhythm. Pulses: Normal pulses. Heart sounds: Normal heart sounds. Pulmonary:      Effort: Pulmonary effort is normal.      Breath sounds: Normal breath sounds. Abdominal:      General: Abdomen is flat. Bowel sounds are normal.      Palpations: Abdomen is soft. Musculoskeletal:         General: Swelling present. Normal range of motion. Cervical back: Normal range of motion and neck supple. Comments: Trace leg edema present. Neurological:      General: No focal deficit present. Mental Status: She is alert and oriented to person, place, and time. Mental status is at baseline. Psychiatric:         Mood and Affect: Mood normal.         Behavior: Behavior normal.         Thought Content:  Thought content normal.         ASSESSMENT and PLAN  Diagnoses and all orders for this visit:    1. Essential hypertension    Stable blood pressure. Will refill,  -     metoprolol succinate (TOPROL-XL) 25 mg XL tablet; Take 0.5 Tablets by mouth daily. 2. Congestive heart failure, unspecified HF chronicity, unspecified heart failure type ([facility]    Compensated. She was prescribed Lasix for 20 mg first 4 days a week, then she is taking Lasix 1 tablet Monday and Friday. 3. Medicare annual wellness visit, subsequent    4. ACP (advance care planning)    5. Osteopenia, unspecified location  On calcium and vitamin D.  6. Venous insufficiency (chronic) (peripheral)  Continue leg elevation. 7. Screening for glaucoma  -     REFERRAL TO OPHTHALMOLOGY    Discussed expected course/resolution/complications of diagnosis in detail with patient. Medication risks/benefits/costs/interactions/alternatives discussed with patient. Discussed COVID-19 infection precaution with patient. Pt was given an after visit summary which includes diagnoses, current medications & vitals. Pt expressed understanding with the diagnosis and plan.

## 2022-01-24 NOTE — PROGRESS NOTES
This is the Subsequent Medicare Annual Wellness Exam, performed 12 months or more after the Initial AWV or the last Subsequent AWV    I have reviewed the patient's medical history in detail and updated the computerized patient record. Assessment/Plan   Education and counseling provided:  Are appropriate based on today's review and evaluation  End-of-Life planning (with patient's consent)  Pneumococcal Vaccine  Influenza Vaccine  Bone mass measurement (DEXA)  Screening for glaucoma         Depression Risk Factor Screening     3 most recent PHQ Screens 1/24/2022   PHQ Not Done -   Little interest or pleasure in doing things Not at all   Feeling down, depressed, irritable, or hopeless Not at all   Total Score PHQ 2 0       Alcohol & Drug Abuse Risk Screen    Do you average more than 1 drink per night or more than 7 drinks a week:  No    On any one occasion in the past three months have you have had more than 3 drinks containing alcohol:  No          Functional Ability and Level of Safety    Hearing: The patient needs further evaluation. Activities of Daily Living: The home contains: walker  Patient needs help with:  transportation, preparing meals, laundry and housework      Ambulation: with mild difficulty     Fall Risk:  Fall Risk Assessment, last 12 mths 1/24/2022   Able to walk? Yes   Fall in past 12 months? 1   Do you feel unsteady? 1   Are you worried about falling 1   Is TUG test greater than 12 seconds? -   Is the gait abnormal? 0   Number of falls in past 12 months 2   Fall with injury?  1      Abuse Screen:  Patient is not abused       Cognitive Screening    Has your family/caregiver stated any concerns about your memory: no     Cognitive Screening: Normal - MMSE (Mini Mental Status Exam)    Health Maintenance Due     Health Maintenance Due   Topic Date Due    COVID-19 Vaccine (1) Never done    DTaP/Tdap/Td series (1 - Tdap) Never done    Shingrix Vaccine Age 50> (1 of 2) Never done    Flu Vaccine (1) 09/01/2021       Patient Care Team   Patient Care Team:  Omar Ortiz MD as PCP - General (Internal Medicine)  Omar Ortiz MD as PCP - [facility] Provider  Wero Brown RN as Nurse Navigator (Internal Medicine)    History     Patient Active Problem List   Diagnosis Code    Unstable angina ([facility] [address] I20.0    CHF (congestive heart failure) ([address] I50.9    Pacemaker Z95.0    Essential hypertension I10    Advance care planning Z71.89    Pure hypercholesterolemia E78.00     Past Medical History:   Diagnosis Date    Arrhythmia     bradycardia,S/P pacemaker    CHF (congestive heart failure) ([address]     Heart failure ([address]     Hypertension     Irregular heart rate     Pure hypercholesterolemia 3/27/2017      Past Surgical History:   Procedure Laterality Date    HX ORTHOPAEDIC      left total knee replacement    HX OTHER SURGICAL  11/2019    Toe surgery    HX PACEMAKER       Current Outpatient Medications   Medication Sig Dispense Refill    lidocaine 4 % patch 1 Patch by TransDERmal route every twenty-four (24) hours. 6 Each 0    nitrofurantoin, macrocrystal-monohydrate, (MACROBID) 100 mg capsule Take 1 Capsule by mouth two (2) times a day. 10 Capsule 0    neomycin-polymyxin-dexamethasone (DEXACINE) 3.5 mg/g-10,000 unit/g-0.1 % ophthalmic ointment       furosemide (LASIX) 20 mg tablet TAKE 1 TABLET BY MOUTH MONDAY AND FRIDAY FOR LEG EDEMA 24 Tablet 1    metoprolol succinate (TOPROL-XL) 25 mg XL tablet TAKE 1/2 TABLET BY MOUTH EVERY DAY 45 Tablet 1    loratadine (CLARITIN) 10 mg tablet TAKE 1 TABLET BY MOUTH NIGHTLY AS NEEDED FOR ALLERGIES 30 Tab 4    sodium chloride-aloe vera (AYR) topical gel Apply  to affected area two (2) times daily as needed (nose bleeding).  1 Tube 1    fluticasone propionate (FLONASE) 50 mcg/actuation nasal spray SPRAY 2 SPRAYS INTO EACH NOSTRIL EVERY DAY 1 Bottle 1    Oyster Shell Calcium-Vit D3 500 mg(1,250mg) -200 unit per tablet TAKE 1 TABLET BY MOUTH TWICE A DAY WITH MEALS 180 Tab 4    albuterol (PROVENTIL HFA, VENTOLIN HFA, PROAIR HFA) 90 mcg/actuation inhaler INHALE 2 PUFFS EVERY 6 HOURS AS NEEDED FOR WHEEZE 6.7 Inhaler 1    OTHER 15 mm Hg below need stockings for both legs. please measure leg width 2 Each 0    acetaminophen (TYLENOL) 500 mg tablet Take 1 Tab by mouth two (2) times daily as needed for Pain. 60 Tab 5    aspirin 81 mg tablet Take 81 mg by mouth. Allergies   Allergen Reactions    Codeine Unknown (comments)     Other reaction(s): GI Intolerance    Morphine Unknown (comments)     Other reaction(s):  Other (See Comments)  Pt states that she was unable to speak    Pcn [Penicillins] Unknown (comments)    Pseudoephedrine Unknown (comments)       Family History   Problem Relation Age of Onset    Hypertension Mother     Cancer Father         prostate    Hypertension Father      Social History     Tobacco Use    Smoking status: Never Smoker    Smokeless tobacco: Never Used   Substance Use Topics    Alcohol use: No         Raulito Zavala MD

## 2022-01-24 NOTE — PATIENT INSTRUCTIONS

## 2022-01-24 NOTE — PROGRESS NOTES
Health Maintenance Due   Topic Date Due    COVID-19 Vaccine (1) Never done    DTaP/Tdap/Td series (1 - Tdap) Never done    Shingrix Vaccine Age 50> (1 of 2) Never done    Flu Vaccine (1) 09/01/2021       Chief Complaint   Patient presents with    Ear Pain    Cholesterol Problem    Narcolepsy    Fall       1. Have you been to the ER, urgent care clinic since your last visit? Hospitalized since your last visit? Yes, ER,[place], Fall, 1/20/22    2. Have you seen or consulted any other health care providers outside of the [address] [place] [address] since your last visit? Include any pap smears or colon screening. No    3) Do you have an Advance Directive on file? no    4) Are you interested in receiving information on Advance Directives? NO      Patient is accompanied by daughter and caregiver I have received verbal consent from Les Cotto to discuss any/all medical information while they are present in the room.

## 2022-01-28 DIAGNOSIS — R04.0 EPISTAXIS: ICD-10-CM

## 2022-01-28 RX ORDER — SODIUM CHLORIDE/ALOE VERA
GEL (GRAM) NASAL
Qty: 14.1 G | Refills: 1 | Status: SHIPPED | OUTPATIENT
Start: 2022-01-28

## 2022-03-08 ENCOUNTER — TELEPHONE (OUTPATIENT)
Dept: INTERNAL MEDICINE CLINIC | Age: 87
End: 2022-03-08

## 2022-03-08 NOTE — TELEPHONE ENCOUNTER
Laura Johnson a home health physical therapist called requesting a call back with verbal orders to continue physical therapy.  Please call him back at [phone]

## 2022-03-19 PROBLEM — E78.00 PURE HYPERCHOLESTEROLEMIA: Status: ACTIVE | Noted: 2017-03-27

## 2022-03-23 ENCOUNTER — TELEPHONE (OUTPATIENT)
Dept: INTERNAL MEDICINE CLINIC | Age: 87
End: 2022-03-23

## 2022-03-23 NOTE — TELEPHONE ENCOUNTER
Pt needs an upright walker. Patient does In-Home Physical Therapy. Therapist recommended that patient gets a walker that does not require her to bend over.

## 2022-04-20 ENCOUNTER — OFFICE VISIT (OUTPATIENT)
Dept: INTERNAL MEDICINE CLINIC | Age: 87
End: 2022-04-20
Payer: MEDICARE

## 2022-04-20 VITALS
HEIGHT: 67 IN | OXYGEN SATURATION: 99 % | DIASTOLIC BLOOD PRESSURE: 76 MMHG | TEMPERATURE: 98 F | SYSTOLIC BLOOD PRESSURE: 124 MMHG | HEART RATE: 69 BPM | BODY MASS INDEX: 24.01 KG/M2 | WEIGHT: 153 LBS

## 2022-04-20 DIAGNOSIS — N39.46 MIXED STRESS AND URGE URINARY INCONTINENCE: ICD-10-CM

## 2022-04-20 DIAGNOSIS — Z78.9 IMPAIRED MOBILITY AND ADLS: ICD-10-CM

## 2022-04-20 DIAGNOSIS — Z74.09 IMPAIRED MOBILITY AND ADLS: ICD-10-CM

## 2022-04-20 DIAGNOSIS — I10 ESSENTIAL HYPERTENSION: Primary | ICD-10-CM

## 2022-04-20 DIAGNOSIS — I50.9 CONGESTIVE HEART FAILURE, UNSPECIFIED HF CHRONICITY, UNSPECIFIED HEART FAILURE TYPE (HCC): ICD-10-CM

## 2022-04-20 DIAGNOSIS — M25.561 CHRONIC PAIN OF BOTH KNEES: ICD-10-CM

## 2022-04-20 DIAGNOSIS — M25.562 CHRONIC PAIN OF BOTH KNEES: ICD-10-CM

## 2022-04-20 DIAGNOSIS — G89.29 CHRONIC PAIN OF BOTH KNEES: ICD-10-CM

## 2022-04-20 DIAGNOSIS — E55.9 VITAMIN D DEFICIENCY: ICD-10-CM

## 2022-04-20 PROCEDURE — G8420 CALC BMI NORM PARAMETERS: HCPCS | Performed by: INTERNAL MEDICINE

## 2022-04-20 PROCEDURE — G8510 SCR DEP NEG, NO PLAN REQD: HCPCS | Performed by: INTERNAL MEDICINE

## 2022-04-20 PROCEDURE — G8427 DOCREV CUR MEDS BY ELIG CLIN: HCPCS | Performed by: INTERNAL MEDICINE

## 2022-04-20 PROCEDURE — 99214 OFFICE O/P EST MOD 30 MIN: CPT | Performed by: INTERNAL MEDICINE

## 2022-04-20 PROCEDURE — 1090F PRES/ABSN URINE INCON ASSESS: CPT | Performed by: INTERNAL MEDICINE

## 2022-04-20 PROCEDURE — 1101F PT FALLS ASSESS-DOCD LE1/YR: CPT | Performed by: INTERNAL MEDICINE

## 2022-04-20 PROCEDURE — G0463 HOSPITAL OUTPT CLINIC VISIT: HCPCS | Performed by: INTERNAL MEDICINE

## 2022-04-20 PROCEDURE — G8536 NO DOC ELDER MAL SCRN: HCPCS | Performed by: INTERNAL MEDICINE

## 2022-04-20 RX ORDER — ARM BRACE
EACH MISCELLANEOUS
Qty: 2 EACH | Refills: 0 | Status: SHIPPED | OUTPATIENT
Start: 2022-04-20

## 2022-04-20 RX ORDER — ARM BRACE
EACH MISCELLANEOUS
Qty: 2 EACH | Refills: 0 | Status: SHIPPED | OUTPATIENT
Start: 2022-04-20 | End: 2022-04-20 | Stop reason: SDUPTHER

## 2022-04-20 NOTE — PROGRESS NOTES
Health Maintenance Due   Topic Date Due    COVID-19 Vaccine (1) Never done    DTaP/Tdap/Td series (1 - Tdap) Never done    Shingrix Vaccine Age 50> (1 of 2) Never done       Chief Complaint   Patient presents with    Hypertension    Incontinence    Cholesterol Problem    Neurologic Problem       1. Have you been to the ER, urgent care clinic since your last visit? Hospitalized since your last visit? No    2. Have you seen or consulted any other health care providers outside of the [address] [place] [address] since your last visit? Include any pap smears or colon screening. No    3) Do you have an Advance Directive on file? no    4) Are you interested in receiving information on Advance Directives? NO      Patient is accompanied by daughter I have received verbal consent from Juan Carlos Weems to discuss any/all medical information while they are present in the room.

## 2022-04-20 NOTE — PROGRESS NOTES
HISTORY OF PRESENT ILLNESS  Leonie Aaron is a 80 y.o. female here for follow-up. She is with her daughter. Has hypertension, on Toprol-XL. Need refill on medication. Has osteopenia, taking Os-Feliciano twice a day. Last calcium level was high. She has history of congestive heart failure. Taking Lasix twice a week. No shortness of breath, not gaining any weight. Report incontinence, using adult diaper. Would like to get refill. Memory seems fine. Refused any vaccinations including Covid vaccine. HPI      Review of Systems   Constitutional: Negative. HENT: Negative. Eyes: Negative. Respiratory: Negative. Cardiovascular: Negative. Gastrointestinal: Negative. Genitourinary: Negative. Musculoskeletal: Negative. Skin: Negative. Neurological: Negative. Endo/Heme/Allergies: Negative. Psychiatric/Behavioral: Negative. Physical Exam  Constitutional:       Appearance: Normal appearance. She is normal weight. Cardiovascular:      Rate and Rhythm: Normal rate and regular rhythm. Pulses: Normal pulses. Heart sounds: Normal heart sounds. Pulmonary:      Effort: Pulmonary effort is normal.      Breath sounds: Normal breath sounds. Abdominal:      General: Abdomen is flat. Bowel sounds are normal.      Palpations: Abdomen is soft. Musculoskeletal:         General: Swelling present. Normal range of motion. Cervical back: Normal range of motion and neck supple. Comments: Trace leg edema present. Neurological:      General: No focal deficit present. Mental Status: She is alert and oriented to person, place, and time. Mental status is at baseline. Psychiatric:         Mood and Affect: Mood normal.         Behavior: Behavior normal.         Thought Content: Thought content normal.         ASSESSMENT and PLAN  Diagnoses and all orders for this visit:    1. Essential hypertension    Stable blood pressure. On Toprol-XL.   Will check,  -     CBC WITH AUTOMATED DIFF  -     METABOLIC PANEL, COMPREHENSIVE  -     LIPID PANEL  -     BLOOD TYPE, (ABO+RH)    2. Chronic pain of both knees    Probable DJD. Will order,  -     REFERRAL TO PHYSICAL THERAPY  -     Leg Brace (ACE Knee Brace) misc; by Does Not Apply route. Use knee brace to walk. DX:chronic knee pain    3. Impaired mobility and ADLs  Need to get physical therapy. 4. Congestive heart failure, unspecified HF chronicity, unspecified heart failure type ([address]  Compensated. Taking Lasix twice a week. 5. Mixed stress and urge urinary incontinence    We will order,  -     OTHER; Adult diaper large size. Use 3 to 4  diaper a day. DX:mixed urine incontinence    6. Vitamin D deficiency    We will check,  -     VITAMIN D, 25 HYDROXY  7. Osteopenia    Taking Os-Feliciano twice a day. Last calcium level was high. We will repeat CMP. Discussed expected course/resolution/complications of diagnosis in detail with patient. Medication risks/benefits/costs/interactions/alternatives discussed with patient. Discussed COVID-19 infection precaution with patient. Pt was given an after visit summary which includes diagnoses, current medications & vitals. Pt expressed understanding with the diagnosis and plan.

## 2022-04-21 LAB
25(OH)D3+25(OH)D2 SERPL-MCNC: 17.6 NG/ML (ref 30–100)
ABO GROUP BLD: NORMAL
ALBUMIN SERPL-MCNC: 4.3 G/DL (ref 3.6–4.6)
ALBUMIN/GLOB SERPL: 1 {RATIO} (ref 1.2–2.2)
ALP SERPL-CCNC: 127 IU/L (ref 44–121)
ALT SERPL-CCNC: 25 IU/L (ref 0–32)
AST SERPL-CCNC: 34 IU/L (ref 0–40)
BASOPHILS # BLD AUTO: 0.1 X10E3/UL (ref 0–0.2)
BASOPHILS NFR BLD AUTO: 2 %
BILIRUB SERPL-MCNC: 0.5 MG/DL (ref 0–1.2)
BUN SERPL-MCNC: 20 MG/DL (ref 8–27)
BUN/CREAT SERPL: 26 (ref 12–28)
CALCIUM SERPL-MCNC: 10.1 MG/DL (ref 8.7–10.3)
CHLORIDE SERPL-SCNC: 104 MMOL/L (ref 96–106)
CHOLEST SERPL-MCNC: 192 MG/DL (ref 100–199)
CO2 SERPL-SCNC: 22 MMOL/L (ref 20–29)
CREAT SERPL-MCNC: 0.76 MG/DL (ref 0.57–1)
EGFR: 75 ML/MIN/1.73
EOSINOPHIL # BLD AUTO: 0.2 X10E3/UL (ref 0–0.4)
EOSINOPHIL NFR BLD AUTO: 6 %
ERYTHROCYTE [DISTWIDTH] IN BLOOD BY AUTOMATED COUNT: 14.9 % (ref 11.7–15.4)
GLOBULIN SER CALC-MCNC: 4.1 G/DL (ref 1.5–4.5)
GLUCOSE SERPL-MCNC: 100 MG/DL (ref 65–99)
HCT VFR BLD AUTO: 41.4 % (ref 34–46.6)
HDLC SERPL-MCNC: 82 MG/DL
HGB BLD-MCNC: 12.7 G/DL (ref 11.1–15.9)
IMM GRANULOCYTES # BLD AUTO: 0 X10E3/UL (ref 0–0.1)
IMM GRANULOCYTES NFR BLD AUTO: 0 %
IMP & REVIEW OF LAB RESULTS: NORMAL
LDLC SERPL CALC-MCNC: 98 MG/DL (ref 0–99)
LYMPHOCYTES # BLD AUTO: 0.8 X10E3/UL (ref 0.7–3.1)
LYMPHOCYTES NFR BLD AUTO: 22 %
MCH RBC QN AUTO: 22.6 PG (ref 26.6–33)
MCHC RBC AUTO-ENTMCNC: 30.7 G/DL (ref 31.5–35.7)
MCV RBC AUTO: 74 FL (ref 79–97)
MONOCYTES # BLD AUTO: 0.7 X10E3/UL (ref 0.1–0.9)
MONOCYTES NFR BLD AUTO: 18 %
NEUTROPHILS # BLD AUTO: 1.9 X10E3/UL (ref 1.4–7)
NEUTROPHILS NFR BLD AUTO: 52 %
PLATELET # BLD AUTO: 183 X10E3/UL (ref 150–450)
POTASSIUM SERPL-SCNC: 4 MMOL/L (ref 3.5–5.2)
PROT SERPL-MCNC: 8.4 G/DL (ref 6–8.5)
RBC # BLD AUTO: 5.63 X10E6/UL (ref 3.77–5.28)
RH BLD: POSITIVE
SODIUM SERPL-SCNC: 142 MMOL/L (ref 134–144)
TRIGL SERPL-MCNC: 65 MG/DL (ref 0–149)
VLDLC SERPL CALC-MCNC: 12 MG/DL (ref 5–40)
WBC # BLD AUTO: 3.7 X10E3/UL (ref 3.4–10.8)

## 2022-04-28 DIAGNOSIS — E55.9 VITAMIN D DEFICIENCY: Primary | ICD-10-CM

## 2022-04-28 RX ORDER — ERGOCALCIFEROL 1.25 MG/1
50000 CAPSULE ORAL
Qty: 4 CAPSULE | Refills: 3 | Status: SHIPPED | OUTPATIENT
Start: 2022-04-28

## 2022-04-28 NOTE — PROGRESS NOTES
B positive blood. vit D level very low.will start on vit D 50,000 unit 1 cap weekly for 4 months. will repeat level in 4 months. adv to be on milk product and expose to sun for 20 min a day. all other labs are stable.

## 2022-05-31 ENCOUNTER — TELEPHONE (OUTPATIENT)
Dept: INTERNAL MEDICINE CLINIC | Age: 87
End: 2022-05-31

## 2022-05-31 DIAGNOSIS — H91.90 HEARING LOSS, UNSPECIFIED HEARING LOSS TYPE, UNSPECIFIED LATERALITY: Primary | ICD-10-CM

## 2022-08-14 DIAGNOSIS — E55.9 VITAMIN D DEFICIENCY: ICD-10-CM

## 2022-08-15 RX ORDER — ERGOCALCIFEROL 1.25 MG/1
50000 CAPSULE ORAL
Qty: 12 CAPSULE | Refills: 1 | OUTPATIENT
Start: 2022-08-15

## 2022-09-19 ENCOUNTER — NURSE TRIAGE (OUTPATIENT)
Dept: OTHER | Facility: CLINIC | Age: 87
End: 2022-09-19

## 2022-09-19 ENCOUNTER — OFFICE VISIT (OUTPATIENT)
Dept: INTERNAL MEDICINE CLINIC | Age: 87
End: 2022-09-19
Payer: MEDICARE

## 2022-09-19 ENCOUNTER — OFFICE VISIT (OUTPATIENT)
Dept: URGENT CARE | Age: 87
End: 2022-09-19
Payer: MEDICARE

## 2022-09-19 VITALS
HEART RATE: 84 BPM | BODY MASS INDEX: 23.73 KG/M2 | SYSTOLIC BLOOD PRESSURE: 108 MMHG | RESPIRATION RATE: 16 BRPM | HEIGHT: 67 IN | WEIGHT: 151.2 LBS | DIASTOLIC BLOOD PRESSURE: 64 MMHG | TEMPERATURE: 97.8 F | OXYGEN SATURATION: 98 %

## 2022-09-19 VITALS — OXYGEN SATURATION: 98 % | TEMPERATURE: 98 F | HEART RATE: 90 BPM | RESPIRATION RATE: 18 BRPM

## 2022-09-19 DIAGNOSIS — M79.673 CHRONIC FOOT PAIN, UNSPECIFIED LATERALITY: ICD-10-CM

## 2022-09-19 DIAGNOSIS — E55.9 VITAMIN D DEFICIENCY: ICD-10-CM

## 2022-09-19 DIAGNOSIS — I10 ESSENTIAL HYPERTENSION: Primary | ICD-10-CM

## 2022-09-19 DIAGNOSIS — G89.29 CHRONIC PAIN OF BOTH KNEES: ICD-10-CM

## 2022-09-19 DIAGNOSIS — G89.29 CHRONIC FOOT PAIN, UNSPECIFIED LATERALITY: ICD-10-CM

## 2022-09-19 DIAGNOSIS — M85.80 OSTEOPENIA, UNSPECIFIED LOCATION: ICD-10-CM

## 2022-09-19 DIAGNOSIS — I87.2 VENOUS INSUFFICIENCY (CHRONIC) (PERIPHERAL): ICD-10-CM

## 2022-09-19 DIAGNOSIS — Z20.822 ENCOUNTER FOR LABORATORY TESTING FOR COVID-19 VIRUS: Primary | ICD-10-CM

## 2022-09-19 DIAGNOSIS — I50.9 CONGESTIVE HEART FAILURE, UNSPECIFIED HF CHRONICITY, UNSPECIFIED HEART FAILURE TYPE (HCC): ICD-10-CM

## 2022-09-19 DIAGNOSIS — N18.31 STAGE 3A CHRONIC KIDNEY DISEASE (HCC): ICD-10-CM

## 2022-09-19 DIAGNOSIS — M25.561 CHRONIC PAIN OF BOTH KNEES: ICD-10-CM

## 2022-09-19 DIAGNOSIS — M25.562 CHRONIC PAIN OF BOTH KNEES: ICD-10-CM

## 2022-09-19 PROBLEM — N18.30 CHRONIC RENAL DISEASE, STAGE III (HCC): Status: ACTIVE | Noted: 2022-09-19

## 2022-09-19 LAB — SARS-COV-2 PCR, POC: NEGATIVE

## 2022-09-19 PROCEDURE — 87635 SARS-COV-2 COVID-19 AMP PRB: CPT | Performed by: FAMILY MEDICINE

## 2022-09-19 PROCEDURE — G8420 CALC BMI NORM PARAMETERS: HCPCS | Performed by: INTERNAL MEDICINE

## 2022-09-19 PROCEDURE — G8427 DOCREV CUR MEDS BY ELIG CLIN: HCPCS | Performed by: INTERNAL MEDICINE

## 2022-09-19 PROCEDURE — G0463 HOSPITAL OUTPT CLINIC VISIT: HCPCS | Performed by: INTERNAL MEDICINE

## 2022-09-19 PROCEDURE — 99214 OFFICE O/P EST MOD 30 MIN: CPT | Performed by: INTERNAL MEDICINE

## 2022-09-19 PROCEDURE — G8510 SCR DEP NEG, NO PLAN REQD: HCPCS | Performed by: INTERNAL MEDICINE

## 2022-09-19 PROCEDURE — 1090F PRES/ABSN URINE INCON ASSESS: CPT | Performed by: INTERNAL MEDICINE

## 2022-09-19 PROCEDURE — 99211 OFF/OP EST MAY X REQ PHY/QHP: CPT | Performed by: FAMILY MEDICINE

## 2022-09-19 PROCEDURE — 1101F PT FALLS ASSESS-DOCD LE1/YR: CPT | Performed by: INTERNAL MEDICINE

## 2022-09-19 PROCEDURE — G8536 NO DOC ELDER MAL SCRN: HCPCS | Performed by: INTERNAL MEDICINE

## 2022-09-19 RX ORDER — CALCIUM CARBONATE 600 MG
600 TABLET ORAL 2 TIMES DAILY
Qty: 180 TABLET | Refills: 3 | Status: SHIPPED | OUTPATIENT
Start: 2022-09-19

## 2022-09-19 RX ORDER — METOPROLOL SUCCINATE 25 MG/1
12.5 TABLET, EXTENDED RELEASE ORAL DAILY
Qty: 45 TABLET | Refills: 1 | Status: SHIPPED | OUTPATIENT
Start: 2022-09-19

## 2022-09-19 RX ORDER — FUROSEMIDE 20 MG/1
TABLET ORAL
Qty: 24 TABLET | Refills: 1 | Status: SHIPPED | OUTPATIENT
Start: 2022-09-19

## 2022-09-19 NOTE — PROGRESS NOTES
HISTORY OF PRESENT ILLNESS  Bryn Hassan is a 80 y.o. female here for follow-up. She looks frail, not eating enough meal, blood pressure seems low. 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 She is unable to take care of herself. Please help to take care of Ensure or boost.  Has hypertension, on Toprol-XL. Need refill on medication. Has osteopenia, not on any calcium. Report chronic foot pain. Need to see podiatrist for orthotics. Has chronic knee pain. Using the orthosis. Need to order another brace. She has history of congestive heart failure. Taking Lasix twice a week. No shortness of breath, not gaining any weight. Refused any vaccinations including Covid vaccine. Hypertension     Incontinence    Cholesterol Problem    Neurologic Problem    Follow Up Chronic Condition    Chest Pain (Angina)       Review of Systems   Constitutional: Negative. HENT: Negative. Eyes: Negative. Respiratory: Negative. Cardiovascular:  Positive for leg swelling. Gastrointestinal: Negative. Genitourinary: Negative. Musculoskeletal:  Positive for joint pain. Skin: Negative. Neurological: Negative. Endo/Heme/Allergies: Negative. Psychiatric/Behavioral: Negative. Physical Exam  Constitutional:       Appearance: Normal appearance. She is normal weight. Cardiovascular:      Rate and Rhythm: Normal rate and regular rhythm. Pulses: Normal pulses. Heart sounds: Normal heart sounds. Pulmonary:      Effort: Pulmonary effort is normal.      Breath sounds: Normal breath sounds. Abdominal:      General: Abdomen is flat. Bowel sounds are normal.      Palpations: Abdomen is soft. Musculoskeletal:         General: Swelling present. Normal range of motion. Cervical back: Normal range of motion and neck supple. Comments: Trace leg edema present. Bilateral knee: Tenderness present. On brace   Neurological:      General: No focal deficit present.       Mental Status: She is alert and oriented to person, place, and time. Mental status is at baseline. Psychiatric:         Mood and Affect: Mood normal.         Behavior: Behavior normal.         Thought Content: Thought content normal.       ASSESSMENT and PLAN    Diagnoses and all orders for this visit:    1. Essential hypertension    Stable blood pressure. Will refill,  -     metoprolol succinate (TOPROL-XL) 25 mg XL tablet; Take 0.5 Tablets by mouth daily.  -     CBC WITH AUTOMATED DIFF  -     METABOLIC PANEL, COMPREHENSIVE    2. Stage 3a chronic kidney disease (HCC)    Need to drink more fluid. We will repeat,  -     CBC WITH AUTOMATED DIFF  -     METABOLIC PANEL, COMPREHENSIVE    3. Congestive heart failure, unspecified HF chronicity, unspecified heart failure type ([facility] [address]  Compensated. Taking Lasix 3 times a week. Will check,    -     METABOLIC PANEL, COMPREHENSIVE  Be on low-salt diet. 4. Osteopenia, unspecified location    Bone density shows osteopenia. Advised to be on calcium rich diet and weightbearing exercise. We will get,  -     calcium carbonate (CALTREX) 600 mg calcium (1,500 mg) tablet; Take 1 Tablet by mouth two (2) times a day. 5. Vitamin D deficiency    We will check,  -     VITAMIN D, 25 HYDROXY    6. Venous insufficiency (chronic) (peripheral)  -     furosemide (LASIX) 20 mg tablet; 1 tab po Monday and Friday    7. Chronic knee pain    Advised to take Tylenol as needed. 8.  Chronic foot and ankle pain    Will refer patient to podiatrist for ankle and foot orthotics. Will order custom-made knee orthosis and elastic with condyle and pad and joints with or without papular controlled. It is prefabricated,need fitting and adjustment   Discussed expected course/resolution/complications of diagnosis in detail with patient. Medication risks/benefits/costs/interactions/alternatives discussed with patient. Discussed COVID-19 infection precaution with patient.   Pt was given an after visit summary which includes diagnoses, current medications & vitals. Pt expressed understanding with the diagnosis and plan.

## 2022-09-19 NOTE — PROGRESS NOTES
Nursing staff confirmed patient with full name and . Prepared patient for visit today by obtaining vitals, verifying medication list and allergies, and briefly discussing reason for visit. Chief Complaint   Patient presents with    Follow Up Chronic Condition    Chest Pain (Angina)    Hypertension       Current Outpatient Medications   Medication Sig    ergocalciferol (ERGOCALCIFEROL) 1,250 mcg (50,000 unit) capsule Take 1 Capsule by mouth every seven (7) days. OTHER Adult diaper large size. Use 3 to 4  diaper a day. DX:mixed urine incontinence    Leg Brace (ACE Knee Brace) misc by Does Not Apply route. Use knee brace to walk. DX:chronic knee pain    Ayr Saline topical gel APPLY TO AFFECTED AREA TWICE DAILY AS NEEDED FOR NOSE BLEEDING    metoprolol succinate (TOPROL-XL) 25 mg XL tablet Take 0.5 Tablets by mouth daily. lidocaine 4 % patch 1 Patch by TransDERmal route every twenty-four (24) hours. neomycin-polymyxin-dexamethasone (DEXACINE) 3.5 mg/g-10,000 unit/g-0.1 % ophthalmic ointment     furosemide (LASIX) 20 mg tablet TAKE 1 TABLET BY MOUTH MONDAY AND FRIDAY FOR LEG EDEMA    loratadine (CLARITIN) 10 mg tablet TAKE 1 TABLET BY MOUTH NIGHTLY AS NEEDED FOR ALLERGIES    fluticasone propionate (FLONASE) 50 mcg/actuation nasal spray SPRAY 2 SPRAYS INTO EACH NOSTRIL EVERY DAY    Oyster Shell Calcium-Vit D3 500 mg(1,250mg) -200 unit per tablet TAKE 1 TABLET BY MOUTH TWICE A DAY WITH MEALS    albuterol (PROVENTIL HFA, VENTOLIN HFA, PROAIR HFA) 90 mcg/actuation inhaler INHALE 2 PUFFS EVERY 6 HOURS AS NEEDED FOR WHEEZE    OTHER 15 mm Hg below need stockings for both legs. please measure leg width    acetaminophen (TYLENOL) 500 mg tablet Take 1 Tab by mouth two (2) times daily as needed for Pain. aspirin 81 mg tablet Take 81 mg by mouth. No current facility-administered medications for this visit. 1. \"Have you been to the ER, urgent care clinic since your last visit?   Hospitalized since your last visit? \" No    2. \"Have you seen or consulted any other health care providers outside of the [address] [place] [address] since your last visit? \" No     3. For patients aged 39-70: Has the patient had a colonoscopy / FIT/ Cologuard? NA - based on age      If the patient is female:    4. For patients aged 41-77: Has the patient had a mammogram within the past 2 years? NA - based on age or sex      11. For patients aged 21-65: Has the patient had a pap smear?  NA - based on age or sex

## 2022-09-19 NOTE — TELEPHONE ENCOUNTER
Received call from Mendel [place] at [facility] with [employer] Complaint. Subjective: Caller states \"Shortness of breath\"     Current Symptoms: Shortness of breath, ears feels full, dry mouth, runny nose, no shortness of breath at this time, hx shortness of breath. Son Patricio Medina) came on phone stating that Mom has had intermittent shortness of breath 1.5 months ago, most recent was last night. No shortness of breath at this time but Son is concern if patient is dehydrated although patient states that she is able to drink fluid okay at this time. Onset: 1.5 month ago; sudden, comes and goes, improving    Associated Symptoms: NA    Pain Severity: Denies    Temperature: Denies    What has been tried: Ayr saline nasal gel- improved    LMP: NA Pregnant: NA    Recommended disposition: Go to [facility]    Care advice provided, patient verbalizes understanding; denies any other questions or concerns; instructed to call back for any new or worsening symptoms. Patient/Caller agrees with recommended disposition; writer provided warm transfer to [place] at [facility] for appointment scheduling    Attention Provider: Thank you for allowing me to participate in the care of your patient. The patient was connected to triage in response to information provided to the [facility]. Please do not respond through this encounter as the response is not directed to a shared pool.         Reason for Disposition   Patient wants to be seen    Protocols used: Breathing Difficulty-ADULT-[place]

## 2022-09-20 LAB
25(OH)D3+25(OH)D2 SERPL-MCNC: 17.8 NG/ML (ref 30–100)
ALBUMIN SERPL-MCNC: 4.4 G/DL (ref 3.6–4.6)
ALBUMIN/GLOB SERPL: 1.1 {RATIO} (ref 1.2–2.2)
ALP SERPL-CCNC: 89 IU/L (ref 44–121)
ALT SERPL-CCNC: 18 IU/L (ref 0–32)
AST SERPL-CCNC: 31 IU/L (ref 0–40)
BASOPHILS # BLD AUTO: 0.1 X10E3/UL (ref 0–0.2)
BASOPHILS NFR BLD AUTO: 2 %
BILIRUB SERPL-MCNC: 0.8 MG/DL (ref 0–1.2)
BUN SERPL-MCNC: 20 MG/DL (ref 8–27)
BUN/CREAT SERPL: 25 (ref 12–28)
CALCIUM SERPL-MCNC: 10.3 MG/DL (ref 8.7–10.3)
CHLORIDE SERPL-SCNC: 101 MMOL/L (ref 96–106)
CO2 SERPL-SCNC: 21 MMOL/L (ref 20–29)
CREAT SERPL-MCNC: 0.8 MG/DL (ref 0.57–1)
EGFR: 70 ML/MIN/1.73
EOSINOPHIL # BLD AUTO: 0.2 X10E3/UL (ref 0–0.4)
EOSINOPHIL NFR BLD AUTO: 5 %
ERYTHROCYTE [DISTWIDTH] IN BLOOD BY AUTOMATED COUNT: 15.6 % (ref 11.7–15.4)
GLOBULIN SER CALC-MCNC: 3.9 G/DL (ref 1.5–4.5)
GLUCOSE SERPL-MCNC: 90 MG/DL (ref 65–99)
HCT VFR BLD AUTO: 41.3 % (ref 34–46.6)
HGB BLD-MCNC: 12.6 G/DL (ref 11.1–15.9)
IMM GRANULOCYTES # BLD AUTO: 0 X10E3/UL (ref 0–0.1)
IMM GRANULOCYTES NFR BLD AUTO: 1 %
LYMPHOCYTES # BLD AUTO: 0.8 X10E3/UL (ref 0.7–3.1)
LYMPHOCYTES NFR BLD AUTO: 25 %
MCH RBC QN AUTO: 22.5 PG (ref 26.6–33)
MCHC RBC AUTO-ENTMCNC: 30.5 G/DL (ref 31.5–35.7)
MCV RBC AUTO: 74 FL (ref 79–97)
MONOCYTES # BLD AUTO: 0.6 X10E3/UL (ref 0.1–0.9)
MONOCYTES NFR BLD AUTO: 20 %
NEUTROPHILS # BLD AUTO: 1.5 X10E3/UL (ref 1.4–7)
NEUTROPHILS NFR BLD AUTO: 47 %
PLATELET # BLD AUTO: 155 X10E3/UL (ref 150–450)
POTASSIUM SERPL-SCNC: 4.5 MMOL/L (ref 3.5–5.2)
PROT SERPL-MCNC: 8.3 G/DL (ref 6–8.5)
RBC # BLD AUTO: 5.59 X10E6/UL (ref 3.77–5.28)
SODIUM SERPL-SCNC: 137 MMOL/L (ref 134–144)
WBC # BLD AUTO: 3.2 X10E3/UL (ref 3.4–10.8)

## 2022-09-26 NOTE — PROGRESS NOTES
vit D level very low.will start on vit D 50,000 unit 1 cap weekly for 4 months. will repeat level in 4 months. adv to be on milk product and expose to sun for 20 min a day. Total WBC count dropped again. We will repeat CBC with differentials in 2 months.

## 2022-10-05 ENCOUNTER — NURSE TRIAGE (OUTPATIENT)
Dept: OTHER | Facility: CLINIC | Age: 87
End: 2022-10-05

## 2022-10-05 DIAGNOSIS — E55.9 VITAMIN D DEFICIENCY: Primary | ICD-10-CM

## 2022-10-05 RX ORDER — ERGOCALCIFEROL 1.25 MG/1
50000 CAPSULE ORAL
Qty: 4 CAPSULE | Refills: 3 | Status: SHIPPED | OUTPATIENT
Start: 2022-10-05

## 2022-10-05 NOTE — TELEPHONE ENCOUNTER
Received call from Natividad caro at [facility] with [employer] Complaint. Subjective: Caller states \"Question about when to get Vitamin D supplement, prescription was not at pharmacy\"     Current Symptoms: Saw PCP late September, sinus congestion and short of breath on and off- not currently-have already been seen by PCP. No new or worsening symptoms, no triage indicated. Was told that vitamin D level was low and needing supplement, having question when to get Vitamin D prescription reqesting to speak to PCP office      Care advice provided, patient verbalizes understanding; denies any other questions or concerns; instructed to call back for any new or worsening symptoms. Writer provided warm transfer to Sundeep Lora at [facility] for Request to speak to PCP office    Attention Provider: Thank you for allowing me to participate in the care of your patient. The patient was connected to triage in response to information provided to the [facility]. Please do not respond through this encounter as the response is not directed to a shared pool. Reason for Disposition   Nursing judgment    Protocols used:  Information Only Call - No Triage-ADULT-[place]

## 2022-11-28 ENCOUNTER — NURSE TRIAGE (OUTPATIENT)
Dept: OTHER | Facility: CLINIC | Age: 87
End: 2022-11-28

## 2022-11-28 NOTE — TELEPHONE ENCOUNTER
Location of patient: [place]    Received call from Raven Vargas at [facility] with [employer]. Subjective: Caller states \"We have a lot of things going on\"     Current Symptoms: leg / foot pain, hard to walk, using a walker, bilateral leg swelling below the knee, no fever, , bilateral shoulder pain, lower back pain, urinary frequency, no chest pain,no shortness of breath    Onset: 3 days ago; unchanged    Associated Symptoms: reduced activity    Pain Severity: 10/10; aching; intermittent    Temperature: no fever     What has been tried: Lasix    LMP: NA Pregnant: No    Recommended disposition: See in Office Today    Care advice provided, patient verbalizes understanding; denies any other questions or concerns; instructed to call back for any new or worsening symptoms. Patient/Caller agrees with recommended disposition; writer provided warm transfer to Kj Segundo at [facility] for appointment scheduling    Attention Provider: Thank you for allowing me to participate in the care of your patient. The patient was connected to triage in response to information provided to the [facility]. Please do not respond through this encounter as the response is not directed to a shared pool.       Reason for Disposition   MODERATE swelling of both ankles (e.g., swelling extends up to the knees) AND new-onset or worsening    Protocols used: Leg Swelling and Edema-ADULT-[place]

## 2022-11-29 ENCOUNTER — APPOINTMENT (OUTPATIENT)
Dept: CT IMAGING | Age: 87
End: 2022-11-29
Attending: EMERGENCY MEDICINE
Payer: MEDICARE

## 2022-11-29 ENCOUNTER — APPOINTMENT (OUTPATIENT)
Dept: GENERAL RADIOLOGY | Age: 87
End: 2022-11-29
Attending: EMERGENCY MEDICINE
Payer: MEDICARE

## 2022-11-29 ENCOUNTER — HOSPITAL ENCOUNTER (EMERGENCY)
Age: 87
Discharge: HOME | End: 2022-11-29
Attending: EMERGENCY MEDICINE
Payer: MEDICARE

## 2022-11-29 VITALS
SYSTOLIC BLOOD PRESSURE: 121 MMHG | RESPIRATION RATE: 18 BRPM | HEART RATE: 64 BPM | DIASTOLIC BLOOD PRESSURE: 64 MMHG | OXYGEN SATURATION: 99 % | TEMPERATURE: 97.5 F

## 2022-11-29 DIAGNOSIS — M79.89 LEG SWELLING: Primary | ICD-10-CM

## 2022-11-29 LAB
ALBUMIN SERPL-MCNC: 3.8 G/DL (ref 3.5–5)
ALBUMIN/GLOB SERPL: 0.7 {RATIO} (ref 1.1–2.2)
ALP SERPL-CCNC: 100 U/L (ref 45–117)
ALT SERPL-CCNC: 32 U/L (ref 12–78)
ANION GAP SERPL CALC-SCNC: 3 MMOL/L (ref 5–15)
APPEARANCE UR: CLEAR
AST SERPL-CCNC: 42 U/L (ref 15–37)
ATRIAL RATE: 60 BPM
BASOPHILS # BLD: 0.1 K/UL (ref 0–0.1)
BASOPHILS NFR BLD: 2 % (ref 0–1)
BILIRUB SERPL-MCNC: 0.7 MG/DL (ref 0.2–1)
BILIRUB UR QL: NEGATIVE
BNP SERPL-MCNC: 96 PG/ML
BUN SERPL-MCNC: 15 MG/DL (ref 6–20)
BUN/CREAT SERPL: 17 (ref 12–20)
CALCIUM SERPL-MCNC: 10.6 MG/DL (ref 8.5–10.1)
CALCULATED R AXIS, ECG10: -6 DEGREES
CALCULATED T AXIS, ECG11: 13 DEGREES
CHLORIDE SERPL-SCNC: 104 MMOL/L (ref 97–108)
CO2 SERPL-SCNC: 31 MMOL/L (ref 21–32)
COLOR UR: NORMAL
COMMENT, HOLDF: NORMAL
COMMENT, HOLDF: NORMAL
CREAT SERPL-MCNC: 0.87 MG/DL (ref 0.55–1.02)
DIAGNOSIS, 93000: NORMAL
DIFFERENTIAL METHOD BLD: ABNORMAL
EOSINOPHIL # BLD: 0.2 K/UL (ref 0–0.4)
EOSINOPHIL NFR BLD: 5 % (ref 0–7)
ERYTHROCYTE [DISTWIDTH] IN BLOOD BY AUTOMATED COUNT: 16.8 % (ref 11.5–14.5)
GLOBULIN SER CALC-MCNC: 5.5 G/DL (ref 2–4)
GLUCOSE SERPL-MCNC: 91 MG/DL (ref 65–100)
GLUCOSE UR STRIP.AUTO-MCNC: NEGATIVE MG/DL
HCT VFR BLD AUTO: 43.2 % (ref 35–47)
HGB BLD-MCNC: 13.4 G/DL (ref 11.5–16)
HGB UR QL STRIP: NEGATIVE
IMM GRANULOCYTES # BLD AUTO: 0 K/UL (ref 0–0.04)
IMM GRANULOCYTES NFR BLD AUTO: 0 % (ref 0–0.5)
KETONES UR QL STRIP.AUTO: NEGATIVE MG/DL
LEUKOCYTE ESTERASE UR QL STRIP.AUTO: NEGATIVE
LYMPHOCYTES # BLD: 0.8 K/UL (ref 0.8–3.5)
LYMPHOCYTES NFR BLD: 23 % (ref 12–49)
MCH RBC QN AUTO: 22.7 PG (ref 26–34)
MCHC RBC AUTO-ENTMCNC: 31 G/DL (ref 30–36.5)
MCV RBC AUTO: 73.1 FL (ref 80–99)
MONOCYTES # BLD: 0.6 K/UL (ref 0–1)
MONOCYTES NFR BLD: 18 % (ref 5–13)
NEUTS SEG # BLD: 1.7 K/UL (ref 1.8–8)
NEUTS SEG NFR BLD: 52 % (ref 32–75)
NITRITE UR QL STRIP.AUTO: NEGATIVE
NRBC # BLD: 0.02 K/UL (ref 0–0.01)
NRBC BLD-RTO: 0.6 PER 100 WBC
P-R INTERVAL, ECG05: 172 MS
PH UR STRIP: 5.5 [PH] (ref 5–8)
PLATELET # BLD AUTO: 161 K/UL (ref 150–400)
PMV BLD AUTO: 10.7 FL (ref 8.9–12.9)
POTASSIUM SERPL-SCNC: 4.2 MMOL/L (ref 3.5–5.1)
PROT SERPL-MCNC: 9.3 G/DL (ref 6.4–8.2)
PROT UR STRIP-MCNC: NEGATIVE MG/DL
Q-T INTERVAL, ECG07: 440 MS
QRS DURATION, ECG06: 76 MS
QTC CALCULATION (BEZET), ECG08: 440 MS
RBC # BLD AUTO: 5.91 M/UL (ref 3.8–5.2)
RBC MORPH BLD: ABNORMAL
SAMPLES BEING HELD,HOLD: NORMAL
SAMPLES BEING HELD,HOLD: NORMAL
SODIUM SERPL-SCNC: 138 MMOL/L (ref 136–145)
SP GR UR REFRACTOMETRY: 1.01 (ref 1–1.03)
TROPONIN-HIGH SENSITIVITY: 16 NG/L (ref 0–51)
UROBILINOGEN UR QL STRIP.AUTO: 0.2 EU/DL (ref 0.2–1)
VENTRICULAR RATE, ECG03: 60 BPM
WBC # BLD AUTO: 3.4 K/UL (ref 3.6–11)

## 2022-11-29 PROCEDURE — 81003 URINALYSIS AUTO W/O SCOPE: CPT

## 2022-11-29 PROCEDURE — 99285 EMERGENCY DEPT VISIT HI MDM: CPT

## 2022-11-29 PROCEDURE — 85025 COMPLETE CBC W/AUTO DIFF WBC: CPT

## 2022-11-29 PROCEDURE — 74011250637 HC RX REV CODE- 250/637: Performed by: EMERGENCY MEDICINE

## 2022-11-29 PROCEDURE — 36415 COLL VENOUS BLD VENIPUNCTURE: CPT

## 2022-11-29 PROCEDURE — 93005 ELECTROCARDIOGRAM TRACING: CPT

## 2022-11-29 PROCEDURE — 84484 ASSAY OF TROPONIN QUANT: CPT

## 2022-11-29 PROCEDURE — 80053 COMPREHEN METABOLIC PANEL: CPT

## 2022-11-29 PROCEDURE — 83880 ASSAY OF NATRIURETIC PEPTIDE: CPT

## 2022-11-29 PROCEDURE — 71046 X-RAY EXAM CHEST 2 VIEWS: CPT

## 2022-11-29 PROCEDURE — 70450 CT HEAD/BRAIN W/O DYE: CPT

## 2022-11-29 RX ORDER — FUROSEMIDE 40 MG/1
20 TABLET ORAL DAILY
Status: DISCONTINUED | OUTPATIENT
Start: 2022-11-30 | End: 2022-11-29

## 2022-11-29 RX ORDER — FUROSEMIDE 40 MG/1
20 TABLET ORAL
Status: COMPLETED | OUTPATIENT
Start: 2022-11-29 | End: 2022-11-29

## 2022-11-29 RX ADMIN — FUROSEMIDE 20 MG: 40 TABLET ORAL at 16:00

## 2022-11-29 NOTE — ED TRIAGE NOTES
Pt ambulatory to triage with walker for c/o increased lower leg swelling, fatigue and urinary frequency x 4 days. GLF yesterday x 2. One fall was d/t falling asleep standing up. Hit head. Denies LOC. Denies blood thinners.

## 2022-11-29 NOTE — ED PROVIDER NOTES
66-year-old -American female with a history of congestive heart failure presents the emergency department chief complaint of increasing bilateral pedal edema over the last several days and increased urinary frequency and she denies shortness of breath or chest pain, denies any burning with urination. Denies any fever or chills    The history is provided by the patient. Fall  The accident occurred More than 2 days ago. Pertinent negatives include no abdominal pain.    Leg Swelling        Past Medical History:   Diagnosis Date    Arrhythmia     bradycardia,S/P pacemaker    CHF (congestive heart failure) ([address]     Heart failure ([address]     Hypertension     Irregular heart rate     Pure hypercholesterolemia 3/27/2017       Past Surgical History:   Procedure Laterality Date    HX ORTHOPAEDIC      left total knee replacement    HX OTHER SURGICAL  11/2019    Toe surgery    HX PACEMAKER           Family History:   Problem Relation Age of Onset    Hypertension Mother     Cancer Father         prostate    Hypertension Father        Social History     Socioeconomic History    Marital status:      Spouse name: Not on file    Number of children: Not on file    Years of education: Not on file    Highest education level: Not on file   Occupational History    Not on file   Tobacco Use    Smoking status: Never    Smokeless tobacco: Never   Vaping Use    Vaping Use: Never used   Substance and Sexual Activity    Alcohol use: No    Drug use: No    Sexual activity: Not Currently     Comment: ,2 children,2children,lives at home   Other Topics Concern    Not on file   Social History Narrative    Not on file     Social Determinants of Health     Financial Resource Strain: Low Risk     Difficulty of Paying Living Expenses: Not hard at all   Food Insecurity: No Food Insecurity    Worried About Running Out of Food in the Last Year: Never true    [address] [facility] [address] of Food in the Last Year: Never true Transportation Needs: Not on file   Physical Activity: Not on file   Stress: Not on file   Social Connections: Not on file   Intimate Partner Violence: Not on file   Housing Stability: Not on file         ALLERGIES: Codeine, Morphine, Pcn [penicillins], and Pseudoephedrine    Review of Systems   Constitutional:  Positive for fatigue. Negative for activity change and appetite change. HENT:  Negative for sinus pressure. Respiratory:  Negative for cough, chest tightness and shortness of breath. Cardiovascular:  Positive for leg swelling. Negative for chest pain. Gastrointestinal:  Negative for abdominal pain. Genitourinary:  Positive for frequency. Negative for difficulty urinating and urgency. Musculoskeletal:  Negative for myalgias. Skin:  Negative for color change. Neurological:  Negative for weakness. Hematological:  Does not bruise/bleed easily. All other systems reviewed and are negative. Vitals:    11/29/22 1044   BP: (!) 143/71   Pulse: 67   Resp: 18   Temp: 97.5 °F (36.4 °C)   SpO2: 99%            Physical Exam     MDM    EKG: atrial paced rhythm, rate 60, , QRS 76, Qtc 440.        1:16 PM  Advised patient in waiting room that work-up was still being done and attempts were being made to bring patient back into an ER room, patient was in no respiratory distress    CBC shows a white count of 3.4, hemoglobin 13.4 hematocrit 43.2 platelets of 131, Urinalysis negative for acute process, awaiting complete metabolic panel and CT scan of the head. Patient does have a history of congestive heart failure pedal edema in the past    2:51 PM  Patient is resting comfortably in the quinn, no acute distress. Awaiting CMP.   Noted that patient is on Lasix Monday and Friday, will consider giving dose of Lasix in the emergency department depending on BUN and creatinine findings head CT was negative for acute process    Patient is resting comfortably at this time states that she feels comfortable going home with family member I will give an additional dose of Lasix and advised her to follow with primary care physician             Procedures

## 2022-12-05 ENCOUNTER — NURSE TRIAGE (OUTPATIENT)
Dept: OTHER | Facility: CLINIC | Age: 87
End: 2022-12-05

## 2022-12-07 ENCOUNTER — OFFICE VISIT (OUTPATIENT)
Dept: INTERNAL MEDICINE CLINIC | Age: 87
End: 2022-12-07
Payer: MEDICARE

## 2022-12-07 VITALS
BODY MASS INDEX: 23.48 KG/M2 | SYSTOLIC BLOOD PRESSURE: 110 MMHG | OXYGEN SATURATION: 99 % | DIASTOLIC BLOOD PRESSURE: 66 MMHG | TEMPERATURE: 97.7 F | HEART RATE: 98 BPM | WEIGHT: 149.6 LBS | HEIGHT: 67 IN | RESPIRATION RATE: 16 BRPM

## 2022-12-07 DIAGNOSIS — I10 ESSENTIAL HYPERTENSION: ICD-10-CM

## 2022-12-07 DIAGNOSIS — R26.9 GAIT ABNORMALITY: Primary | ICD-10-CM

## 2022-12-07 DIAGNOSIS — R29.6 FREQUENT FALLS: ICD-10-CM

## 2022-12-07 DIAGNOSIS — M25.662 STIFFNESS OF LEFT KNEE: ICD-10-CM

## 2022-12-07 DIAGNOSIS — E78.00 PURE HYPERCHOLESTEROLEMIA: ICD-10-CM

## 2022-12-07 DIAGNOSIS — R54 FRAIL ELDERLY: ICD-10-CM

## 2022-12-07 PROCEDURE — G8432 DEP SCR NOT DOC, RNG: HCPCS | Performed by: INTERNAL MEDICINE

## 2022-12-07 PROCEDURE — G8420 CALC BMI NORM PARAMETERS: HCPCS | Performed by: INTERNAL MEDICINE

## 2022-12-07 PROCEDURE — G8427 DOCREV CUR MEDS BY ELIG CLIN: HCPCS | Performed by: INTERNAL MEDICINE

## 2022-12-07 PROCEDURE — G8536 NO DOC ELDER MAL SCRN: HCPCS | Performed by: INTERNAL MEDICINE

## 2022-12-07 PROCEDURE — G0463 HOSPITAL OUTPT CLINIC VISIT: HCPCS | Performed by: INTERNAL MEDICINE

## 2022-12-07 PROCEDURE — 1101F PT FALLS ASSESS-DOCD LE1/YR: CPT | Performed by: INTERNAL MEDICINE

## 2022-12-07 PROCEDURE — 1090F PRES/ABSN URINE INCON ASSESS: CPT | Performed by: INTERNAL MEDICINE

## 2022-12-07 PROCEDURE — 99214 OFFICE O/P EST MOD 30 MIN: CPT | Performed by: INTERNAL MEDICINE

## 2022-12-07 RX ORDER — BISMUTH SUBSALICYLATE 262 MG
1 TABLET,CHEWABLE ORAL DAILY
Qty: 30 TABLET | Refills: 11 | Status: SHIPPED | OUTPATIENT
Start: 2022-12-07

## 2022-12-07 NOTE — PROGRESS NOTES
HISTORY OF PRESENT ILLNESS  Sheree Cowden is a 80 y.o. female here for follow-up. She is accompanied by her son who is her caregiver. She went to emergency room once after a fall from her hospital bed where the mattress Topper was not right size. She slid and fell down. After couple of days again she fell down from same bed. Both time she had x-ray and CT head done, all came back normal.  The second time [facility] came and took care of her at home. She has failed she has gait and balance issue. She looks frail, not eating enough meal, blood pressure seems low. 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 She is unable to take care of herself. She has lost weight. Has hypertension, on Toprol-XL. Need refill on medication. Has osteopenia, not on any calcium. Has chronic knee pain. Had knee replacement done, did not finish her physical therapy. Knee is stiff. She has history of congestive heart failure. Taking Lasix twice a week. No shortness of breath, not gaining any weight. Hypertension     Cholesterol Problem    Follow Up Chronic Condition      Review of Systems   Constitutional: Negative. HENT: Negative. Eyes: Negative. Respiratory: Negative. Cardiovascular:  Positive for leg swelling. Gastrointestinal: Negative. Genitourinary: Negative. Musculoskeletal:  Positive for falls and joint pain. Skin: Negative. Neurological: Negative. Endo/Heme/Allergies: Negative. Psychiatric/Behavioral: Negative. Physical Exam  Constitutional:       Appearance: Normal appearance. She is normal weight. Cardiovascular:      Rate and Rhythm: Normal rate and regular rhythm. Pulses: Normal pulses. Heart sounds: Normal heart sounds. Pulmonary:      Effort: Pulmonary effort is normal.      Breath sounds: Normal breath sounds. Abdominal:      General: Abdomen is flat. Bowel sounds are normal.      Palpations: Abdomen is soft.    Musculoskeletal:         General: Swelling present. Normal range of motion. Cervical back: Normal range of motion and neck supple. Comments: Trace leg edema present. Bilateral knee: Tenderness present. Stiffness of knee present. Neurological:      General: No focal deficit present. Mental Status: She is alert and oriented to person, place, and time. Mental status is at baseline. Psychiatric:         Mood and Affect: Mood normal.         Behavior: Behavior normal.         Thought Content: Thought content normal.       ASSESSMENT and PLAN  Diagnoses and all orders for this visit:    1. Gait abnormality  She is very frail, already have to fall. Has gait and balance issue. She is very frail. We will have a home health nurse to do PT and OT for gait and balance. -     [address] [place]    2. Frequent falls    Need to get stronger. Will refer,  -     [address] [facility]    3. Stiffness of left knee    Had knee surgery done in the past, she did not finish her physical therapy from them. Has reduced mobility. Will refer,  -     [address] [facility]    4. Essential hypertension  Stable blood pressure. On low-dose of metoprolol. Doing well. 5. Pure hypercholesterolemia  Be on low-carb and low-cholesterol diet and exercise. 6. Frail elderly    She is caregiver for her daughter who is 62years old and had stroke and had paralysis. Her daughter has home health aide who takes care of her for 8 hours but mom cannot help but to help her. She got a hospital bed, mattress was too weak for the bed and she slid down and fell twice from mattress. So far she did not have any fracture bone. CT head was negative. Advised her not to sleep on this. Until his been fixed. Her son is calling the hospital bed company and to get the right side mattress. We will start her on physical therapy via home health nurse. She has no dementia but she is getting frail. Son stopped her driving car.   Is very involved and want to give mom a break. We have discussed at the length about her care, she should not take care of her daughter or daughter should be placed in nursing home. Son is trying to give her a holiday break and wanted her to be in hotel during the weekend so that she can get some break. We will give,  -     multivitamin (ONE A DAY) tablet; Take 1 Tablet by mouth daily. Discussed expected course/resolution/complications of diagnosis in detail with patient. Medication risks/benefits/costs/interactions/alternatives discussed with patient. Discussed COVID-19 infection precaution with patient. Pt was given an after visit summary which includes diagnoses, current medications & vitals. Pt expressed understanding with the diagnosis and plan.

## 2022-12-08 ENCOUNTER — DOCUMENTATION ONLY (OUTPATIENT)
Dept: INTERNAL MEDICINE CLINIC | Age: 87
End: 2022-12-08

## 2022-12-09 ENCOUNTER — TELEPHONE (OUTPATIENT)
Dept: INTERNAL MEDICINE CLINIC | Age: 87
End: 2022-12-09

## 2022-12-09 NOTE — PROGRESS NOTES
Darryl DE LEON 4 minutes ago (10:34 AM)     DH  They will accept and start immediately.           Note          [facility] care [phone]

## 2023-02-01 ENCOUNTER — OFFICE VISIT (OUTPATIENT)
Dept: INTERNAL MEDICINE CLINIC | Age: 88
End: 2023-02-01
Payer: MEDICARE

## 2023-02-01 VITALS
OXYGEN SATURATION: 95 % | HEIGHT: 67 IN | HEART RATE: 84 BPM | BODY MASS INDEX: 24.67 KG/M2 | TEMPERATURE: 97.3 F | WEIGHT: 157.2 LBS | DIASTOLIC BLOOD PRESSURE: 74 MMHG | SYSTOLIC BLOOD PRESSURE: 126 MMHG | RESPIRATION RATE: 16 BRPM

## 2023-02-01 DIAGNOSIS — Z00.00 MEDICARE ANNUAL WELLNESS VISIT, SUBSEQUENT: ICD-10-CM

## 2023-02-01 DIAGNOSIS — N18.31 STAGE 3A CHRONIC KIDNEY DISEASE (HCC): ICD-10-CM

## 2023-02-01 DIAGNOSIS — E78.00 PURE HYPERCHOLESTEROLEMIA: ICD-10-CM

## 2023-02-01 DIAGNOSIS — I10 ESSENTIAL HYPERTENSION: Primary | ICD-10-CM

## 2023-02-01 DIAGNOSIS — Z71.89 ACP (ADVANCE CARE PLANNING): ICD-10-CM

## 2023-02-01 DIAGNOSIS — M17.10 PRIMARY OSTEOARTHRITIS OF KNEE, UNSPECIFIED LATERALITY: ICD-10-CM

## 2023-02-01 DIAGNOSIS — I50.9 CONGESTIVE HEART FAILURE, UNSPECIFIED HF CHRONICITY, UNSPECIFIED HEART FAILURE TYPE (HCC): ICD-10-CM

## 2023-02-01 DIAGNOSIS — E55.9 VITAMIN D DEFICIENCY: ICD-10-CM

## 2023-02-01 DIAGNOSIS — R04.0 EPISTAXIS: ICD-10-CM

## 2023-02-01 DIAGNOSIS — R26.9 GAIT ABNORMALITY: ICD-10-CM

## 2023-02-01 DIAGNOSIS — R54 FRAIL ELDERLY: ICD-10-CM

## 2023-02-01 PROCEDURE — G8536 NO DOC ELDER MAL SCRN: HCPCS | Performed by: INTERNAL MEDICINE

## 2023-02-01 PROCEDURE — G0439 PPPS, SUBSEQ VISIT: HCPCS | Performed by: INTERNAL MEDICINE

## 2023-02-01 PROCEDURE — G0463 HOSPITAL OUTPT CLINIC VISIT: HCPCS | Performed by: INTERNAL MEDICINE

## 2023-02-01 PROCEDURE — 99497 ADVNCD CARE PLAN 30 MIN: CPT | Performed by: INTERNAL MEDICINE

## 2023-02-01 PROCEDURE — 1101F PT FALLS ASSESS-DOCD LE1/YR: CPT | Performed by: INTERNAL MEDICINE

## 2023-02-01 PROCEDURE — G8427 DOCREV CUR MEDS BY ELIG CLIN: HCPCS | Performed by: INTERNAL MEDICINE

## 2023-02-01 PROCEDURE — G8420 CALC BMI NORM PARAMETERS: HCPCS | Performed by: INTERNAL MEDICINE

## 2023-02-01 PROCEDURE — G8510 SCR DEP NEG, NO PLAN REQD: HCPCS | Performed by: INTERNAL MEDICINE

## 2023-02-01 PROCEDURE — 99214 OFFICE O/P EST MOD 30 MIN: CPT | Performed by: INTERNAL MEDICINE

## 2023-02-01 PROCEDURE — 1090F PRES/ABSN URINE INCON ASSESS: CPT | Performed by: INTERNAL MEDICINE

## 2023-02-01 RX ORDER — DICLOFENAC SODIUM 10 MG/G
GEL TOPICAL
Qty: 100 G | Refills: 3 | Status: SHIPPED | OUTPATIENT
Start: 2023-02-01

## 2023-02-01 RX ORDER — MULTIVITAMIN
1 TABLET ORAL DAILY
Qty: 30 TABLET | Refills: 11 | Status: SHIPPED | OUTPATIENT
Start: 2023-02-01

## 2023-02-01 NOTE — PROGRESS NOTES
HISTORY OF PRESENT ILLNESS  Charla Adamson is a 80 y.o. female here for follow-up. She is accompanied by her son who is her caregiver. Reports nosebleed on and off. She is using Flonase every day. Has hypertension, compliant on medication. She has been palpitation shortness of breath. Rate seems stable. She is eating 3 meals a day. And need multivitamin refill. Still has gait and balance issue, need home health nurse for PT and OT. Has osteopenia, not on any calcium. Has chronic knee pain. Had knee replacement done, did not finish her physical therapy. Knee is stiff. She has history of congestive heart failure. Taking Lasix twice a week. No shortness of breath, not gaining any weight. Here for Medicare wellness visit. Has no living will. Hypertension     Cholesterol Problem    Follow Up Chronic Condition      Review of Systems   Constitutional: Negative. HENT: Negative. Eyes: Negative. Respiratory: Negative. Cardiovascular: Negative. Gastrointestinal: Negative. Genitourinary: Negative. Musculoskeletal:  Positive for falls and joint pain. Skin: Negative. Neurological: Negative. Endo/Heme/Allergies:  Bruises/bleeds easily. Psychiatric/Behavioral: Negative. Physical Exam  Constitutional:       Appearance: Normal appearance. She is normal weight. HENT:      Nose: Nose normal.      Comments: No active bleeding noticed      Mouth/Throat:      Mouth: Mucous membranes are moist.   Cardiovascular:      Rate and Rhythm: Normal rate and regular rhythm. Pulses: Normal pulses. Heart sounds: Normal heart sounds. Pulmonary:      Effort: Pulmonary effort is normal.      Breath sounds: Normal breath sounds. Abdominal:      General: Abdomen is flat. Bowel sounds are normal.      Palpations: Abdomen is soft. Musculoskeletal:         General: Swelling present. Normal range of motion. Cervical back: Normal range of motion and neck supple.       Comments: Trace leg edema present. Bilateral knee: Tenderness present. Stiffness of knee present. Neurological:      General: No focal deficit present. Mental Status: She is alert and oriented to person, place, and time. Mental status is at baseline. Psychiatric:         Mood and Affect: Mood normal.         Behavior: Behavior normal.         Thought Content: Thought content normal.       ASSESSMENT and PLAN    Diagnoses and all orders for this visit:    1. Essential hypertension    Stable blood pressure. On Toprol-XL. Will check,  -     CBC WITH AUTOMATED DIFF  -     METABOLIC PANEL, COMPREHENSIVE    2. Congestive heart failure, unspecified HF chronicity, unspecified heart failure type ([address]    Compensated. On Lasix. Will check,  -     METABOLIC PANEL, COMPREHENSIVE    3. Stage 3a chronic kidney disease (HCC)  -     METABOLIC PANEL, COMPREHENSIVE    4. Pure hypercholesterolemia    Be on low-cholesterol diet and exercise. Will check,  -     METABOLIC PANEL, COMPREHENSIVE    5. Medicare annual wellness visit, subsequent    6. ACP (advance care planning)    7. Vitamin D deficiency  -     VITAMIN D, 25 HYDROXY    8. Epistaxis  Advised to use AYR nasal gel. Need to do nasal saline also. Avoid to use Flonase every day. 9. Frail elderly  -     multivitamin (ONE A DAY) tablet; Take 1 Tablet by mouth daily. 10. Primary osteoarthritis of knee, unspecified laterality    Take Tylenol as needed. We will give,  -     diclofenac (VOLTAREN) 1 % gel; Apply  to affected area two (2) times daily as needed for Pain. 11. Gait abnormality    Still have gait and balance problem. Will refer,  -     [address] [facility]   Discussed expected course/resolution/complications of diagnosis in detail with patient. Medication risks/benefits/costs/interactions/alternatives discussed with patient. Discussed COVID-19 infection precaution with patient.   Pt was given an after visit summary which includes diagnoses, current medications & vitals. Pt expressed understanding with the diagnosis and plan.

## 2023-02-01 NOTE — PATIENT INSTRUCTIONS
Medicare Wellness Visit, Female     The best way to live healthy is to have a lifestyle where you eat a well-balanced diet, exercise regularly, limit alcohol use, and quit all forms of tobacco/nicotine, if applicable. Regular preventive services are another way to keep healthy. Preventive services (vaccines, screening tests, monitoring & exams) can help personalize your care plan, which helps you manage your own care. Screening tests can find health problems at the earliest stages, when they are easiest to treat. [facility] follows the current, evidence-based guidelines published by the [facility] V[facility] Qureshi ([facility]) when recommending preventive services for our patients. Because we follow these guidelines, sometimes recommendations change over time as research supports it. (For example, mammograms used to be recommended annually. Even though Medicare will still pay for an annual mammogram, the newer guidelines recommend a mammogram every two years for women of average risk). Of course, you and your doctor may decide to screen more often for some diseases, based on your risk and your co-morbidities (chronic disease you are already diagnosed with). Preventive services for you include:  - Medicare offers their members a free annual wellness visit, which is time for you and your primary care provider to discuss and plan for your preventive service needs.  Take advantage of this benefit every year!    -Over the age of 72 should receive the recommended pneumonia vaccines.    -All adults should have a flu vaccine yearly.  -All adults should have a tetanus vaccine every 10 years.   -Over the age 48 should receive the shingles vaccines.        -All adults should be screened once for Hepatitis C.  -All adults age 38-68 who are overweight should have a diabetes screening test once every three years.   -Other screening tests and preventive services for persons with diabetes include: an eye exam to screen for diabetic retinopathy, a kidney function test, a foot exam, and stricter control over your cholesterol.   -Cardiovascular screening for adults with routine risk involves an electrocardiogram (ECG) at intervals determined by your doctor.     -Colorectal cancer screenings should be done for adults age 39-70 with no increased risk factors for colorectal cancer. There are a number of acceptable methods of screening for this type of cancer. Each test has its own benefits and drawbacks. Discuss with your doctor what is most appropriate for you during your annual wellness visit. The different tests include: colonoscopy (considered the best screening method), a fecal occult blood test, a fecal DNA test, and sigmoidoscopy.    -Lung cancer screening is recommended annually with a low dose CT scan for adults between age 54 and 68, who have smoked at least 30 pack years (equivalent of 1 pack per day for 30 days), and who is a current smoker or quit less than 15 years ago.    -A bone mass density test is recommended when a woman turns 65 to screen for osteoporosis. This test is only recommended one time, as a screening. Some providers will use this same test as a disease monitoring tool if you already have osteoporosis. -Breast cancer screenings are recommended every other year for women of normal risk, age 54-69.    -Cervical cancer screenings for women over age 72 are only recommended with certain risk factors.      Here is a list of your current Health Maintenance items (your personalized list of preventive services) with a due date:  Health Maintenance Due   Topic Date Due    COVID-19 Vaccine (1) Never done    DTaP/Tdap/Td  (1 - Tdap) Never done    Shingles Vaccine (1 of 2) Never done    Yearly Flu Vaccine (1) 08/01/2022

## 2023-02-01 NOTE — PROGRESS NOTES
Nursing staff confirmed patient with full name and . Prepared patient for visit today by obtaining vitals, verifying medication list and allergies, and briefly discussing reason for visit. Chief Complaint   Patient presents with    Hypertension    Chronic Kidney Disease    CHF    Chest Pain (Angina)    Cholesterol Problem       Current Outpatient Medications   Medication Sig    multivitamin (ONE A DAY) tablet Take 1 Tablet by mouth daily. metoprolol succinate (TOPROL-XL) 25 mg XL tablet Take 0.5 Tablets by mouth daily. furosemide (LASIX) 20 mg tablet 1 tab po Monday and friday    calcium carbonate (CALTREX) 600 mg calcium (1,500 mg) tablet Take 1 Tablet by mouth two (2) times a day. OTHER Adult diaper large size. Use 3 to 4  diaper a day. DX:mixed urine incontinence    Leg Brace (ACE Knee Brace) misc by Does Not Apply route. Use knee brace to walk. DX:chronic knee pain    Ayr Saline topical gel APPLY TO AFFECTED AREA TWICE DAILY AS NEEDED FOR NOSE BLEEDING    lidocaine 4 % patch 1 Patch by TransDERmal route every twenty-four (24) hours. neomycin-polymyxin-dexamethasone (DEXACINE) 3.5 mg/g-10,000 unit/g-0.1 % ophthalmic ointment     loratadine (CLARITIN) 10 mg tablet TAKE 1 TABLET BY MOUTH NIGHTLY AS NEEDED FOR ALLERGIES    fluticasone propionate (FLONASE) 50 mcg/actuation nasal spray SPRAY 2 SPRAYS INTO EACH NOSTRIL EVERY DAY    albuterol (PROVENTIL HFA, VENTOLIN HFA, PROAIR HFA) 90 mcg/actuation inhaler INHALE 2 PUFFS EVERY 6 HOURS AS NEEDED FOR WHEEZE    OTHER 15 mm Hg below need stockings for both legs. please measure leg width    acetaminophen (TYLENOL) 500 mg tablet Take 1 Tab by mouth two (2) times daily as needed for Pain. aspirin 81 mg tablet Take 81 mg by mouth.    ergocalciferol (ERGOCALCIFEROL) 1,250 mcg (50,000 unit) capsule Take 1 Capsule by mouth every seven (7) days. (Patient not taking: Reported on 2023)     No current facility-administered medications for this visit. 1. \"Have you been to the ER, urgent care clinic since your last visit? Hospitalized since your last visit? \" No    2. \"Have you seen or consulted any other health care providers outside of the [address] [place] [address] since your last visit? \" No     3. For patients aged 39-70: Has the patient had a colonoscopy / FIT/ Cologuard? NA - based on age      If the patient is female:    4. For patients aged 41-77: Has the patient had a mammogram within the past 2 years? NA - based on age or sex      11. For patients aged 21-65: Has the patient had a pap smear?  NA - based on age or sex

## 2023-02-01 NOTE — PROGRESS NOTES
This is the Subsequent Medicare Annual Wellness Exam, performed 12 months or more after the Initial AWV or the last Subsequent AWV    I have reviewed the patient's medical history in detail and updated the computerized patient record. Assessment/Plan   Education and counseling provided:  Are appropriate based on today's review and evaluation  End-of-Life planning (with patient's consent)  Pneumococcal Vaccine  Screening Mammography  Colorectal cancer screening tests  Bone mass measurement (DEXA)  Screening for glaucoma         Depression Risk Factor Screening     3 most recent PHQ Screens 9/19/2022   PHQ Not Done -   Little interest or pleasure in doing things Not at all   Feeling down, depressed, irritable, or hopeless Not at all   Total Score PHQ 2 0       Alcohol & Drug Abuse Risk Screen    Do you average more than 1 drink per night or more than 7 drinks a week:  No    On any one occasion in the past three months have you have had more than 3 drinks containing alcohol:  No          Functional Ability and Level of Safety    Hearing: The patient wears hearing aids. Activities of Daily Living: The home contains:  walker  Patient needs help with:  transportation, shopping, and laundry      Ambulation: with mild difficulty     Fall Risk:  Fall Risk Assessment, last 12 mths 2/1/2023   Able to walk? Yes   Fall in past 12 months? 1   Do you feel unsteady? 1   Are you worried about falling 1   Is TUG test greater than 12 seconds? 1   Is the gait abnormal? 1   Number of falls in past 12 months 2   Fall with injury?  1      Abuse Screen:  Patient is not abused       Cognitive Screening    Has your family/caregiver stated any concerns about your memory: no     Cognitive Screening: Normal - MMSE (Mini Mental Status Exam)    Health Maintenance Due     Health Maintenance Due   Topic Date Due    COVID-19 Vaccine (1) Never done    DTaP/Tdap/Td series (1 - Tdap) Never done    Shingles Vaccine (1 of 2) Never done    Flu Vaccine (1) 08/01/2022       Patient Care Team   Patient Care Team:  Eliazbeth Neves MD as PCP - General (Internal Medicine Physician)  Elizabeth Neves MD as PCP - [address] [place] [address] [facility]  Gricel Weiss, RN as Nurse Navigator (Internal Medicine Physician)    History     Patient Active Problem List   Diagnosis Code    Unstable angina ([facility] I20.0    CHF (congestive heart failure) ([address] I50.9    Pacemaker Z95.0    Essential hypertension I10    Advance care planning Z71.89    Pure hypercholesterolemia E78.00    Chronic renal disease, stage III N18.30     Past Medical History:   Diagnosis Date    Arrhythmia     bradycardia,S/P pacemaker    CHF (congestive heart failure) ([facility]     Heart failure ([facility]     Hypertension     Irregular heart rate     Pure hypercholesterolemia 3/27/2017      Past Surgical History:   Procedure Laterality Date    HX ORTHOPAEDIC      left total knee replacement    HX OTHER SURGICAL  11/2019    Toe surgery    HX PACEMAKER       Current Outpatient Medications   Medication Sig Dispense Refill    multivitamin (ONE A DAY) tablet Take 1 Tablet by mouth daily. 30 Tablet 11    metoprolol succinate (TOPROL-XL) 25 mg XL tablet Take 0.5 Tablets by mouth daily. 45 Tablet 1    furosemide (LASIX) 20 mg tablet 1 tab po Monday and friday 24 Tablet 1    calcium carbonate (CALTREX) 600 mg calcium (1,500 mg) tablet Take 1 Tablet by mouth two (2) times a day. 180 Tablet 3    OTHER Adult diaper large size. Use 3 to 4  diaper a day. DX:mixed urine incontinence 100 Each 5    Leg Brace (ACE Knee Brace) misc by Does Not Apply route. Use knee brace to walk. DX:chronic knee pain 2 Each 0    Ayr Saline topical gel APPLY TO AFFECTED AREA TWICE DAILY AS NEEDED FOR NOSE BLEEDING 14.1 g 1    lidocaine 4 % patch 1 Patch by TransDERmal route every twenty-four (24) hours.  6 Each 0    neomycin-polymyxin-dexamethasone (DEXACINE) 3.5 mg/g-10,000 unit/g-0.1 % ophthalmic ointment       loratadine (CLARITIN) 10 mg tablet TAKE 1 TABLET BY MOUTH NIGHTLY AS NEEDED FOR ALLERGIES 30 Tab 4    fluticasone propionate (FLONASE) 50 mcg/actuation nasal spray SPRAY 2 SPRAYS INTO EACH NOSTRIL EVERY DAY 1 Bottle 1    albuterol (PROVENTIL HFA, VENTOLIN HFA, PROAIR HFA) 90 mcg/actuation inhaler INHALE 2 PUFFS EVERY 6 HOURS AS NEEDED FOR WHEEZE 6.7 Inhaler 1    OTHER 15 mm Hg below need stockings for both legs. please measure leg width 2 Each 0    acetaminophen (TYLENOL) 500 mg tablet Take 1 Tab by mouth two (2) times daily as needed for Pain. 60 Tab 5    aspirin 81 mg tablet Take 81 mg by mouth.      ergocalciferol (ERGOCALCIFEROL) 1,250 mcg (50,000 unit) capsule Take 1 Capsule by mouth every seven (7) days. (Patient not taking: Reported on 2/1/2023) 4 Capsule 3     Allergies   Allergen Reactions    Codeine Unknown (comments)     Other reaction(s): GI Intolerance    Morphine Unknown (comments)     Other reaction(s):  Other (See Comments)  Pt states that she was unable to speak    Pcn [Penicillins] Unknown (comments)    Pseudoephedrine Unknown (comments)       Family History   Problem Relation Age of Onset    Hypertension Mother     Cancer Father         prostate    Hypertension Father      Social History     Tobacco Use    Smoking status: Never    Smokeless tobacco: Never   Substance Use Topics    Alcohol use: No         Starr Rabago MD

## 2023-02-01 NOTE — ACP (ADVANCE CARE PLANNING)

## 2023-02-02 LAB
25(OH)D3+25(OH)D2 SERPL-MCNC: 29.4 NG/ML (ref 30–100)
ALBUMIN SERPL-MCNC: 4.1 G/DL (ref 3.6–4.6)
ALBUMIN/GLOB SERPL: 0.9 {RATIO} (ref 1.2–2.2)
ALP SERPL-CCNC: ABNORMAL IU/L
ALT SERPL-CCNC: 31 IU/L (ref 0–32)
AST SERPL-CCNC: ABNORMAL IU/L
BASOPHILS # BLD AUTO: 0.1 X10E3/UL (ref 0–0.2)
BASOPHILS NFR BLD AUTO: 2 %
BILIRUB SERPL-MCNC: 0.7 MG/DL (ref 0–1.2)
BUN SERPL-MCNC: 21 MG/DL (ref 8–27)
BUN/CREAT SERPL: 28 (ref 12–28)
CALCIUM SERPL-MCNC: 9.7 MG/DL (ref 8.7–10.3)
CHLORIDE SERPL-SCNC: 104 MMOL/L (ref 96–106)
CO2 SERPL-SCNC: 22 MMOL/L (ref 20–29)
CREAT SERPL-MCNC: 0.76 MG/DL (ref 0.57–1)
EGFRCR SERPLBLD CKD-EPI 2021: 75 ML/MIN/1.73
EOSINOPHIL # BLD AUTO: 0.1 X10E3/UL (ref 0–0.4)
EOSINOPHIL NFR BLD AUTO: 4 %
ERYTHROCYTE [DISTWIDTH] IN BLOOD BY AUTOMATED COUNT: 15.8 % (ref 11.7–15.4)
GLOBULIN SER CALC-MCNC: 4.4 G/DL (ref 1.5–4.5)
GLUCOSE SERPL-MCNC: 95 MG/DL (ref 70–99)
HCT VFR BLD AUTO: 44 % (ref 34–46.6)
HGB BLD-MCNC: 13.1 G/DL (ref 11.1–15.9)
IMM GRANULOCYTES # BLD AUTO: 0 X10E3/UL (ref 0–0.1)
IMM GRANULOCYTES NFR BLD AUTO: 1 %
LYMPHOCYTES # BLD AUTO: 0.6 X10E3/UL (ref 0.7–3.1)
LYMPHOCYTES NFR BLD AUTO: 18 %
MCH RBC QN AUTO: 22.1 PG (ref 26.6–33)
MCHC RBC AUTO-ENTMCNC: 29.8 G/DL (ref 31.5–35.7)
MCV RBC AUTO: 74 FL (ref 79–97)
MONOCYTES # BLD AUTO: 0.7 X10E3/UL (ref 0.1–0.9)
MONOCYTES NFR BLD AUTO: 19 %
NEUTROPHILS # BLD AUTO: 1.9 X10E3/UL (ref 1.4–7)
NEUTROPHILS NFR BLD AUTO: 56 %
PLATELET # BLD AUTO: 142 X10E3/UL (ref 150–450)
POTASSIUM SERPL-SCNC: ABNORMAL MMOL/L
PROT SERPL-MCNC: 8.5 G/DL (ref 6–8.5)
RBC # BLD AUTO: 5.94 X10E6/UL (ref 3.77–5.28)
SODIUM SERPL-SCNC: 136 MMOL/L (ref 134–144)
WBC # BLD AUTO: 3.4 X10E3/UL (ref 3.4–10.8)

## 2023-03-05 DIAGNOSIS — I87.2 VENOUS INSUFFICIENCY (CHRONIC) (PERIPHERAL): ICD-10-CM

## 2023-03-05 RX ORDER — FUROSEMIDE 20 MG/1
TABLET ORAL
Qty: 24 TABLET | Refills: 1 | Status: SHIPPED | OUTPATIENT
Start: 2023-03-05

## 2023-04-18 ENCOUNTER — VIRTUAL VISIT (OUTPATIENT)
Dept: INTERNAL MEDICINE CLINIC | Age: 88
End: 2023-04-18
Payer: MEDICARE

## 2023-04-18 DIAGNOSIS — N18.31 STAGE 3A CHRONIC KIDNEY DISEASE (HCC): ICD-10-CM

## 2023-04-18 DIAGNOSIS — R26.9 GAIT ABNORMALITY: ICD-10-CM

## 2023-04-18 DIAGNOSIS — I50.9 CONGESTIVE HEART FAILURE, UNSPECIFIED HF CHRONICITY, UNSPECIFIED HEART FAILURE TYPE (HCC): ICD-10-CM

## 2023-04-18 DIAGNOSIS — I87.2 VENOUS INSUFFICIENCY (CHRONIC) (PERIPHERAL): Primary | ICD-10-CM

## 2023-04-18 DIAGNOSIS — R41.3 MEMORY CHANGE: ICD-10-CM

## 2023-04-18 DIAGNOSIS — E78.00 PURE HYPERCHOLESTEROLEMIA: ICD-10-CM

## 2023-04-18 DIAGNOSIS — I10 ESSENTIAL HYPERTENSION: ICD-10-CM

## 2023-04-18 DIAGNOSIS — Z23 ENCOUNTER FOR IMMUNIZATION: ICD-10-CM

## 2023-04-18 PROCEDURE — G8427 DOCREV CUR MEDS BY ELIG CLIN: HCPCS | Performed by: INTERNAL MEDICINE

## 2023-04-18 PROCEDURE — 1090F PRES/ABSN URINE INCON ASSESS: CPT | Performed by: INTERNAL MEDICINE

## 2023-04-18 PROCEDURE — 1123F ACP DISCUSS/DSCN MKR DOCD: CPT | Performed by: INTERNAL MEDICINE

## 2023-04-18 PROCEDURE — G8536 NO DOC ELDER MAL SCRN: HCPCS | Performed by: INTERNAL MEDICINE

## 2023-04-18 PROCEDURE — 99214 OFFICE O/P EST MOD 30 MIN: CPT | Performed by: INTERNAL MEDICINE

## 2023-04-18 PROCEDURE — G8420 CALC BMI NORM PARAMETERS: HCPCS | Performed by: INTERNAL MEDICINE

## 2023-04-18 PROCEDURE — 1101F PT FALLS ASSESS-DOCD LE1/YR: CPT | Performed by: INTERNAL MEDICINE

## 2023-04-18 PROCEDURE — G8432 DEP SCR NOT DOC, RNG: HCPCS | Performed by: INTERNAL MEDICINE

## 2023-04-18 PROCEDURE — G0463 HOSPITAL OUTPT CLINIC VISIT: HCPCS | Performed by: INTERNAL MEDICINE

## 2023-04-18 RX ORDER — FUROSEMIDE 20 MG/1
TABLET ORAL
Qty: 60 TABLET | Refills: 1 | Status: SHIPPED | OUTPATIENT
Start: 2023-04-18

## 2023-04-18 RX ORDER — METOPROLOL SUCCINATE 25 MG/1
12.5 TABLET, EXTENDED RELEASE ORAL DAILY
Qty: 45 TABLET | Refills: 1 | Status: SHIPPED | OUTPATIENT
Start: 2023-04-18

## 2023-04-18 NOTE — PROGRESS NOTES
Savanah Miller is a 80 y.o. female who was seen by synchronous (real-time) audio-video technology on 4/18/2023 for Dementia and Altered mental status        Assessment & Plan:   Diagnoses and all orders for this visit:    1. Venous insufficiency (chronic) (peripheral)    She is taking Lasix every single day according to cardiologist and she is urinating a lot and she is not very happy about it. She is not able to sit still. We will reduce Lasix to 5 days a week. -     furosemide (LASIX) 20 mg tablet; 1 tab po every day except Saturday and Sunday,no pill. Advised her to monitor her leg swelling and also monitor her weight gain. Need to watch salt. Notify me if she is gaining more weight. 2. Essential hypertension    Stable blood pressure. Will refill,  -     metoprolol succinate (TOPROL-XL) 25 mg XL tablet; Take 0.5 Tablets by mouth daily. 3. Congestive heart failure, unspecified HF chronicity, unspecified heart failure type ([address]  Compensated. On Lasix. 4. Stage 3a chronic kidney disease ([address]  Kidney function stable. 5. Pure hypercholesterolemia  Be on low-cholesterol diet and exercise. 6. Gait abnormality    She has already fell 6 times at her house. We will refer,  -     [address] [place] [address]    7. Encounter for immunization    We will order,  -     varicella-zoster recombinant, PF, (SHINGRIX) 50 mcg/0.5 mL susr injection; 0.5 mL by IntraMUSCular route once for 1 dose. 8. Memory change  Probable dementia. She is 80years old. She is advised not to dispense her daughter's medications. She is also advised not to cook anymore. I spent at least 31 minutes on this visit with this established patient. Subjective:     Kandis Stone is here for follow-up. She is getting frail. Having memory changes, n has short-term memory loss. She has 6 times fall this year according to her niece.   Has some gait and balance issues  As she is annoyed that she has to go to bathroom very frequently since she was placed on Lasix every day according to cardiologist.  Has history of congestive heart failure. No leg swelling. No orthopnea or shortness of breath. Leg edema has improved since she is on Lasix every day but if she is not very happy about it. Has hypertension, compliant medication. Denies chest pain palpitation or shortness of breath. Has a elevated lipid, not on statin. Doing well. Prior to Admission medications    Medication Sig Start Date End Date Taking? Authorizing Provider   metoprolol succinate (TOPROL-XL) 25 mg XL tablet Take 0.5 Tablets by mouth daily. 4/18/23  Yes Dayami Sanchez MD   furosemide (LASIX) 20 mg tablet 1 tab po every day except Saturday and Sunday,no pill. 4/18/23  Yes Dayami Sanchez MD   varicella-zoster recombinant, PF, [facility]) 50 mcg/0.5 mL susr injection 0.5 mL by IntraMUSCular route once for 1 dose. 4/18/23 4/18/23 Yes Dayami Sanchez MD   diclofenac (VOLTAREN) 1 % gel Apply  to affected area two (2) times daily as needed for Pain. 2/1/23  Yes Dayami Sanchez MD   multivitamin (ONE A DAY) tablet Take 1 Tablet by mouth daily. 2/1/23  Yes Dayami Sanchez MD   calcium carbonate (CALTREX) 600 mg calcium (1,500 mg) tablet Take 1 Tablet by mouth two (2) times a day. 9/19/22  Yes Dayami Sanchez MD   OTHER Adult diaper large size. Use 3 to 4  diaper a day. DX:mixed urine incontinence 4/20/22  Yes Dayami Sanchez MD   Leg Brace (ACE Knee Brace) mis by Does Not Apply route. Use knee brace to walk. DX:chronic knee pain 4/20/22  Yes Dayami Sanchez MD   Ayr Saline topical gel APPLY TO AFFECTED AREA TWICE DAILY AS NEEDED FOR NOSE BLEEDING 1/28/22  Yes Dayami Sanchez MD   lidocaine 4 % patch 1 Patch by TransDERmal route every twenty-four (24) hours.  1/20/22  Yes Stephanie Branch MD   neomycin-polymyxin-dexamethasone (DEXACINE) 3.5 mg/g-10,000 unit/g-0.1 % ophthalmic ointment  12/16/21  Yes Other, MD Royer   loratadine (CLARITIN) 10 mg tablet TAKE 1 TABLET BY MOUTH NIGHTLY AS NEEDED FOR ALLERGIES 3/10/21  Yes Issac Prieto NP   fluticasone propionate (FLONASE) 50 mcg/actuation nasal spray SPRAY 2 SPRAYS INTO EACH NOSTRIL EVERY DAY 8/18/20  Yes Pamela Keller NP   albuterol (PROVENTIL HFA, VENTOLIN HFA, PROAIR HFA) 90 mcg/actuation inhaler INHALE 2 PUFFS EVERY 6 HOURS AS NEEDED FOR WHEEZE 6/29/20  Yes Darryle Harvest, MD OTHER 15 mm Hg below need stockings for both legs. please measure leg width 5/27/20  Yes Darryle Harvest, MD   acetaminophen (TYLENOL) 500 mg tablet Take 1 Tab by mouth two (2) times daily as needed for Pain. 5/4/20  Yes Darryle Harvest, MD   aspirin 81 mg tablet Take 1 Tablet by mouth. Yes Royer Grace MD   furosemide (LASIX) 20 mg tablet TAKE 1 TABLET BY MOUTH ON MONDAY AND FRIDAY 3/5/23 4/18/23  Chitra Keller NP   ergocalciferol (ERGOCALCIFEROL) 1,250 mcg (50,000 unit) capsule Take 1 Capsule by mouth every seven (7) days. 10/5/22 4/18/23  Darryle Harvest, MD   metoprolol succinate (TOPROL-XL) 25 mg XL tablet Take 0.5 Tablets by mouth daily. 9/19/22 4/18/23  Darryle Harvest, MD     Past Medical History:   Diagnosis Date    Arrhythmia     bradycardia,S/P pacemaker    CHF (congestive heart failure) ([facility]     Heart failure ([facility] [address]     Hypertension     Irregular heart rate     Pure hypercholesterolemia 3/27/2017       ROS significant for urine frequency and memory changes.     Objective:     Patient-Reported Vitals 6/16/2021   Patient-Reported Weight 158lb   Patient-Reported Height -          Constitutional: [x] Appears well-developed and well-nourished [x] No apparent distress      [] Abnormal -     Mental status: [x] Alert and awake  [x] Oriented to person/place/time [x] Able to follow commands    [] Abnormal -     Eyes:   EOM    [x]  Normal    [] Abnormal -   Sclera  [x]  Normal    [] Abnormal -          Discharge [x]  None visible   [] Abnormal -     HENT: [x] Normocephalic, atraumatic  [] Abnormal -   [x] Mouth/Throat: Mucous membranes are moist    External Ears [x] Normal  [] Abnormal -    Neck: [x] No visualized mass [] Abnormal -     Pulmonary/Chest: [x] Respiratory effort normal   [x] No visualized signs of difficulty breathing or respiratory distress        [] Abnormal -      Musculoskeletal:   [x] Normal gait with no signs of ataxia         [x] Normal range of motion of neck        [] Abnormal -     Neurological:        [x] No Facial Asymmetry (Cranial nerve 7 motor function) (limited exam due to video visit)          [x] No gaze palsy        [] Abnormal -          Skin:        [x] No significant exanthematous lesions or discoloration noted on facial skin         [] Abnormal -            Psychiatric:       [x] Normal Affect [] Abnormal -   Probable dementia. [x] No Hallucinations    Other pertinent observable physical exam findings:-        We discussed the expected course, resolution and complications of the diagnosis(es) in detail. Medication risks, benefits, costs, interactions, and alternatives were discussed as indicated. I advised her to contact the office if her condition worsens, changes or fails to improve as anticipated. She expressed understanding with the diagnosis(es) and plan. Thomas Chavis, was evaluated through a synchronous (real-time) audio-video encounter. The patient (or guardian if applicable) is aware that this is a billable service, which includes applicable co-pays. This Virtual Visit was conducted with patient's (and/or legal guardian's) consent. The visit was conducted pursuant to the emergency declaration under the [address] [place] [address] authority and the [employer] General Act. Patient identification was verified, and a caregiver was present when appropriate. The patient was located at: Home: [address] [facility] [address]  The provider was located at:  Facility (Appt Department): [address] [place] [address] [id number]  Pillo Null MD

## 2023-04-28 NOTE — LETTER
10/3/2017 2:42 PM 
 
Ms. Johanna Bhagat [address] [facility] Dear Johanna Bhagat: 
 
Please find your most recent results below. Resulted Orders METABOLIC PANEL, COMPREHENSIVE Result Value Ref Range Glucose 86 65 - 99 mg/dL BUN 10 8 - 27 mg/dL Creatinine 0.74 0.57 - 1.00 mg/dL GFR est non-AA 75 >59 mL/min/1.73 GFR est AA 86 >59 mL/min/1.73  
 BUN/Creatinine ratio 14 12 - 28 Sodium 140 134 - 144 mmol/L Potassium 4.2 3.5 - 5.2 mmol/L Chloride 103 96 - 106 mmol/L  
 CO2 26 18 - 29 mmol/L Calcium 10.1 8.7 - 10.3 mg/dL Protein, total 7.7 6.0 - 8.5 g/dL Albumin 4.0 3.5 - 4.7 g/dL GLOBULIN, TOTAL 3.7 1.5 - 4.5 g/dL A-G Ratio 1.1 (L) 1.2 - 2.2 Bilirubin, total 0.8 0.0 - 1.2 mg/dL Alk. phosphatase 82 39 - 117 IU/L  
 AST (SGOT) 30 0 - 40 IU/L  
 ALT (SGPT) 20 0 - 32 IU/L Narrative Performed at:  [address]  [id number] : Rama Roberson MD, Phone:  [phone] VITAMIN D, 25 HYDROXY Result Value Ref Range VITAMIN D, 25-HYDROXY 19.6 (L) 30.0 - 100.0 ng/mL Comment:  
   Vitamin D deficiency has been defined by the [facility] practice guideline as a 
level of serum 25-OH vitamin D less than 20 ng/mL (1,2). The Endocrine Society went on to further define vitamin D 
insufficiency as a level between 21 and 29 ng/mL (2). 1. IOM ([place] [employer]). 2010. Dietary reference 
   intakes for calcium and D. [facility]: The 
   [employer]. 2. Lisa MF, Murray BENDER, Unique CONWAY, et al. 
   Evaluation, treatment, and prevention of vitamin D 
   deficiency: an Endocrine Society clinical practice 
   guideline. JCEM. 2011 Jul; 96(7):1911-30. Narrative Performed at:  [address]  [id number] : Rama Roberson MD, Phone:  [phone] RECOMMENDATIONS: 
Laci Lama your labs indicate that  your vitamin  D level very low, will start on vit D 50,000 unit 1 cap weekly for 4 months. will repeat level in 4 months and increase your consumption of  milk product and expose to sun for 20 min a day. Please call me if you have any questions: [phone] Sincerely, Bonnie Morales MD 
 [Normal Mood and Affect] : normal mood and affect [Able to Communicate] : able to communicate [Well Developed] : well developed [Well Nourished] : well nourished [NL (0-180)] : full passive forward flexion 0-180 degrees [NL (0-90)] : full external rotation 0-90 degrees [Right] : right shoulder [There are no fractures, subluxations or dislocations. No significant abnormalities are seen] : There are no fractures, subluxations or dislocations. No significant abnormalities are seen [Type 2 acromion] : Type 2 acromion [Left] : left hand [5___] : grasp 5[unfilled]/5 [de-identified] : thin [FreeTextEntry9] : IR to T9 [de-identified] : + [place] [] : negative triggering

## 2023-05-12 ENCOUNTER — APPOINTMENT (OUTPATIENT)
Facility: HOSPITAL | Age: 88
End: 2023-05-12
Payer: MEDICARE

## 2023-05-12 ENCOUNTER — HOSPITAL ENCOUNTER (EMERGENCY)
Facility: HOSPITAL | Age: 88
Discharge: HOME | End: 2023-05-13
Attending: EMERGENCY MEDICINE
Payer: MEDICARE

## 2023-05-12 VITALS
HEIGHT: 67 IN | HEART RATE: 62 BPM | BODY MASS INDEX: 23.6 KG/M2 | OXYGEN SATURATION: 99 % | RESPIRATION RATE: 14 BRPM | SYSTOLIC BLOOD PRESSURE: 128 MMHG | WEIGHT: 150.35 LBS | TEMPERATURE: 97.9 F | DIASTOLIC BLOOD PRESSURE: 59 MMHG

## 2023-05-12 DIAGNOSIS — R07.9 CHEST PAIN, UNSPECIFIED TYPE: Primary | ICD-10-CM

## 2023-05-12 LAB
ALBUMIN SERPL-MCNC: 3.4 G/DL (ref 3.5–5)
ALBUMIN/GLOB SERPL: 0.6 (ref 1.1–2.2)
ALP SERPL-CCNC: 114 U/L (ref 45–117)
ALT SERPL-CCNC: 34 U/L (ref 12–78)
ANION GAP SERPL CALC-SCNC: ABNORMAL MMOL/L (ref 5–15)
AST SERPL-CCNC: 66 U/L (ref 15–37)
BILIRUB SERPL-MCNC: 0.8 MG/DL (ref 0.2–1)
BUN SERPL-MCNC: 28 MG/DL (ref 6–20)
BUN/CREAT SERPL: 31 (ref 12–20)
CALCIUM SERPL-MCNC: 10.6 MG/DL (ref 8.5–10.1)
CHLORIDE SERPL-SCNC: 105 MMOL/L (ref 97–108)
CO2 SERPL-SCNC: 27 MMOL/L (ref 21–32)
COMMENT:: NORMAL
CREAT SERPL-MCNC: 0.91 MG/DL (ref 0.55–1.02)
ERYTHROCYTE [DISTWIDTH] IN BLOOD BY AUTOMATED COUNT: 17.4 % (ref 11.5–14.5)
GLOBULIN SER CALC-MCNC: 6.1 G/DL (ref 2–4)
GLUCOSE SERPL-MCNC: 93 MG/DL (ref 65–100)
HCT VFR BLD AUTO: 43.5 % (ref 35–47)
HGB BLD-MCNC: 13.5 G/DL (ref 11.5–16)
MCH RBC QN AUTO: 21.9 PG (ref 26–34)
MCHC RBC AUTO-ENTMCNC: 31 G/DL (ref 30–36.5)
MCV RBC AUTO: 70.6 FL (ref 80–99)
NRBC # BLD: 0 K/UL (ref 0–0.01)
NRBC BLD-RTO: 0 PER 100 WBC
NT PRO BNP: 78 PG/ML
PLATELET # BLD AUTO: 128 K/UL (ref 150–400)
POTASSIUM SERPL-SCNC: ABNORMAL MMOL/L (ref 3.5–5.1)
PROT SERPL-MCNC: 9.5 G/DL (ref 6.4–8.2)
RBC # BLD AUTO: 6.16 M/UL (ref 3.8–5.2)
SODIUM SERPL-SCNC: 131 MMOL/L (ref 136–145)
SPECIMEN HOLD: NORMAL
TROPONIN I SERPL HS-MCNC: 9 NG/L (ref 0–37)
WBC # BLD AUTO: 4.1 K/UL (ref 3.6–11)

## 2023-05-12 PROCEDURE — 84484 ASSAY OF TROPONIN QUANT: CPT

## 2023-05-12 PROCEDURE — 36415 COLL VENOUS BLD VENIPUNCTURE: CPT

## 2023-05-12 PROCEDURE — 83880 ASSAY OF NATRIURETIC PEPTIDE: CPT

## 2023-05-12 PROCEDURE — 80053 COMPREHEN METABOLIC PANEL: CPT

## 2023-05-12 PROCEDURE — 99284 EMERGENCY DEPT VISIT MOD MDM: CPT

## 2023-05-12 PROCEDURE — 85027 COMPLETE CBC AUTOMATED: CPT

## 2023-05-12 PROCEDURE — 71045 X-RAY EXAM CHEST 1 VIEW: CPT

## 2023-05-12 ASSESSMENT — PAIN - FUNCTIONAL ASSESSMENT: PAIN_FUNCTIONAL_ASSESSMENT: NONE - DENIES PAIN

## 2023-05-13 LAB
EKG ATRIAL RATE: 64 BPM
EKG DIAGNOSIS: NORMAL
EKG P AXIS: 98 DEGREES
EKG P-R INTERVAL: 168 MS
EKG Q-T INTERVAL: 414 MS
EKG QRS DURATION: 78 MS
EKG QTC CALCULATION (BAZETT): 427 MS
EKG R AXIS: 61 DEGREES
EKG T AXIS: 69 DEGREES
EKG VENTRICULAR RATE: 64 BPM
TROPONIN I SERPL HS-MCNC: 8 NG/L (ref 0–37)

## 2023-05-13 PROCEDURE — 84484 ASSAY OF TROPONIN QUANT: CPT

## 2023-05-13 PROCEDURE — 36415 COLL VENOUS BLD VENIPUNCTURE: CPT

## 2023-05-13 RX ORDER — 0.9 % SODIUM CHLORIDE 0.9 %
500 INTRAVENOUS SOLUTION INTRAVENOUS ONCE
Status: DISCONTINUED | OUTPATIENT
Start: 2023-05-13 | End: 2023-05-13 | Stop reason: HOSPADM

## 2023-05-13 RX ORDER — 0.9 % SODIUM CHLORIDE 0.9 %
1000 INTRAVENOUS SOLUTION INTRAVENOUS ONCE
Status: DISCONTINUED | OUTPATIENT
Start: 2023-05-13 | End: 2023-05-13

## 2023-05-13 NOTE — ED TRIAGE NOTES
Pt comes in with complaints of chest discomfort. States she has been feeling like this for three days. Pt states she is urinating more than normal. States she has SOB. Pt states she has CHF and a pacemaker. Pts legs are swollen and shiny bilaterally.

## 2023-05-13 NOTE — DISCHARGE INSTRUCTIONS
Your blood work and EKG showed you did not have a heart attack. Please discuss with Dr Duane Hawthorne on Monday. Thank you.

## 2023-05-13 NOTE — ED PROVIDER NOTES
[facility] EMERGENCY DEP  EMERGENCY DEPARTMENT ENCOUNTER      Pt Name: Eric Meza  MRN: [id number]  Alethagfvanessa 2/2/1933  Date of evaluation: 5/12/2023  Provider: Vishal Acevedo MD    CHIEF COMPLAINT       Chief Complaint   Patient presents with    Chest Pain         HISTORY OF PRESENT ILLNESS   (Location/Symptom, Timing/Onset, Context/Setting, Quality, Duration, Modifying Factors, Severity)  Note limiting factors. Patient is a 80-year-old female with history of bradycardia status post pacemaker, congestive heart failure, hypertension, hyperlipidemia who presents with midsternal chest pressure that lasted 5 minutes 3 days ago. She mentioned it to the staff at the facility, and they did an EKG which was normal.  Family member reported that he thinks it could be GERD, but wanted to get her checked out. Patient currently has no symptoms. Denies any prior history of coronary artery disease. Patient reports she has never felt chest pressure before. No recent travel, illness, sick contacts. The history is provided by the patient and a relative. Review of External Medical Records:     Nursing Notes were reviewed. REVIEW OF SYSTEMS    (2-9 systems for level 4, 10 or more for level 5)     Review of Systems   Cardiovascular:  Positive for chest pain. Except as noted above the remainder of the review of systems was reviewed and negative.        PAST MEDICAL HISTORY     Past Medical History:   Diagnosis Date    Arrhythmia     bradycardia,S/P pacemaker    CHF (congestive heart failure) ([address]     Heart failure ([address]     Hypertension     Irregular heart rate     Pure hypercholesterolemia 3/27/2017         SURGICAL HISTORY       Past Surgical History:   Procedure Laterality Date    ORTHOPEDIC SURGERY      left total knee replacement    OTHER SURGICAL HISTORY  11/2019    Toe surgery    PACEMAKER           CURRENT MEDICATIONS       Discharge Medication List as of 5/13/2023  2:32 AM        CONTINUE these

## 2023-05-13 NOTE — ED NOTES
Pts cardiologist Dr. Gibson Devi, RN  05/12/23 2234
°F (36.6 °C) Tympanic 65 17 100 % 5' 7\" (1.702 m) 150 lb 5.7 oz (68.2 kg)       Body mass index is 23.55 kg/m². Physical Exam  Constitutional:       General: She is not in acute distress. Appearance: Normal appearance. She is not ill-appearing or diaphoretic. HENT:      Head: Normocephalic. Right Ear: External ear normal.      Left Ear: External ear normal.      Nose: Nose normal.      Mouth/Throat:      Mouth: Mucous membranes are moist.   Eyes:      General: No scleral icterus. Cardiovascular:      Rate and Rhythm: Normal rate. Pulmonary:      Effort: Pulmonary effort is normal. No respiratory distress. Breath sounds: Normal breath sounds. No stridor. Chest:      Chest wall: No tenderness. Abdominal:      General: There is no distension. Musculoskeletal:         General: No deformity. Cervical back: No rigidity. Skin:     Coloration: Skin is not jaundiced. Neurological:      Mental Status: She is oriented to person, place, and time. Psychiatric:         Behavior: Behavior normal.       DIAGNOSTIC RESULTS     EKG: All EKG's are interpreted by the Emergency Department Physician who either signs or Co-signs this chart in the absence of a cardiologist.        RADIOLOGY:   Non-plain film images such as CT, Ultrasound and MRI are read by the radiologist. Plain radiographic images are visualized and preliminarily interpreted by the emergency physician with the below findings:    No Pneumonia, pneumothorax, widened mediastinum.     Interpretation per the Radiologist below, if available at the time of this note:    XR CHEST PORTABLE   Final Result      No acute process or change compared to the prior exam.              LABS:  Labs Reviewed   CBC - Abnormal; Notable for the following components:       Result Value    RBC 6.16 (*)     MCV 70.6 (*)     MCH 21.9 (*)     RDW 17.4 (*)     Platelets 921 (*)     All other components within normal limits   COMPREHENSIVE METABOLIC PANEL -

## 2023-06-01 ENCOUNTER — OFFICE VISIT (OUTPATIENT)
Age: 88
End: 2023-06-01
Payer: MEDICARE

## 2023-06-01 VITALS
DIASTOLIC BLOOD PRESSURE: 82 MMHG | RESPIRATION RATE: 16 BRPM | SYSTOLIC BLOOD PRESSURE: 126 MMHG | TEMPERATURE: 97.9 F | BODY MASS INDEX: 24.08 KG/M2 | HEART RATE: 64 BPM | WEIGHT: 153.4 LBS | OXYGEN SATURATION: 98 % | HEIGHT: 67 IN

## 2023-06-01 DIAGNOSIS — R41.3 MEMORY CHANGE: ICD-10-CM

## 2023-06-01 DIAGNOSIS — I10 ESSENTIAL HYPERTENSION: ICD-10-CM

## 2023-06-01 DIAGNOSIS — M85.80 OSTEOPENIA, UNSPECIFIED LOCATION: ICD-10-CM

## 2023-06-01 DIAGNOSIS — R26.9 GAIT ABNORMALITY: ICD-10-CM

## 2023-06-01 DIAGNOSIS — I87.2 VENOUS INSUFFICIENCY (CHRONIC) (PERIPHERAL): Primary | ICD-10-CM

## 2023-06-01 DIAGNOSIS — E87.1 HYPONATREMIA: ICD-10-CM

## 2023-06-01 DIAGNOSIS — I50.9 CONGESTIVE HEART FAILURE, UNSPECIFIED HF CHRONICITY, UNSPECIFIED HEART FAILURE TYPE (HCC): ICD-10-CM

## 2023-06-01 DIAGNOSIS — E55.9 VITAMIN D DEFICIENCY: ICD-10-CM

## 2023-06-01 PROCEDURE — G8427 DOCREV CUR MEDS BY ELIG CLIN: HCPCS | Performed by: INTERNAL MEDICINE

## 2023-06-01 PROCEDURE — 99214 OFFICE O/P EST MOD 30 MIN: CPT | Performed by: INTERNAL MEDICINE

## 2023-06-01 PROCEDURE — 1090F PRES/ABSN URINE INCON ASSESS: CPT | Performed by: INTERNAL MEDICINE

## 2023-06-01 PROCEDURE — 1123F ACP DISCUSS/DSCN MKR DOCD: CPT | Performed by: INTERNAL MEDICINE

## 2023-06-01 PROCEDURE — G8420 CALC BMI NORM PARAMETERS: HCPCS | Performed by: INTERNAL MEDICINE

## 2023-06-01 PROCEDURE — 1036F TOBACCO NON-USER: CPT | Performed by: INTERNAL MEDICINE

## 2023-06-01 RX ORDER — HYDROXYZINE HYDROCHLORIDE 10 MG/1
TABLET, FILM COATED ORAL
COMMUNITY
Start: 2016-01-20

## 2023-06-01 RX ORDER — MELATONIN
1000 DAILY
Qty: 30 TABLET | Refills: 5 | Status: SHIPPED | OUTPATIENT
Start: 2023-06-01

## 2023-06-01 RX ORDER — ASPIRIN 81 MG/1
81 TABLET, CHEWABLE ORAL DAILY
Qty: 30 TABLET | Refills: 5 | Status: SHIPPED | OUTPATIENT
Start: 2023-06-01

## 2023-06-01 RX ORDER — METOPROLOL SUCCINATE 25 MG/1
25 TABLET, EXTENDED RELEASE ORAL DAILY
Qty: 30 TABLET | Refills: 5 | Status: SHIPPED | OUTPATIENT
Start: 2023-06-01

## 2023-06-01 RX ORDER — AMLODIPINE BESYLATE 10 MG/1
TABLET ORAL
COMMUNITY
Start: 2016-01-13 | End: 2023-06-01

## 2023-06-01 RX ORDER — FUROSEMIDE 20 MG/1
20 TABLET ORAL DAILY
Qty: 25 TABLET | Refills: 5 | Status: SHIPPED | OUTPATIENT
Start: 2023-06-01

## 2023-06-01 SDOH — ECONOMIC STABILITY: FOOD INSECURITY: WITHIN THE PAST 12 MONTHS, THE FOOD YOU BOUGHT JUST DIDN'T LAST AND YOU DIDN'T HAVE MONEY TO GET MORE.: NEVER TRUE

## 2023-06-01 SDOH — ECONOMIC STABILITY: HOUSING INSECURITY
IN THE LAST 12 MONTHS, WAS THERE A TIME WHEN YOU DID NOT HAVE A STEADY PLACE TO SLEEP OR SLEPT IN A SHELTER (INCLUDING NOW)?: NO

## 2023-06-01 SDOH — ECONOMIC STABILITY: FOOD INSECURITY: WITHIN THE PAST 12 MONTHS, YOU WORRIED THAT YOUR FOOD WOULD RUN OUT BEFORE YOU GOT MONEY TO BUY MORE.: NEVER TRUE

## 2023-06-01 SDOH — ECONOMIC STABILITY: INCOME INSECURITY: HOW HARD IS IT FOR YOU TO PAY FOR THE VERY BASICS LIKE FOOD, HOUSING, MEDICAL CARE, AND HEATING?: NOT HARD AT ALL

## 2023-06-01 ASSESSMENT — PATIENT HEALTH QUESTIONNAIRE - PHQ9
SUM OF ALL RESPONSES TO PHQ9 QUESTIONS 1 & 2: 0
SUM OF ALL RESPONSES TO PHQ QUESTIONS 1-9: 0
2. FEELING DOWN, DEPRESSED OR HOPELESS: 0
1. LITTLE INTEREST OR PLEASURE IN DOING THINGS: 0
SUM OF ALL RESPONSES TO PHQ QUESTIONS 1-9: 0

## 2023-06-01 ASSESSMENT — ENCOUNTER SYMPTOMS
GASTROINTESTINAL NEGATIVE: 1
EYES NEGATIVE: 1
RESPIRATORY NEGATIVE: 1
ALLERGIC/IMMUNOLOGIC NEGATIVE: 1

## 2023-06-01 NOTE — PROGRESS NOTES
Subjective:      Patient ID: Gigi Barlow is a 80 y.o. female here accompanied by her niece. Noticed to have bilateral leg edema. She is only taking Lasix 2 days a week. No shortness of breath orthopnea. She has seen podiatrist and was placed on cast so that she can walk better. She is using walker to walk. Has gait and balance issue. Need home health aide. She has noticed to have memory loss. Not able to remember all medication. Medication need to be changed to a different pharmacy who can do pill packing. Has hypertension, however with medication. Denies chest pain palpitation or shortness of breath. She has osteopenia, supposed to take Calcium with vitamin D. Has gait and balance issue, need home health nurse. Has history of congestive heart failure, no shortness of breath orthopnea. Congestive Heart Failure    Hypertension    Diabetes  Hypoglycemia symptoms include confusion. Review of Systems   Constitutional: Negative. HENT: Negative. Eyes: Negative. Respiratory: Negative. Cardiovascular: Negative. Gastrointestinal: Negative. Endocrine: Negative. Genitourinary: Negative. Musculoskeletal: Negative. Skin: Negative. Allergic/Immunologic: Negative. Neurological: Negative. Hematological: Negative. Psychiatric/Behavioral:  Positive for confusion. Objective:   Physical Exam  Constitutional:       Appearance: Normal appearance. She is obese. HENT:      Head: Normocephalic and atraumatic. Cardiovascular:      Rate and Rhythm: Normal rate and regular rhythm. Pulses: Normal pulses. Heart sounds: Normal heart sounds. Pulmonary:      Effort: Pulmonary effort is normal.      Breath sounds: Normal breath sounds. Abdominal:      General: Abdomen is flat. Bowel sounds are normal.      Palpations: Abdomen is soft. Musculoskeletal:         General: Swelling and tenderness present.       Cervical back: Normal range of motion and neck

## 2023-06-06 ENCOUNTER — TELEPHONE (OUTPATIENT)
Age: 88
End: 2023-06-06

## 2023-06-06 NOTE — TELEPHONE ENCOUNTER
Verbal order needed for occupational therapy 1x  per week for 4wks   Please call [facility]   [phone]

## 2023-06-07 DIAGNOSIS — I50.9 CONGESTIVE HEART FAILURE, UNSPECIFIED HF CHRONICITY, UNSPECIFIED HEART FAILURE TYPE (HCC): ICD-10-CM

## 2023-06-07 RX ORDER — LORATADINE 10 MG/1
TABLET ORAL
Qty: 90 TABLET | Refills: 1 | Status: SHIPPED | OUTPATIENT
Start: 2023-06-07

## 2023-06-07 NOTE — TELEPHONE ENCOUNTER
Loratadine PRN   Multi vitamin  Needs to have new scripts they are now doing her pill packaging  please send to [facility] on chart

## 2023-06-07 NOTE — TELEPHONE ENCOUNTER
Requested Prescriptions     Pending Prescriptions Disp Refills    loratadine (CLARITIN) 10 MG tablet 90 tablet 1     Sig: TAKE 1 TABLET BY MOUTH NIGHTLY AS NEEDED FOR ALLERGIES    Multiple Vitamin (MVI, CELEBRATE, CHEWABLE TABLET) 30 tablet 11     Sig: Take 1 tablet by mouth daily         [address] [phone] Richar Armstrong [phone]  [facility] [address]  Phone: [phone] Fax: [phone]       Last appt 6/1/2023      Future Appointments   Date Time Provider Rachel Cornelius   9/1/2023 10:00 AM Antonieta Cummins PSYD [facility] BS AMB   9/11/2023 11:30 AM Deland Goldmann, MD [facility] BS AMB

## 2023-06-07 NOTE — TELEPHONE ENCOUNTER
[place], [address] [place] [address] was called and verbalized understanding on note below, verbal given

## 2023-06-20 ENCOUNTER — PROCEDURE VISIT (OUTPATIENT)
Age: 88
End: 2023-06-20
Payer: MEDICARE

## 2023-06-20 ENCOUNTER — OFFICE VISIT (OUTPATIENT)
Age: 88
End: 2023-06-20
Payer: MEDICARE

## 2023-06-20 DIAGNOSIS — F01.A0 MILD VASCULAR DEMENTIA WITHOUT BEHAVIORAL DISTURBANCE, PSYCHOTIC DISTURBANCE, MOOD DISTURBANCE, OR ANXIETY (HCC): ICD-10-CM

## 2023-06-20 DIAGNOSIS — R41.840 ATTENTION DEFICIT: ICD-10-CM

## 2023-06-20 DIAGNOSIS — F43.21 ADJUSTMENT DISORDER WITH DEPRESSED MOOD: ICD-10-CM

## 2023-06-20 DIAGNOSIS — F43.23 ADJUSTMENT DISORDER WITH MIXED ANXIETY AND DEPRESSED MOOD: ICD-10-CM

## 2023-06-20 DIAGNOSIS — R41.3 MEMORY LOSS: Primary | ICD-10-CM

## 2023-06-20 DIAGNOSIS — I67.9 CEREBROVASCULAR DISEASE: Primary | ICD-10-CM

## 2023-06-20 PROCEDURE — 90785 PSYTX COMPLEX INTERACTIVE: CPT | Performed by: CLINICAL NEUROPSYCHOLOGIST

## 2023-06-20 PROCEDURE — 96136 PSYCL/NRPSYC TST PHY/QHP 1ST: CPT | Performed by: CLINICAL NEUROPSYCHOLOGIST

## 2023-06-20 PROCEDURE — 96138 PSYCL/NRPSYC TECH 1ST: CPT | Performed by: CLINICAL NEUROPSYCHOLOGIST

## 2023-06-20 PROCEDURE — 90791 PSYCH DIAGNOSTIC EVALUATION: CPT | Performed by: CLINICAL NEUROPSYCHOLOGIST

## 2023-06-20 PROCEDURE — 1123F ACP DISCUSS/DSCN MKR DOCD: CPT | Performed by: CLINICAL NEUROPSYCHOLOGIST

## 2023-06-20 PROCEDURE — 96139 PSYCL/NRPSYC TST TECH EA: CPT | Performed by: CLINICAL NEUROPSYCHOLOGIST

## 2023-06-20 PROCEDURE — 1036F TOBACCO NON-USER: CPT | Performed by: CLINICAL NEUROPSYCHOLOGIST
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
## 2023-06-30 LAB
BUN SERPL-MCNC: 29 MG/DL (ref 10–36)
BUN/CREAT SERPL: 37 (ref 12–28)
CALCIUM SERPL-MCNC: 10.6 MG/DL (ref 8.7–10.3)
CHLORIDE SERPL-SCNC: 104 MMOL/L (ref 96–106)
CO2 SERPL-SCNC: 24 MMOL/L (ref 20–29)
CREAT SERPL-MCNC: 0.79 MG/DL (ref 0.57–1)
EGFRCR SERPLBLD CKD-EPI 2021: 71 ML/MIN/1.73
GLUCOSE SERPL-MCNC: 90 MG/DL (ref 70–99)
POTASSIUM SERPL-SCNC: 4.4 MMOL/L (ref 3.5–5.2)
SODIUM SERPL-SCNC: 141 MMOL/L (ref 134–144)

## 2023-07-18 NOTE — PROGRESS NOTES
Neuropsychological Evaluation Report      Patient Name: Gretchen Benton  YOB: 1933    Age: 80 y.o. Date of Intake: 2023   Education: 14 Date of Testin2023   Gender: Female Ethnicity: Black     Referring Provider: Dr. Saskia Narayan:  Gretchen Benton  was referred for neuropsychological evaluation by her Primary Care Physician to obtain a quantitative assessment of her current level of neurocognitive functioning, assist in differential diagnosis, and aid in individualized treatment planning. The patient understood the rationale and procedures for evaluation, as well as the limits to confidentiality, and they agreed to participate. The patient consented to have this report made available to her  treating providers through her  electronic medical records. This evaluation was completed with the patient by Heather Dobson PsyD with the exception of testing by technician, which was completed by SEBASTIAN Guadalupe under the supervision of Dr. Louie Montana. History Sources: Patient, Relative (niece), Medical Record, and Test Data    SUMMARY AND IMPRESSION:  The following section is a summary of the patient's pertinent test results and impressions. A more thorough review of the patient's background and test scores can be found below. Neuropsychological evaluation revealed pronounced impairment (~3 standard deviations below the mean) related to executive functioning and learning. Recall was notable for mild impairment (~1.5 standard deviations below the mean) and while recognition/discrimination was inefficient (~1 standard deviation below the mean), it was not amnestic. Attention/working memory was also mildly impaired and mental processing speed was inefficient. 1 aspect of expressive language (i.e., confrontation naming) was moderately impaired wall verbal fluency performances were relative strengths and within normal limits.   Practical judgment was

## 2023-07-19 ENCOUNTER — OFFICE VISIT (OUTPATIENT)
Age: 88
End: 2023-07-19
Payer: MEDICARE

## 2023-07-19 DIAGNOSIS — I67.9 CEREBROVASCULAR DISEASE: Primary | ICD-10-CM

## 2023-07-19 DIAGNOSIS — F01.A0 MILD VASCULAR DEMENTIA WITHOUT BEHAVIORAL DISTURBANCE, PSYCHOTIC DISTURBANCE, MOOD DISTURBANCE, OR ANXIETY (HCC): ICD-10-CM

## 2023-07-19 PROCEDURE — 96132 NRPSYC TST EVAL PHYS/QHP 1ST: CPT | Performed by: CLINICAL NEUROPSYCHOLOGIST

## 2023-07-19 PROCEDURE — 96133 NRPSYC TST EVAL PHYS/QHP EA: CPT | Performed by: CLINICAL NEUROPSYCHOLOGIST

## 2023-07-19 NOTE — PROGRESS NOTES
Prior to seeing the patient I reviewed pertinent records, including the previously completed report, the records in [address] [place] [address], and any updated visits from other providers since the patient's last visit. Today, I engaged in a psychoeducational and supportive feedback session with the patient. I provided psychotherapy in the form of psychoeducation and support with respect to the results of the recent Neuropsychological Evaluation, including discussing individual tests as well as patient's areas of neurocognitive strength versus weakness. We discussed, in detail, the following:  Testing revealed pronounced impairment on measures of executive functioning and learning while recall was notable for mild impairment and recognition was only inefficient. Attention/working memory was also mildly impaired. Patient's cognitive weaknesses are believed to be related to cerebrovascular disease and bereavement related to the loss of her . As her cognitive impairment has affected her daily functioning, she meets criteria for the diagnosis of dementia  Reviewed recommendations outlined in report  Answered questions to the best of my ability    Education was provided regarding my diagnostic impressions, and we discussed treatment plan/options. I also answered numerous questions related to the clinical findings, including discussing various methods to improve cognition and mood. Supportive/Cognitive Behavioral/Solution Focused psychotherapy provided. The patient needs to:    Follow-up with referring provider for ongoing management  Discuss medication for anxiety  Discussed physical therapy due to history of falling  Discontinue driving  Use practical strategies to compensate for cognitive changes  Emphasize modifiable protective behaviors such as exercise, diet, and cognitive stimulation    The patient had the following concerns which I deferred to their referring provider:   meds for mood  Physical therapy for fall

## 2023-07-24 ENCOUNTER — OFFICE VISIT (OUTPATIENT)
Age: 88
End: 2023-07-24
Payer: MEDICARE

## 2023-07-24 VITALS
BODY MASS INDEX: 23.7 KG/M2 | WEIGHT: 151 LBS | OXYGEN SATURATION: 99 % | DIASTOLIC BLOOD PRESSURE: 70 MMHG | HEIGHT: 67 IN | RESPIRATION RATE: 16 BRPM | HEART RATE: 78 BPM | TEMPERATURE: 98 F | SYSTOLIC BLOOD PRESSURE: 124 MMHG

## 2023-07-24 DIAGNOSIS — R41.3 MEMORY CHANGE: ICD-10-CM

## 2023-07-24 DIAGNOSIS — R41.3 MEMORY CHANGE: Primary | ICD-10-CM

## 2023-07-24 DIAGNOSIS — R29.3 BAD POSTURE: ICD-10-CM

## 2023-07-24 DIAGNOSIS — I10 ESSENTIAL (PRIMARY) HYPERTENSION: ICD-10-CM

## 2023-07-24 DIAGNOSIS — R26.9 GAIT ABNORMALITY: ICD-10-CM

## 2023-07-24 PROCEDURE — 99213 OFFICE O/P EST LOW 20 MIN: CPT | Performed by: INTERNAL MEDICINE

## 2023-07-24 PROCEDURE — 1090F PRES/ABSN URINE INCON ASSESS: CPT | Performed by: INTERNAL MEDICINE

## 2023-07-24 PROCEDURE — 1123F ACP DISCUSS/DSCN MKR DOCD: CPT | Performed by: INTERNAL MEDICINE

## 2023-07-24 PROCEDURE — G8428 CUR MEDS NOT DOCUMENT: HCPCS | Performed by: INTERNAL MEDICINE

## 2023-07-24 PROCEDURE — 1036F TOBACCO NON-USER: CPT | Performed by: INTERNAL MEDICINE

## 2023-07-24 PROCEDURE — G8420 CALC BMI NORM PARAMETERS: HCPCS | Performed by: INTERNAL MEDICINE

## 2023-07-24 SDOH — ECONOMIC STABILITY: INCOME INSECURITY: HOW HARD IS IT FOR YOU TO PAY FOR THE VERY BASICS LIKE FOOD, HOUSING, MEDICAL CARE, AND HEATING?: NOT VERY HARD

## 2023-07-24 SDOH — ECONOMIC STABILITY: FOOD INSECURITY: WITHIN THE PAST 12 MONTHS, THE FOOD YOU BOUGHT JUST DIDN'T LAST AND YOU DIDN'T HAVE MONEY TO GET MORE.: NEVER TRUE

## 2023-07-24 SDOH — ECONOMIC STABILITY: FOOD INSECURITY: WITHIN THE PAST 12 MONTHS, YOU WORRIED THAT YOUR FOOD WOULD RUN OUT BEFORE YOU GOT MONEY TO BUY MORE.: NEVER TRUE

## 2023-07-24 ASSESSMENT — PATIENT HEALTH QUESTIONNAIRE - PHQ9
1. LITTLE INTEREST OR PLEASURE IN DOING THINGS: 0
2. FEELING DOWN, DEPRESSED OR HOPELESS: 0
SUM OF ALL RESPONSES TO PHQ QUESTIONS 1-9: 0
SUM OF ALL RESPONSES TO PHQ QUESTIONS 1-9: 0
SUM OF ALL RESPONSES TO PHQ9 QUESTIONS 1 & 2: 0
SUM OF ALL RESPONSES TO PHQ QUESTIONS 1-9: 0
SUM OF ALL RESPONSES TO PHQ QUESTIONS 1-9: 0

## 2023-07-24 ASSESSMENT — ANXIETY QUESTIONNAIRES
1. FEELING NERVOUS, ANXIOUS, OR ON EDGE: 0
IF YOU CHECKED OFF ANY PROBLEMS ON THIS QUESTIONNAIRE, HOW DIFFICULT HAVE THESE PROBLEMS MADE IT FOR YOU TO DO YOUR WORK, TAKE CARE OF THINGS AT HOME, OR GET ALONG WITH OTHER PEOPLE: NOT DIFFICULT AT ALL
6. BECOMING EASILY ANNOYED OR IRRITABLE: 0
4. TROUBLE RELAXING: 0
7. FEELING AFRAID AS IF SOMETHING AWFUL MIGHT HAPPEN: 0
3. WORRYING TOO MUCH ABOUT DIFFERENT THINGS: 0
5. BEING SO RESTLESS THAT IT IS HARD TO SIT STILL: 0
GAD7 TOTAL SCORE: 0
2. NOT BEING ABLE TO STOP OR CONTROL WORRYING: 0

## 2023-07-24 ASSESSMENT — ENCOUNTER SYMPTOMS
GASTROINTESTINAL NEGATIVE: 1
BACK PAIN: 1
RESPIRATORY NEGATIVE: 1

## 2023-07-24 NOTE — PROGRESS NOTES
1. \"Have you been to the ER, urgent care clinic since your last visit? Hospitalized since your last visit? \" No    2. \"Have you seen or consulted any other health care providers outside of the [address] [place] [address] since your last visit? \" No    3. For patients aged 43-73: Has the patient had a colonoscopy / FIT/ Cologuard? Recommendation: Colonoscopy every 10y or annual FIT test from 50-75 or every 3 year stool DNA based test with consideration of ongoing screening from 76-85. If the patient is female:    4. For patients aged 43-66: Has the patient had a mammogram within the past 2 years? No    5. For patients aged 21-65: Has the patient had a pap smear?  No

## 2023-07-25 LAB
FOLATE SERPL-MCNC: 13.8 NG/ML
T4 SERPL-MCNC: 6.8 UG/DL (ref 4.5–12)
TSH SERPL DL<=0.005 MIU/L-ACNC: 1.02 UIU/ML (ref 0.45–4.5)
VIT B12 SERPL-MCNC: 468 PG/ML (ref 232–1245)

## 2023-08-31 DIAGNOSIS — I50.9 CONGESTIVE HEART FAILURE, UNSPECIFIED HF CHRONICITY, UNSPECIFIED HEART FAILURE TYPE (HCC): ICD-10-CM

## 2023-08-31 DIAGNOSIS — I87.2 VENOUS INSUFFICIENCY (CHRONIC) (PERIPHERAL): ICD-10-CM

## 2023-08-31 RX ORDER — FUROSEMIDE 20 MG/1
20 TABLET ORAL DAILY
Qty: 90 TABLET | Refills: 1 | Status: SHIPPED | OUTPATIENT
Start: 2023-08-31

## 2023-08-31 NOTE — TELEPHONE ENCOUNTER
Pts wants to know if she should still be taking furosemide (LASIX) 20 MG tablet. Patient did mention having to change pharmacy due to insurance and was wondering if that was the reason it wasn't filled. Please return call to confirm with patient.    Phone: [phone]

## 2023-08-31 NOTE — TELEPHONE ENCOUNTER
Per last chart note:       \"She has more than 1+ leg edema. As she is she is only taking Lasix 2 times a week. Advised her to take Lasix 20 mg at least 6 days a week and Sunday off since that she is having some hyponatremia. Need to elevate leg. Need to use Ace wraps or stockings. -     furosemide (LASIX) 20 MG tablet; Take 1 tablet by mouth daily 1 tab po qd except Sunday,no waterpill\"      Jenna Youssef was called and verbalized understanding on note below.        Requested Prescriptions     Pending Prescriptions Disp Refills    furosemide (LASIX) 20 MG tablet 90 tablet 1     Sig: Take 1 tablet by mouth daily 1 tab po qd except Sunday,no Avril Sims [phone] [facility] [phone]  [address] [place] [id number]  Phone: [phone] Fax: [phone]       Last appt 7/24/2023      Future Appointments   Date Time Provider [address] [place] [address]   9/11/2023 11:30 AM Krystal Serrano MD [facility]

## 2023-09-04 ENCOUNTER — APPOINTMENT (OUTPATIENT)
Facility: HOSPITAL | Age: 88
End: 2023-09-04
Payer: MEDICARE

## 2023-09-04 ENCOUNTER — HOSPITAL ENCOUNTER (EMERGENCY)
Facility: HOSPITAL | Age: 88
Discharge: HOME | End: 2023-09-04
Attending: STUDENT IN AN ORGANIZED HEALTH CARE EDUCATION/TRAINING PROGRAM
Payer: MEDICARE

## 2023-09-04 VITALS
DIASTOLIC BLOOD PRESSURE: 78 MMHG | OXYGEN SATURATION: 97 % | HEART RATE: 93 BPM | SYSTOLIC BLOOD PRESSURE: 138 MMHG | TEMPERATURE: 98.3 F | RESPIRATION RATE: 14 BRPM

## 2023-09-04 DIAGNOSIS — R51.9 NONINTRACTABLE HEADACHE, UNSPECIFIED CHRONICITY PATTERN, UNSPECIFIED HEADACHE TYPE: Primary | ICD-10-CM

## 2023-09-04 LAB
ALBUMIN SERPL-MCNC: 3 G/DL (ref 3.5–5)
ALBUMIN/GLOB SERPL: 0.7 (ref 1.1–2.2)
ALP SERPL-CCNC: 99 U/L (ref 45–117)
ALT SERPL-CCNC: 25 U/L (ref 12–78)
ANION GAP SERPL CALC-SCNC: 5 MMOL/L (ref 5–15)
AST SERPL-CCNC: 30 U/L (ref 15–37)
BASOPHILS # BLD: 0 K/UL (ref 0–0.1)
BASOPHILS NFR BLD: 1 % (ref 0–1)
BILIRUB SERPL-MCNC: 0.4 MG/DL (ref 0.2–1)
BUN SERPL-MCNC: 20 MG/DL (ref 6–20)
BUN/CREAT SERPL: 19 (ref 12–20)
CALCIUM SERPL-MCNC: 9.9 MG/DL (ref 8.5–10.1)
CHLORIDE SERPL-SCNC: 109 MMOL/L (ref 97–108)
CO2 SERPL-SCNC: 26 MMOL/L (ref 21–32)
COMMENT:: NORMAL
CREAT SERPL-MCNC: 1.04 MG/DL (ref 0.55–1.02)
DIFFERENTIAL METHOD BLD: ABNORMAL
EOSINOPHIL # BLD: 0.2 K/UL (ref 0–0.4)
EOSINOPHIL NFR BLD: 4 % (ref 0–7)
ERYTHROCYTE [DISTWIDTH] IN BLOOD BY AUTOMATED COUNT: 16.6 % (ref 11.5–14.5)
GLOBULIN SER CALC-MCNC: 4.6 G/DL (ref 2–4)
GLUCOSE SERPL-MCNC: 105 MG/DL (ref 65–100)
HCT VFR BLD AUTO: 36.7 % (ref 35–47)
HGB BLD-MCNC: 11.4 G/DL (ref 11.5–16)
IMM GRANULOCYTES # BLD AUTO: 0 K/UL (ref 0–0.04)
IMM GRANULOCYTES NFR BLD AUTO: 0 % (ref 0–0.5)
LYMPHOCYTES # BLD: 1 K/UL (ref 0.8–3.5)
LYMPHOCYTES NFR BLD: 23 % (ref 12–49)
MCH RBC QN AUTO: 21.9 PG (ref 26–34)
MCHC RBC AUTO-ENTMCNC: 31.1 G/DL (ref 30–36.5)
MCV RBC AUTO: 70.4 FL (ref 80–99)
MONOCYTES # BLD: 0.9 K/UL (ref 0–1)
MONOCYTES NFR BLD: 21 % (ref 5–13)
NEUTS SEG # BLD: 2.3 K/UL (ref 1.8–8)
NEUTS SEG NFR BLD: 51 % (ref 32–75)
NRBC # BLD: 0 K/UL (ref 0–0.01)
NRBC BLD-RTO: 0 PER 100 WBC
NT PRO BNP: 185 PG/ML
PLATELET # BLD AUTO: 102 K/UL (ref 150–400)
POTASSIUM SERPL-SCNC: 4.7 MMOL/L (ref 3.5–5.1)
PROT SERPL-MCNC: 7.6 G/DL (ref 6.4–8.2)
RBC # BLD AUTO: 5.21 M/UL (ref 3.8–5.2)
RBC MORPH BLD: ABNORMAL
SODIUM SERPL-SCNC: 140 MMOL/L (ref 136–145)
SPECIMEN HOLD: NORMAL
TROPONIN I SERPL HS-MCNC: 8 NG/L (ref 0–51)
WBC # BLD AUTO: 4.4 K/UL (ref 3.6–11)

## 2023-09-04 PROCEDURE — 71045 X-RAY EXAM CHEST 1 VIEW: CPT

## 2023-09-04 PROCEDURE — 96374 THER/PROPH/DIAG INJ IV PUSH: CPT

## 2023-09-04 PROCEDURE — 85025 COMPLETE CBC W/AUTO DIFF WBC: CPT

## 2023-09-04 PROCEDURE — 70450 CT HEAD/BRAIN W/O DYE: CPT

## 2023-09-04 PROCEDURE — 83880 ASSAY OF NATRIURETIC PEPTIDE: CPT

## 2023-09-04 PROCEDURE — 84484 ASSAY OF TROPONIN QUANT: CPT

## 2023-09-04 PROCEDURE — 93005 ELECTROCARDIOGRAM TRACING: CPT | Performed by: STUDENT IN AN ORGANIZED HEALTH CARE EDUCATION/TRAINING PROGRAM

## 2023-09-04 PROCEDURE — 6360000002 HC RX W HCPCS: Performed by: STUDENT IN AN ORGANIZED HEALTH CARE EDUCATION/TRAINING PROGRAM

## 2023-09-04 PROCEDURE — 36415 COLL VENOUS BLD VENIPUNCTURE: CPT

## 2023-09-04 PROCEDURE — 99285 EMERGENCY DEPT VISIT HI MDM: CPT

## 2023-09-04 PROCEDURE — 80053 COMPREHEN METABOLIC PANEL: CPT

## 2023-09-04 PROCEDURE — 6370000000 HC RX 637 (ALT 250 FOR IP): Performed by: STUDENT IN AN ORGANIZED HEALTH CARE EDUCATION/TRAINING PROGRAM

## 2023-09-04 PROCEDURE — 96375 TX/PRO/DX INJ NEW DRUG ADDON: CPT

## 2023-09-04 RX ORDER — ACETAMINOPHEN 325 MG/1
650 TABLET ORAL
Status: COMPLETED | OUTPATIENT
Start: 2023-09-04 | End: 2023-09-04

## 2023-09-04 RX ORDER — PROCHLORPERAZINE EDISYLATE 5 MG/ML
10 INJECTION INTRAMUSCULAR; INTRAVENOUS ONCE
Status: COMPLETED | OUTPATIENT
Start: 2023-09-04 | End: 2023-09-04

## 2023-09-04 RX ORDER — DIPHENHYDRAMINE HYDROCHLORIDE 50 MG/ML
25 INJECTION INTRAMUSCULAR; INTRAVENOUS
Status: COMPLETED | OUTPATIENT
Start: 2023-09-04 | End: 2023-09-04

## 2023-09-04 RX ADMIN — ACETAMINOPHEN 650 MG: 325 TABLET ORAL at 16:29

## 2023-09-04 RX ADMIN — DIPHENHYDRAMINE HYDROCHLORIDE 25 MG: 50 INJECTION, SOLUTION INTRAMUSCULAR; INTRAVENOUS at 16:30

## 2023-09-04 RX ADMIN — PROCHLORPERAZINE EDISYLATE 10 MG: 5 INJECTION, SOLUTION INTRAMUSCULAR; INTRAVENOUS at 16:32

## 2023-09-04 ASSESSMENT — PAIN SCALES - GENERAL
PAINLEVEL_OUTOF10: 8
PAINLEVEL_OUTOF10: 2

## 2023-09-04 ASSESSMENT — PAIN - FUNCTIONAL ASSESSMENT
PAIN_FUNCTIONAL_ASSESSMENT: 0-10
PAIN_FUNCTIONAL_ASSESSMENT: 0-10

## 2023-09-04 ASSESSMENT — PAIN DESCRIPTION - LOCATION
LOCATION: HEAD
LOCATION: HEAD

## 2023-09-04 NOTE — DISCHARGE INSTRUCTIONS
You were seen today for evaluation of headache. Your exam was reassuring and your CT imaging showed no abnormality. Your headache improved after medication, and you are cleared for discharge home. Please follow-up with your primary care doctor in the next 2 to 5 days for reevaluation and repeat labs. Please return to the emergency department if you have worsening headache, vision changes, weakness, numbness, difficulty with walking, shortness of breath, chest pain  Thank you for letting us take care of you, hope you feel better soon,  Noemi Frankel MD        For pain management, both Tylenol and ibuprofen (motrin) can be taken. They have a different chemical composition and may give more relief together than can be provided using either alone. How to alternate Ibuprofen and Tylenol:  ? With food, You will take a dose of pain medication every three hours. ? Start by taking 650 mg of Tylenol   ? 3 hours later take 600 mg of Motrin   ? 3 hours after taking the Motrin take 650 mg of Tylenol  ? 3 hours after that take 600 mg of Motrin. ? You can continue to alternate Ibuprofen and Tylenol, up to the maximum doses of each medicine, but do not exceed the maximum dose of each. ? Be sure to maintain proper dosing intervals between successive doses of the same medication (at least 4 hours)    Do not take more than 4,000 mg of Tylenol or 3,200 mg of Motrin in a 24-hour period!

## 2023-09-04 NOTE — ED TRIAGE NOTES
Pt arrives via EMS from home for difficulty breathing and headache that started yesterday morning. Pt states that SOB has mostly resolved but still complaining of headache 8/10 pain.      PMH CHF

## 2023-09-07 LAB
EKG ATRIAL RATE: 60 BPM
EKG DIAGNOSIS: NORMAL
EKG P AXIS: 59 DEGREES
EKG P-R INTERVAL: 170 MS
EKG Q-T INTERVAL: 420 MS
EKG QRS DURATION: 84 MS
EKG QTC CALCULATION (BAZETT): 420 MS
EKG R AXIS: 48 DEGREES
EKG T AXIS: 58 DEGREES
EKG VENTRICULAR RATE: 60 BPM

## 2023-09-07 PROCEDURE — 93010 ELECTROCARDIOGRAM REPORT: CPT | Performed by: SPECIALIST

## 2023-09-11 ENCOUNTER — TELEPHONE (OUTPATIENT)
Age: 88
End: 2023-09-11

## 2023-09-11 ENCOUNTER — OFFICE VISIT (OUTPATIENT)
Age: 88
End: 2023-09-11
Payer: MEDICARE

## 2023-09-11 VITALS
WEIGHT: 146.4 LBS | HEIGHT: 67 IN | RESPIRATION RATE: 16 BRPM | BODY MASS INDEX: 22.98 KG/M2 | DIASTOLIC BLOOD PRESSURE: 80 MMHG | OXYGEN SATURATION: 99 % | SYSTOLIC BLOOD PRESSURE: 132 MMHG | HEART RATE: 60 BPM | TEMPERATURE: 97.6 F

## 2023-09-11 DIAGNOSIS — I87.2 VENOUS INSUFFICIENCY (CHRONIC) (PERIPHERAL): ICD-10-CM

## 2023-09-11 DIAGNOSIS — R41.3 MEMORY CHANGE: ICD-10-CM

## 2023-09-11 DIAGNOSIS — I10 ESSENTIAL HYPERTENSION: ICD-10-CM

## 2023-09-11 DIAGNOSIS — Z63.4 BEREAVEMENT: ICD-10-CM

## 2023-09-11 DIAGNOSIS — Z23 NEED FOR VACCINATION: ICD-10-CM

## 2023-09-11 DIAGNOSIS — R26.9 GAIT ABNORMALITY: ICD-10-CM

## 2023-09-11 DIAGNOSIS — F41.9 ANXIETY DISORDER, UNSPECIFIED TYPE: Primary | ICD-10-CM

## 2023-09-11 DIAGNOSIS — I50.9 CONGESTIVE HEART FAILURE, UNSPECIFIED HF CHRONICITY, UNSPECIFIED HEART FAILURE TYPE (HCC): ICD-10-CM

## 2023-09-11 PROCEDURE — 1090F PRES/ABSN URINE INCON ASSESS: CPT | Performed by: INTERNAL MEDICINE

## 2023-09-11 PROCEDURE — 99214 OFFICE O/P EST MOD 30 MIN: CPT | Performed by: INTERNAL MEDICINE

## 2023-09-11 PROCEDURE — 1123F ACP DISCUSS/DSCN MKR DOCD: CPT | Performed by: INTERNAL MEDICINE

## 2023-09-11 PROCEDURE — 1036F TOBACCO NON-USER: CPT | Performed by: INTERNAL MEDICINE

## 2023-09-11 PROCEDURE — G8427 DOCREV CUR MEDS BY ELIG CLIN: HCPCS | Performed by: INTERNAL MEDICINE

## 2023-09-11 PROCEDURE — G8420 CALC BMI NORM PARAMETERS: HCPCS | Performed by: INTERNAL MEDICINE

## 2023-09-11 RX ORDER — METOPROLOL SUCCINATE 25 MG/1
25 TABLET, EXTENDED RELEASE ORAL DAILY
Qty: 90 TABLET | Refills: 1 | Status: SHIPPED | OUTPATIENT
Start: 2023-09-11

## 2023-09-11 RX ORDER — ZOSTER VACCINE RECOMBINANT, ADJUVANTED 50 MCG/0.5
0.5 KIT INTRAMUSCULAR SEE ADMIN INSTRUCTIONS
Qty: 0.5 ML | Refills: 1 | Status: SHIPPED | OUTPATIENT
Start: 2023-09-11 | End: 2023-09-12

## 2023-09-11 RX ORDER — FUROSEMIDE 20 MG/1
20 TABLET ORAL 2 TIMES DAILY
Qty: 180 TABLET | Refills: 1 | Status: SHIPPED | OUTPATIENT
Start: 2023-09-11

## 2023-09-11 RX ORDER — FUROSEMIDE 20 MG/1
20 TABLET ORAL DAILY
Qty: 90 TABLET | Refills: 1 | Status: SHIPPED | OUTPATIENT
Start: 2023-09-11 | End: 2023-09-11 | Stop reason: ALTCHOICE

## 2023-09-11 SDOH — ECONOMIC STABILITY: INCOME INSECURITY: HOW HARD IS IT FOR YOU TO PAY FOR THE VERY BASICS LIKE FOOD, HOUSING, MEDICAL CARE, AND HEATING?: NOT HARD AT ALL

## 2023-09-11 SDOH — ECONOMIC STABILITY: FOOD INSECURITY: WITHIN THE PAST 12 MONTHS, YOU WORRIED THAT YOUR FOOD WOULD RUN OUT BEFORE YOU GOT MONEY TO BUY MORE.: NEVER TRUE

## 2023-09-11 SDOH — SOCIAL STABILITY - SOCIAL INSECURITY: DISSAPEARANCE AND DEATH OF FAMILY MEMBER: Z63.4

## 2023-09-11 SDOH — ECONOMIC STABILITY: FOOD INSECURITY: WITHIN THE PAST 12 MONTHS, THE FOOD YOU BOUGHT JUST DIDN'T LAST AND YOU DIDN'T HAVE MONEY TO GET MORE.: NEVER TRUE

## 2023-09-11 ASSESSMENT — PATIENT HEALTH QUESTIONNAIRE - PHQ9
1. LITTLE INTEREST OR PLEASURE IN DOING THINGS: 0
SUM OF ALL RESPONSES TO PHQ QUESTIONS 1-9: 0
SUM OF ALL RESPONSES TO PHQ9 QUESTIONS 1 & 2: 0
SUM OF ALL RESPONSES TO PHQ QUESTIONS 1-9: 0
2. FEELING DOWN, DEPRESSED OR HOPELESS: 0

## 2023-09-11 ASSESSMENT — ANXIETY QUESTIONNAIRES
GAD7 TOTAL SCORE: 0
2. NOT BEING ABLE TO STOP OR CONTROL WORRYING: 0
IF YOU CHECKED OFF ANY PROBLEMS ON THIS QUESTIONNAIRE, HOW DIFFICULT HAVE THESE PROBLEMS MADE IT FOR YOU TO DO YOUR WORK, TAKE CARE OF THINGS AT HOME, OR GET ALONG WITH OTHER PEOPLE: NOT DIFFICULT AT ALL
4. TROUBLE RELAXING: 0
5. BEING SO RESTLESS THAT IT IS HARD TO SIT STILL: 0
1. FEELING NERVOUS, ANXIOUS, OR ON EDGE: 0
6. BECOMING EASILY ANNOYED OR IRRITABLE: 0
3. WORRYING TOO MUCH ABOUT DIFFERENT THINGS: 0
7. FEELING AFRAID AS IF SOMETHING AWFUL MIGHT HAPPEN: 0

## 2023-09-11 ASSESSMENT — ENCOUNTER SYMPTOMS
GASTROINTESTINAL NEGATIVE: 1
RESPIRATORY NEGATIVE: 1
EYES NEGATIVE: 1

## 2023-09-11 NOTE — PROGRESS NOTES
1. \"Have you been to the ER, urgent care clinic since your last visit? Hospitalized since your last visit? \" No    2. \"Have you seen or consulted any other health care providers outside of the [address] [place] [address] since your last visit? \" No    3. For patients aged 43-73: Has the patient had a colonoscopy / FIT/ Cologuard? Recommendation: Colonoscopy every 10y or annual FIT test from 50-75 or every 3 year stool DNA based test with consideration of ongoing screening from 76-85. If the patient is female:    4. For patients aged 43-66: Has the patient had a mammogram within the past 2 years? No    5. For patients aged 21-65: Has the patient had a pap smear?  No
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
## 2023-09-12 NOTE — TELEPHONE ENCOUNTER
Call placed to [facility], provided pharmacist with correct sig. 1 tab BID.

## 2023-10-02 ENCOUNTER — APPOINTMENT (OUTPATIENT)
Facility: HOSPITAL | Age: 88
DRG: 690 | End: 2023-10-02
Payer: MEDICARE

## 2023-10-02 ENCOUNTER — HOSPITAL ENCOUNTER (INPATIENT)
Facility: HOSPITAL | Age: 88
LOS: 5 days | Discharge: NURSING, REHAB OR LONG-TERM CARE | DRG: 690 | End: 2023-10-09
Attending: EMERGENCY MEDICINE | Admitting: FAMILY MEDICINE
Payer: MEDICARE

## 2023-10-02 DIAGNOSIS — R60.0 EDEMA OF BOTH LOWER EXTREMITIES: ICD-10-CM

## 2023-10-02 DIAGNOSIS — I50.9 CONGESTIVE HEART FAILURE, UNSPECIFIED HF CHRONICITY, UNSPECIFIED HEART FAILURE TYPE (HCC): ICD-10-CM

## 2023-10-02 DIAGNOSIS — R29.6 MULTIPLE FALLS: ICD-10-CM

## 2023-10-02 DIAGNOSIS — R27.0 ATAXIA: Primary | ICD-10-CM

## 2023-10-02 DIAGNOSIS — R60.9 PERIPHERAL EDEMA: ICD-10-CM

## 2023-10-02 DIAGNOSIS — R53.1 GENERALIZED WEAKNESS: ICD-10-CM

## 2023-10-02 LAB
ALBUMIN SERPL-MCNC: 3.7 G/DL (ref 3.5–5)
ALBUMIN/GLOB SERPL: 0.7 (ref 1.1–2.2)
ALP SERPL-CCNC: 81 U/L (ref 45–117)
ALT SERPL-CCNC: 29 U/L (ref 12–78)
ANION GAP SERPL CALC-SCNC: 5 MMOL/L (ref 5–15)
AST SERPL-CCNC: 38 U/L (ref 15–37)
BASOPHILS # BLD: 0.1 K/UL (ref 0–0.1)
BASOPHILS NFR BLD: 1 % (ref 0–1)
BILIRUB SERPL-MCNC: 0.9 MG/DL (ref 0.2–1)
BUN SERPL-MCNC: 34 MG/DL (ref 6–20)
BUN/CREAT SERPL: 33 (ref 12–20)
CALCIUM SERPL-MCNC: 10.8 MG/DL (ref 8.5–10.1)
CHLORIDE SERPL-SCNC: 106 MMOL/L (ref 97–108)
CO2 SERPL-SCNC: 28 MMOL/L (ref 21–32)
COMMENT:: NORMAL
CREAT SERPL-MCNC: 1.03 MG/DL (ref 0.55–1.02)
DIFFERENTIAL METHOD BLD: ABNORMAL
EOSINOPHIL # BLD: 0.1 K/UL (ref 0–0.4)
EOSINOPHIL NFR BLD: 2 % (ref 0–7)
ERYTHROCYTE [DISTWIDTH] IN BLOOD BY AUTOMATED COUNT: 17.4 % (ref 11.5–14.5)
GLOBULIN SER CALC-MCNC: 5.4 G/DL (ref 2–4)
GLUCOSE SERPL-MCNC: 101 MG/DL (ref 65–100)
HCT VFR BLD AUTO: 42.8 % (ref 35–47)
HGB BLD-MCNC: 13.4 G/DL (ref 11.5–16)
IMM GRANULOCYTES # BLD AUTO: 0 K/UL (ref 0–0.04)
IMM GRANULOCYTES NFR BLD AUTO: 0 % (ref 0–0.5)
LYMPHOCYTES # BLD: 0.7 K/UL (ref 0.8–3.5)
LYMPHOCYTES NFR BLD: 11 % (ref 12–49)
MCH RBC QN AUTO: 22.3 PG (ref 26–34)
MCHC RBC AUTO-ENTMCNC: 31.3 G/DL (ref 30–36.5)
MCV RBC AUTO: 71.1 FL (ref 80–99)
MONOCYTES # BLD: 1.1 K/UL (ref 0–1)
MONOCYTES NFR BLD: 17 % (ref 5–13)
NEUTS SEG # BLD: 4.4 K/UL (ref 1.8–8)
NEUTS SEG NFR BLD: 69 % (ref 32–75)
NRBC # BLD: 0 K/UL (ref 0–0.01)
NRBC BLD-RTO: 0 PER 100 WBC
NT PRO BNP: 102 PG/ML
PLATELET # BLD AUTO: 131 K/UL (ref 150–400)
PMV BLD AUTO: 10 FL (ref 8.9–12.9)
POTASSIUM SERPL-SCNC: 3.5 MMOL/L (ref 3.5–5.1)
PROT SERPL-MCNC: 9.1 G/DL (ref 6.4–8.2)
RBC # BLD AUTO: 6.02 M/UL (ref 3.8–5.2)
RBC MORPH BLD: ABNORMAL
SODIUM SERPL-SCNC: 139 MMOL/L (ref 136–145)
SPECIMEN HOLD: NORMAL
WBC # BLD AUTO: 6.4 K/UL (ref 3.6–11)

## 2023-10-02 PROCEDURE — 99285 EMERGENCY DEPT VISIT HI MDM: CPT

## 2023-10-02 PROCEDURE — 36415 COLL VENOUS BLD VENIPUNCTURE: CPT

## 2023-10-02 PROCEDURE — 70450 CT HEAD/BRAIN W/O DYE: CPT

## 2023-10-02 PROCEDURE — 83880 ASSAY OF NATRIURETIC PEPTIDE: CPT

## 2023-10-02 PROCEDURE — 80053 COMPREHEN METABOLIC PANEL: CPT

## 2023-10-02 PROCEDURE — 85025 COMPLETE CBC W/AUTO DIFF WBC: CPT

## 2023-10-02 ASSESSMENT — PAIN - FUNCTIONAL ASSESSMENT: PAIN_FUNCTIONAL_ASSESSMENT: NONE - DENIES PAIN

## 2023-10-02 NOTE — ED TRIAGE NOTES
Patient arrives to ED via EMS from [address] [place] [address]. Per EMS patient has been reporting generalized weakness with  leg swelling. Patient is non-compliant with lasix. Family concerned of patient being a fall risk at home while living by herself.

## 2023-10-03 ENCOUNTER — APPOINTMENT (OUTPATIENT)
Facility: HOSPITAL | Age: 88
DRG: 690 | End: 2023-10-03
Attending: FAMILY MEDICINE
Payer: MEDICARE

## 2023-10-03 ENCOUNTER — APPOINTMENT (OUTPATIENT)
Facility: HOSPITAL | Age: 88
DRG: 690 | End: 2023-10-03
Attending: HOSPITALIST
Payer: MEDICARE

## 2023-10-03 ENCOUNTER — APPOINTMENT (OUTPATIENT)
Facility: HOSPITAL | Age: 88
DRG: 690 | End: 2023-10-03
Payer: MEDICARE

## 2023-10-03 LAB
ALBUMIN SERPL-MCNC: 2.8 G/DL (ref 3.5–5)
ALBUMIN/GLOB SERPL: 0.6 (ref 1.1–2.2)
ALP SERPL-CCNC: 63 U/L (ref 45–117)
ALT SERPL-CCNC: 23 U/L (ref 12–78)
ANION GAP SERPL CALC-SCNC: 1 MMOL/L (ref 5–15)
APPEARANCE UR: ABNORMAL
APTT PPP: 27.7 SEC (ref 22.1–31)
AST SERPL-CCNC: 27 U/L (ref 15–37)
BACTERIA URNS QL MICRO: ABNORMAL /HPF
BASOPHILS # BLD: 0.1 K/UL (ref 0–0.1)
BASOPHILS NFR BLD: 1 % (ref 0–1)
BILIRUB SERPL-MCNC: 0.8 MG/DL (ref 0.2–1)
BILIRUB UR QL: NEGATIVE
BUN SERPL-MCNC: 24 MG/DL (ref 6–20)
BUN/CREAT SERPL: 31 (ref 12–20)
CALCIUM SERPL-MCNC: 9.8 MG/DL (ref 8.5–10.1)
CHLORIDE SERPL-SCNC: 109 MMOL/L (ref 97–108)
CO2 SERPL-SCNC: 30 MMOL/L (ref 21–32)
COLOR UR: ABNORMAL
COMMENT:: NORMAL
CREAT SERPL-MCNC: 0.78 MG/DL (ref 0.55–1.02)
DIFFERENTIAL METHOD BLD: ABNORMAL
EOSINOPHIL # BLD: 0.2 K/UL (ref 0–0.4)
EOSINOPHIL NFR BLD: 3 % (ref 0–7)
EPITH CASTS URNS QL MICRO: ABNORMAL /LPF
ERYTHROCYTE [DISTWIDTH] IN BLOOD BY AUTOMATED COUNT: 16.8 % (ref 11.5–14.5)
GLOBULIN SER CALC-MCNC: 4.6 G/DL (ref 2–4)
GLUCOSE SERPL-MCNC: 139 MG/DL (ref 65–100)
GLUCOSE UR STRIP.AUTO-MCNC: NEGATIVE MG/DL
HCT VFR BLD AUTO: 38.5 % (ref 35–47)
HGB BLD-MCNC: 12 G/DL (ref 11.5–16)
HGB UR QL STRIP: ABNORMAL
HYALINE CASTS URNS QL MICRO: ABNORMAL /LPF (ref 0–5)
IMM GRANULOCYTES # BLD AUTO: 0 K/UL (ref 0–0.04)
IMM GRANULOCYTES NFR BLD AUTO: 0 % (ref 0–0.5)
INR PPP: 1.1 (ref 0.9–1.1)
KETONES UR QL STRIP.AUTO: NEGATIVE MG/DL
LACTATE SERPL-SCNC: 1.3 MMOL/L (ref 0.4–2)
LEUKOCYTE ESTERASE UR QL STRIP.AUTO: ABNORMAL
LYMPHOCYTES # BLD: 0.8 K/UL (ref 0.8–3.5)
LYMPHOCYTES NFR BLD: 14 % (ref 12–49)
MAGNESIUM SERPL-MCNC: 1.5 MG/DL (ref 1.6–2.4)
MCH RBC QN AUTO: 21.7 PG (ref 26–34)
MCHC RBC AUTO-ENTMCNC: 31.2 G/DL (ref 30–36.5)
MCV RBC AUTO: 69.6 FL (ref 80–99)
MONOCYTES # BLD: 0.9 K/UL (ref 0–1)
MONOCYTES NFR BLD: 16 % (ref 5–13)
NEUTS SEG # BLD: 3.4 K/UL (ref 1.8–8)
NEUTS SEG NFR BLD: 66 % (ref 32–75)
NITRITE UR QL STRIP.AUTO: NEGATIVE
NRBC # BLD: 0 K/UL (ref 0–0.01)
NRBC BLD-RTO: 0 PER 100 WBC
PH UR STRIP: 5.5 (ref 5–8)
PHOSPHATE SERPL-MCNC: 1.6 MG/DL (ref 2.6–4.7)
PLATELET # BLD AUTO: 114 K/UL (ref 150–400)
POTASSIUM SERPL-SCNC: 3.3 MMOL/L (ref 3.5–5.1)
PROT SERPL-MCNC: 7.4 G/DL (ref 6.4–8.2)
PROT UR STRIP-MCNC: ABNORMAL MG/DL
PROTHROMBIN TIME: 11.5 SEC (ref 9–11.1)
RBC # BLD AUTO: 5.53 M/UL (ref 3.8–5.2)
RBC #/AREA URNS HPF: ABNORMAL /HPF (ref 0–5)
RBC MORPH BLD: ABNORMAL
SODIUM SERPL-SCNC: 140 MMOL/L (ref 136–145)
SP GR UR REFRACTOMETRY: 1.02 (ref 1–1.03)
SPECIMEN HOLD: NORMAL
THERAPEUTIC RANGE: NORMAL SECS (ref 58–77)
URINE CULTURE IF INDICATED: ABNORMAL
UROBILINOGEN UR QL STRIP.AUTO: 0.2 EU/DL (ref 0.2–1)
WBC # BLD AUTO: 5.4 K/UL (ref 3.6–11)
WBC URNS QL MICRO: >100 /HPF (ref 0–4)

## 2023-10-03 PROCEDURE — 84100 ASSAY OF PHOSPHORUS: CPT

## 2023-10-03 PROCEDURE — 81001 URINALYSIS AUTO W/SCOPE: CPT

## 2023-10-03 PROCEDURE — G0378 HOSPITAL OBSERVATION PER HR: HCPCS

## 2023-10-03 PROCEDURE — 85610 PROTHROMBIN TIME: CPT

## 2023-10-03 PROCEDURE — 96365 THER/PROPH/DIAG IV INF INIT: CPT

## 2023-10-03 PROCEDURE — 96366 THER/PROPH/DIAG IV INF ADDON: CPT

## 2023-10-03 PROCEDURE — 93970 EXTREMITY STUDY: CPT

## 2023-10-03 PROCEDURE — 6370000000 HC RX 637 (ALT 250 FOR IP): Performed by: FAMILY MEDICINE

## 2023-10-03 PROCEDURE — 85025 COMPLETE CBC W/AUTO DIFF WBC: CPT

## 2023-10-03 PROCEDURE — 96372 THER/PROPH/DIAG INJ SC/IM: CPT

## 2023-10-03 PROCEDURE — 6360000002 HC RX W HCPCS: Performed by: HOSPITALIST

## 2023-10-03 PROCEDURE — 83735 ASSAY OF MAGNESIUM: CPT

## 2023-10-03 PROCEDURE — 71045 X-RAY EXAM CHEST 1 VIEW: CPT

## 2023-10-03 PROCEDURE — 87086 URINE CULTURE/COLONY COUNT: CPT

## 2023-10-03 PROCEDURE — 83605 ASSAY OF LACTIC ACID: CPT

## 2023-10-03 PROCEDURE — 87186 SC STD MICRODIL/AGAR DIL: CPT

## 2023-10-03 PROCEDURE — 80053 COMPREHEN METABOLIC PANEL: CPT

## 2023-10-03 PROCEDURE — 99222 1ST HOSP IP/OBS MODERATE 55: CPT | Performed by: PSYCHIATRY & NEUROLOGY

## 2023-10-03 PROCEDURE — 87077 CULTURE AEROBIC IDENTIFY: CPT

## 2023-10-03 PROCEDURE — 6370000000 HC RX 637 (ALT 250 FOR IP): Performed by: HOSPITALIST

## 2023-10-03 PROCEDURE — 36415 COLL VENOUS BLD VENIPUNCTURE: CPT

## 2023-10-03 PROCEDURE — 85730 THROMBOPLASTIN TIME PARTIAL: CPT

## 2023-10-03 RX ORDER — SODIUM CHLORIDE 9 MG/ML
INJECTION, SOLUTION INTRAVENOUS PRN
Status: DISCONTINUED | OUTPATIENT
Start: 2023-10-03 | End: 2023-10-09 | Stop reason: HOSPADM

## 2023-10-03 RX ORDER — ONDANSETRON 2 MG/ML
4 INJECTION INTRAMUSCULAR; INTRAVENOUS EVERY 6 HOURS PRN
Status: DISCONTINUED | OUTPATIENT
Start: 2023-10-03 | End: 2023-10-09 | Stop reason: HOSPADM

## 2023-10-03 RX ORDER — MAGNESIUM SULFATE IN WATER 40 MG/ML
2000 INJECTION, SOLUTION INTRAVENOUS ONCE
Status: COMPLETED | OUTPATIENT
Start: 2023-10-03 | End: 2023-10-03

## 2023-10-03 RX ORDER — ONDANSETRON 4 MG/1
4 TABLET, ORALLY DISINTEGRATING ORAL EVERY 8 HOURS PRN
Status: DISCONTINUED | OUTPATIENT
Start: 2023-10-03 | End: 2023-10-09 | Stop reason: HOSPADM

## 2023-10-03 RX ORDER — HEPARIN SODIUM 5000 [USP'U]/ML
5000 INJECTION, SOLUTION INTRAVENOUS; SUBCUTANEOUS EVERY 8 HOURS SCHEDULED
Status: DISCONTINUED | OUTPATIENT
Start: 2023-10-03 | End: 2023-10-06

## 2023-10-03 RX ORDER — MULTIVITAMIN,THERAPEUTIC
1 TABLET ORAL DAILY
Status: DISCONTINUED | OUTPATIENT
Start: 2023-10-03 | End: 2023-10-06 | Stop reason: SDUPTHER

## 2023-10-03 RX ORDER — POLYETHYLENE GLYCOL 3350 17 G/17G
17 POWDER, FOR SOLUTION ORAL DAILY PRN
Status: DISCONTINUED | OUTPATIENT
Start: 2023-10-03 | End: 2023-10-09 | Stop reason: HOSPADM

## 2023-10-03 RX ORDER — LEVOFLOXACIN 500 MG/1
500 TABLET, FILM COATED ORAL DAILY
Status: DISCONTINUED | OUTPATIENT
Start: 2023-10-03 | End: 2023-10-03 | Stop reason: DRUGHIGH

## 2023-10-03 RX ORDER — SODIUM CHLORIDE 0.9 % (FLUSH) 0.9 %
5-40 SYRINGE (ML) INJECTION EVERY 12 HOURS SCHEDULED
Status: DISCONTINUED | OUTPATIENT
Start: 2023-10-03 | End: 2023-10-09 | Stop reason: HOSPADM

## 2023-10-03 RX ORDER — FLUTICASONE PROPIONATE 50 MCG
2 SPRAY, SUSPENSION (ML) NASAL DAILY
Status: DISCONTINUED | OUTPATIENT
Start: 2023-10-03 | End: 2023-10-09 | Stop reason: HOSPADM

## 2023-10-03 RX ORDER — ACETAMINOPHEN 650 MG/1
650 SUPPOSITORY RECTAL EVERY 6 HOURS PRN
Status: DISCONTINUED | OUTPATIENT
Start: 2023-10-03 | End: 2023-10-09 | Stop reason: HOSPADM

## 2023-10-03 RX ORDER — ACETAMINOPHEN 325 MG/1
650 TABLET ORAL EVERY 6 HOURS PRN
Status: DISCONTINUED | OUTPATIENT
Start: 2023-10-03 | End: 2023-10-09 | Stop reason: HOSPADM

## 2023-10-03 RX ORDER — VITAMIN B COMPLEX
1000 TABLET ORAL DAILY
Status: DISCONTINUED | OUTPATIENT
Start: 2023-10-03 | End: 2023-10-09 | Stop reason: HOSPADM

## 2023-10-03 RX ORDER — LEVOFLOXACIN 500 MG/1
250 TABLET, FILM COATED ORAL DAILY
Status: COMPLETED | OUTPATIENT
Start: 2023-10-03 | End: 2023-10-07

## 2023-10-03 RX ORDER — POTASSIUM CHLORIDE 750 MG/1
40 TABLET, FILM COATED, EXTENDED RELEASE ORAL ONCE
Status: COMPLETED | OUTPATIENT
Start: 2023-10-03 | End: 2023-10-03

## 2023-10-03 RX ORDER — SODIUM CHLORIDE 0.9 % (FLUSH) 0.9 %
5-40 SYRINGE (ML) INJECTION PRN
Status: DISCONTINUED | OUTPATIENT
Start: 2023-10-03 | End: 2023-10-09 | Stop reason: HOSPADM

## 2023-10-03 RX ADMIN — MAGNESIUM SULFATE HEPTAHYDRATE 2000 MG: 40 INJECTION, SOLUTION INTRAVENOUS at 12:14

## 2023-10-03 RX ADMIN — DIBASIC SODIUM PHOSPHATE, MONOBASIC POTASSIUM PHOSPHATE AND MONOBASIC SODIUM PHOSPHATE 1 TABLET: 852; 155; 130 TABLET ORAL at 11:35

## 2023-10-03 RX ADMIN — Medication 1000 UNITS: at 09:21

## 2023-10-03 RX ADMIN — LEVOFLOXACIN 250 MG: 500 TABLET, FILM COATED ORAL at 11:35

## 2023-10-03 RX ADMIN — THERA TABS 1 TABLET: TAB at 09:22

## 2023-10-03 RX ADMIN — POTASSIUM CHLORIDE 40 MEQ: 750 TABLET, EXTENDED RELEASE ORAL at 11:35

## 2023-10-03 RX ADMIN — ACETAMINOPHEN 650 MG: 325 TABLET ORAL at 15:51

## 2023-10-03 RX ADMIN — Medication 1 TABLET: at 21:20

## 2023-10-03 RX ADMIN — DIBASIC SODIUM PHOSPHATE, MONOBASIC POTASSIUM PHOSPHATE AND MONOBASIC SODIUM PHOSPHATE 1 TABLET: 852; 155; 130 TABLET ORAL at 21:20

## 2023-10-03 RX ADMIN — Medication 1 TABLET: at 09:21

## 2023-10-03 RX ADMIN — HEPARIN SODIUM 5000 UNITS: 5000 INJECTION INTRAVENOUS; SUBCUTANEOUS at 21:20

## 2023-10-03 ASSESSMENT — PAIN SCALES - GENERAL
PAINLEVEL_OUTOF10: 4
PAINLEVEL_OUTOF10: 0
PAINLEVEL_OUTOF10: 4

## 2023-10-03 ASSESSMENT — PAIN DESCRIPTION - ORIENTATION: ORIENTATION: RIGHT

## 2023-10-03 ASSESSMENT — PAIN DESCRIPTION - LOCATION: LOCATION: KNEE

## 2023-10-03 NOTE — ED NOTES
Bedside and Verbal shift change report given to Devin Mckeon RN (oncoming nurse) by Felix Ward RN (offgoing nurse). Report included the following information Nurse Handoff Report, Index, ED Encounter Summary, ED SBAR, Adult Overview, Intake/Output, MAR, Recent Results, Med Rec Status, Quality Measures, and Neuro Assessment.       [address]  10/03/23 4651

## 2023-10-03 NOTE — H&P
Albumin/Globulin Ratio 0.7 (*)     All other components within normal limits       [unfilled]    IMAGING:   CT HEAD WO CONTRAST   Final Result   No acute intracranial abnormalities. ECG/ECHO:  [unfilled]       Notes reviewed from all clinical/nonclinical/nursing services involved in patient's clinical care. Care coordination discussions were held with appropriate clinical/nonclinical/ nursing providers based on care coordination needs. Assessment:   Given the patient's current clinical presentation, there is a high level of concern for decompensation if discharged from the emergency department. Complex decision making was performed, which includes reviewing the patient's available past medical records, laboratory results, and imaging studies. Principal Problem:    Ataxia  Resolved Problems:    * No resolved hospital problems. *      Plan:     1. Ataxia      Unsteady gait      Multiple fall  -Admit to telemetry  -Place on fall precautions  -Neurochecks  -Considered for but unable to obtain MRI of the brain as patient has a cardiac pacemaker  -Consult neurologist  -Consult PT/OT  -Ambulate with assistance only  -Consult case management for discharge planning. Patient lives independently alone at home and is unsafe to care for self. 2.  Generalized weakness  -Plan as above    3. Edema both lower extremities  -Appears more chronic than acute  -proBNP 102 not suggestive of acute CHF  -Keep legs elevated at rest  -Order to echocardiogram  -Place on strict I's and O's  -Order venous duplex of bilateral lower extremities. 4.  Dehydration  -Elevated BUN 34, creatinine 1.03; GFR 52  -Repeat renal panel in a.m.  -No IV fluids ordered due to peripheral edema and concerns for underlying CHF    5.   Thrombocytopenia  -Platelets 951  -Repeat platelet count in a.m.    6.  Essential hypertension  -Blood pressures on the lower range  -As such have not reordered home BP medications yet  -Reassess
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
## 2023-10-03 NOTE — ED NOTES
MRI called and told RN that pace-maker is not MRI compatible. RN called and spoke to Dr. Isacc Story to verify.       Brent Oliva RN  10/03/23 6609

## 2023-10-03 NOTE — ED NOTES
Dr. Constanza Christian called RN and notified RN that pace-maker is not compatible. RN notified MRI. RN notified MD Malik Ye.      Merlin Eth, RN  10/03/23 8347

## 2023-10-03 NOTE — ED PROVIDER NOTES
[facility] EMERGENCY DEP  EMERGENCY DEPARTMENT ENCOUNTER      Pt Name: Yanely Ferrell  MRN: [id number]  [address] [place] 2/2/1933  Date of evaluation: 10/2/2023  Provider: Mele Wilks       Chief Complaint   Patient presents with    Fatigue         HISTORY OF PRESENT ILLNESS   (Location/Symptom, Timing/Onset, Context/Setting, Quality, Duration, Modifying Factors, Severity)  Note limiting factors. 72-year-old female comes in for generalized fatigue. Patient apparently comes from independent living where she is noncompliant with her Lasix. She comes in for generalized weakness and bilateral lower extremity edema. It is noted by her family that she has been having some gait issues and some problems with her vision and that sometimes her vision is off. She states that is been going on for up to several months. What concerned the family is tonight when they were trying to eat the patient got the fork or spoon up to the side of the face but \"could not find my mouth. \"  Patient denies chest pain shortness of breath fevers or chills. Family endorses multiple falls. Review of External Medical Records:     Nursing Notes were reviewed. REVIEW OF SYSTEMS    (2-9 systems for level 4, 10 or more for level 5)     Review of Systems    Except as noted above the remainder of the review of systems was reviewed and negative.        PAST MEDICAL HISTORY     Past Medical History:   Diagnosis Date    Arrhythmia     bradycardia,S/P pacemaker    CHF (congestive heart failure) ([address])     Heart failure ([address])     Hypertension     Irregular heart rate     Pure hypercholesterolemia 3/27/2017         SURGICAL HISTORY       Past Surgical History:   Procedure Laterality Date    ORTHOPEDIC SURGERY      left total knee replacement    OTHER SURGICAL HISTORY  11/2019    Toe surgery    PACEMAKER           CURRENT MEDICATIONS       Previous Medications    ALBUTEROL SULFATE HFA (PROVENTIL;VENTOLIN;PROAIR) 108 (90

## 2023-10-03 NOTE — ED NOTES
0235 pt changed into gown. New blankets, sheets and eagle sheet placed on patient. Pt also placed on external catheter with suction.      Jeanne Barth  10/03/23 4460

## 2023-10-03 NOTE — ED NOTES
Dr. Rio Gonzales told this RN that Dr. Dl Collins signed off on patients pace-maker being compatible. Per MRI they need cardiologist to sign off on form to verify. RN called and spoke to [facility] for Dr. Michael Goldstein covering for Dr. Dl Collins to sign form for patient.      Rosa Chavarria RN  10/03/23 0925

## 2023-10-03 NOTE — ED NOTES
This nurse warmed up pt a microwave meal. Pt sitting up and eating at this time, no distress noted.       Jeanne Barth  10/03/23 1005

## 2023-10-03 NOTE — ACP (ADVANCE CARE PLANNING)
Advance Care Planning     Advance Care Planning (ACP) Physician/NP/PA Conversation    Date of Conversation: 10/2/2023  Conducted with: Patient with Decision Making Capacity    Healthcare Decision Maker:    Primary Decision Maker: Paddy Habermann. - Lilia - [phone]  Click here to complete Healthcare Decision Makers including selection of the Healthcare Decision Maker Relationship (ie \"Primary\"). Today we documented Decision Maker(s) consistent with Legal Next of Kin hierarchy. Care Preferences:    Hospitalization: \"If your health worsens and it becomes clear that your chance of recovery is unlikely, what would be your preference regarding hospitalization? \"  The patient would prefer hospitalization. Ventilation: \"If you were unable to breath on your own and your chance of recovery was unlikely, what would be your preference about the use of a ventilator (breathing machine) if it was available to you? \"  The patient would NOT desire the use of a ventilator. Resuscitation: \"In the event your heart stopped as a result of an underlying serious health condition, would you want attempts made to restart your heart, or would you prefer a natural death? \"  No, do NOT attempt to resuscitate.     treatment goals    Conversation Outcomes / Follow-Up Plan:  ACP complete - no further action today  Reviewed DNR/DNI and patient elects DNR order - referred to ACP Clinical Specialist & placed order    Length of Voluntary ACP Conversation in minutes:  16 minutes    Emelyn Colvin MD

## 2023-10-04 PROBLEM — N39.0 UTI (URINARY TRACT INFECTION): Status: ACTIVE | Noted: 2023-10-04

## 2023-10-04 LAB
ALBUMIN SERPL-MCNC: 2.5 G/DL (ref 3.5–5)
ALBUMIN/GLOB SERPL: 0.6 (ref 1.1–2.2)
ALP SERPL-CCNC: 68 U/L (ref 45–117)
ALT SERPL-CCNC: 20 U/L (ref 12–78)
ANION GAP SERPL CALC-SCNC: 5 MMOL/L (ref 5–15)
AST SERPL-CCNC: 21 U/L (ref 15–37)
BASOPHILS # BLD: 0 K/UL (ref 0–0.1)
BASOPHILS NFR BLD: 0 % (ref 0–1)
BILIRUB SERPL-MCNC: 0.7 MG/DL (ref 0.2–1)
BUN SERPL-MCNC: 19 MG/DL (ref 6–20)
BUN/CREAT SERPL: 28 (ref 12–20)
CALCIUM SERPL-MCNC: 9.3 MG/DL (ref 8.5–10.1)
CHLORIDE SERPL-SCNC: 105 MMOL/L (ref 97–108)
CO2 SERPL-SCNC: 28 MMOL/L (ref 21–32)
COMMENT:: NORMAL
CREAT SERPL-MCNC: 0.67 MG/DL (ref 0.55–1.02)
DIFFERENTIAL METHOD BLD: ABNORMAL
ECHO BSA: 1.79 M2
EOSINOPHIL # BLD: 0.1 K/UL (ref 0–0.4)
EOSINOPHIL NFR BLD: 2 % (ref 0–7)
ERYTHROCYTE [DISTWIDTH] IN BLOOD BY AUTOMATED COUNT: 16.4 % (ref 11.5–14.5)
GLOBULIN SER CALC-MCNC: 3.9 G/DL (ref 2–4)
GLUCOSE SERPL-MCNC: 135 MG/DL (ref 65–100)
HCT VFR BLD AUTO: 35.6 % (ref 35–47)
HGB BLD-MCNC: 11.3 G/DL (ref 11.5–16)
IMM GRANULOCYTES # BLD AUTO: 0 K/UL (ref 0–0.04)
IMM GRANULOCYTES NFR BLD AUTO: 0 % (ref 0–0.5)
LYMPHOCYTES # BLD: 1 K/UL (ref 0.8–3.5)
LYMPHOCYTES NFR BLD: 14 % (ref 12–49)
MCH RBC QN AUTO: 22 PG (ref 26–34)
MCHC RBC AUTO-ENTMCNC: 31.7 G/DL (ref 30–36.5)
MCV RBC AUTO: 69.4 FL (ref 80–99)
MONOCYTES # BLD: 1.7 K/UL (ref 0–1)
MONOCYTES NFR BLD: 23 % (ref 5–13)
NEUTS SEG # BLD: 4.6 K/UL (ref 1.8–8)
NEUTS SEG NFR BLD: 61 % (ref 32–75)
NRBC # BLD: 0 K/UL (ref 0–0.01)
NRBC BLD-RTO: 0 PER 100 WBC
PLATELET # BLD AUTO: 77 K/UL (ref 150–400)
PMV BLD AUTO: 10.2 FL (ref 8.9–12.9)
POTASSIUM SERPL-SCNC: 4.1 MMOL/L (ref 3.5–5.1)
PROT SERPL-MCNC: 6.4 G/DL (ref 6.4–8.2)
RBC # BLD AUTO: 5.13 M/UL (ref 3.8–5.2)
RBC MORPH BLD: ABNORMAL
SODIUM SERPL-SCNC: 138 MMOL/L (ref 136–145)
SPECIMEN HOLD: NORMAL
WBC # BLD AUTO: 7.4 K/UL (ref 3.6–11)

## 2023-10-04 PROCEDURE — 6370000000 HC RX 637 (ALT 250 FOR IP): Performed by: HOSPITALIST

## 2023-10-04 PROCEDURE — G0378 HOSPITAL OBSERVATION PER HR: HCPCS

## 2023-10-04 PROCEDURE — 2580000003 HC RX 258

## 2023-10-04 PROCEDURE — 97530 THERAPEUTIC ACTIVITIES: CPT

## 2023-10-04 PROCEDURE — 97165 OT EVAL LOW COMPLEX 30 MIN: CPT

## 2023-10-04 PROCEDURE — 2060000000 HC ICU INTERMEDIATE R&B

## 2023-10-04 PROCEDURE — 80053 COMPREHEN METABOLIC PANEL: CPT

## 2023-10-04 PROCEDURE — 6370000000 HC RX 637 (ALT 250 FOR IP): Performed by: FAMILY MEDICINE

## 2023-10-04 PROCEDURE — 1100000003 HC PRIVATE W/ TELEMETRY

## 2023-10-04 PROCEDURE — 97535 SELF CARE MNGMENT TRAINING: CPT

## 2023-10-04 PROCEDURE — 36415 COLL VENOUS BLD VENIPUNCTURE: CPT

## 2023-10-04 PROCEDURE — 97161 PT EVAL LOW COMPLEX 20 MIN: CPT

## 2023-10-04 PROCEDURE — 6360000002 HC RX W HCPCS: Performed by: HOSPITALIST

## 2023-10-04 PROCEDURE — 85025 COMPLETE CBC W/AUTO DIFF WBC: CPT

## 2023-10-04 PROCEDURE — 96372 THER/PROPH/DIAG INJ SC/IM: CPT

## 2023-10-04 PROCEDURE — 2580000003 HC RX 258: Performed by: FAMILY MEDICINE

## 2023-10-04 RX ORDER — SODIUM CHLORIDE, SODIUM LACTATE, POTASSIUM CHLORIDE, AND CALCIUM CHLORIDE .6; .31; .03; .02 G/100ML; G/100ML; G/100ML; G/100ML
250 INJECTION, SOLUTION INTRAVENOUS ONCE
Status: COMPLETED | OUTPATIENT
Start: 2023-10-04 | End: 2023-10-05

## 2023-10-04 RX ORDER — CASTOR OIL AND BALSAM, PERU 788; 87 MG/G; MG/G
OINTMENT TOPICAL EVERY 12 HOURS
Status: DISCONTINUED | OUTPATIENT
Start: 2023-10-04 | End: 2023-10-09 | Stop reason: HOSPADM

## 2023-10-04 RX ADMIN — HEPARIN SODIUM 5000 UNITS: 5000 INJECTION INTRAVENOUS; SUBCUTANEOUS at 07:01

## 2023-10-04 RX ADMIN — HEPARIN SODIUM 5000 UNITS: 5000 INJECTION INTRAVENOUS; SUBCUTANEOUS at 13:00

## 2023-10-04 RX ADMIN — THERA TABS 1 TABLET: TAB at 09:53

## 2023-10-04 RX ADMIN — DIBASIC SODIUM PHOSPHATE, MONOBASIC POTASSIUM PHOSPHATE AND MONOBASIC SODIUM PHOSPHATE 1 TABLET: 852; 155; 130 TABLET ORAL at 21:13

## 2023-10-04 RX ADMIN — SODIUM CHLORIDE, PRESERVATIVE FREE 10 ML: 5 INJECTION INTRAVENOUS at 08:31

## 2023-10-04 RX ADMIN — Medication 1000 UNITS: at 08:30

## 2023-10-04 RX ADMIN — Medication: at 14:52

## 2023-10-04 RX ADMIN — FLUTICASONE PROPIONATE 2 SPRAY: 50 SPRAY, METERED NASAL at 08:31

## 2023-10-04 RX ADMIN — DIBASIC SODIUM PHOSPHATE, MONOBASIC POTASSIUM PHOSPHATE AND MONOBASIC SODIUM PHOSPHATE 1 TABLET: 852; 155; 130 TABLET ORAL at 08:30

## 2023-10-04 RX ADMIN — LEVOFLOXACIN 250 MG: 500 TABLET, FILM COATED ORAL at 08:30

## 2023-10-04 RX ADMIN — SODIUM CHLORIDE, POTASSIUM CHLORIDE, SODIUM LACTATE AND CALCIUM CHLORIDE 250 ML: 600; 310; 30; 20 INJECTION, SOLUTION INTRAVENOUS at 23:15

## 2023-10-04 RX ADMIN — Medication 1 TABLET: at 08:30

## 2023-10-04 RX ADMIN — ACETAMINOPHEN 650 MG: 325 TABLET ORAL at 14:52

## 2023-10-04 RX ADMIN — Medication 1 TABLET: at 21:13

## 2023-10-04 RX ADMIN — ACETAMINOPHEN 650 MG: 325 TABLET ORAL at 23:24

## 2023-10-04 RX ADMIN — HEPARIN SODIUM 5000 UNITS: 5000 INJECTION INTRAVENOUS; SUBCUTANEOUS at 21:13

## 2023-10-04 ASSESSMENT — PAIN DESCRIPTION - ORIENTATION
ORIENTATION: RIGHT;LEFT
ORIENTATION: RIGHT;LEFT

## 2023-10-04 ASSESSMENT — PAIN DESCRIPTION - LOCATION
LOCATION: HAND
LOCATION: ARM;HAND;LEG

## 2023-10-04 ASSESSMENT — PAIN DESCRIPTION - DESCRIPTORS: DESCRIPTORS: ACHING

## 2023-10-04 ASSESSMENT — PAIN SCALES - GENERAL: PAINLEVEL_OUTOF10: 5

## 2023-10-04 NOTE — WOUND CARE
Wound Care Note:     New consult placed by nurse request for severely cracked skin, breakdown in noelle-area    Chart shows:  Admitted for ataxia  Past Medical History:   Diagnosis Date    Arrhythmia     bradycardia,S/P pacemaker    CHF (congestive heart failure) ([address])     Heart failure ([address])     Hypertension     Irregular heart rate     Pure hypercholesterolemia 3/27/2017     WBC = 7.4 on 10/4/23  Admitted from home    Assessment:   Patient is alert and talking, incontinent with some assistance needed in repositioning. Bed: [place]  Patient has a Pure Wick. Diet: Adult diet regular; low fat;low chol;high fiber/2 gm NA  Patient reports some pain in left heel. Right heel, bilateral buttocks, and sacral skin intact and without erythema. 1. POA left heel with hyperpigmentation, area measures 1.5 cm x 2 cm, no open area, non-blanchable, probable evolving pressure injury. Venelex ointment to be ordered; offloaded on pillow. 2.  POA no breakdown noted on noelle-area at this time, will order Z guard paste as patient has a Pure Wick in place. Patient repositioned on supine. Heels offloaded on pillows. Recommendations:    Sacrum and bilateral heels- Every 12 hours liberally apply Venelex ointment    Labia and perineum- Every 12 hours and as needed apply Z guard paste (orange tube). Skin Care & Pressure Prevention:  Minimize layers of linen/pads under patient to optimize support surface. Turn/reposition approximately every 2 hours and offload heels.   Manage incontinence / promote continence   Nourishing Skin Cream to dry skin, minimize use of briefs when able    Discussed above plan with patient & Clearance DEEP Hogan    Transition of Care: Plan to follow as needed while admitted to hospital.    Myra Vazquez, KADENN, RN, [facility]  Certified Wound and Ostomy Nurse  office [phone]  Best way to contact me is through [address]

## 2023-10-05 ENCOUNTER — APPOINTMENT (OUTPATIENT)
Facility: HOSPITAL | Age: 88
DRG: 690 | End: 2023-10-05
Payer: MEDICARE

## 2023-10-05 ENCOUNTER — APPOINTMENT (OUTPATIENT)
Facility: HOSPITAL | Age: 88
DRG: 690 | End: 2023-10-05
Attending: INTERNAL MEDICINE
Payer: MEDICARE

## 2023-10-05 LAB
ECHO BSA: 1.77 M2
ECHO LV FRACTIONAL SHORTENING: 33 % (ref 28–44)
ECHO LV INTERNAL DIMENSION DIASTOLE INDEX: 2.26 CM/M2
ECHO LV INTERNAL DIMENSION DIASTOLIC: 4 CM (ref 3.9–5.3)
ECHO LV INTERNAL DIMENSION SYSTOLIC INDEX: 1.53 CM/M2
ECHO LV INTERNAL DIMENSION SYSTOLIC: 2.7 CM
EKG ATRIAL RATE: 70 BPM
EKG DIAGNOSIS: NORMAL
EKG P AXIS: 44 DEGREES
EKG P-R INTERVAL: 100 MS
EKG Q-T INTERVAL: 396 MS
EKG QRS DURATION: 76 MS
EKG QTC CALCULATION (BAZETT): 427 MS
EKG R AXIS: -9 DEGREES
EKG T AXIS: -19 DEGREES
EKG VENTRICULAR RATE: 70 BPM
TROPONIN I SERPL HS-MCNC: 11 NG/L (ref 0–51)

## 2023-10-05 PROCEDURE — 93010 ELECTROCARDIOGRAM REPORT: CPT | Performed by: SPECIALIST

## 2023-10-05 PROCEDURE — 2500000003 HC RX 250 WO HCPCS: Performed by: INTERNAL MEDICINE

## 2023-10-05 PROCEDURE — 93005 ELECTROCARDIOGRAM TRACING: CPT | Performed by: INTERNAL MEDICINE

## 2023-10-05 PROCEDURE — 6370000000 HC RX 637 (ALT 250 FOR IP): Performed by: HOSPITALIST

## 2023-10-05 PROCEDURE — 93308 TTE F-UP OR LMTD: CPT

## 2023-10-05 PROCEDURE — 2580000003 HC RX 258: Performed by: INTERNAL MEDICINE

## 2023-10-05 PROCEDURE — 6370000000 HC RX 637 (ALT 250 FOR IP): Performed by: FAMILY MEDICINE

## 2023-10-05 PROCEDURE — 71045 X-RAY EXAM CHEST 1 VIEW: CPT

## 2023-10-05 PROCEDURE — 36415 COLL VENOUS BLD VENIPUNCTURE: CPT

## 2023-10-05 PROCEDURE — 6370000000 HC RX 637 (ALT 250 FOR IP): Performed by: INTERNAL MEDICINE

## 2023-10-05 PROCEDURE — 6360000002 HC RX W HCPCS

## 2023-10-05 PROCEDURE — 84484 ASSAY OF TROPONIN QUANT: CPT

## 2023-10-05 PROCEDURE — 6360000002 HC RX W HCPCS: Performed by: INTERNAL MEDICINE

## 2023-10-05 PROCEDURE — 6360000002 HC RX W HCPCS: Performed by: HOSPITALIST

## 2023-10-05 PROCEDURE — P9047 ALBUMIN (HUMAN), 25%, 50ML: HCPCS

## 2023-10-05 PROCEDURE — 2060000000 HC ICU INTERMEDIATE R&B

## 2023-10-05 PROCEDURE — 2580000003 HC RX 258

## 2023-10-05 PROCEDURE — 2580000003 HC RX 258: Performed by: FAMILY MEDICINE

## 2023-10-05 RX ORDER — ALBUMIN (HUMAN) 12.5 G/50ML
25 SOLUTION INTRAVENOUS ONCE
Status: COMPLETED | OUTPATIENT
Start: 2023-10-05 | End: 2023-10-05

## 2023-10-05 RX ORDER — SODIUM CHLORIDE 9 MG/ML
INJECTION, SOLUTION INTRAVENOUS CONTINUOUS
Status: DISCONTINUED | OUTPATIENT
Start: 2023-10-05 | End: 2023-10-06

## 2023-10-05 RX ORDER — MAGNESIUM SULFATE IN WATER 40 MG/ML
2000 INJECTION, SOLUTION INTRAVENOUS ONCE
Status: COMPLETED | OUTPATIENT
Start: 2023-10-05 | End: 2023-10-05

## 2023-10-05 RX ORDER — SODIUM CHLORIDE, SODIUM LACTATE, POTASSIUM CHLORIDE, AND CALCIUM CHLORIDE .6; .31; .03; .02 G/100ML; G/100ML; G/100ML; G/100ML
250 INJECTION, SOLUTION INTRAVENOUS ONCE
Status: COMPLETED | OUTPATIENT
Start: 2023-10-05 | End: 2023-10-05

## 2023-10-05 RX ORDER — NITROGLYCERIN 0.4 MG/1
0.4 TABLET SUBLINGUAL EVERY 5 MIN PRN
Status: DISCONTINUED | OUTPATIENT
Start: 2023-10-05 | End: 2023-10-09 | Stop reason: HOSPADM

## 2023-10-05 RX ADMIN — THERA TABS 1 TABLET: TAB at 14:07

## 2023-10-05 RX ADMIN — HEPARIN SODIUM 5000 UNITS: 5000 INJECTION INTRAVENOUS; SUBCUTANEOUS at 13:15

## 2023-10-05 RX ADMIN — Medication: at 10:03

## 2023-10-05 RX ADMIN — SODIUM CHLORIDE, PRESERVATIVE FREE 10 ML: 5 INJECTION INTRAVENOUS at 22:33

## 2023-10-05 RX ADMIN — FLUTICASONE PROPIONATE 2 SPRAY: 50 SPRAY, METERED NASAL at 08:27

## 2023-10-05 RX ADMIN — MAGNESIUM SULFATE HEPTAHYDRATE 2000 MG: 40 INJECTION, SOLUTION INTRAVENOUS at 16:37

## 2023-10-05 RX ADMIN — NITROGLYCERIN 0.4 MG: 0.4 TABLET, ORALLY DISINTEGRATING SUBLINGUAL at 17:52

## 2023-10-05 RX ADMIN — NITROGLYCERIN 0.4 MG: 0.4 TABLET, ORALLY DISINTEGRATING SUBLINGUAL at 18:57

## 2023-10-05 RX ADMIN — HEPARIN SODIUM 5000 UNITS: 5000 INJECTION INTRAVENOUS; SUBCUTANEOUS at 22:33

## 2023-10-05 RX ADMIN — DIBASIC SODIUM PHOSPHATE, MONOBASIC POTASSIUM PHOSPHATE AND MONOBASIC SODIUM PHOSPHATE 1 TABLET: 852; 155; 130 TABLET ORAL at 22:32

## 2023-10-05 RX ADMIN — Medication: at 22:33

## 2023-10-05 RX ADMIN — SODIUM CHLORIDE: 9 INJECTION, SOLUTION INTRAVENOUS at 10:02

## 2023-10-05 RX ADMIN — LEVOFLOXACIN 250 MG: 500 TABLET, FILM COATED ORAL at 08:27

## 2023-10-05 RX ADMIN — Medication 1 TABLET: at 22:32

## 2023-10-05 RX ADMIN — HEPARIN SODIUM 5000 UNITS: 5000 INJECTION INTRAVENOUS; SUBCUTANEOUS at 05:42

## 2023-10-05 RX ADMIN — NITROGLYCERIN 0.4 MG: 0.4 TABLET, ORALLY DISINTEGRATING SUBLINGUAL at 17:41

## 2023-10-05 RX ADMIN — SODIUM CHLORIDE, POTASSIUM CHLORIDE, SODIUM LACTATE AND CALCIUM CHLORIDE 250 ML: 600; 310; 30; 20 INJECTION, SOLUTION INTRAVENOUS at 01:53

## 2023-10-05 RX ADMIN — SODIUM CHLORIDE, PRESERVATIVE FREE 10 ML: 5 INJECTION INTRAVENOUS at 08:27

## 2023-10-05 RX ADMIN — SODIUM PHOSPHATE, MONOBASIC, MONOHYDRATE AND SODIUM PHOSPHATE, DIBASIC, ANHYDROUS 30 MMOL: 276; 142 INJECTION, SOLUTION INTRAVENOUS at 16:43

## 2023-10-05 RX ADMIN — Medication 1 TABLET: at 08:27

## 2023-10-05 RX ADMIN — ALBUMIN (HUMAN) 25 G: 0.25 INJECTION, SOLUTION INTRAVENOUS at 05:33

## 2023-10-05 RX ADMIN — Medication 1000 UNITS: at 08:27

## 2023-10-05 RX ADMIN — DIBASIC SODIUM PHOSPHATE, MONOBASIC POTASSIUM PHOSPHATE AND MONOBASIC SODIUM PHOSPHATE 1 TABLET: 852; 155; 130 TABLET ORAL at 08:27

## 2023-10-05 RX ADMIN — ACETAMINOPHEN 650 MG: 325 TABLET ORAL at 06:20

## 2023-10-06 LAB
ANION GAP SERPL CALC-SCNC: 5 MMOL/L (ref 5–15)
BACTERIA SPEC CULT: ABNORMAL
BACTERIA SPEC CULT: ABNORMAL
BUN SERPL-MCNC: 14 MG/DL (ref 6–20)
BUN/CREAT SERPL: 22 (ref 12–20)
CALCIUM SERPL-MCNC: 9.6 MG/DL (ref 8.5–10.1)
CC UR VC: ABNORMAL
CHLORIDE SERPL-SCNC: 106 MMOL/L (ref 97–108)
CO2 SERPL-SCNC: 27 MMOL/L (ref 21–32)
CREAT SERPL-MCNC: 0.63 MG/DL (ref 0.55–1.02)
ERYTHROCYTE [DISTWIDTH] IN BLOOD BY AUTOMATED COUNT: 16.1 % (ref 11.5–14.5)
FERRITIN SERPL-MCNC: 296 NG/ML (ref 26–388)
FOLATE SERPL-MCNC: 15 NG/ML (ref 5–21)
GLUCOSE SERPL-MCNC: 136 MG/DL (ref 65–100)
HCT VFR BLD AUTO: 36.1 % (ref 35–47)
HGB BLD-MCNC: 11.6 G/DL (ref 11.5–16)
IRON SATN MFR SERPL: 14 % (ref 20–50)
IRON SERPL-MCNC: 23 UG/DL (ref 35–150)
MCH RBC QN AUTO: 22.3 PG (ref 26–34)
MCHC RBC AUTO-ENTMCNC: 32.1 G/DL (ref 30–36.5)
MCV RBC AUTO: 69.4 FL (ref 80–99)
NRBC # BLD: 0 K/UL (ref 0–0.01)
NRBC BLD-RTO: 0 PER 100 WBC
PLATELET # BLD AUTO: 59 K/UL (ref 150–400)
PMV BLD AUTO: 10.3 FL (ref 8.9–12.9)
POTASSIUM SERPL-SCNC: 3.8 MMOL/L (ref 3.5–5.1)
RBC # BLD AUTO: 5.2 M/UL (ref 3.8–5.2)
SERVICE CMNT-IMP: ABNORMAL
SODIUM SERPL-SCNC: 138 MMOL/L (ref 136–145)
TIBC SERPL-MCNC: 161 UG/DL (ref 250–450)
VIT B12 SERPL-MCNC: 402 PG/ML (ref 193–986)
WBC # BLD AUTO: 6.8 K/UL (ref 3.6–11)

## 2023-10-06 PROCEDURE — 36415 COLL VENOUS BLD VENIPUNCTURE: CPT

## 2023-10-06 PROCEDURE — 82728 ASSAY OF FERRITIN: CPT

## 2023-10-06 PROCEDURE — 2580000003 HC RX 258: Performed by: FAMILY MEDICINE

## 2023-10-06 PROCEDURE — 6370000000 HC RX 637 (ALT 250 FOR IP): Performed by: INTERNAL MEDICINE

## 2023-10-06 PROCEDURE — 6360000002 HC RX W HCPCS: Performed by: HOSPITALIST

## 2023-10-06 PROCEDURE — 97535 SELF CARE MNGMENT TRAINING: CPT

## 2023-10-06 PROCEDURE — 83540 ASSAY OF IRON: CPT

## 2023-10-06 PROCEDURE — 6370000000 HC RX 637 (ALT 250 FOR IP): Performed by: FAMILY MEDICINE

## 2023-10-06 PROCEDURE — 97530 THERAPEUTIC ACTIVITIES: CPT

## 2023-10-06 PROCEDURE — 6370000000 HC RX 637 (ALT 250 FOR IP): Performed by: HOSPITALIST

## 2023-10-06 PROCEDURE — 82746 ASSAY OF FOLIC ACID SERUM: CPT

## 2023-10-06 PROCEDURE — 2060000000 HC ICU INTERMEDIATE R&B

## 2023-10-06 PROCEDURE — 85027 COMPLETE CBC AUTOMATED: CPT

## 2023-10-06 PROCEDURE — 82607 VITAMIN B-12: CPT

## 2023-10-06 PROCEDURE — 80048 BASIC METABOLIC PNL TOTAL CA: CPT

## 2023-10-06 PROCEDURE — 83550 IRON BINDING TEST: CPT

## 2023-10-06 RX ORDER — MULTIVITAMIN WITH IRON
1 TABLET ORAL DAILY
Status: DISCONTINUED | OUTPATIENT
Start: 2023-10-06 | End: 2023-10-09 | Stop reason: HOSPADM

## 2023-10-06 RX ORDER — FERROUS SULFATE 325(65) MG
325 TABLET ORAL
Status: DISCONTINUED | OUTPATIENT
Start: 2023-10-07 | End: 2023-10-09 | Stop reason: HOSPADM

## 2023-10-06 RX ADMIN — LEVOFLOXACIN 250 MG: 500 TABLET, FILM COATED ORAL at 10:16

## 2023-10-06 RX ADMIN — Medication 1000 UNITS: at 10:16

## 2023-10-06 RX ADMIN — Medication 1 TABLET: at 10:16

## 2023-10-06 RX ADMIN — SODIUM CHLORIDE, PRESERVATIVE FREE 10 ML: 5 INJECTION INTRAVENOUS at 10:17

## 2023-10-06 RX ADMIN — SODIUM CHLORIDE, PRESERVATIVE FREE 10 ML: 5 INJECTION INTRAVENOUS at 21:41

## 2023-10-06 RX ADMIN — FLUTICASONE PROPIONATE 2 SPRAY: 50 SPRAY, METERED NASAL at 10:17

## 2023-10-06 RX ADMIN — THERA TABS 1 TABLET: TAB at 16:34

## 2023-10-06 RX ADMIN — Medication 1 TABLET: at 21:41

## 2023-10-06 RX ADMIN — HEPARIN SODIUM 5000 UNITS: 5000 INJECTION INTRAVENOUS; SUBCUTANEOUS at 06:33

## 2023-10-06 RX ADMIN — THERA TABS 1 TABLET: TAB at 10:16

## 2023-10-06 RX ADMIN — Medication: at 21:41

## 2023-10-06 RX ADMIN — Medication: at 10:16

## 2023-10-06 ASSESSMENT — PAIN SCALES - GENERAL: PAINLEVEL_OUTOF10: 0

## 2023-10-06 NOTE — CONSULTS
Neurology Consult Note     NAME: Latisha Salomon   :  1933   MRN:  [id number]   DATE:  10/3/2023       HPI:  Pt is a 79yo RH female admitted 10/2/23 with generalized weakness/fatigue and bilateral LE edema, noted to be non-compliant with Lasix, and family expressed their concern about pt being a fall risk at home while living by herself. Family noted gait and vision issues for several months, but what prompted ED visit was pt putting utensils to her face while eating, but she could not find her mouth. Found to have a UTI. Neurology is asked to see the pt for \"ataxia/generalized weakness/multiple falls. \"  CTH neg. No vascular imaging done. BUN/Cr 34/1.03 on admission, today 24/0. 78. Ca 10.8, AST 38. UA c/w UTI. Mg 1.5. She is seen with her son at the bedside. The pt reports she falls asleep while standing. Her son feels her falls are related to musculoskeletal issues, noting she has had left knee replacement, but favors her right knee when using her Rolator, not picking up her feet, and her right leg gives out. She is living at home and he no longer feels this is safe. Pt mentions not drinking enough water. In review of EMR, she was seen by Dr. Day Gambino in Neuropsychology in  for memory loss, at that time family reported 6 falls in last year.      PMH:  CHF  PM, not MRI compatible   HTN  HLD  CKD  Left TKR  OA  Vit D def      ROS:  Per HPI o/w neg    MEDS:  Home:  Current Outpatient Medications   Medication Instructions    albuterol sulfate HFA (PROVENTIL;VENTOLIN;PROAIR) 108 (90 Base) MCG/ACT inhaler INHALE 2 PUFFS EVERY 6 HOURS AS NEEDED FOR WHEEZE    aspirin (ASPIRIN 81) 81 mg, Oral, DAILY    calcium-vitamin D (OSCAL 500 D-3) 500-5 MG-MCG TABS per tablet 1 tablet, Oral, 2 TIMES DAILY    diclofenac sodium (VOLTAREN) 1 % GEL Topical, 2 TIMES DAILY PRN
NEEDED FOR ALLERGIES 6/7/23   Buster Membreno MD   Multiple Vitamin (MVI, CELEBRATE, CHEWABLE TABLET) Take 1 tablet by mouth daily 6/7/23   Buster Membreno MD   hydrOXYzine HCl (ATARAX) 10 MG tablet  1/20/16   ProviderRosi MD   aspirin (ASPIRIN 81) 81 MG chewable tablet Take 1 tablet by mouth daily 6/1/23   Buster Membreno MD   calcium-vitamin D (OSCAL 500 D-3) 500-5 MG-MCG TABS per tablet Take 1 tablet by mouth 2 times daily 6/1/23   Buster Membreno MD   vitamin D3 (CHOLECALCIFEROL) 25 MCG (1000 UT) TABS tablet Take 1 tablet by mouth daily 6/1/23   Buster Membreno MD   albuterol sulfate HFA (PROVENTIL;VENTOLIN;PROAIR) 108 (90 Base) MCG/ACT inhaler INHALE 2 PUFFS EVERY 6 HOURS AS NEEDED FOR WHEEZE 6/29/20   Automatic Reconciliation, Ar   diclofenac sodium (VOLTAREN) 1 % GEL Apply topically 2 times daily as needed 2/1/23   Automatic Reconciliation, Ar   fluticasone (FLONASE) 50 MCG/ACT nasal spray SPRAY 2 SPRAYS INTO EACH NOSTRIL EVERY DAY 8/18/20   Automatic Reconciliation, Ar   lidocaine 4 % external patch Place 1 patch onto the skin every 24 hours 1/20/22   Automatic Reconciliation, Ar       Current Facility-Administered Medications   Medication Dose Route Frequency    0.9 % sodium chloride infusion   IntraVENous Continuous    nitroGLYCERIN (NITROSTAT) SL tablet 0.4 mg  0.4 mg SubLINGual Q5 Min PRN    balsum peru-castor oil (VENELEX) ointment   Topical Q12H    sodium chloride flush 0.9 % injection 5-40 mL  5-40 mL IntraVENous 2 times per day    sodium chloride flush 0.9 % injection 5-40 mL  5-40 mL IntraVENous PRN    0.9 % sodium chloride infusion   IntraVENous PRN    ondansetron (ZOFRAN-ODT) disintegrating tablet 4 mg  4 mg Oral Q8H PRN    Or    ondansetron (ZOFRAN) injection 4 mg  4 mg IntraVENous Q6H PRN    polyethylene glycol (GLYCOLAX) packet 17 g  17 g Oral Daily PRN    acetaminophen (TYLENOL) tablet 650 mg  650 mg Oral Q6H PRN    Or    acetaminophen (TYLENOL) suppository 650 mg  650 mg Rectal Q6H PRN    oyster

## 2023-10-07 LAB
ANION GAP SERPL CALC-SCNC: 4 MMOL/L (ref 5–15)
BUN SERPL-MCNC: 18 MG/DL (ref 6–20)
BUN/CREAT SERPL: 26 (ref 12–20)
CALCIUM SERPL-MCNC: 9.7 MG/DL (ref 8.5–10.1)
CHLORIDE SERPL-SCNC: 105 MMOL/L (ref 97–108)
CO2 SERPL-SCNC: 27 MMOL/L (ref 21–32)
CREAT SERPL-MCNC: 0.69 MG/DL (ref 0.55–1.02)
ERYTHROCYTE [DISTWIDTH] IN BLOOD BY AUTOMATED COUNT: 16.1 % (ref 11.5–14.5)
GLUCOSE SERPL-MCNC: 119 MG/DL (ref 65–100)
HCT VFR BLD AUTO: 33.8 % (ref 35–47)
HGB BLD-MCNC: 10.9 G/DL (ref 11.5–16)
MAGNESIUM SERPL-MCNC: 1.8 MG/DL (ref 1.6–2.4)
MCH RBC QN AUTO: 22.1 PG (ref 26–34)
MCHC RBC AUTO-ENTMCNC: 32.2 G/DL (ref 30–36.5)
MCV RBC AUTO: 68.4 FL (ref 80–99)
NRBC # BLD: 0 K/UL (ref 0–0.01)
NRBC BLD-RTO: 0 PER 100 WBC
PHOSPHATE SERPL-MCNC: 1.9 MG/DL (ref 2.6–4.7)
PLATELET # BLD AUTO: 81 K/UL (ref 150–400)
POTASSIUM SERPL-SCNC: 4 MMOL/L (ref 3.5–5.1)
RBC # BLD AUTO: 4.94 M/UL (ref 3.8–5.2)
SODIUM SERPL-SCNC: 136 MMOL/L (ref 136–145)
WBC # BLD AUTO: 7.6 K/UL (ref 3.6–11)

## 2023-10-07 PROCEDURE — 85027 COMPLETE CBC AUTOMATED: CPT

## 2023-10-07 PROCEDURE — 6370000000 HC RX 637 (ALT 250 FOR IP): Performed by: INTERNAL MEDICINE

## 2023-10-07 PROCEDURE — 6370000000 HC RX 637 (ALT 250 FOR IP): Performed by: HOSPITALIST

## 2023-10-07 PROCEDURE — 86022 PLATELET ANTIBODIES: CPT

## 2023-10-07 PROCEDURE — 83735 ASSAY OF MAGNESIUM: CPT

## 2023-10-07 PROCEDURE — 36415 COLL VENOUS BLD VENIPUNCTURE: CPT

## 2023-10-07 PROCEDURE — 2060000000 HC ICU INTERMEDIATE R&B

## 2023-10-07 PROCEDURE — 2580000003 HC RX 258: Performed by: FAMILY MEDICINE

## 2023-10-07 PROCEDURE — 6370000000 HC RX 637 (ALT 250 FOR IP): Performed by: FAMILY MEDICINE

## 2023-10-07 PROCEDURE — 80048 BASIC METABOLIC PNL TOTAL CA: CPT

## 2023-10-07 PROCEDURE — 84100 ASSAY OF PHOSPHORUS: CPT

## 2023-10-07 RX ORDER — PANTOPRAZOLE SODIUM 40 MG/1
40 TABLET, DELAYED RELEASE ORAL
Status: DISCONTINUED | OUTPATIENT
Start: 2023-10-07 | End: 2023-10-09 | Stop reason: HOSPADM

## 2023-10-07 RX ADMIN — FLUTICASONE PROPIONATE 2 SPRAY: 50 SPRAY, METERED NASAL at 09:44

## 2023-10-07 RX ADMIN — PANTOPRAZOLE SODIUM 40 MG: 40 TABLET, DELAYED RELEASE ORAL at 09:42

## 2023-10-07 RX ADMIN — FERROUS SULFATE TAB 325 MG (65 MG ELEMENTAL FE) 325 MG: 325 (65 FE) TAB at 07:01

## 2023-10-07 RX ADMIN — Medication 1 TABLET: at 21:45

## 2023-10-07 RX ADMIN — SODIUM CHLORIDE, PRESERVATIVE FREE 10 ML: 5 INJECTION INTRAVENOUS at 09:44

## 2023-10-07 RX ADMIN — Medication: at 09:44

## 2023-10-07 RX ADMIN — POLYVINYL ALCOHOL, POVIDONE 2 DROP: .5; .6 LIQUID OPHTHALMIC at 13:00

## 2023-10-07 RX ADMIN — POLYVINYL ALCOHOL, POVIDONE 2 DROP: .5; .6 LIQUID OPHTHALMIC at 16:58

## 2023-10-07 RX ADMIN — POLYVINYL ALCOHOL, POVIDONE 2 DROP: .5; .6 LIQUID OPHTHALMIC at 13:04

## 2023-10-07 RX ADMIN — Medication 1000 UNITS: at 09:42

## 2023-10-07 RX ADMIN — Medication 1 TABLET: at 09:42

## 2023-10-07 RX ADMIN — POLYVINYL ALCOHOL, POVIDONE 2 DROP: .5; .6 LIQUID OPHTHALMIC at 21:46

## 2023-10-07 RX ADMIN — LEVOFLOXACIN 250 MG: 500 TABLET, FILM COATED ORAL at 09:42

## 2023-10-07 RX ADMIN — THERA TABS 1 TABLET: TAB at 09:42

## 2023-10-07 RX ADMIN — SODIUM CHLORIDE, PRESERVATIVE FREE 10 ML: 5 INJECTION INTRAVENOUS at 21:46

## 2023-10-07 ASSESSMENT — PAIN SCALES - GENERAL
PAINLEVEL_OUTOF10: 0
PAINLEVEL_OUTOF10: 0

## 2023-10-08 LAB
ANION GAP SERPL CALC-SCNC: 2 MMOL/L (ref 5–15)
BUN SERPL-MCNC: 24 MG/DL (ref 6–20)
BUN/CREAT SERPL: 31 (ref 12–20)
CALCIUM SERPL-MCNC: 10.1 MG/DL (ref 8.5–10.1)
CHLORIDE SERPL-SCNC: 107 MMOL/L (ref 97–108)
CO2 SERPL-SCNC: 27 MMOL/L (ref 21–32)
CREAT SERPL-MCNC: 0.78 MG/DL (ref 0.55–1.02)
ERYTHROCYTE [DISTWIDTH] IN BLOOD BY AUTOMATED COUNT: 16.5 % (ref 11.5–14.5)
GLUCOSE SERPL-MCNC: 140 MG/DL (ref 65–100)
HCT VFR BLD AUTO: 34.7 % (ref 35–47)
HGB BLD-MCNC: 11 G/DL (ref 11.5–16)
MCH RBC QN AUTO: 22.4 PG (ref 26–34)
MCHC RBC AUTO-ENTMCNC: 31.7 G/DL (ref 30–36.5)
MCV RBC AUTO: 70.7 FL (ref 80–99)
NRBC # BLD: 0 K/UL (ref 0–0.01)
NRBC BLD-RTO: 0 PER 100 WBC
PLATELET # BLD AUTO: 73 K/UL (ref 150–400)
POTASSIUM SERPL-SCNC: 4.4 MMOL/L (ref 3.5–5.1)
RBC # BLD AUTO: 4.91 M/UL (ref 3.8–5.2)
SODIUM SERPL-SCNC: 136 MMOL/L (ref 136–145)
WBC # BLD AUTO: 7.5 K/UL (ref 3.6–11)

## 2023-10-08 PROCEDURE — 6370000000 HC RX 637 (ALT 250 FOR IP): Performed by: FAMILY MEDICINE

## 2023-10-08 PROCEDURE — 6370000000 HC RX 637 (ALT 250 FOR IP): Performed by: HOSPITALIST

## 2023-10-08 PROCEDURE — 6370000000 HC RX 637 (ALT 250 FOR IP): Performed by: INTERNAL MEDICINE

## 2023-10-08 PROCEDURE — 1100000000 HC RM PRIVATE

## 2023-10-08 PROCEDURE — 2580000003 HC RX 258: Performed by: FAMILY MEDICINE

## 2023-10-08 PROCEDURE — 36415 COLL VENOUS BLD VENIPUNCTURE: CPT

## 2023-10-08 PROCEDURE — 85027 COMPLETE CBC AUTOMATED: CPT

## 2023-10-08 PROCEDURE — 80048 BASIC METABOLIC PNL TOTAL CA: CPT

## 2023-10-08 RX ORDER — SALIVA STIMULANT COMB. NO.3
SPRAY, NON-AEROSOL (ML) MUCOUS MEMBRANE PRN
Status: DISCONTINUED | OUTPATIENT
Start: 2023-10-08 | End: 2023-10-09 | Stop reason: HOSPADM

## 2023-10-08 RX ADMIN — FERROUS SULFATE TAB 325 MG (65 MG ELEMENTAL FE) 325 MG: 325 (65 FE) TAB at 08:49

## 2023-10-08 RX ADMIN — DIBASIC SODIUM PHOSPHATE, MONOBASIC POTASSIUM PHOSPHATE AND MONOBASIC SODIUM PHOSPHATE 1 TABLET: 852; 155; 130 TABLET ORAL at 08:49

## 2023-10-08 RX ADMIN — Medication: at 22:05

## 2023-10-08 RX ADMIN — THERA TABS 1 TABLET: TAB at 08:49

## 2023-10-08 RX ADMIN — POLYVINYL ALCOHOL, POVIDONE 2 DROP: .5; .6 LIQUID OPHTHALMIC at 18:25

## 2023-10-08 RX ADMIN — POLYVINYL ALCOHOL, POVIDONE 2 DROP: .5; .6 LIQUID OPHTHALMIC at 22:05

## 2023-10-08 RX ADMIN — POLYVINYL ALCOHOL, POVIDONE 2 DROP: .5; .6 LIQUID OPHTHALMIC at 08:53

## 2023-10-08 RX ADMIN — Medication 1000 UNITS: at 08:49

## 2023-10-08 RX ADMIN — DIBASIC SODIUM PHOSPHATE, MONOBASIC POTASSIUM PHOSPHATE AND MONOBASIC SODIUM PHOSPHATE 1 TABLET: 852; 155; 130 TABLET ORAL at 22:03

## 2023-10-08 RX ADMIN — PANTOPRAZOLE SODIUM 40 MG: 40 TABLET, DELAYED RELEASE ORAL at 06:18

## 2023-10-08 RX ADMIN — Medication 1 TABLET: at 08:49

## 2023-10-08 RX ADMIN — SODIUM CHLORIDE, PRESERVATIVE FREE 10 ML: 5 INJECTION INTRAVENOUS at 08:53

## 2023-10-08 RX ADMIN — POLYVINYL ALCOHOL, POVIDONE 2 DROP: .5; .6 LIQUID OPHTHALMIC at 12:44

## 2023-10-08 RX ADMIN — Medication: at 12:44

## 2023-10-08 RX ADMIN — FLUTICASONE PROPIONATE 2 SPRAY: 50 SPRAY, METERED NASAL at 08:53

## 2023-10-08 RX ADMIN — Medication 1 TABLET: at 22:03

## 2023-10-08 RX ADMIN — Medication: at 00:39

## 2023-10-08 RX ADMIN — SODIUM CHLORIDE, PRESERVATIVE FREE 10 ML: 5 INJECTION INTRAVENOUS at 22:04

## 2023-10-08 ASSESSMENT — PAIN SCALES - GENERAL
PAINLEVEL_OUTOF10: 0
PAINLEVEL_OUTOF10: 0

## 2023-10-09 VITALS
BODY MASS INDEX: 23.54 KG/M2 | TEMPERATURE: 98.7 F | HEART RATE: 67 BPM | HEIGHT: 67 IN | WEIGHT: 150 LBS | OXYGEN SATURATION: 96 % | RESPIRATION RATE: 15 BRPM | SYSTOLIC BLOOD PRESSURE: 116 MMHG | DIASTOLIC BLOOD PRESSURE: 69 MMHG

## 2023-10-09 LAB
ANION GAP SERPL CALC-SCNC: 4 MMOL/L (ref 5–15)
BUN SERPL-MCNC: 22 MG/DL (ref 6–20)
BUN/CREAT SERPL: 39 (ref 12–20)
CALCIUM SERPL-MCNC: 9.9 MG/DL (ref 8.5–10.1)
CHLORIDE SERPL-SCNC: 104 MMOL/L (ref 97–108)
CO2 SERPL-SCNC: 28 MMOL/L (ref 21–32)
CREAT SERPL-MCNC: 0.57 MG/DL (ref 0.55–1.02)
ERYTHROCYTE [DISTWIDTH] IN BLOOD BY AUTOMATED COUNT: 16.1 % (ref 11.5–14.5)
GLUCOSE SERPL-MCNC: 116 MG/DL (ref 65–100)
HCT VFR BLD AUTO: 35.5 % (ref 35–47)
HGB BLD-MCNC: 11.4 G/DL (ref 11.5–16)
MAGNESIUM SERPL-MCNC: 1.7 MG/DL (ref 1.6–2.4)
MCH RBC QN AUTO: 22.2 PG (ref 26–34)
MCHC RBC AUTO-ENTMCNC: 32.1 G/DL (ref 30–36.5)
MCV RBC AUTO: 69.1 FL (ref 80–99)
NRBC # BLD: 0 K/UL (ref 0–0.01)
NRBC BLD-RTO: 0 PER 100 WBC
PF4 HEPARIN CMPLX AB SER-ACNC: 0.06 OD (ref 0–0.4)
PHOSPHATE SERPL-MCNC: 2.8 MG/DL (ref 2.6–4.7)
PLATELET # BLD AUTO: 134 K/UL (ref 150–400)
PMV BLD AUTO: 10.5 FL (ref 8.9–12.9)
POTASSIUM SERPL-SCNC: 3.9 MMOL/L (ref 3.5–5.1)
RBC # BLD AUTO: 5.14 M/UL (ref 3.8–5.2)
SODIUM SERPL-SCNC: 136 MMOL/L (ref 136–145)
WBC # BLD AUTO: 6.4 K/UL (ref 3.6–11)

## 2023-10-09 PROCEDURE — 36415 COLL VENOUS BLD VENIPUNCTURE: CPT

## 2023-10-09 PROCEDURE — 80048 BASIC METABOLIC PNL TOTAL CA: CPT

## 2023-10-09 PROCEDURE — 83735 ASSAY OF MAGNESIUM: CPT

## 2023-10-09 PROCEDURE — 85027 COMPLETE CBC AUTOMATED: CPT

## 2023-10-09 PROCEDURE — 6370000000 HC RX 637 (ALT 250 FOR IP): Performed by: FAMILY MEDICINE

## 2023-10-09 PROCEDURE — 2580000003 HC RX 258: Performed by: FAMILY MEDICINE

## 2023-10-09 PROCEDURE — 84100 ASSAY OF PHOSPHORUS: CPT

## 2023-10-09 PROCEDURE — 6370000000 HC RX 637 (ALT 250 FOR IP): Performed by: INTERNAL MEDICINE

## 2023-10-09 RX ORDER — FERROUS SULFATE 325(65) MG
325 TABLET ORAL
Qty: 30 TABLET | Refills: 0 | Status: SHIPPED | OUTPATIENT
Start: 2023-10-10

## 2023-10-09 RX ORDER — FUROSEMIDE 20 MG/1
20 TABLET ORAL DAILY PRN
Qty: 30 TABLET | Refills: 0 | Status: SHIPPED | OUTPATIENT
Start: 2023-10-09

## 2023-10-09 RX ORDER — SALIVA STIMULANT COMB. NO.3
2 SPRAY, NON-AEROSOL (ML) MUCOUS MEMBRANE PRN
Qty: 44.3 ML | Refills: 0 | Status: SHIPPED | OUTPATIENT
Start: 2023-10-09 | End: 2023-11-08

## 2023-10-09 RX ORDER — POLYETHYLENE GLYCOL 3350 17 G/17G
17 POWDER, FOR SOLUTION ORAL DAILY PRN
Qty: 30 PACKET | Refills: 0 | Status: SHIPPED | OUTPATIENT
Start: 2023-10-09 | End: 2023-11-08

## 2023-10-09 RX ORDER — PANTOPRAZOLE SODIUM 40 MG/1
40 TABLET, DELAYED RELEASE ORAL
Qty: 30 TABLET | Refills: 0 | Status: SHIPPED | OUTPATIENT
Start: 2023-10-10

## 2023-10-09 RX ADMIN — Medication 1 TABLET: at 09:14

## 2023-10-09 RX ADMIN — POLYVINYL ALCOHOL, POVIDONE 2 DROP: .5; .6 LIQUID OPHTHALMIC at 13:01

## 2023-10-09 RX ADMIN — POLYVINYL ALCOHOL, POVIDONE 2 DROP: .5; .6 LIQUID OPHTHALMIC at 09:15

## 2023-10-09 RX ADMIN — Medication 1000 UNITS: at 09:14

## 2023-10-09 RX ADMIN — FLUTICASONE PROPIONATE 2 SPRAY: 50 SPRAY, METERED NASAL at 09:16

## 2023-10-09 RX ADMIN — PANTOPRAZOLE SODIUM 40 MG: 40 TABLET, DELAYED RELEASE ORAL at 06:15

## 2023-10-09 RX ADMIN — DIBASIC SODIUM PHOSPHATE, MONOBASIC POTASSIUM PHOSPHATE AND MONOBASIC SODIUM PHOSPHATE 1 TABLET: 852; 155; 130 TABLET ORAL at 09:14

## 2023-10-09 RX ADMIN — SODIUM CHLORIDE, PRESERVATIVE FREE 10 ML: 5 INJECTION INTRAVENOUS at 09:16

## 2023-10-09 RX ADMIN — THERA TABS 1 TABLET: TAB at 09:14

## 2023-10-09 RX ADMIN — Medication: at 09:14

## 2023-10-09 RX ADMIN — FERROUS SULFATE TAB 325 MG (65 MG ELEMENTAL FE) 325 MG: 325 (65 FE) TAB at 09:15

## 2023-10-09 RX ADMIN — Medication: at 11:10

## 2023-10-09 ASSESSMENT — PAIN SCALES - GENERAL: PAINLEVEL_OUTOF10: 0

## 2023-10-09 NOTE — PLAN OF CARE
[facility], NP notified of Patient's low BP. Bolused 250 LR twice and gave 100mL albumin. Pressures have continued to remain soft through the night. MAP mid 50s to 60. Problem: Discharge Planning  Goal: Discharge to home or other facility with appropriate resources  Recent Flowsheet Documentation  Taken 10/4/2023 2000 by Deannie Krabbe, RN  Discharge to home or other facility with appropriate resources: Identify barriers to discharge with patient and caregiver  10/4/2023 0932 by Luc Gee RN  Outcome: Progressing  Flowsheets (Taken 10/3/2023 2000 by Deannie Krabbe, RN)  Discharge to home or other facility with appropriate resources: Identify barriers to discharge with patient and caregiver     Problem: Chronic Conditions and Co-morbidities  Goal: Patient's chronic conditions and co-morbidity symptoms are monitored and maintained or improved  10/4/2023 2033 by Deannie Krabbe, RN  Outcome: Progressing  Flowsheets (Taken 10/4/2023 2000)  Care Plan - Patient's Chronic Conditions and Co-Morbidity Symptoms are Monitored and Maintained or Improved: Monitor and assess patient's chronic conditions and comorbid symptoms for stability, deterioration, or improvement  10/4/2023 0932 by Luc Gee RN  Outcome: Progressing     Problem: Safety - Adult  Goal: Free from fall injury  10/4/2023 2033 by Deannie Krabbe, RN  Outcome: Progressing  10/4/2023 0932 by Luc Gee RN  Outcome: Progressing     Problem: Skin/Tissue Integrity  Goal: Absence of new skin breakdown  Description: 1. Monitor for areas of redness and/or skin breakdown  2. Assess vascular access sites hourly  3. Every 4-6 hours minimum:  Change oxygen saturation probe site  4. Every 4-6 hours:  If on nasal continuous positive airway pressure, respiratory therapy assess nares and determine need for appliance change or resting period.   10/4/2023 2033 by Deannie Krabbe, RN  Outcome:
Problem: Chronic Conditions and Co-morbidities  Goal: Patient's chronic conditions and co-morbidity symptoms are monitored and maintained or improved  10/8/2023 0414 by Lance Sterling RN  Outcome: Progressing     Problem: Safety - Adult  Goal: Free from fall injury  10/8/2023 0414 by Lance Sterling RN  Outcome: Progressing     Problem: Skin/Tissue Integrity  Goal: Absence of new skin breakdown  Description: 1. Monitor for areas of redness and/or skin breakdown  2. Assess vascular access sites hourly  3. Every 4-6 hours minimum:  Change oxygen saturation probe site  4. Every 4-6 hours:  If on nasal continuous positive airway pressure, respiratory therapy assess nares and determine need for appliance change or resting period.   10/8/2023 0414 by Lance Sterling RN  Outcome: Progressing     Problem: Discharge Planning  Goal: Discharge to home or other facility with appropriate resources  10/8/2023 0414 by Lance Sterling RN  Outcome: Progressing
Problem: Discharge Planning  Goal: Discharge to home or other facility with appropriate resources  10/6/2023 1200 by Marcellus Sen RN  Outcome: Progressing  Flowsheets (Taken 10/6/2023 0800)  Discharge to home or other facility with appropriate resources: Identify barriers to discharge with patient and caregiver  10/5/2023 2358 by Alex Kaplan RN  Outcome: Progressing     Problem: Chronic Conditions and Co-morbidities  Goal: Patient's chronic conditions and co-morbidity symptoms are monitored and maintained or improved  10/6/2023 1200 by Macrellus Sen RN  Outcome: Progressing  Flowsheets (Taken 10/6/2023 0800)  Care Plan - Patient's Chronic Conditions and Co-Morbidity Symptoms are Monitored and Maintained or Improved: Monitor and assess patient's chronic conditions and comorbid symptoms for stability, deterioration, or improvement  10/5/2023 2358 by Alex Kaplan RN  Outcome: Progressing  Flowsheets (Taken 10/5/2023 2000)  Care Plan - Patient's Chronic Conditions and Co-Morbidity Symptoms are Monitored and Maintained or Improved: Monitor and assess patient's chronic conditions and comorbid symptoms for stability, deterioration, or improvement     Problem: Safety - Adult  Goal: Free from fall injury  10/6/2023 1200 by Marcellus Sen RN  Outcome: Progressing  10/5/2023 2358 by Alex Kaplan RN  Outcome: Progressing     Problem: Skin/Tissue Integrity  Goal: Absence of new skin breakdown  Description: 1. Monitor for areas of redness and/or skin breakdown  2. Assess vascular access sites hourly  3. Every 4-6 hours minimum:  Change oxygen saturation probe site  4. Every 4-6 hours:  If on nasal continuous positive airway pressure, respiratory therapy assess nares and determine need for appliance change or resting period.   10/6/2023 1200 by Marcellus Sen RN  Outcome: Progressing  10/5/2023 2358 by Alex Kaplan RN  Outcome: Progressing
Problem: Discharge Planning  Goal: Discharge to home or other facility with appropriate resources  10/7/2023 1344 by Lawson Fuentes RN  Outcome: Progressing  Flowsheets (Taken 10/7/2023 0800)  Discharge to home or other facility with appropriate resources:   Identify barriers to discharge with patient and caregiver   Arrange for needed discharge resources and transportation as appropriate  10/7/2023 0739 by Nerissa Melchor RN  Outcome: Progressing     Problem: Chronic Conditions and Co-morbidities  Goal: Patient's chronic conditions and co-morbidity symptoms are monitored and maintained or improved  Outcome: Progressing  Flowsheets (Taken 10/7/2023 0800)  Care Plan - Patient's Chronic Conditions and Co-Morbidity Symptoms are Monitored and Maintained or Improved: Monitor and assess patient's chronic conditions and comorbid symptoms for stability, deterioration, or improvement     Problem: Safety - Adult  Goal: Free from fall injury  10/7/2023 1344 by Lawson Fuentes RN  Outcome: Progressing  10/7/2023 0739 by Nerissa Melchor RN  Outcome: Progressing     Problem: Skin/Tissue Integrity  Goal: Absence of new skin breakdown  Description: 1. Monitor for areas of redness and/or skin breakdown  2. Assess vascular access sites hourly  3. Every 4-6 hours minimum:  Change oxygen saturation probe site  4. Every 4-6 hours:  If on nasal continuous positive airway pressure, respiratory therapy assess nares and determine need for appliance change or resting period. 10/7/2023 1344 by Lawson Fuentes RN  Outcome: Progressing  10/7/2023 0739 by Nerissa Melchor RN  Outcome: Progressing     Problem: Skin/Tissue Integrity  Goal: Absence of new skin breakdown  Description: 1. Monitor for areas of redness and/or skin breakdown  2. Assess vascular access sites hourly  3. Every 4-6 hours minimum:  Change oxygen saturation probe site  4.   Every 4-6 hours:  If on nasal continuous positive airway pressure, respiratory therapy assess
Problem: Discharge Planning  Goal: Discharge to home or other facility with appropriate resources  10/9/2023 0015 by Genesis Vaughn RN  Outcome: Progressing  Flowsheets (Taken 10/8/2023 2039)  Discharge to home or other facility with appropriate resources:   Identify barriers to discharge with patient and caregiver   Identify discharge learning needs (meds, wound care, etc)     Problem: Chronic Conditions and Co-morbidities  Goal: Patient's chronic conditions and co-morbidity symptoms are monitored and maintained or improved  10/9/2023 0015 by Genesis Vaughn RN  Outcome: Progressing  Flowsheets (Taken 10/8/2023 2039)  Care Plan - Patient's Chronic Conditions and Co-Morbidity Symptoms are Monitored and Maintained or Improved: Monitor and assess patient's chronic conditions and comorbid symptoms for stability, deterioration, or improvement     Problem: Safety - Adult  Goal: Free from fall injury  10/9/2023 0015 by Genesis Vaughn RN  Outcome: Progressing     Problem: Skin/Tissue Integrity  Goal: Absence of new skin breakdown  Description: 1. Monitor for areas of redness and/or skin breakdown  2. Assess vascular access sites hourly  3. Every 4-6 hours minimum:  Change oxygen saturation probe site  4. Every 4-6 hours:  If on nasal continuous positive airway pressure, respiratory therapy assess nares and determine need for appliance change or resting period.   10/9/2023 0015 by Genesis Vaughn RN  Outcome: Progressing
Problem: Discharge Planning  Goal: Discharge to home or other facility with appropriate resources  Outcome: Progressing     Problem: Chronic Conditions and Co-morbidities  Goal: Patient's chronic conditions and co-morbidity symptoms are monitored and maintained or improved  Outcome: Progressing     Problem: Safety - Adult  Goal: Free from fall injury  Outcome: Progressing     Problem: Skin/Tissue Integrity  Goal: Absence of new skin breakdown  Description: 1. Monitor for areas of redness and/or skin breakdown  2. Assess vascular access sites hourly  3. Every 4-6 hours minimum:  Change oxygen saturation probe site  4. Every 4-6 hours:  If on nasal continuous positive airway pressure, respiratory therapy assess nares and determine need for appliance change or resting period.   Outcome: Progressing
Problem: Discharge Planning  Goal: Discharge to home or other facility with appropriate resources  Outcome: Progressing     Problem: Chronic Conditions and Co-morbidities  Goal: Patient's chronic conditions and co-morbidity symptoms are monitored and maintained or improved  Outcome: Progressing  Flowsheets (Taken 10/5/2023 2000)  Care Plan - Patient's Chronic Conditions and Co-Morbidity Symptoms are Monitored and Maintained or Improved: Monitor and assess patient's chronic conditions and comorbid symptoms for stability, deterioration, or improvement     Problem: Safety - Adult  Goal: Free from fall injury  Outcome: Progressing     Problem: Skin/Tissue Integrity  Goal: Absence of new skin breakdown  Description: 1. Monitor for areas of redness and/or skin breakdown  2. Assess vascular access sites hourly  3. Every 4-6 hours minimum:  Change oxygen saturation probe site  4. Every 4-6 hours:  If on nasal continuous positive airway pressure, respiratory therapy assess nares and determine need for appliance change or resting period.   Outcome: Progressing
Problem: Discharge Planning  Goal: Discharge to home or other facility with appropriate resources  Outcome: Progressing     Problem: Safety - Adult  Goal: Free from fall injury  Outcome: Progressing     Problem: Skin/Tissue Integrity  Goal: Absence of new skin breakdown  Description: 1. Monitor for areas of redness and/or skin breakdown  2. Assess vascular access sites hourly  3. Every 4-6 hours minimum:  Change oxygen saturation probe site  4. Every 4-6 hours:  If on nasal continuous positive airway pressure, respiratory therapy assess nares and determine need for appliance change or resting period.   Outcome: Progressing
Problem: Discharge Planning  Goal: Discharge to home or other facility with appropriate resources  Outcome: Progressing  Flowsheets (Taken 10/3/2023 1521)  Discharge to home or other facility with appropriate resources:   Identify barriers to discharge with patient and caregiver   Arrange for needed discharge resources and transportation as appropriate   Identify discharge learning needs (meds, wound care, etc)   Arrange for interpreters to assist at discharge as needed   Refer to discharge planning if patient needs post-hospital services based on physician order or complex needs related to functional status, cognitive ability or social support system     Problem: Chronic Conditions and Co-morbidities  Goal: Patient's chronic conditions and co-morbidity symptoms are monitored and maintained or improved  Outcome: Progressing  Flowsheets (Taken 10/3/2023 1521)  Care Plan - Patient's Chronic Conditions and Co-Morbidity Symptoms are Monitored and Maintained or Improved:   Monitor and assess patient's chronic conditions and comorbid symptoms for stability, deterioration, or improvement   Collaborate with multidisciplinary team to address chronic and comorbid conditions and prevent exacerbation or deterioration   Update acute care plan with appropriate goals if chronic or comorbid symptoms are exacerbated and prevent overall improvement and discharge
Problem: Discharge Planning  Goal: Discharge to home or other facility with appropriate resources  Outcome: Progressing  Flowsheets (Taken 10/8/2023 2039)  Discharge to home or other facility with appropriate resources:   Identify barriers to discharge with patient and caregiver   Identify discharge learning needs (meds, wound care, etc)     Problem: Chronic Conditions and Co-morbidities  Goal: Patient's chronic conditions and co-morbidity symptoms are monitored and maintained or improved  Outcome: Progressing  Flowsheets (Taken 10/8/2023 2039)  Care Plan - Patient's Chronic Conditions and Co-Morbidity Symptoms are Monitored and Maintained or Improved: Monitor and assess patient's chronic conditions and comorbid symptoms for stability, deterioration, or improvement     Problem: Safety - Adult  Goal: Free from fall injury  Outcome: Progressing     Problem: Skin/Tissue Integrity  Goal: Absence of new skin breakdown  Description: 1. Monitor for areas of redness and/or skin breakdown  2. Assess vascular access sites hourly  3. Every 4-6 hours minimum:  Change oxygen saturation probe site  4. Every 4-6 hours:  If on nasal continuous positive airway pressure, respiratory therapy assess nares and determine need for appliance change or resting period.   Outcome: Progressing
Problem: Discharge Planning  Goal: Discharge to home or other facility with appropriate resources  Recent Flowsheet Documentation  Taken 10/3/2023 2000 by Magy Morales RN  Discharge to home or other facility with appropriate resources: Identify barriers to discharge with patient and caregiver  10/3/2023 1941 by Lindaann Leventhal, RN  Outcome: Progressing  Flowsheets (Taken 10/3/2023 1521)  Discharge to home or other facility with appropriate resources:   Identify barriers to discharge with patient and caregiver   Arrange for needed discharge resources and transportation as appropriate   Identify discharge learning needs (meds, wound care, etc)   Arrange for interpreters to assist at discharge as needed   Refer to discharge planning if patient needs post-hospital services based on physician order or complex needs related to functional status, cognitive ability or social support system     Problem: Chronic Conditions and Co-morbidities  Goal: Patient's chronic conditions and co-morbidity symptoms are monitored and maintained or improved  10/4/2023 0058 by Magy Morales RN  Outcome: Progressing  Flowsheets (Taken 10/3/2023 2000)  Care Plan - Patient's Chronic Conditions and Co-Morbidity Symptoms are Monitored and Maintained or Improved: Monitor and assess patient's chronic conditions and comorbid symptoms for stability, deterioration, or improvement  10/3/2023 1941 by Lindaann Leventhal, RN  Outcome: Progressing  Flowsheets (Taken 10/3/2023 1521)  Care Plan - Patient's Chronic Conditions and Co-Morbidity Symptoms are Monitored and Maintained or Improved:   Monitor and assess patient's chronic conditions and comorbid symptoms for stability, deterioration, or improvement   Collaborate with multidisciplinary team to address chronic and comorbid conditions and prevent exacerbation or deterioration   Update acute care plan with appropriate goals if chronic or comorbid symptoms are exacerbated and prevent
Problem: Occupational Therapy - Adult  Goal: By Discharge: Performs self-care activities at highest level of function for planned discharge setting. See evaluation for individualized goals. Description: FUNCTIONAL STATUS PRIOR TO ADMISSION: Patient reports she required occasional assist for ADLs/IADLs from a caregiver who comes a few hours 3x/week, ambulates with a rollator, sleeping in a lift chair recliner. HOME SUPPORT: Patient lived with her daughter who has a hx of CVA and a caregiver of her own, reports her son occasionally assists with IADLs, caregiver assisting with ADLs/IADLs when there. Occupational Therapy Goals:  Initiated 10/4/2023  1. Patient will perform at least 2 seated grooming tasks with Minimal Assist within 7 day(s). 2.  Patient will perform upper body dressing with Minimal Assist within 7 day(s). 3.  Patient will perform lower body dressing with Moderate Assist & AE PRN within 7 day(s). 4.  Patient will perform anterior neck to thigh bathing with Moderate Assist within 7 day(s). 5.  Patient will perform toilet transfers to/from [facility] with Moderate Assist x2  within 7 day(s). 6.  Patient will perform all aspects of toileting with Moderate Assist within 7 day(s). 7.  Patient will participate in upper extremity therapeutic exercise/activities with Minimal Assist for 10 minutes with rest breaks PRN within 7 day(s). 8.  Patient will utilize energy conservation techniques during functional activities with verbal, visual, and tactile cues within 7 day(s).     Outcome: Progressing     OCCUPATIONAL THERAPY TREATMENT  Patient: Chitra Adame (89 y.o. female)  Date: 10/6/2023  Primary Diagnosis: Ataxia [R27.0]  Peripheral edema [R60.9]  Generalized weakness [R53.1]  Multiple falls [R29.6]  Edema of both lower extremities [R60.0]  UTI (urinary tract infection) [N39.0]       Precautions: Fall Risk                Chart, occupational therapy assessment, plan of care, and goals were
Problem: Occupational Therapy - Adult  Goal: By Discharge: Performs self-care activities at highest level of function for planned discharge setting. See evaluation for individualized goals. Description: FUNCTIONAL STATUS PRIOR TO ADMISSION: Per her report, patient reports she required occasional assist for ADLs/IADLs from a caregiver who comes a few hours 3x/week, ambulates with a rollator, sleeping in a lift chair recliner. HOME SUPPORT: Patient lived with her daughter who has a hx of CVA and a caregiver of her own, reports her son occasionally assists with IADLs, caregiver assisting with ADLs/IADLs when there. Occupational Therapy Goals:  Initiated 10/4/2023  1. Patient will perform at least 2 seated grooming tasks with Minimal Assist within 7 day(s). 2.  Patient will perform upper body dressing with Minimal Assist within 7 day(s). 3.  Patient will perform lower body dressing with Moderate Assist & AE PRN within 7 day(s). 4.  Patient will perform anterior neck to thigh bathing with Moderate Assist within 7 day(s). 5.  Patient will perform toilet transfers to/from [facility] with Moderate Assist x2  within 7 day(s). 6.  Patient will perform all aspects of toileting with Moderate Assist within 7 day(s). 7.  Patient will participate in upper extremity therapeutic exercise/activities with Minimal Assist for 10 minutes with rest breaks PRN within 7 day(s). 8.  Patient will utilize energy conservation techniques during functional activities with verbal, visual, and tactile cues within 7 day(s).     Outcome: Progressing     OCCUPATIONAL THERAPY EVALUATION    Patient: Lit Shah (33 y.o. female)  Date: 10/4/2023  Primary Diagnosis: Ataxia [R27.0]  Peripheral edema [R60.9]  Generalized weakness [R53.1]  Multiple falls [R29.6]  Edema of both lower extremities [R60.0]  UTI (urinary tract infection) [N39.0]         Precautions: Fall Risk                  ASSESSMENT :  The patient is limited by decreased
Problem: Physical Therapy - Adult  Goal: By Discharge: Performs mobility at highest level of function for planned discharge setting. See evaluation for individualized goals. Description: FUNCTIONAL STATUS PRIOR TO ADMISSION: Patient reports being mod I with rollator for ambulation within her home. Patient lives with her daughter who has had a CVA. Both her and her daughter have hired aids to assist with self care and home maintenance. Patient states her daughter requires assist herself and is not able to provide assist to the patient. Patient reports multiple falls at home related to \"falling asleep while standing up\". Patient has hx L knee replacement and reports limited L knee flexion at baseline. HOME SUPPORT PRIOR TO ADMISSION: The patient lived with daughter (mobility impaired) and required minimal assistance for self-care from hired aid 3x/week. Physical Therapy Goals  Initiated 10/4/2023  1. Patient will move from supine to sit and sit to supine in bed with minimal assistance within 7 day(s). 2.  Patient will perform sit to stand with minimal assistance within 7 day(s). 3.  Patient will transfer from bed to chair and chair to bed with minimal assistance using the least restrictive device within 7 day(s). 4.  Patient will ambulate with moderate assistance for 10 feet with the least restrictive device within 7 day(s). 5.  Patient will ascend/descend 2 stairs with left handrail(s) with moderate assistance within 7 day(s).     Outcome: Progressing   PHYSICAL THERAPY EVALUATION    Patient: Elder Taylor (43 y.o. female)  Date: 10/4/2023  Primary Diagnosis: Ataxia [R27.0]  Peripheral edema [R60.9]  Generalized weakness [R53.1]  Multiple falls [R29.6]  Edema of both lower extremities [R60.0]  UTI (urinary tract infection) [N39.0]       Precautions: Fall Risk                    ASSESSMENT :   DEFICITS/IMPAIRMENTS:   The patient is limited by decreased functional mobility, independence in ADLs,
awareness of need for assistance  Problem Solving: Assistance required to generate solutions;Assistance required to implement solutions;Assistance required to identify errors made;Assistance required to correct errors made;Decreased awareness of errors  Insights: Decreased awareness of deficits  Initiation: Requires cues for some  Sequencing: Requires cues for some    Functional Mobility Training:  Bed Mobility:  Bed Mobility Training  Bed Mobility Training: Yes  Supine to Sit: Moderate assistance;Minimum assistance;Assist X1;Additional time (bed rails utilized)  Scooting: Minimum assistance; Moderate assistance;Assist X1  Transfers:  Transfer Training  Transfer Training: Yes  Interventions: Demonstration;Manual cues; Safety awareness training; Tactile cues; Verbal cues; Visual cues  Sit to Stand: Maximum assistance;Assist X2 (UE supporton ezekiel steady)  Stand to Sit: Moderate assistance;Assist X2  Bed to Chair: Total assistance (ezekiel steady)  Balance:  Balance  Sitting: Impaired  Sitting - Static: Fair (occasional)  Sitting - Dynamic: Fair (occasional)  Standing: Impaired  Standing - Static: Poor;Constant support  Standing - Dynamic: Not tested      Pain Rating:  Patient endorsing L LE pain in standing     Pain Intervention(s):   nursing notified, rest, and repositioning    Activity Tolerance:   Fair , requires rest breaks, SpO2 stable on room air, and limited by L > R LE pain     After treatment:   Patient left in no apparent distress sitting up in chair, Call bell within reach, and Bed/ chair alarm activated      COMMUNICATION/EDUCATION:   The patient's plan of care was discussed with: occupational therapist and registered nurse    Patient Education  Education Given To: Patient  Education Provided: Role of Therapy; Energy Conservation; Fall Prevention Strategies; Plan of Care;Precautions;Transfer Training  Education Method: Verbal  Barriers to Learning: Cognition  Education Outcome: Verbalized understanding;Continued

## 2023-10-09 NOTE — DISCHARGE SUMMARY
17 g packet  saliva substitute Soln liquid           NOTIFY YOUR PHYSICIAN FOR ANY OF THE FOLLOWING:   Fever over 101 degrees for 24 hours. Chest pain, shortness of breath, fever, chills, nausea, vomiting, diarrhea, change in mentation, falling, weakness, bleeding. Severe pain or pain not relieved by medications. Or, any other signs or symptoms that you may have questions about. DISPOSITION:    Home With:   OT  PT  HH  RN      x Long term SNF/Inpatient Rehab    Independent/assisted living    Hospice    Other:       PATIENT CONDITION AT DISCHARGE:     Functional status   x Poor     Deconditioned     Independent      Cognition     Lucid    x Forgetful     Dementia      Catheters/lines (plus indication)    Reed     PICC     PEG    x None      Code status     Full code    x DNR      PHYSICAL EXAMINATION AT DISCHARGE:    General : awake, no acute distress,   HEENT: EOMI, dry mucus membrane  Neck: supple, no JVD, no meningeal signs  Chest: Clear to auscultation bilaterally, no rales, rhonchi, or wheezing  CVS: S1 S2 heard, RRR   abd: soft/ Non tender, non distended, BS physiological,   Ext: no clubbing, no cyanosis, no edema; Neuro/Psych: pleasant mood and affect, no focal neurological deficits  Skin: warm     CHRONIC MEDICAL DIAGNOSES:  Principal Problem:    Ataxia  Active Problems:    UTI (urinary tract infection)  Resolved Problems:    * No resolved hospital problems.  *        Greater than 31 minutes were spent with the patient on counseling and coordination of care    Signed:   Annita Alvarado MD  10/9/2023  2:03 PM

## 2023-10-09 NOTE — DISCHARGE INSTRUCTIONS
degrees for 24 hours. Chest pain, shortness of breath, fever, chills, nausea, vomiting, diarrhea, change in mentation, falling, weakness, bleeding. Severe pain or pain not relieved by medications. Or, any other signs or symptoms that you may have questions about.       DISPOSITION:    Home With:   OT  PT  HH  RN      x SNF/Inpatient Rehab/LTAC    Independent/assisted living    Hospice    Other:     CDMP Checked:   Yes BRENDAN     PROBLEM LIST Updated:  Yes IK       Signed:   Goldie Chase MD  10/9/2023  1:56 PM

## 2023-10-10 NOTE — CARE COORDINATION
CM received consult for SNF placement. Pt is currently in Observation status and would not qualify for SNF placement. IP status as well as 3 night stay would be necessary for Pt to qualify for SNF stay under Medicare. Dr. Davis Genoa informed. Full assessment to follow tomorrow, 10/4. EKATERINA Maier.
EL met with Dr. Bharati Elizabeth. She stated that this pt is not medically stable to d/c today. EL called Annita ([phone]) with [facility] to inform her. EL also called [facility] to inform them of cancellation.  Drake Valentino
Medicaid LTSS Screening submitted for processing on the [facility] portal.      BEATRIZ Haq
Transition of Care Plan:    SNF - [facility] - 10/7  RN will call report to [phone]; Room 61A. Transport: [facility] 1230 10/7 - Transport packet on chart    RUR: 15%  Prior Level of Functioning: Requires assist with bathing and IADLs, Mod I, Uses Rollator to ambulated  Disposition: SNF  If SNF or IPR: Date FOC offered: 10/4  Date FOC received: 10/4  [facility]: [facility]  Follow up appointments: PCP  DME needed: defer to SNF  Transportation at discharge: [facility]  IM/IMM Medicare/ letter given: Not yet given by Pt Access  Caregiver Contact: Amira Marlow., [phone]; [email or web address]  Discharge Caregiver contacted prior to discharge? Care Conference needed? no  Barriers to discharge:  3 night medicare stay - eligible for DC 10/7    Pt's son prefers [facility]; CM informed Jenny Jernigan of likely DC tomorrow. 1030: [facility] is able to accept Pt tomorrow. RN will call report to [phone]; Room 61A. CM informed Pt's son Nonah Corporal of discharge tomorrow. [facility] transport scheduled for 1230 tomorrow. EKATERINA Castro.
Transition of Care Plan:    [facility]; referral pending with [address] [place]  - No authorization required but does require 3 night stay, eligible for DC 10/7    Transport: S    RUR: 15%  Prior Level of Functioning: Requires assist with bathing and IADLs, Mod I, Uses Rollator to ambulated  Disposition: SNF  If SNF or IPR: Date FOC offered: 10/4  Date FOC received: 10/4  Accepting facility: [address]  Follow up appointments: PCP  DME needed: defer to SNF  Transportation at discharge: BLS  IM/IMM Medicare/ letter given: Not yet given by Pt Access  Caregiver Contact: Sudha Jack., [phone]; [email or web address]  Discharge Caregiver contacted prior to discharge? Care Conference needed? no  Barriers to discharge: Medical, low BP, 3 night medicare stay - eligible for DC 10/7    [facility]. CM has requested Pt's son to choose between 3 accepting facilities. EKATERINA Ruiz.
care needs: No   Financial Resources Medicare   Social/Functional History   Lives With Daughter  (Dtr has suffered previous stroke, also requires assist/not able to assist Pt)   Type of [facility]  Two level; Able to Live on Main level with bedroom/bathroom   Home Access Stairs to enter with rails   Entrance Stairs - Number of Steps 2   Entrance Stairs - Rails Left   Bathroom Shower/Tub Walk-in shower   Bathroom Equipment Shower chair;Grab bars around toilet   [address] [facility] care attendant   ADL Assistance Needs assistance   [facility] Needs assistance   Ambulation Assistance Independent   Transfer Assistance Independent   Active  No   Discharge Planning   Type of [address] Family Members   Current Services Prior To Admission Private Duty Homecare;Durable Medical Equipment  (TTS 9am-2pm)   Potential Assistance Needed Skilled Nursing Facility;Transportation   DME Ordered? No   Potential Assistance Purchasing Medications No   Patient expects to be discharged to: [address] [place] [address] At/After Discharge   Transition of Care Consult (CM Consult) SNF   Mode of Transport at Discharge BLS   Condition of Participation: Discharge Planning   The Plan for Transition of Care is related to the following treatment goals: SNF   The Patient and/or Patient Representative was provided with a Choice of Provider? Patient Representative   Name of the Patient Representative who was provided with the Choice of Provider and agrees with the Discharge Plan? Felix Camp   The Patient and/Or Patient Representative agree with the Discharge Plan? Yes   Freedom of Choice list was provided with basic dialogue that supports the patient's individualized plan of care/goals, treatment preferences, and shares the quality data associated with the providers?   Yes     Hank Manzanares
etc.)  []Surgical Drain Management  []Tracheostomy Care  []Dressing Changes  []Dialysis with transportation  []PEG Care  []Oxygen  []Daily Weights for Heart Failure    Dietary:  []Any diet limitations  []Tube Feedings   []Total Parenteral Management (TPN)    Financial Resources:  []Medicaid Application Completed    [x]UAI Completed and copy given to pt/family  and copy given to pt/family - UAI Requested 10/9 and will provide copy to facility once processed. []A screening has previously been completed. []Level II Completed    [x] Private pay individual who will not become   financially eligible for Medicaid within 6 months from admission to a [address] [place] [address] facility. [] Individual refused to have screening conducted. []Medicaid Application Completed    []The screening denied because it was determined individual did not need/did not qualify for nursing facility level of care. [] Out of state residents seeking direct admission to a [facility]. [] Individuals who are inpatients of an out of state hospital, or in state or out of state veterans/ hospital and seek direct admission to a [facility]  [] Individuals who are pateints or residents of a state owned/operated facility that is licensed by Department of [employer] and seek direct admission to [address] [place] [address]  [] A screening not required for enrollment in [address] [place] [address] as set out in [address] [place] [address] 30-  [] [facility]) staff shall perform screenings of the [facility] clients. Advanced Care Plan:  []Surrogate Decision Maker of Care  []POA  []Communicated Code Status and copy sent. Other:           EKATERINA Stiles.

## 2023-11-03 PROBLEM — N39.0 UTI (URINARY TRACT INFECTION): Status: RESOLVED | Noted: 2023-10-04 | Resolved: 2023-11-03

## 2023-11-25 ENCOUNTER — HOSPITAL ENCOUNTER (EMERGENCY)
Facility: HOSPITAL | Age: 88
Discharge: NURSING, REHAB OR LONG-TERM CARE | End: 2023-11-25
Attending: STUDENT IN AN ORGANIZED HEALTH CARE EDUCATION/TRAINING PROGRAM
Payer: MEDICARE

## 2023-11-25 ENCOUNTER — APPOINTMENT (OUTPATIENT)
Facility: HOSPITAL | Age: 88
End: 2023-11-25
Payer: MEDICARE

## 2023-11-25 VITALS
SYSTOLIC BLOOD PRESSURE: 112 MMHG | OXYGEN SATURATION: 100 % | TEMPERATURE: 97.2 F | DIASTOLIC BLOOD PRESSURE: 58 MMHG | RESPIRATION RATE: 17 BRPM | HEART RATE: 64 BPM

## 2023-11-25 DIAGNOSIS — U07.1 COVID-19 VIRUS INFECTION: ICD-10-CM

## 2023-11-25 DIAGNOSIS — R09.81 NASAL CONGESTION: Primary | ICD-10-CM

## 2023-11-25 LAB
ALBUMIN SERPL-MCNC: 3.6 G/DL (ref 3.5–5)
ALBUMIN/GLOB SERPL: 0.8 (ref 1.1–2.2)
ALP SERPL-CCNC: 91 U/L (ref 45–117)
ALT SERPL-CCNC: 19 U/L (ref 12–78)
ANION GAP SERPL CALC-SCNC: 4 MMOL/L (ref 5–15)
AST SERPL-CCNC: 24 U/L (ref 15–37)
BASOPHILS # BLD: 0.1 K/UL (ref 0–0.1)
BASOPHILS NFR BLD: 1 % (ref 0–1)
BILIRUB SERPL-MCNC: 0.5 MG/DL (ref 0.2–1)
BUN SERPL-MCNC: 44 MG/DL (ref 6–20)
BUN/CREAT SERPL: 39 (ref 12–20)
CALCIUM SERPL-MCNC: 10.4 MG/DL (ref 8.5–10.1)
CHLORIDE SERPL-SCNC: 108 MMOL/L (ref 97–108)
CO2 SERPL-SCNC: 24 MMOL/L (ref 21–32)
CREAT SERPL-MCNC: 1.14 MG/DL (ref 0.55–1.02)
DIFFERENTIAL METHOD BLD: ABNORMAL
EOSINOPHIL # BLD: 0.1 K/UL (ref 0–0.4)
EOSINOPHIL NFR BLD: 3 % (ref 0–7)
ERYTHROCYTE [DISTWIDTH] IN BLOOD BY AUTOMATED COUNT: 17.9 % (ref 11.5–14.5)
FLUAV AG NPH QL IA: NEGATIVE
FLUBV AG NOSE QL IA: NEGATIVE
GLOBULIN SER CALC-MCNC: 4.7 G/DL (ref 2–4)
GLUCOSE SERPL-MCNC: 105 MG/DL (ref 65–100)
HCT VFR BLD AUTO: 40.7 % (ref 35–47)
HGB BLD-MCNC: 13 G/DL (ref 11.5–16)
IMM GRANULOCYTES # BLD AUTO: 0 K/UL (ref 0–0.04)
IMM GRANULOCYTES NFR BLD AUTO: 0 % (ref 0–0.5)
LYMPHOCYTES # BLD: 0.9 K/UL (ref 0.8–3.5)
LYMPHOCYTES NFR BLD: 20 % (ref 12–49)
MAGNESIUM SERPL-MCNC: 1.9 MG/DL (ref 1.6–2.4)
MCH RBC QN AUTO: 22.3 PG (ref 26–34)
MCHC RBC AUTO-ENTMCNC: 31.9 G/DL (ref 30–36.5)
MCV RBC AUTO: 69.8 FL (ref 80–99)
MONOCYTES # BLD: 0.8 K/UL (ref 0–1)
MONOCYTES NFR BLD: 16 % (ref 5–13)
NEUTS SEG # BLD: 2.7 K/UL (ref 1.8–8)
NEUTS SEG NFR BLD: 59 % (ref 32–75)
NRBC # BLD: 0 K/UL (ref 0–0.01)
NRBC BLD-RTO: 0 PER 100 WBC
NT PRO BNP: 194 PG/ML
PLATELET # BLD AUTO: 153 K/UL (ref 150–400)
POTASSIUM SERPL-SCNC: 4.5 MMOL/L (ref 3.5–5.1)
PROT SERPL-MCNC: 8.3 G/DL (ref 6.4–8.2)
RBC # BLD AUTO: 5.83 M/UL (ref 3.8–5.2)
SARS-COV-2 RDRP RESP QL NAA+PROBE: DETECTED
SODIUM SERPL-SCNC: 136 MMOL/L (ref 136–145)
SOURCE: ABNORMAL
TROPONIN I SERPL HS-MCNC: 10 NG/L (ref 0–51)
WBC # BLD AUTO: 4.6 K/UL (ref 3.6–11)

## 2023-11-25 PROCEDURE — 83880 ASSAY OF NATRIURETIC PEPTIDE: CPT

## 2023-11-25 PROCEDURE — 87804 INFLUENZA ASSAY W/OPTIC: CPT

## 2023-11-25 PROCEDURE — 71046 X-RAY EXAM CHEST 2 VIEWS: CPT

## 2023-11-25 PROCEDURE — 87635 SARS-COV-2 COVID-19 AMP PRB: CPT

## 2023-11-25 PROCEDURE — 84484 ASSAY OF TROPONIN QUANT: CPT

## 2023-11-25 PROCEDURE — 83735 ASSAY OF MAGNESIUM: CPT

## 2023-11-25 PROCEDURE — 99285 EMERGENCY DEPT VISIT HI MDM: CPT

## 2023-11-25 PROCEDURE — 85025 COMPLETE CBC W/AUTO DIFF WBC: CPT

## 2023-11-25 PROCEDURE — 80053 COMPREHEN METABOLIC PANEL: CPT

## 2023-11-25 PROCEDURE — 36415 COLL VENOUS BLD VENIPUNCTURE: CPT

## 2023-11-25 RX ORDER — MOMETASONE FUROATE 50 UG/1
2 SPRAY, METERED NASAL DAILY
Qty: 1 EACH | Refills: 3 | Status: SHIPPED | OUTPATIENT
Start: 2023-11-25 | End: 2023-12-25

## 2023-11-25 ASSESSMENT — PAIN - FUNCTIONAL ASSESSMENT: PAIN_FUNCTIONAL_ASSESSMENT: NONE - DENIES PAIN

## 2023-11-26 LAB
EKG ATRIAL RATE: 65 BPM
EKG DIAGNOSIS: NORMAL
EKG P-R INTERVAL: 170 MS
EKG Q-T INTERVAL: 408 MS
EKG QRS DURATION: 78 MS
EKG QTC CALCULATION (BAZETT): 424 MS
EKG R AXIS: 53 DEGREES
EKG T AXIS: 47 DEGREES
EKG VENTRICULAR RATE: 65 BPM

## 2023-11-26 NOTE — DISCHARGE INSTRUCTIONS
You are seen in the emergency department for COVID infection. The rest of your work-up was reassuring. You have been prescribed some Nasonex to help with your nasal congestion. Return to the emergency department if you develop any new or worsening symptoms.

## 2023-11-26 NOTE — ED NOTES
Patient found attempting to crawl out of the bed and screaming that she can't breath. SpO2 99% on room air, non-labored breathing but hyper-ventilating. Calming techniques applied. Patient is now crying and saying she has to go and complaining of being cold. Patient given warm blankets.      Jeanne Tinajero  11/25/23 2116

## 2023-11-26 NOTE — ED NOTES
Patient now complaining of her sacrum hurting.  Patient laid back slightly and turned, no relief, pt non-compliant     Dorie Lynch RN  11/25/23 2119

## 2023-11-26 NOTE — ED PROVIDER NOTES
worsen. PATIENT REFERRED TO:  No follow-up provider specified. DISCHARGE MEDICATIONS:     Medication List        ASK your doctor about these medications      albuterol sulfate  (90 Base) MCG/ACT inhaler  Commonly known as: PROVENTIL;VENTOLIN;PROAIR     aspirin 81 MG chewable tablet  Commonly known as: Aspirin 81  Take 1 tablet by mouth daily     citalopram 10 MG tablet  Commonly known as: CELEXA     diclofenac sodium 1 % Gel  Commonly known as: VOLTAREN     ferrous sulfate 325 (65 Fe) MG tablet  Commonly known as: IRON 325  Take 1 tablet by mouth daily (with breakfast)     fluticasone 50 MCG/ACT nasal spray  Commonly known as: FLONASE     furosemide 20 MG tablet  Commonly known as: LASIX  Take 1 tablet by mouth daily as needed (Signs and symptoms of peripheral volume overload;)     lidocaine 4 % external patch     loratadine 10 MG tablet  Commonly known as: CLARITIN  TAKE 1 TABLET BY MOUTH NIGHTLY AS NEEDED FOR ALLERGIES     MVI (CELEBRATE) CHEWABLE TABLET  Take 1 tablet by mouth daily     Oscal 500 D-3 500-200 MG-UNIT per tablet  Generic drug: calcium-vitamin D  Take 1 tablet by mouth 2 times daily     pantoprazole 40 MG tablet  Commonly known as: PROTONIX  Take 1 tablet by mouth every morning (before breakfast)     vitamin D3 25 MCG (1000 UT) Tabs tablet  Commonly known as: CHOLECALCIFEROL  Take 1 tablet by mouth daily                DISCONTINUED MEDICATIONS:  Current Discharge Medication List          I am the Primary Clinician of Record. Anna Ferreira MD (electronically signed)      (Please note that parts of this dictation were completed with voice recognition software. Quite often unanticipated grammatical, syntax, homophones, and other interpretive errors are inadvertently transcribed by the computer software. Please disregards these errors.  Please excuse any errors that have escaped final proofreading.)         Anna Ferreira MD  11/27/23 7195

## 2023-12-12 ENCOUNTER — TELEPHONE (OUTPATIENT)
Age: 88
End: 2023-12-12

## 2023-12-12 NOTE — TELEPHONE ENCOUNTER
Pt has been at [address] [place] [address] since 10/12/2023 and is due to be discharged on 12/20/2023. Pts son feels that she needs around the clock care. Diamond Fuentes is requesting a call back to ask a few questions about pts care and the next steps he should be taking upon discharge.    Please return call at [phone]

## 2023-12-27 ENCOUNTER — TELEPHONE (OUTPATIENT)
Age: 88
End: 2023-12-27

## 2023-12-27 NOTE — TELEPHONE ENCOUNTER
----- Message from Georgia Lux sent at 12/27/2023 12:31 PM EST -----  Subject: Message to Provider    QUESTIONS  Information for Provider? Steven Garcia called for verbal order . Pt is needing   verbal order for consent to keep doing occupation therapy . Pt also need   verbal order for  evaluation . Please reach out to Steven Garcia to   provide verbal orders . Steven Garcia number is [phone]  ---------------------------------------------------------------------------  --------------  Ioana Whitney INFO  [phone]; OK to leave message on voicemail  ---------------------------------------------------------------------------  --------------  SCRIPT ANSWERS  Relationship to Patient? Covered Entity  Covered Entity Type? Home Health Care?   Representative Name? Steven Garcia

## 2024-01-01 ENCOUNTER — HOSPITAL ENCOUNTER (INPATIENT)
Facility: HOSPITAL | Age: 89
LOS: 8 days | Discharge: HOSPICE | DRG: 871 | End: 2024-05-09
Attending: HOSPITALIST | Admitting: HOSPITALIST
Payer: MEDICARE

## 2024-01-01 ENCOUNTER — APPOINTMENT (OUTPATIENT)
Facility: HOSPITAL | Age: 89
DRG: 871 | End: 2024-01-01
Attending: HOSPITALIST
Payer: MEDICARE

## 2024-01-01 ENCOUNTER — HOSPICE ADMISSION (OUTPATIENT)
Age: 89
End: 2024-01-01
Payer: MEDICARE

## 2024-01-01 VITALS
BODY MASS INDEX: 30.14 KG/M2 | SYSTOLIC BLOOD PRESSURE: 138 MMHG | OXYGEN SATURATION: 96 % | TEMPERATURE: 97.8 F | HEART RATE: 75 BPM | HEIGHT: 67 IN | DIASTOLIC BLOOD PRESSURE: 70 MMHG | RESPIRATION RATE: 18 BRPM

## 2024-01-01 DIAGNOSIS — I10 ESSENTIAL HYPERTENSION: ICD-10-CM

## 2024-01-01 DIAGNOSIS — E61.1 IRON DEFICIENCY: ICD-10-CM

## 2024-01-01 DIAGNOSIS — I50.9 CONGESTIVE HEART FAILURE, UNSPECIFIED HF CHRONICITY, UNSPECIFIED HEART FAILURE TYPE (HCC): ICD-10-CM

## 2024-01-01 LAB
ALBUMIN SERPL-MCNC: 1.4 G/DL (ref 3.5–5)
ALBUMIN/GLOB SERPL: 0.3 (ref 1.1–2.2)
ALP SERPL-CCNC: 114 U/L (ref 45–117)
ALT SERPL-CCNC: 50 U/L (ref 12–78)
ANION GAP SERPL CALC-SCNC: 2 MMOL/L (ref 5–15)
ANION GAP SERPL CALC-SCNC: 3 MMOL/L (ref 5–15)
ANION GAP SERPL CALC-SCNC: 4 MMOL/L (ref 5–15)
ANION GAP SERPL CALC-SCNC: 7 MMOL/L (ref 5–15)
ARTERIAL PATENCY WRIST A: POSITIVE
ARTERIAL PATENCY WRIST A: POSITIVE
AST SERPL-CCNC: 66 U/L (ref 15–37)
BACTERIA SPEC CULT: NORMAL
BASE EXCESS BLD CALC-SCNC: 5.4 MMOL/L
BASE EXCESS BLD CALC-SCNC: 6 MMOL/L
BASOPHILS # BLD: 0 K/UL (ref 0–0.1)
BASOPHILS NFR BLD: 0 % (ref 0–1)
BDY SITE: ABNORMAL
BDY SITE: ABNORMAL
BILIRUB SERPL-MCNC: 0.4 MG/DL (ref 0.2–1)
BUN SERPL-MCNC: 10 MG/DL (ref 6–20)
BUN SERPL-MCNC: 15 MG/DL (ref 6–20)
BUN SERPL-MCNC: 16 MG/DL (ref 6–20)
BUN SERPL-MCNC: 19 MG/DL (ref 6–20)
BUN SERPL-MCNC: 19 MG/DL (ref 6–20)
BUN SERPL-MCNC: 26 MG/DL (ref 6–20)
BUN/CREAT SERPL: 35 (ref 12–20)
BUN/CREAT SERPL: 36 (ref 12–20)
BUN/CREAT SERPL: 36 (ref 12–20)
BUN/CREAT SERPL: 42 (ref 12–20)
BUN/CREAT SERPL: 46 (ref 12–20)
BUN/CREAT SERPL: 48 (ref 12–20)
CALCIUM SERPL-MCNC: 10.4 MG/DL (ref 8.5–10.1)
CALCIUM SERPL-MCNC: 10.5 MG/DL (ref 8.5–10.1)
CALCIUM SERPL-MCNC: 10.7 MG/DL (ref 8.5–10.1)
CALCIUM SERPL-MCNC: 10.8 MG/DL (ref 8.5–10.1)
CALCIUM SERPL-MCNC: 10.9 MG/DL (ref 8.5–10.1)
CALCIUM SERPL-MCNC: 11.2 MG/DL (ref 8.5–10.1)
CHLORIDE SERPL-SCNC: 100 MMOL/L (ref 97–108)
CHLORIDE SERPL-SCNC: 101 MMOL/L (ref 97–108)
CHLORIDE SERPL-SCNC: 101 MMOL/L (ref 97–108)
CHLORIDE SERPL-SCNC: 102 MMOL/L (ref 97–108)
CHLORIDE SERPL-SCNC: 97 MMOL/L (ref 97–108)
CHLORIDE SERPL-SCNC: 99 MMOL/L (ref 97–108)
CO2 SERPL-SCNC: 25 MMOL/L (ref 21–32)
CO2 SERPL-SCNC: 26 MMOL/L (ref 21–32)
CO2 SERPL-SCNC: 30 MMOL/L (ref 21–32)
CO2 SERPL-SCNC: 31 MMOL/L (ref 21–32)
CO2 SERPL-SCNC: 31 MMOL/L (ref 21–32)
CO2 SERPL-SCNC: 33 MMOL/L (ref 21–32)
CREAT SERPL-MCNC: 0.24 MG/DL (ref 0.55–1.02)
CREAT SERPL-MCNC: 0.4 MG/DL (ref 0.55–1.02)
CREAT SERPL-MCNC: 0.43 MG/DL (ref 0.55–1.02)
CREAT SERPL-MCNC: 0.45 MG/DL (ref 0.55–1.02)
CREAT SERPL-MCNC: 0.53 MG/DL (ref 0.55–1.02)
CREAT SERPL-MCNC: 0.57 MG/DL (ref 0.55–1.02)
DIFFERENTIAL METHOD BLD: ABNORMAL
EOSINOPHIL # BLD: 0 K/UL (ref 0–0.4)
EOSINOPHIL NFR BLD: 0 % (ref 0–7)
ERYTHROCYTE [DISTWIDTH] IN BLOOD BY AUTOMATED COUNT: 15.9 % (ref 11.5–14.5)
ERYTHROCYTE [DISTWIDTH] IN BLOOD BY AUTOMATED COUNT: 15.9 % (ref 11.5–14.5)
ERYTHROCYTE [DISTWIDTH] IN BLOOD BY AUTOMATED COUNT: 16.1 % (ref 11.5–14.5)
ERYTHROCYTE [DISTWIDTH] IN BLOOD BY AUTOMATED COUNT: 16.4 % (ref 11.5–14.5)
ERYTHROCYTE [DISTWIDTH] IN BLOOD BY AUTOMATED COUNT: 16.7 % (ref 11.5–14.5)
GAS FLOW.O2 O2 DELIVERY SYS: ABNORMAL
GAS FLOW.O2 O2 DELIVERY SYS: ABNORMAL
GLOBULIN SER CALC-MCNC: 5.3 G/DL (ref 2–4)
GLUCOSE BLD STRIP.AUTO-MCNC: 109 MG/DL (ref 65–117)
GLUCOSE BLD STRIP.AUTO-MCNC: 134 MG/DL (ref 65–117)
GLUCOSE BLD STRIP.AUTO-MCNC: 80 MG/DL (ref 65–117)
GLUCOSE BLD STRIP.AUTO-MCNC: 81 MG/DL (ref 65–117)
GLUCOSE SERPL-MCNC: 103 MG/DL (ref 65–100)
GLUCOSE SERPL-MCNC: 121 MG/DL (ref 65–100)
GLUCOSE SERPL-MCNC: 81 MG/DL (ref 65–100)
GLUCOSE SERPL-MCNC: 83 MG/DL (ref 65–100)
GLUCOSE SERPL-MCNC: 86 MG/DL (ref 65–100)
GLUCOSE SERPL-MCNC: 90 MG/DL (ref 65–100)
GRAM STN SPEC: NORMAL
GRAM STN SPEC: NORMAL
HCO3 BLD-SCNC: 29.9 MMOL/L (ref 22–26)
HCO3 BLD-SCNC: 30.5 MMOL/L (ref 22–26)
HCT VFR BLD AUTO: 24 % (ref 35–47)
HCT VFR BLD AUTO: 25.2 % (ref 35–47)
HCT VFR BLD AUTO: 26.3 % (ref 35–47)
HCT VFR BLD AUTO: 26.4 % (ref 35–47)
HCT VFR BLD AUTO: 30 % (ref 35–47)
HGB BLD-MCNC: 7.8 G/DL (ref 11.5–16)
HGB BLD-MCNC: 8 G/DL (ref 11.5–16)
HGB BLD-MCNC: 8.2 G/DL (ref 11.5–16)
HGB BLD-MCNC: 8.3 G/DL (ref 11.5–16)
HGB BLD-MCNC: 9.3 G/DL (ref 11.5–16)
IMM GRANULOCYTES # BLD AUTO: 0.1 K/UL (ref 0–0.04)
IMM GRANULOCYTES NFR BLD AUTO: 1 % (ref 0–0.5)
LYMPHOCYTES # BLD: 0.8 K/UL (ref 0.8–3.5)
LYMPHOCYTES NFR BLD: 8 % (ref 12–49)
MAGNESIUM SERPL-MCNC: 2.1 MG/DL (ref 1.6–2.4)
MCH RBC QN AUTO: 21.2 PG (ref 26–34)
MCH RBC QN AUTO: 21.6 PG (ref 26–34)
MCH RBC QN AUTO: 21.7 PG (ref 26–34)
MCH RBC QN AUTO: 21.9 PG (ref 26–34)
MCH RBC QN AUTO: 21.9 PG (ref 26–34)
MCHC RBC AUTO-ENTMCNC: 31 G/DL (ref 30–36.5)
MCHC RBC AUTO-ENTMCNC: 31.1 G/DL (ref 30–36.5)
MCHC RBC AUTO-ENTMCNC: 31.6 G/DL (ref 30–36.5)
MCHC RBC AUTO-ENTMCNC: 31.7 G/DL (ref 30–36.5)
MCHC RBC AUTO-ENTMCNC: 32.5 G/DL (ref 30–36.5)
MCV RBC AUTO: 67.4 FL (ref 80–99)
MCV RBC AUTO: 68.1 FL (ref 80–99)
MCV RBC AUTO: 68.2 FL (ref 80–99)
MCV RBC AUTO: 68.7 FL (ref 80–99)
MCV RBC AUTO: 70.8 FL (ref 80–99)
MONOCYTES # BLD: 1.7 K/UL (ref 0–1)
MONOCYTES NFR BLD: 18 % (ref 5–13)
NEUTS SEG # BLD: 7.1 K/UL (ref 1.8–8)
NEUTS SEG NFR BLD: 73 % (ref 32–75)
NRBC # BLD: 0 K/UL (ref 0–0.01)
NRBC # BLD: 0.02 K/UL (ref 0–0.01)
NRBC BLD-RTO: 0 PER 100 WBC
NRBC BLD-RTO: 0.3 PER 100 WBC
O2/TOTAL GAS SETTING VFR VENT: 21 %
PCO2 BLD: 42.7 MMHG (ref 35–45)
PCO2 BLD: 43.7 MMHG (ref 35–45)
PH BLD: 7.45 (ref 7.35–7.45)
PH BLD: 7.45 (ref 7.35–7.45)
PHOSPHATE SERPL-MCNC: 3.1 MG/DL (ref 2.6–4.7)
PLATELET # BLD AUTO: 214 K/UL (ref 150–400)
PLATELET # BLD AUTO: 287 K/UL (ref 150–400)
PLATELET # BLD AUTO: 362 K/UL (ref 150–400)
PLATELET # BLD AUTO: 389 K/UL (ref 150–400)
PLATELET # BLD AUTO: 403 K/UL (ref 150–400)
PMV BLD AUTO: 10 FL (ref 8.9–12.9)
PMV BLD AUTO: 10.2 FL (ref 8.9–12.9)
PMV BLD AUTO: 10.7 FL (ref 8.9–12.9)
PMV BLD AUTO: 11.9 FL (ref 8.9–12.9)
PO2 BLD: 67 MMHG (ref 80–100)
PO2 BLD: 82 MMHG (ref 80–100)
POTASSIUM SERPL-SCNC: 3.4 MMOL/L (ref 3.5–5.1)
POTASSIUM SERPL-SCNC: 3.9 MMOL/L (ref 3.5–5.1)
POTASSIUM SERPL-SCNC: 4.2 MMOL/L (ref 3.5–5.1)
POTASSIUM SERPL-SCNC: 4.4 MMOL/L (ref 3.5–5.1)
PROT SERPL-MCNC: 6.7 G/DL (ref 6.4–8.2)
RBC # BLD AUTO: 3.56 M/UL (ref 3.8–5.2)
RBC # BLD AUTO: 3.7 M/UL (ref 3.8–5.2)
RBC # BLD AUTO: 3.83 M/UL (ref 3.8–5.2)
RBC # BLD AUTO: 3.87 M/UL (ref 3.8–5.2)
RBC # BLD AUTO: 4.24 M/UL (ref 3.8–5.2)
RBC MORPH BLD: ABNORMAL
SAO2 % BLD: 93.7 % (ref 92–97)
SAO2 % BLD: 96.5 % (ref 92–97)
SARS-COV-2 RDRP RESP QL NAA+PROBE: DETECTED
SERVICE CMNT-IMP: ABNORMAL
SERVICE CMNT-IMP: NORMAL
SODIUM SERPL-SCNC: 128 MMOL/L (ref 136–145)
SODIUM SERPL-SCNC: 131 MMOL/L (ref 136–145)
SODIUM SERPL-SCNC: 133 MMOL/L (ref 136–145)
SODIUM SERPL-SCNC: 134 MMOL/L (ref 136–145)
SODIUM SERPL-SCNC: 134 MMOL/L (ref 136–145)
SODIUM SERPL-SCNC: 136 MMOL/L (ref 136–145)
SOURCE: ABNORMAL
SPECIMEN TYPE: ABNORMAL
SPECIMEN TYPE: ABNORMAL
URATE SERPL-MCNC: 4.5 MG/DL (ref 2.6–6)
WBC # BLD AUTO: 4.8 K/UL (ref 3.6–11)
WBC # BLD AUTO: 5.3 K/UL (ref 3.6–11)
WBC # BLD AUTO: 5.8 K/UL (ref 3.6–11)
WBC # BLD AUTO: 6 K/UL (ref 3.6–11)
WBC # BLD AUTO: 9.7 K/UL (ref 3.6–11)

## 2024-01-01 PROCEDURE — 94760 N-INVAS EAR/PLS OXIMETRY 1: CPT

## 2024-01-01 PROCEDURE — 99232 SBSQ HOSP IP/OBS MODERATE 35: CPT | Performed by: INTERNAL MEDICINE

## 2024-01-01 PROCEDURE — 83735 ASSAY OF MAGNESIUM: CPT

## 2024-01-01 PROCEDURE — 82962 GLUCOSE BLOOD TEST: CPT

## 2024-01-01 PROCEDURE — 1100000000 HC RM PRIVATE

## 2024-01-01 PROCEDURE — 87205 SMEAR GRAM STAIN: CPT

## 2024-01-01 PROCEDURE — 6370000000 HC RX 637 (ALT 250 FOR IP): Performed by: HOSPITALIST

## 2024-01-01 PROCEDURE — 2580000003 HC RX 258: Performed by: HOSPITALIST

## 2024-01-01 PROCEDURE — 97530 THERAPEUTIC ACTIVITIES: CPT

## 2024-01-01 PROCEDURE — 80053 COMPREHEN METABOLIC PANEL: CPT

## 2024-01-01 PROCEDURE — 20611 DRAIN/INJ JOINT/BURSA W/US: CPT

## 2024-01-01 PROCEDURE — 99233 SBSQ HOSP IP/OBS HIGH 50: CPT | Performed by: NURSE PRACTITIONER

## 2024-01-01 PROCEDURE — 97535 SELF CARE MNGMENT TRAINING: CPT

## 2024-01-01 PROCEDURE — 85027 COMPLETE CBC AUTOMATED: CPT

## 2024-01-01 PROCEDURE — 99231 SBSQ HOSP IP/OBS SF/LOW 25: CPT | Performed by: NURSE PRACTITIONER

## 2024-01-01 PROCEDURE — 97165 OT EVAL LOW COMPLEX 30 MIN: CPT

## 2024-01-01 PROCEDURE — 6360000002 HC RX W HCPCS: Performed by: INTERNAL MEDICINE

## 2024-01-01 PROCEDURE — 36600 WITHDRAWAL OF ARTERIAL BLOOD: CPT

## 2024-01-01 PROCEDURE — 80048 BASIC METABOLIC PNL TOTAL CA: CPT

## 2024-01-01 PROCEDURE — 36415 COLL VENOUS BLD VENIPUNCTURE: CPT

## 2024-01-01 PROCEDURE — 97161 PT EVAL LOW COMPLEX 20 MIN: CPT

## 2024-01-01 PROCEDURE — 73560 X-RAY EXAM OF KNEE 1 OR 2: CPT

## 2024-01-01 PROCEDURE — 92610 EVALUATE SWALLOWING FUNCTION: CPT

## 2024-01-01 PROCEDURE — 6360000002 HC RX W HCPCS: Performed by: HOSPITALIST

## 2024-01-01 PROCEDURE — 71045 X-RAY EXAM CHEST 1 VIEW: CPT

## 2024-01-01 PROCEDURE — APPNB15 APP NON BILLABLE TIME 0-15 MINS: Performed by: PHYSICIAN ASSISTANT

## 2024-01-01 PROCEDURE — 87070 CULTURE OTHR SPECIMN AEROBIC: CPT

## 2024-01-01 PROCEDURE — 6360000002 HC RX W HCPCS: Performed by: NURSE PRACTITIONER

## 2024-01-01 PROCEDURE — 0656 HSPC GENERAL INPATIENT

## 2024-01-01 PROCEDURE — 87635 SARS-COV-2 COVID-19 AMP PRB: CPT

## 2024-01-01 PROCEDURE — 84100 ASSAY OF PHOSPHORUS: CPT

## 2024-01-01 PROCEDURE — 84550 ASSAY OF BLOOD/URIC ACID: CPT

## 2024-01-01 PROCEDURE — 2580000003 HC RX 258: Performed by: INTERNAL MEDICINE

## 2024-01-01 PROCEDURE — 20606 DRAIN/INJ JOINT/BURSA W/US: CPT

## 2024-01-01 PROCEDURE — 99231 SBSQ HOSP IP/OBS SF/LOW 25: CPT | Performed by: INTERNAL MEDICINE

## 2024-01-01 PROCEDURE — 82803 BLOOD GASES ANY COMBINATION: CPT

## 2024-01-01 PROCEDURE — 85025 COMPLETE CBC W/AUTO DIFF WBC: CPT

## 2024-01-01 PROCEDURE — 92526 ORAL FUNCTION THERAPY: CPT

## 2024-01-01 RX ORDER — HYDROMORPHONE HYDROCHLORIDE 1 MG/ML
1 INJECTION, SOLUTION INTRAMUSCULAR; INTRAVENOUS; SUBCUTANEOUS EVERY 4 HOURS PRN
Status: DISCONTINUED | OUTPATIENT
Start: 2024-01-01 | End: 2024-01-01

## 2024-01-01 RX ORDER — MIDODRINE HYDROCHLORIDE 5 MG/1
2.5 TABLET ORAL 2 TIMES DAILY
Status: DISCONTINUED | OUTPATIENT
Start: 2024-01-01 | End: 2024-01-01 | Stop reason: HOSPADM

## 2024-01-01 RX ORDER — TRAMADOL HYDROCHLORIDE 50 MG/1
50 TABLET ORAL EVERY 6 HOURS PRN
Status: DISCONTINUED | OUTPATIENT
Start: 2024-01-01 | End: 2024-01-01

## 2024-01-01 RX ORDER — ACETAMINOPHEN 650 MG/1
650 SUPPOSITORY RECTAL EVERY 6 HOURS PRN
Status: DISCONTINUED | OUTPATIENT
Start: 2024-01-01 | End: 2024-01-01 | Stop reason: HOSPADM

## 2024-01-01 RX ORDER — LORAZEPAM 2 MG/ML
1 INJECTION INTRAMUSCULAR
Status: DISCONTINUED | OUTPATIENT
Start: 2024-01-01 | End: 2024-01-01

## 2024-01-01 RX ORDER — ONDANSETRON 2 MG/ML
4 INJECTION INTRAMUSCULAR; INTRAVENOUS EVERY 6 HOURS PRN
Status: DISCONTINUED | OUTPATIENT
Start: 2024-01-01 | End: 2024-01-01 | Stop reason: HOSPADM

## 2024-01-01 RX ORDER — POTASSIUM CHLORIDE 750 MG/1
40 TABLET, FILM COATED, EXTENDED RELEASE ORAL PRN
Status: DISCONTINUED | OUTPATIENT
Start: 2024-01-01 | End: 2024-01-01 | Stop reason: HOSPADM

## 2024-01-01 RX ORDER — HYDROMORPHONE HYDROCHLORIDE 1 MG/ML
0.5 INJECTION, SOLUTION INTRAMUSCULAR; INTRAVENOUS; SUBCUTANEOUS EVERY 4 HOURS PRN
Status: DISCONTINUED | OUTPATIENT
Start: 2024-01-01 | End: 2024-01-01

## 2024-01-01 RX ORDER — LIDOCAINE HYDROCHLORIDE 10 MG/ML
5 INJECTION, SOLUTION EPIDURAL; INFILTRATION; INTRACAUDAL; PERINEURAL ONCE
Status: DISCONTINUED | OUTPATIENT
Start: 2024-01-01 | End: 2024-01-01 | Stop reason: HOSPADM

## 2024-01-01 RX ORDER — POLYETHYLENE GLYCOL 3350 17 G/17G
17 POWDER, FOR SOLUTION ORAL DAILY PRN
Status: DISCONTINUED | OUTPATIENT
Start: 2024-01-01 | End: 2024-01-01 | Stop reason: HOSPADM

## 2024-01-01 RX ORDER — PANTOPRAZOLE SODIUM 40 MG/1
40 TABLET, DELAYED RELEASE ORAL
Status: DISCONTINUED | OUTPATIENT
Start: 2024-01-01 | End: 2024-01-01 | Stop reason: HOSPADM

## 2024-01-01 RX ORDER — POTASSIUM CHLORIDE 7.45 MG/ML
10 INJECTION INTRAVENOUS PRN
Status: DISCONTINUED | OUTPATIENT
Start: 2024-01-01 | End: 2024-01-01 | Stop reason: HOSPADM

## 2024-01-01 RX ORDER — CETIRIZINE HYDROCHLORIDE 10 MG/1
5 TABLET ORAL DAILY
Status: DISCONTINUED | OUTPATIENT
Start: 2024-01-01 | End: 2024-01-01 | Stop reason: HOSPADM

## 2024-01-01 RX ORDER — ENOXAPARIN SODIUM 100 MG/ML
40 INJECTION SUBCUTANEOUS DAILY
Status: DISCONTINUED | OUTPATIENT
Start: 2024-01-01 | End: 2024-01-01 | Stop reason: HOSPADM

## 2024-01-01 RX ORDER — HYDROMORPHONE HYDROCHLORIDE 1 MG/ML
1 INJECTION, SOLUTION INTRAMUSCULAR; INTRAVENOUS; SUBCUTANEOUS EVERY 4 HOURS
Status: DISCONTINUED | OUTPATIENT
Start: 2024-01-01 | End: 2024-01-01

## 2024-01-01 RX ORDER — MAGNESIUM SULFATE IN WATER 40 MG/ML
2000 INJECTION, SOLUTION INTRAVENOUS PRN
Status: DISCONTINUED | OUTPATIENT
Start: 2024-01-01 | End: 2024-01-01 | Stop reason: HOSPADM

## 2024-01-01 RX ORDER — BISACODYL 10 MG
10 SUPPOSITORY, RECTAL RECTAL DAILY PRN
Status: DISCONTINUED | OUTPATIENT
Start: 2024-01-01 | End: 2024-01-01 | Stop reason: HOSPADM

## 2024-01-01 RX ORDER — KETOROLAC TROMETHAMINE 30 MG/ML
30 INJECTION, SOLUTION INTRAMUSCULAR; INTRAVENOUS EVERY 6 HOURS PRN
Status: ACTIVE | OUTPATIENT
Start: 2024-01-01 | End: 2024-01-01

## 2024-01-01 RX ORDER — FERROUS SULFATE 325(65) MG
1 TABLET ORAL
Qty: 90 TABLET | Refills: 0 | Status: CANCELLED | OUTPATIENT
Start: 2024-01-01

## 2024-01-01 RX ORDER — GLYCOPYRROLATE 0.2 MG/ML
0.2 INJECTION INTRAMUSCULAR; INTRAVENOUS EVERY 4 HOURS PRN
Status: DISCONTINUED | OUTPATIENT
Start: 2024-01-01 | End: 2024-01-01

## 2024-01-01 RX ORDER — ASPIRIN 81 MG/1
81 TABLET, CHEWABLE ORAL DAILY
Status: DISCONTINUED | OUTPATIENT
Start: 2024-01-01 | End: 2024-01-01 | Stop reason: HOSPADM

## 2024-01-01 RX ORDER — SODIUM CHLORIDE 0.9 % (FLUSH) 0.9 %
5-40 SYRINGE (ML) INJECTION EVERY 12 HOURS SCHEDULED
Status: DISCONTINUED | OUTPATIENT
Start: 2024-01-01 | End: 2024-01-01 | Stop reason: HOSPADM

## 2024-01-01 RX ORDER — FUROSEMIDE 20 MG/1
20 TABLET ORAL DAILY PRN
Status: DISCONTINUED | OUTPATIENT
Start: 2024-01-01 | End: 2024-01-01 | Stop reason: HOSPADM

## 2024-01-01 RX ORDER — ASPIRIN 81 MG
81 TABLET,CHEWABLE ORAL DAILY
Qty: 90 TABLET | Refills: 0 | Status: CANCELLED | OUTPATIENT
Start: 2024-01-01

## 2024-01-01 RX ORDER — ACETAMINOPHEN 325 MG/1
650 TABLET ORAL EVERY 6 HOURS PRN
Status: DISCONTINUED | OUTPATIENT
Start: 2024-01-01 | End: 2024-01-01 | Stop reason: HOSPADM

## 2024-01-01 RX ORDER — SODIUM CHLORIDE 9 MG/ML
INJECTION, SOLUTION INTRAVENOUS PRN
Status: DISCONTINUED | OUTPATIENT
Start: 2024-01-01 | End: 2024-01-01 | Stop reason: HOSPADM

## 2024-01-01 RX ORDER — CITALOPRAM 20 MG/1
10 TABLET ORAL DAILY
Status: DISCONTINUED | OUTPATIENT
Start: 2024-01-01 | End: 2024-01-01 | Stop reason: HOSPADM

## 2024-01-01 RX ORDER — ONDANSETRON 4 MG/1
4 TABLET, ORALLY DISINTEGRATING ORAL EVERY 8 HOURS PRN
Status: DISCONTINUED | OUTPATIENT
Start: 2024-01-01 | End: 2024-01-01 | Stop reason: HOSPADM

## 2024-01-01 RX ORDER — SODIUM CHLORIDE 0.9 % (FLUSH) 0.9 %
5-40 SYRINGE (ML) INJECTION PRN
Status: DISCONTINUED | OUTPATIENT
Start: 2024-01-01 | End: 2024-01-01 | Stop reason: HOSPADM

## 2024-01-01 RX ORDER — HYDROMORPHONE HYDROCHLORIDE 1 MG/ML
1 INJECTION, SOLUTION INTRAMUSCULAR; INTRAVENOUS; SUBCUTANEOUS
Status: DISCONTINUED | OUTPATIENT
Start: 2024-01-01 | End: 2024-01-01

## 2024-01-01 RX ORDER — OXYCODONE HYDROCHLORIDE 5 MG/1
5 TABLET ORAL EVERY 4 HOURS PRN
Status: DISCONTINUED | OUTPATIENT
Start: 2024-01-01 | End: 2024-01-01

## 2024-01-01 RX ORDER — HYDROMORPHONE HYDROCHLORIDE 1 MG/ML
1 INJECTION, SOLUTION INTRAMUSCULAR; INTRAVENOUS; SUBCUTANEOUS EVERY 6 HOURS
Status: DISCONTINUED | OUTPATIENT
Start: 2024-01-01 | End: 2024-01-01 | Stop reason: HOSPADM

## 2024-01-01 RX ORDER — FERROUS SULFATE 325(65) MG
325 TABLET ORAL
Status: DISCONTINUED | OUTPATIENT
Start: 2024-01-01 | End: 2024-01-01 | Stop reason: HOSPADM

## 2024-01-01 RX ORDER — HYDROMORPHONE HYDROCHLORIDE 1 MG/ML
1 INJECTION, SOLUTION INTRAMUSCULAR; INTRAVENOUS; SUBCUTANEOUS
Status: DISCONTINUED | OUTPATIENT
Start: 2024-01-01 | End: 2024-01-01 | Stop reason: HOSPADM

## 2024-01-01 RX ADMIN — ACETAMINOPHEN 650 MG: 325 TABLET ORAL at 06:37

## 2024-01-01 RX ADMIN — ENOXAPARIN SODIUM 40 MG: 100 INJECTION SUBCUTANEOUS at 16:57

## 2024-01-01 RX ADMIN — PANTOPRAZOLE SODIUM 40 MG: 40 TABLET, DELAYED RELEASE ORAL at 06:18

## 2024-01-01 RX ADMIN — SODIUM CHLORIDE, PRESERVATIVE FREE 10 ML: 5 INJECTION INTRAVENOUS at 21:54

## 2024-01-01 RX ADMIN — HYDROMORPHONE HYDROCHLORIDE 1 MG: 1 INJECTION, SOLUTION INTRAMUSCULAR; INTRAVENOUS; SUBCUTANEOUS at 22:38

## 2024-01-01 RX ADMIN — WATER 2000 MG: 1 INJECTION INTRAMUSCULAR; INTRAVENOUS; SUBCUTANEOUS at 15:42

## 2024-01-01 RX ADMIN — PANTOPRAZOLE SODIUM 40 MG: 40 TABLET, DELAYED RELEASE ORAL at 06:34

## 2024-01-01 RX ADMIN — MIDODRINE HYDROCHLORIDE 2.5 MG: 5 TABLET ORAL at 21:34

## 2024-01-01 RX ADMIN — ENOXAPARIN SODIUM 40 MG: 100 INJECTION SUBCUTANEOUS at 09:14

## 2024-01-01 RX ADMIN — HYDROMORPHONE HYDROCHLORIDE 0.5 MG: 1 INJECTION, SOLUTION INTRAMUSCULAR; INTRAVENOUS; SUBCUTANEOUS at 18:20

## 2024-01-01 RX ADMIN — ASPIRIN 81 MG CHEWABLE TABLET 81 MG: 81 TABLET CHEWABLE at 15:22

## 2024-01-01 RX ADMIN — HYDROMORPHONE HYDROCHLORIDE 0.5 MG: 1 INJECTION, SOLUTION INTRAMUSCULAR; INTRAVENOUS; SUBCUTANEOUS at 15:02

## 2024-01-01 RX ADMIN — CETIRIZINE HYDROCHLORIDE 5 MG: 10 TABLET, FILM COATED ORAL at 15:23

## 2024-01-01 RX ADMIN — WATER 2000 MG: 1 INJECTION INTRAMUSCULAR; INTRAVENOUS; SUBCUTANEOUS at 17:13

## 2024-01-01 RX ADMIN — ENOXAPARIN SODIUM 40 MG: 100 INJECTION SUBCUTANEOUS at 15:24

## 2024-01-01 RX ADMIN — CITALOPRAM HYDROBROMIDE 10 MG: 20 TABLET ORAL at 09:30

## 2024-01-01 RX ADMIN — SODIUM CHLORIDE, PRESERVATIVE FREE 10 ML: 5 INJECTION INTRAVENOUS at 20:31

## 2024-01-01 RX ADMIN — ENOXAPARIN SODIUM 40 MG: 100 INJECTION SUBCUTANEOUS at 09:29

## 2024-01-01 RX ADMIN — SODIUM CHLORIDE, PRESERVATIVE FREE 10 ML: 5 INJECTION INTRAVENOUS at 15:44

## 2024-01-01 RX ADMIN — SODIUM CHLORIDE, PRESERVATIVE FREE 10 ML: 5 INJECTION INTRAVENOUS at 09:30

## 2024-01-01 RX ADMIN — SODIUM CHLORIDE, PRESERVATIVE FREE 10 ML: 5 INJECTION INTRAVENOUS at 11:20

## 2024-01-01 RX ADMIN — HYDROMORPHONE HYDROCHLORIDE 1 MG: 1 INJECTION, SOLUTION INTRAMUSCULAR; INTRAVENOUS; SUBCUTANEOUS at 16:57

## 2024-01-01 RX ADMIN — ASPIRIN 81 MG CHEWABLE TABLET 81 MG: 81 TABLET CHEWABLE at 09:29

## 2024-01-01 RX ADMIN — FERROUS SULFATE TAB 325 MG (65 MG ELEMENTAL FE) 325 MG: 325 (65 FE) TAB at 06:18

## 2024-01-01 RX ADMIN — HYDROMORPHONE HYDROCHLORIDE 1 MG: 1 INJECTION, SOLUTION INTRAMUSCULAR; INTRAVENOUS; SUBCUTANEOUS at 15:01

## 2024-01-01 RX ADMIN — WATER 2000 MG: 1 INJECTION INTRAMUSCULAR; INTRAVENOUS; SUBCUTANEOUS at 17:00

## 2024-01-01 RX ADMIN — MIDODRINE HYDROCHLORIDE 2.5 MG: 5 TABLET ORAL at 09:29

## 2024-01-01 RX ADMIN — ENOXAPARIN SODIUM 40 MG: 100 INJECTION SUBCUTANEOUS at 11:11

## 2024-01-01 RX ADMIN — CETIRIZINE HYDROCHLORIDE 5 MG: 10 TABLET, FILM COATED ORAL at 09:32

## 2024-01-01 RX ADMIN — MIDODRINE HYDROCHLORIDE 2.5 MG: 5 TABLET ORAL at 21:54

## 2024-01-01 RX ADMIN — HYDROMORPHONE HYDROCHLORIDE 1 MG: 1 INJECTION, SOLUTION INTRAMUSCULAR; INTRAVENOUS; SUBCUTANEOUS at 04:49

## 2024-01-01 RX ADMIN — MIDODRINE HYDROCHLORIDE 2.5 MG: 5 TABLET ORAL at 20:08

## 2024-01-01 RX ADMIN — ENOXAPARIN SODIUM 40 MG: 100 INJECTION SUBCUTANEOUS at 15:43

## 2024-01-01 RX ADMIN — WATER 2000 MG: 1 INJECTION INTRAMUSCULAR; INTRAVENOUS; SUBCUTANEOUS at 16:52

## 2024-01-01 RX ADMIN — WATER 2000 MG: 1 INJECTION INTRAMUSCULAR; INTRAVENOUS; SUBCUTANEOUS at 17:04

## 2024-01-01 RX ADMIN — ENOXAPARIN SODIUM 40 MG: 100 INJECTION SUBCUTANEOUS at 09:08

## 2024-01-01 RX ADMIN — SODIUM CHLORIDE, PRESERVATIVE FREE 10 ML: 5 INJECTION INTRAVENOUS at 20:28

## 2024-01-01 RX ADMIN — SODIUM CHLORIDE, PRESERVATIVE FREE 10 ML: 5 INJECTION INTRAVENOUS at 09:14

## 2024-01-01 RX ADMIN — HYDROMORPHONE HYDROCHLORIDE 0.5 MG: 1 INJECTION, SOLUTION INTRAMUSCULAR; INTRAVENOUS; SUBCUTANEOUS at 04:24

## 2024-01-01 RX ADMIN — FERROUS SULFATE TAB 325 MG (65 MG ELEMENTAL FE) 325 MG: 325 (65 FE) TAB at 06:34

## 2024-01-01 RX ADMIN — SODIUM CHLORIDE, PRESERVATIVE FREE 10 ML: 5 INJECTION INTRAVENOUS at 22:38

## 2024-01-01 RX ADMIN — SODIUM CHLORIDE, PRESERVATIVE FREE 10 ML: 5 INJECTION INTRAVENOUS at 11:12

## 2024-01-01 RX ADMIN — WATER 2000 MG: 1 INJECTION INTRAMUSCULAR; INTRAVENOUS; SUBCUTANEOUS at 18:07

## 2024-01-01 RX ADMIN — CITALOPRAM HYDROBROMIDE 10 MG: 20 TABLET ORAL at 15:23

## 2024-01-01 RX ADMIN — HYDROMORPHONE HYDROCHLORIDE 0.5 MG: 1 INJECTION, SOLUTION INTRAMUSCULAR; INTRAVENOUS; SUBCUTANEOUS at 19:14

## 2024-01-01 RX ADMIN — SODIUM CHLORIDE, PRESERVATIVE FREE 10 ML: 5 INJECTION INTRAVENOUS at 20:45

## 2024-01-01 RX ADMIN — WATER 2000 MG: 1 INJECTION INTRAMUSCULAR; INTRAVENOUS; SUBCUTANEOUS at 18:20

## 2024-01-01 RX ADMIN — SODIUM CHLORIDE, PRESERVATIVE FREE 10 ML: 5 INJECTION INTRAVENOUS at 21:35

## 2024-01-01 RX ADMIN — HYDROMORPHONE HYDROCHLORIDE 1 MG: 1 INJECTION, SOLUTION INTRAMUSCULAR; INTRAVENOUS; SUBCUTANEOUS at 11:20

## 2024-01-01 RX ADMIN — MIDODRINE HYDROCHLORIDE 2.5 MG: 5 TABLET ORAL at 15:24

## 2024-01-01 RX ADMIN — WATER 2000 MG: 1 INJECTION INTRAMUSCULAR; INTRAVENOUS; SUBCUTANEOUS at 16:57

## 2024-01-01 RX ADMIN — HYDROMORPHONE HYDROCHLORIDE 1 MG: 1 INJECTION, SOLUTION INTRAMUSCULAR; INTRAVENOUS; SUBCUTANEOUS at 20:45

## 2024-01-01 RX ADMIN — SODIUM CHLORIDE, PRESERVATIVE FREE 10 ML: 5 INJECTION INTRAVENOUS at 09:08

## 2024-01-01 ASSESSMENT — PAIN SCALES - GENERAL
PAINLEVEL_OUTOF10: 7
PAINLEVEL_OUTOF10: 1
PAINLEVEL_OUTOF10: 7
PAINLEVEL_OUTOF10: 0
PAINLEVEL_OUTOF10: 9
PAINLEVEL_OUTOF10: 0
PAINLEVEL_OUTOF10: 10

## 2024-01-01 ASSESSMENT — PAIN DESCRIPTION - LOCATION
LOCATION: KNEE
LOCATION: GENERALIZED
LOCATION: GENERALIZED

## 2024-01-01 ASSESSMENT — PAIN SCALES - WONG BAKER
WONGBAKER_NUMERICALRESPONSE: NO HURT
WONGBAKER_NUMERICALRESPONSE: HURTS A LITTLE BIT
WONGBAKER_NUMERICALRESPONSE: NO HURT

## 2024-01-01 ASSESSMENT — PAIN DESCRIPTION - ORIENTATION: ORIENTATION: RIGHT

## 2024-01-09 ENCOUNTER — TELEPHONE (OUTPATIENT)
Age: 89
End: 2024-01-09

## 2024-01-09 NOTE — TELEPHONE ENCOUNTER
Pt called and was told to not take mometasone (NASONEX) 50 MCG/ACT nasal spray medication if Pt is experiencing cold symptoms; Please Advise    If Pt doesn't answer Please Call Son  Misael Newman -> [phone]

## 2024-01-11 ENCOUNTER — OFFICE VISIT (OUTPATIENT)
Age: 89
End: 2024-01-11
Payer: MEDICARE

## 2024-01-11 VITALS
BODY MASS INDEX: 24.27 KG/M2 | HEART RATE: 65 BPM | OXYGEN SATURATION: 97 % | WEIGHT: 154.6 LBS | HEIGHT: 67 IN | DIASTOLIC BLOOD PRESSURE: 76 MMHG | SYSTOLIC BLOOD PRESSURE: 138 MMHG | TEMPERATURE: 97 F | RESPIRATION RATE: 16 BRPM

## 2024-01-11 DIAGNOSIS — R04.0 BLEEDING NOSE: ICD-10-CM

## 2024-01-11 DIAGNOSIS — E78.00 PURE HYPERCHOLESTEROLEMIA: ICD-10-CM

## 2024-01-11 DIAGNOSIS — F01.A0 MILD VASCULAR DEMENTIA WITHOUT BEHAVIORAL DISTURBANCE, PSYCHOTIC DISTURBANCE, MOOD DISTURBANCE, OR ANXIETY (HCC): ICD-10-CM

## 2024-01-11 DIAGNOSIS — B37.2 CANDIDAL INTERTRIGO: Primary | ICD-10-CM

## 2024-01-11 DIAGNOSIS — N18.31 CHRONIC KIDNEY DISEASE, STAGE 3A (HCC): ICD-10-CM

## 2024-01-11 DIAGNOSIS — I10 ESSENTIAL HYPERTENSION: ICD-10-CM

## 2024-01-11 PROBLEM — F33.0 MAJOR DEPRESSIVE DISORDER, RECURRENT, MILD (HCC): Status: ACTIVE | Noted: 2024-01-11

## 2024-01-11 PROCEDURE — 1090F PRES/ABSN URINE INCON ASSESS: CPT | Performed by: INTERNAL MEDICINE

## 2024-01-11 PROCEDURE — G8427 DOCREV CUR MEDS BY ELIG CLIN: HCPCS | Performed by: INTERNAL MEDICINE

## 2024-01-11 PROCEDURE — G8420 CALC BMI NORM PARAMETERS: HCPCS | Performed by: INTERNAL MEDICINE

## 2024-01-11 PROCEDURE — 99214 OFFICE O/P EST MOD 30 MIN: CPT | Performed by: INTERNAL MEDICINE

## 2024-01-11 PROCEDURE — G8484 FLU IMMUNIZE NO ADMIN: HCPCS | Performed by: INTERNAL MEDICINE

## 2024-01-11 PROCEDURE — 1036F TOBACCO NON-USER: CPT | Performed by: INTERNAL MEDICINE

## 2024-01-11 PROCEDURE — 1123F ACP DISCUSS/DSCN MKR DOCD: CPT | Performed by: INTERNAL MEDICINE

## 2024-01-11 RX ORDER — NYSTATIN 100000 [USP'U]/G
POWDER TOPICAL
Qty: 1 EACH | Refills: 0 | Status: SHIPPED | OUTPATIENT
Start: 2024-01-11

## 2024-01-11 ASSESSMENT — PATIENT HEALTH QUESTIONNAIRE - PHQ9
1. LITTLE INTEREST OR PLEASURE IN DOING THINGS: 0
SUM OF ALL RESPONSES TO PHQ QUESTIONS 1-9: 0
2. FEELING DOWN, DEPRESSED OR HOPELESS: 0
8. MOVING OR SPEAKING SO SLOWLY THAT OTHER PEOPLE COULD HAVE NOTICED. OR THE OPPOSITE, BEING SO FIGETY OR RESTLESS THAT YOU HAVE BEEN MOVING AROUND A LOT MORE THAN USUAL: 0
7. TROUBLE CONCENTRATING ON THINGS, SUCH AS READING THE NEWSPAPER OR WATCHING TELEVISION: 0
5. POOR APPETITE OR OVEREATING: 0
SUM OF ALL RESPONSES TO PHQ QUESTIONS 1-9: 0
SUM OF ALL RESPONSES TO PHQ QUESTIONS 1-9: 0
6. FEELING BAD ABOUT YOURSELF - OR THAT YOU ARE A FAILURE OR HAVE LET YOURSELF OR YOUR FAMILY DOWN: 0
9. THOUGHTS THAT YOU WOULD BE BETTER OFF DEAD, OR OF HURTING YOURSELF: 0
SUM OF ALL RESPONSES TO PHQ9 QUESTIONS 1 & 2: 0
SUM OF ALL RESPONSES TO PHQ QUESTIONS 1-9: 0
3. TROUBLE FALLING OR STAYING ASLEEP: 0
10. IF YOU CHECKED OFF ANY PROBLEMS, HOW DIFFICULT HAVE THESE PROBLEMS MADE IT FOR YOU TO DO YOUR WORK, TAKE CARE OF THINGS AT HOME, OR GET ALONG WITH OTHER PEOPLE: 0
4. FEELING TIRED OR HAVING LITTLE ENERGY: 0

## 2024-01-11 ASSESSMENT — ENCOUNTER SYMPTOMS
EYES NEGATIVE: 1
SINUS COMPLAINT: 1
ALLERGIC/IMMUNOLOGIC NEGATIVE: 1
GASTROINTESTINAL NEGATIVE: 1
RESPIRATORY NEGATIVE: 1
RHINORRHEA: 1

## 2024-01-11 NOTE — TELEPHONE ENCOUNTER
Shameka Perez MD  You15 hours ago (4:47 PM)       She can use Claritin 10 mg p.o. daily as needed and use nasal spray and mometasone as needed.       Will discuss at today's apt:    Future Appointments   Date Time Provider Department Center   1/11/2024 11:30 AM Shameka Perez MD [facility]

## 2024-01-11 NOTE — PROGRESS NOTES
Subjective:      Patient ID: Carlie Newman is a 89 y.o. female here for follow-up.  She is accompanied by her caregiver.  She lives with her son right now.    She was in the hospital with profound weakness and was transferred to [facility] where she stayed for couple of months.  She came back home on December 20.  Temporarily she is staying with her son.  Noticed to have rash on left-sided groin area, slightly itchy.  She was prescribed Nasonex for cold and now she is having mild nosebleeding.  Wondering if she should continue with  .  Had low blood pressure, started on midodrine, blood pressure running okay.  No dizziness  .  She is frail, very only walking with rollator.  No recent fall.  She is taking her medications but she has mild dementia.  Her son is looking for her placement.  Depression seems under control.  Taking Celexa.  History of heart failure, taking Lasix as needed.  Labs reviewed, seems stable.    Congestive Heart Failure    Hypertension    Sinus Problem    Review of Systems   Constitutional: Negative.    HENT:  Positive for nosebleeds and rhinorrhea.    Eyes: Negative.    Respiratory: Negative.     Cardiovascular: Negative.    Gastrointestinal: Negative.    Endocrine: Negative.    Genitourinary: Negative.    Musculoskeletal: Negative.    Skin:  Positive for rash.   Allergic/Immunologic: Negative.    Neurological: Negative.    Hematological: Negative.    Psychiatric/Behavioral: Negative.       Objective:   Physical Exam  Constitutional:       Appearance: Normal appearance.   HENT:      Head: Normocephalic and atraumatic.      Right Ear: Tympanic membrane normal.      Left Ear: Tympanic membrane normal.      Nose: Congestion present.   Eyes:      Extraocular Movements: Extraocular movements intact.      Conjunctiva/sclera: Conjunctivae normal.      Pupils: Pupils are equal, round, and reactive to light.   Cardiovascular:      Rate and Rhythm: Regular rhythm. Tachycardia present.

## 2024-01-12 LAB
ALBUMIN SERPL-MCNC: 3.8 G/DL (ref 3.6–4.6)
ALBUMIN/GLOB SERPL: 1.1 {RATIO} (ref 1.2–2.2)
ALP SERPL-CCNC: 105 IU/L (ref 44–121)
ALT SERPL-CCNC: 17 IU/L (ref 0–32)
AST SERPL-CCNC: 35 IU/L (ref 0–40)
BASOPHILS # BLD AUTO: 0.1 X10E3/UL (ref 0–0.2)
BASOPHILS NFR BLD AUTO: 3 %
BILIRUB SERPL-MCNC: 0.4 MG/DL (ref 0–1.2)
BUN SERPL-MCNC: 18 MG/DL (ref 10–36)
BUN/CREAT SERPL: 23 (ref 12–28)
CALCIUM SERPL-MCNC: 9.9 MG/DL (ref 8.7–10.3)
CHLORIDE SERPL-SCNC: 105 MMOL/L (ref 96–106)
CO2 SERPL-SCNC: 21 MMOL/L (ref 20–29)
CREAT SERPL-MCNC: 0.79 MG/DL (ref 0.57–1)
EGFRCR SERPLBLD CKD-EPI 2021: 71 ML/MIN/1.73
EOSINOPHIL # BLD AUTO: 0.2 X10E3/UL (ref 0–0.4)
EOSINOPHIL NFR BLD AUTO: 8 %
ERYTHROCYTE [DISTWIDTH] IN BLOOD BY AUTOMATED COUNT: 17 % (ref 11.7–15.4)
GLOBULIN SER CALC-MCNC: 3.6 G/DL (ref 1.5–4.5)
GLUCOSE SERPL-MCNC: 102 MG/DL (ref 70–99)
HCT VFR BLD AUTO: 36 % (ref 34–46.6)
HGB BLD-MCNC: 10.9 G/DL (ref 11.1–15.9)
IMM GRANULOCYTES # BLD AUTO: 0 X10E3/UL (ref 0–0.1)
IMM GRANULOCYTES NFR BLD AUTO: 0 %
LYMPHOCYTES # BLD AUTO: 0.6 X10E3/UL (ref 0.7–3.1)
LYMPHOCYTES NFR BLD AUTO: 19 %
MCH RBC QN AUTO: 22.5 PG (ref 26.6–33)
MCHC RBC AUTO-ENTMCNC: 30.3 G/DL (ref 31.5–35.7)
MCV RBC AUTO: 74 FL (ref 79–97)
MONOCYTES # BLD AUTO: 0.7 X10E3/UL (ref 0.1–0.9)
MONOCYTES NFR BLD AUTO: 20 %
NEUTROPHILS # BLD AUTO: 1.6 X10E3/UL (ref 1.4–7)
NEUTROPHILS NFR BLD AUTO: 50 %
PLATELET # BLD AUTO: 178 X10E3/UL (ref 150–450)
POTASSIUM SERPL-SCNC: 4.1 MMOL/L (ref 3.5–5.2)
PROT SERPL-MCNC: 7.4 G/DL (ref 6–8.5)
RBC # BLD AUTO: 4.85 X10E6/UL (ref 3.77–5.28)
SODIUM SERPL-SCNC: 141 MMOL/L (ref 134–144)
WBC # BLD AUTO: 3.2 X10E3/UL (ref 3.4–10.8)

## 2024-01-15 ENCOUNTER — TELEPHONE (OUTPATIENT)
Age: 89
End: 2024-01-15

## 2024-01-15 DIAGNOSIS — D72.829 LEUKOCYTOSIS, UNSPECIFIED TYPE: Primary | ICD-10-CM

## 2024-01-15 NOTE — TELEPHONE ENCOUNTER
Occupational Therapy:   Requesting to move discharge from next week to this week.   Junie stated that the pt is not really participating, its hard to move around in the home even though the pts son cleans up from time to time.     Please return call if there are any questions.

## 2024-01-15 NOTE — TELEPHONE ENCOUNTER
Ok to discharge.    Left  with Shirley from , gave verbal orders from Shameka Perez MD for below request

## 2024-01-23 DIAGNOSIS — I50.9 CONGESTIVE HEART FAILURE, UNSPECIFIED HF CHRONICITY, UNSPECIFIED HEART FAILURE TYPE (HCC): ICD-10-CM

## 2024-01-23 DIAGNOSIS — E55.9 VITAMIN D DEFICIENCY: ICD-10-CM

## 2024-01-23 NOTE — TELEPHONE ENCOUNTER
Pt  Instructed on use of incentive spirometer   Pt  Able to provide good use of incentive [address], RN  08/17/18 [address] [place], [place] - [facility] -  [phone] - F [phone]     vitamin D3 (CHOLECALCIFEROL) 25 MCG (1000 UT) TABS tablet   furosemide (LASIX) 20 MG tablet   01/11/2024  05/15/2024

## 2024-01-24 RX ORDER — MELATONIN
1000 DAILY
Qty: 30 TABLET | Refills: 5 | Status: SHIPPED | OUTPATIENT
Start: 2024-01-24

## 2024-01-24 RX ORDER — FUROSEMIDE 20 MG/1
20 TABLET ORAL DAILY PRN
Qty: 30 TABLET | Refills: 0 | Status: SHIPPED | OUTPATIENT
Start: 2024-01-24

## 2024-02-06 DIAGNOSIS — F32.A DEPRESSION, UNSPECIFIED DEPRESSION TYPE: ICD-10-CM

## 2024-02-06 DIAGNOSIS — R10.13 DYSPEPSIA: ICD-10-CM

## 2024-02-06 DIAGNOSIS — I10 ESSENTIAL HYPERTENSION: ICD-10-CM

## 2024-02-06 DIAGNOSIS — I95.9 HYPOTENSION, UNSPECIFIED HYPOTENSION TYPE: ICD-10-CM

## 2024-02-06 DIAGNOSIS — I50.9 CONGESTIVE HEART FAILURE, UNSPECIFIED HF CHRONICITY, UNSPECIFIED HEART FAILURE TYPE (HCC): ICD-10-CM

## 2024-02-06 DIAGNOSIS — E55.9 VITAMIN D DEFICIENCY: ICD-10-CM

## 2024-02-06 DIAGNOSIS — B37.2 CANDIDAL INTERTRIGO: ICD-10-CM

## 2024-02-06 DIAGNOSIS — T78.40XD ALLERGY, SUBSEQUENT ENCOUNTER: Primary | ICD-10-CM

## 2024-02-06 RX ORDER — MIDODRINE HYDROCHLORIDE 2.5 MG/1
2.5 TABLET ORAL 2 TIMES DAILY
Qty: 180 TABLET | Refills: 1 | Status: CANCELLED | OUTPATIENT
Start: 2024-02-06

## 2024-02-06 NOTE — TELEPHONE ENCOUNTER
Pt is requesting a blood pressure medication sent to [place], [place] - [facility] - P [phone] - F [phone] [[id number]]     NEEDS URGENTLY!!

## 2024-02-06 NOTE — TELEPHONE ENCOUNTER
LOV: 01/11/2024  NOV: 05/15/2024    Medication: furosemide (LASIX) 20 MG tablet   Quantity: 30    Medication: nystatin (MYCOSTATIN) 940880 UNIT/GM powder   Quantity: 1    Medication: midodrine (PROAMATINE) 2.5 MG tablet   Quantity: 180    Medication: citalopram (CELEXA) 10 MG tablet   Quantity: 90    Medication: pantoprazole (PROTONIX) 40 MG tablet   Quantity: 90    Medication: loratadine (CLARITIN) 10 MG tablet   Quantity: 90    Medication:   Multiple Vitamin (MVI, CELEBRATE, CHEWABLE TABLET)   Quantity: 30    Medication:   aspirin (ASPIRIN 81) 81 MG chewable tablet   Quantity: 30    Medication: calcium-vitamin D (OSCAL 500 D-3) 500-5 MG-MCG TABS per tablet   Quantity: 60

## 2024-02-07 RX ORDER — CITALOPRAM HYDROBROMIDE 10 MG/1
10 TABLET ORAL DAILY
Qty: 90 TABLET | Refills: 1 | Status: SHIPPED | OUTPATIENT
Start: 2024-02-07

## 2024-02-07 RX ORDER — FUROSEMIDE 20 MG/1
20 TABLET ORAL DAILY PRN
Qty: 90 TABLET | Refills: 0 | Status: SHIPPED | OUTPATIENT
Start: 2024-02-07

## 2024-02-07 RX ORDER — ASPIRIN 81 MG/1
81 TABLET, CHEWABLE ORAL DAILY
Qty: 90 TABLET | Refills: 0 | Status: SHIPPED | OUTPATIENT
Start: 2024-02-07

## 2024-02-07 RX ORDER — OYSTER SHELL CALCIUM WITH VITAMIN D 500; 200 MG/1; [IU]/1
1 TABLET, FILM COATED ORAL DAILY
Qty: 90 TABLET | Refills: 0 | Status: SHIPPED | OUTPATIENT
Start: 2024-02-07

## 2024-02-07 RX ORDER — PANTOPRAZOLE SODIUM 40 MG/1
40 TABLET, DELAYED RELEASE ORAL
Qty: 90 TABLET | Refills: 1 | Status: SHIPPED | OUTPATIENT
Start: 2024-02-07

## 2024-02-07 RX ORDER — NYSTATIN 100000 [USP'U]/G
POWDER TOPICAL
Qty: 1 EACH | Refills: 0 | Status: SHIPPED | OUTPATIENT
Start: 2024-02-07

## 2024-02-07 RX ORDER — LORATADINE 10 MG/1
TABLET ORAL
Qty: 90 TABLET | Refills: 1 | Status: SHIPPED | OUTPATIENT
Start: 2024-02-07

## 2024-02-07 RX ORDER — MIDODRINE HYDROCHLORIDE 2.5 MG/1
2.5 TABLET ORAL 2 TIMES DAILY
Qty: 180 TABLET | Refills: 1 | Status: SHIPPED | OUTPATIENT
Start: 2024-02-07

## 2024-02-14 ENCOUNTER — TELEPHONE (OUTPATIENT)
Age: 89
End: 2024-02-14

## 2024-02-14 NOTE — TELEPHONE ENCOUNTER
Pat is requesting a call back to discuss pts recent med list in order to start packaging the refill.   FYI Pt is currently out of medication.

## 2024-02-14 NOTE — TELEPHONE ENCOUNTER
Current Outpatient Medications   Medication Sig    furosemide (LASIX) 20 MG tablet Take 1 tablet by mouth daily as needed (Signs and symptoms of peripheral volume overload;)    nystatin (MYCOSTATIN) 088493 UNIT/GM powder Apply 3 times daily.    midodrine (PROAMATINE) 2.5 MG tablet Take 1 tablet by mouth 2 times daily    citalopram (CELEXA) 10 MG tablet Take 1 tablet by mouth daily Indications: Feeling Anxious    pantoprazole (PROTONIX) 40 MG tablet Take 1 tablet by mouth every morning (before breakfast)    loratadine (CLARITIN) 10 MG tablet TAKE 1 TABLET BY MOUTH NIGHTLY AS NEEDED FOR ALLERGIES    aspirin (ASPIRIN 81) 81 MG chewable tablet Take 1 tablet by mouth daily    calcium-vitamin D (OSCAL-500) 500-5 MG-MCG TABS per tablet Take 1 tablet by mouth daily    vitamin D3 (CHOLECALCIFEROL) 25 MCG (1000 UT) TABS tablet Take 1 tablet by mouth daily    mometasone (NASONEX) 50 MCG/ACT nasal spray 2 sprays by Each Nostril route daily    ferrous sulfate (IRON 325) 325 (65 Fe) MG tablet Take 1 tablet by mouth daily (with breakfast)    Multiple Vitamin (MVI, CELEBRATE, CHEWABLE TABLET) Take 1 tablet by mouth daily    calcium-vitamin D (OSCAL 500 D-3) 500-5 MG-MCG TABS per tablet Take 1 tablet by mouth 2 times daily    albuterol sulfate HFA (PROVENTIL;VENTOLIN;PROAIR) 108 (90 Base) MCG/ACT inhaler INHALE 2 PUFFS EVERY 6 HOURS AS NEEDED FOR WHEEZE    diclofenac sodium (VOLTAREN) 1 % GEL Apply topically 2 times daily as needed    lidocaine 4 % external patch Place 1 patch onto the skin every 24 hours     No current facility-administered medications for this visit.

## 2024-03-05 RX ORDER — FERROUS SULFATE 325(65) MG
1 TABLET ORAL
Qty: 30 TABLET | Refills: 0 | Status: SHIPPED | OUTPATIENT
Start: 2024-03-05

## 2024-03-08 ENCOUNTER — OFFICE VISIT (OUTPATIENT)
Age: 89
End: 2024-03-08
Payer: MEDICARE

## 2024-03-08 VITALS
OXYGEN SATURATION: 98 % | HEART RATE: 64 BPM | DIASTOLIC BLOOD PRESSURE: 60 MMHG | BODY MASS INDEX: 21.5 KG/M2 | SYSTOLIC BLOOD PRESSURE: 117 MMHG | WEIGHT: 137 LBS | RESPIRATION RATE: 19 BRPM | TEMPERATURE: 98 F | HEIGHT: 67 IN

## 2024-03-08 DIAGNOSIS — R30.0 DYSURIA: ICD-10-CM

## 2024-03-08 DIAGNOSIS — R82.998 DARK URINE: Primary | ICD-10-CM

## 2024-03-08 DIAGNOSIS — N89.8 VAGINAL IRRITATION: ICD-10-CM

## 2024-03-08 LAB
BILIRUBIN, URINE, POC: NEGATIVE
BLOOD URINE, POC: NEGATIVE
GLUCOSE URINE, POC: NEGATIVE
KETONES, URINE, POC: NEGATIVE
LEUKOCYTE ESTERASE, URINE, POC: ABNORMAL
NITRITE, URINE, POC: NEGATIVE
PH, URINE, POC: 5 (ref 4.6–8)
PROTEIN,URINE, POC: NEGATIVE
SPECIFIC GRAVITY, URINE, POC: 1.02 (ref 1–1.03)
URINALYSIS CLARITY, POC: ABNORMAL
URINALYSIS COLOR, POC: ABNORMAL
UROBILINOGEN, POC: NORMAL

## 2024-03-08 PROCEDURE — G8484 FLU IMMUNIZE NO ADMIN: HCPCS | Performed by: INTERNAL MEDICINE

## 2024-03-08 PROCEDURE — 99213 OFFICE O/P EST LOW 20 MIN: CPT | Performed by: INTERNAL MEDICINE

## 2024-03-08 PROCEDURE — 1123F ACP DISCUSS/DSCN MKR DOCD: CPT | Performed by: INTERNAL MEDICINE

## 2024-03-08 PROCEDURE — 1090F PRES/ABSN URINE INCON ASSESS: CPT | Performed by: INTERNAL MEDICINE

## 2024-03-08 PROCEDURE — G8427 DOCREV CUR MEDS BY ELIG CLIN: HCPCS | Performed by: INTERNAL MEDICINE

## 2024-03-08 PROCEDURE — 81002 URINALYSIS NONAUTO W/O SCOPE: CPT | Performed by: INTERNAL MEDICINE

## 2024-03-08 PROCEDURE — 1036F TOBACCO NON-USER: CPT | Performed by: INTERNAL MEDICINE

## 2024-03-08 PROCEDURE — G8420 CALC BMI NORM PARAMETERS: HCPCS | Performed by: INTERNAL MEDICINE

## 2024-03-08 PROCEDURE — PBSHW AMB POC URINALYSIS DIP STICK MANUAL W/O MICRO: Performed by: INTERNAL MEDICINE

## 2024-03-08 RX ORDER — CIPROFLOXACIN 500 MG/1
500 TABLET, FILM COATED ORAL 2 TIMES DAILY
Qty: 14 TABLET | Refills: 0 | Status: SHIPPED | OUTPATIENT
Start: 2024-03-08 | End: 2024-03-15

## 2024-03-08 ASSESSMENT — ENCOUNTER SYMPTOMS: RESPIRATORY NEGATIVE: 1

## 2024-03-08 NOTE — PROGRESS NOTES
\"Have you been to the ER, urgent care clinic since your last visit?  Hospitalized since your last visit?\"    NO    “Have you seen or consulted any other health care providers outside of [facility] since your last visit?”    NO

## 2024-03-08 NOTE — PROGRESS NOTES
Subjective    Carlie Newman is a 89 y.o. female who presents today for the following: patient was seen with her home aid.     Reports that for the last several months she has complaints of pain when urinating, vaginal irritation and odor to her urine. Has not taken anything to help.   Reports some lower back pain at times. No fever.     No discharge or smell   Chief Complaint   Patient presents with    Follow-up Chronic Condition    Hypertension    Urinary Tract Infection           PMH/PSH/Allergies/Social History/medication list and most recent studies reviewed with patient.     reports that she has never smoked. She has never used smokeless tobacco.    reports no history of alcohol use.     Vitals:    03/08/24 1242   BP: 117/60   Pulse: 64   Resp: 19   Temp: 98 °F (36.7 °C)   SpO2: 98%     Body mass index is 21.46 kg/m².      3/8/2024    12:42 PM 1/11/2024    10:53 AM 10/9/2023     1:51 AM 10/7/2023     2:00 AM 10/5/2023     1:32 PM 10/4/2023     2:13 AM 10/3/2023     9:30 PM   Weight Metrics   Weight 137 lb 154 lb 9.6 oz 150 lb 148 lb 12.8 oz 146 lb 146 lb 146 lb 1.6 oz   BMI (Calculated) 21.5 kg/m2 24.3 kg/m2 23.5 kg/m2 23.4 kg/m2 22.9 kg/m2 22.9 kg/m2 22.9 kg/m2       Past Medical History:   Diagnosis Date    Arrhythmia     bradycardia,S/P pacemaker    CHF (congestive heart failure) ([facility])     Chronic renal disease, stage III ([facility]) 09/19/2022    Heart failure (HCC)     Hypertension     Irregular heart rate     Major depressive disorder, recurrent, mild 1/11/2024    Pure hypercholesterolemia 3/27/2017       Allergies   Allergen Reactions    Codeine      Other reaction(s): Unknown (comments)  Other reaction(s): GI Intolerance    Morphine      Other reaction(s): Unknown (comments)  Other reaction(s): Other (See Comments)  Pt states that she was unable to speak    Penicillins      Other reaction(s): Unknown (comments)    Pseudoephedrine      Other reaction(s): Unknown (comments)        Current Outpatient

## 2024-03-11 LAB — BACTERIA UR CULT: NORMAL

## 2024-03-18 ENCOUNTER — HOME HEALTH ADMISSION (OUTPATIENT)
Dept: HOME HEALTH SERVICES | Facility: HOME HEALTH | Age: 89
End: 2024-03-18
Payer: COMMERCIAL

## 2024-03-18 ENCOUNTER — OFFICE VISIT (OUTPATIENT)
Age: 89
End: 2024-03-18
Payer: MEDICARE

## 2024-03-18 VITALS
WEIGHT: 133 LBS | HEART RATE: 62 BPM | RESPIRATION RATE: 18 BRPM | SYSTOLIC BLOOD PRESSURE: 115 MMHG | TEMPERATURE: 98 F | OXYGEN SATURATION: 96 % | BODY MASS INDEX: 20.88 KG/M2 | DIASTOLIC BLOOD PRESSURE: 67 MMHG | HEIGHT: 67 IN

## 2024-03-18 DIAGNOSIS — Z91.81 HISTORY OF FALL: ICD-10-CM

## 2024-03-18 DIAGNOSIS — R63.4 WEIGHT LOSS, UNINTENTIONAL: ICD-10-CM

## 2024-03-18 DIAGNOSIS — I10 ESSENTIAL (PRIMARY) HYPERTENSION: ICD-10-CM

## 2024-03-18 DIAGNOSIS — R82.71 BACTERIA IN URINE: ICD-10-CM

## 2024-03-18 DIAGNOSIS — R68.89 DOES NOT FEEL RIGHT: Primary | ICD-10-CM

## 2024-03-18 DIAGNOSIS — E61.1 IRON DEFICIENCY: ICD-10-CM

## 2024-03-18 DIAGNOSIS — L97.401: ICD-10-CM

## 2024-03-18 DIAGNOSIS — R53.83 OTHER FATIGUE: ICD-10-CM

## 2024-03-18 DIAGNOSIS — I83.004: ICD-10-CM

## 2024-03-18 DIAGNOSIS — N89.8 VAGINAL ITCHING: ICD-10-CM

## 2024-03-18 LAB
BASOPHILS # BLD AUTO: 0.1 X10E3/UL (ref 0–0.2)
BASOPHILS NFR BLD AUTO: 2 %
BILIRUBIN, URINE, POC: NEGATIVE
BLOOD URINE, POC: NEGATIVE
EOSINOPHIL # BLD AUTO: 0.1 X10E3/UL (ref 0–0.4)
EOSINOPHIL NFR BLD AUTO: 4 %
ERYTHROCYTE [DISTWIDTH] IN BLOOD BY AUTOMATED COUNT: 13.1 % (ref 11.7–15.4)
GLUCOSE URINE, POC: NEGATIVE
HCT VFR BLD AUTO: 38.9 % (ref 34–46.6)
HGB BLD-MCNC: 11.8 G/DL (ref 11.1–15.9)
IMM GRANULOCYTES # BLD AUTO: 0 X10E3/UL (ref 0–0.1)
IMM GRANULOCYTES NFR BLD AUTO: 0 %
KETONES, URINE, POC: NEGATIVE
LEUKOCYTE ESTERASE, URINE, POC: ABNORMAL
LYMPHOCYTES # BLD AUTO: 0.8 X10E3/UL (ref 0.7–3.1)
LYMPHOCYTES NFR BLD AUTO: 21 %
MCH RBC QN AUTO: 22.7 PG (ref 26.6–33)
MCHC RBC AUTO-ENTMCNC: 30.3 G/DL (ref 31.5–35.7)
MCV RBC AUTO: 75 FL (ref 79–97)
MONOCYTES # BLD AUTO: 0.7 X10E3/UL (ref 0.1–0.9)
MONOCYTES NFR BLD AUTO: 18 %
NEUTROPHILS # BLD AUTO: 2.2 X10E3/UL (ref 1.4–7)
NEUTROPHILS NFR BLD AUTO: 55 %
NITRITE, URINE, POC: NEGATIVE
PH, URINE, POC: 7 (ref 4.6–8)
PLATELET # BLD AUTO: 128 X10E3/UL (ref 150–450)
PROTEIN,URINE, POC: ABNORMAL
RBC # BLD AUTO: 5.19 X10E6/UL (ref 3.77–5.28)
SPECIFIC GRAVITY, URINE, POC: 1.02 (ref 1–1.03)
URINALYSIS CLARITY, POC: ABNORMAL
URINALYSIS COLOR, POC: YELLOW
UROBILINOGEN, POC: NORMAL
WBC # BLD AUTO: 4 X10E3/UL (ref 3.4–10.8)

## 2024-03-18 PROCEDURE — 99215 OFFICE O/P EST HI 40 MIN: CPT | Performed by: CLINICAL NURSE SPECIALIST

## 2024-03-18 PROCEDURE — G8484 FLU IMMUNIZE NO ADMIN: HCPCS | Performed by: CLINICAL NURSE SPECIALIST

## 2024-03-18 PROCEDURE — 81003 URINALYSIS AUTO W/O SCOPE: CPT | Performed by: CLINICAL NURSE SPECIALIST

## 2024-03-18 PROCEDURE — G8427 DOCREV CUR MEDS BY ELIG CLIN: HCPCS | Performed by: CLINICAL NURSE SPECIALIST

## 2024-03-18 PROCEDURE — 1123F ACP DISCUSS/DSCN MKR DOCD: CPT | Performed by: CLINICAL NURSE SPECIALIST

## 2024-03-18 PROCEDURE — 1090F PRES/ABSN URINE INCON ASSESS: CPT | Performed by: CLINICAL NURSE SPECIALIST

## 2024-03-18 PROCEDURE — PBSHW AMB POC URINALYSIS DIP STICK AUTO W/O MICRO: Performed by: CLINICAL NURSE SPECIALIST

## 2024-03-18 PROCEDURE — 1036F TOBACCO NON-USER: CPT | Performed by: CLINICAL NURSE SPECIALIST

## 2024-03-18 PROCEDURE — G8420 CALC BMI NORM PARAMETERS: HCPCS | Performed by: CLINICAL NURSE SPECIALIST

## 2024-03-18 RX ORDER — FLUCONAZOLE 150 MG/1
150 TABLET ORAL ONCE
Qty: 1 TABLET | Refills: 0 | Status: SHIPPED | OUTPATIENT
Start: 2024-03-18 | End: 2024-03-18

## 2024-03-18 RX ORDER — CEPHALEXIN 500 MG/1
500 CAPSULE ORAL 4 TIMES DAILY
Qty: 28 CAPSULE | Refills: 0 | Status: SHIPPED | OUTPATIENT
Start: 2024-03-18 | End: 2024-03-25

## 2024-03-18 NOTE — PROGRESS NOTES
Chief Complaint   Patient presents with    Follow-up Chronic Condition    Hypertension    Skin Problem   Poor appetite  Weight loss  Foot sore    \"Have you been to the ER, urgent care clinic since your last visit?  Hospitalized since your last visit?\"    NO    “Have you seen or consulted any other health care providers outside of [facility] since your last visit?”    NO            Click Here for Release of Records Request    
she cannot pinpoint anything specific. Does think she may have vaginal itching, does not note any discharge. Reports that she was previously seen and started on a medication and she has not felt right since then. Appears that she was seen in this office about 10 days ago and given cipro for a UTI, son reports that she did complete the full course. She also states that she has been sleeping a lot which she does not like. She cannot complete any activities without falling asleep. States that she will sometimes fall asleep while in the middle of a meal. Of note, over the course of about two months, her weight is down 21 pounds. Son is on the phone and states that she is not eating as much recently, if he does not place food in front of her then it is well into the evening when she may have her first meal. Admits that he is concerned about her constant sleeping. BP lower end of normal on today, admits that she has not yet taken any of her medications today. Son also reports concern about wounds to the top of both of her feet that suddenly appeared.         Review of Systems   Constitutional:  Positive for appetite change and fatigue.   Respiratory: Negative.     Cardiovascular: Negative.    Skin:  Positive for wound.        Past Medical History:   Diagnosis Date    Arrhythmia     bradycardia,S/P pacemaker    CHF (congestive heart failure) (HCC)     Chronic renal disease, stage III ([facility]) 09/19/2022    Heart failure (HCC)     Hypertension     Irregular heart rate     Major depressive disorder, recurrent, mild 1/11/2024    Pure hypercholesterolemia 3/27/2017     Current Outpatient Medications on File Prior to Visit   Medication Sig Dispense Refill    FEROSUL 325 (65 Fe) MG tablet TAKE 1 TABLET BY MOUTH EVERY MORNING BEFORE BREAKFAST 30 tablet 0    furosemide (LASIX) 20 MG tablet Take 1 tablet by mouth daily as needed (Signs and symptoms of peripheral volume overload;) 90 tablet 0    nystatin (MYCOSTATIN) 013535 UNIT/GM

## 2024-03-19 LAB
ALBUMIN SERPL-MCNC: 4.1 G/DL (ref 3.6–4.6)
ALBUMIN/GLOB SERPL: 1 {RATIO} (ref 1.2–2.2)
ALP SERPL-CCNC: 118 IU/L (ref 44–121)
ALT SERPL-CCNC: 17 IU/L (ref 0–32)
AST SERPL-CCNC: 26 IU/L (ref 0–40)
BILIRUB SERPL-MCNC: 0.4 MG/DL (ref 0–1.2)
BUN SERPL-MCNC: 20 MG/DL (ref 10–36)
BUN/CREAT SERPL: 21 (ref 12–28)
CALCIUM SERPL-MCNC: 11 MG/DL (ref 8.7–10.3)
CHLORIDE SERPL-SCNC: 100 MMOL/L (ref 96–106)
CO2 SERPL-SCNC: 25 MMOL/L (ref 20–29)
CREAT SERPL-MCNC: 0.96 MG/DL (ref 0.57–1)
EGFRCR SERPLBLD CKD-EPI 2021: 56 ML/MIN/1.73
GLOBULIN SER CALC-MCNC: 4 G/DL (ref 1.5–4.5)
GLUCOSE SERPL-MCNC: 112 MG/DL (ref 70–99)
IRON SATN MFR SERPL: 17 % (ref 15–55)
IRON SERPL-MCNC: 42 UG/DL (ref 27–139)
POTASSIUM SERPL-SCNC: 4.1 MMOL/L (ref 3.5–5.2)
PROT SERPL-MCNC: 8.1 G/DL (ref 6–8.5)
REPORT: NORMAL
SODIUM SERPL-SCNC: 140 MMOL/L (ref 134–144)
TIBC SERPL-MCNC: 252 UG/DL (ref 250–450)
TSH SERPL DL<=0.005 MIU/L-ACNC: 0.99 UIU/ML (ref 0.45–4.5)
UIBC SERPL-MCNC: 210 UG/DL (ref 118–369)

## 2024-03-19 RX ORDER — AMMONIUM LACTATE 12 G/100G
LOTION TOPICAL
Qty: 222 ML | Refills: 0 | Status: SHIPPED | OUTPATIENT
Start: 2024-03-19

## 2024-03-19 ASSESSMENT — ENCOUNTER SYMPTOMS: RESPIRATORY NEGATIVE: 1

## 2024-03-20 ENCOUNTER — TELEPHONE (OUTPATIENT)
Age: 89
End: 2024-03-20

## 2024-03-20 ENCOUNTER — HOME CARE VISIT (OUTPATIENT)
Facility: HOME HEALTH | Age: 89
End: 2024-03-20
Payer: COMMERCIAL

## 2024-03-20 LAB
BACTERIA UR CULT: NORMAL
SPECIMEN STATUS REPORT: NORMAL

## 2024-03-20 PROCEDURE — 0221000100 HH NO PAY CLAIM PROCEDURE

## 2024-03-20 PROCEDURE — G0299 HHS/HOSPICE OF RN EA 15 MIN: HCPCS

## 2024-03-20 ASSESSMENT — ENCOUNTER SYMPTOMS: HEMOPTYSIS: 0

## 2024-03-20 NOTE — TELEPHONE ENCOUNTER
----- Message from SAMUEL Garzon CNP sent at 3/19/2024  1:07 PM EDT -----  Please advise to discontinue calcium supplementation for at least 4 days; Platelets are low, this is new. Let's repeat her CBC and BMP in one week.

## 2024-03-20 NOTE — TELEPHONE ENCOUNTER
----- Message from SAMUEL Garzon CNP sent at 3/20/2024 10:11 AM EDT -----  She does not have a UTI, she should continue antibiotic for foot wound though.

## 2024-03-21 ENCOUNTER — TELEPHONE (OUTPATIENT)
Age: 89
End: 2024-03-21

## 2024-03-22 ENCOUNTER — HOME CARE VISIT (OUTPATIENT)
Facility: HOME HEALTH | Age: 89
End: 2024-03-22
Payer: COMMERCIAL

## 2024-03-22 VITALS
TEMPERATURE: 97.1 F | BODY MASS INDEX: 20.88 KG/M2 | HEART RATE: 62 BPM | SYSTOLIC BLOOD PRESSURE: 110 MMHG | HEIGHT: 67 IN | DIASTOLIC BLOOD PRESSURE: 72 MMHG | WEIGHT: 133 LBS | RESPIRATION RATE: 16 BRPM | OXYGEN SATURATION: 99 %

## 2024-03-22 VITALS
SYSTOLIC BLOOD PRESSURE: 122 MMHG | DIASTOLIC BLOOD PRESSURE: 70 MMHG | OXYGEN SATURATION: 99 % | HEART RATE: 62 BPM | RESPIRATION RATE: 16 BRPM | TEMPERATURE: 98.1 F

## 2024-03-22 VITALS
HEART RATE: 64 BPM | TEMPERATURE: 97.8 F | OXYGEN SATURATION: 100 % | DIASTOLIC BLOOD PRESSURE: 60 MMHG | RESPIRATION RATE: 18 BRPM | SYSTOLIC BLOOD PRESSURE: 108 MMHG

## 2024-03-22 DIAGNOSIS — E61.1 IRON DEFICIENCY: Primary | ICD-10-CM

## 2024-03-22 PROCEDURE — G0299 HHS/HOSPICE OF RN EA 15 MIN: HCPCS

## 2024-03-22 PROCEDURE — G0151 HHCP-SERV OF PT,EA 15 MIN: HCPCS

## 2024-03-22 RX ORDER — FERROUS SULFATE 325(65) MG
1 TABLET ORAL
Qty: 90 TABLET | Refills: 0 | Status: SHIPPED | OUTPATIENT
Start: 2024-03-22

## 2024-03-22 ASSESSMENT — ENCOUNTER SYMPTOMS
DYSPNEA ACTIVITY LEVEL: AFTER AMBULATING MORE THAN 20 FT
PAIN LOCATION - PAIN QUALITY: ACHY
PAIN LOCATION - PAIN QUALITY: ACHY

## 2024-03-22 NOTE — HOME HEALTH
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 She also has a wound on the top of her right foot. Patient has a history of UTI and increasing fatigue over the past couple months. She has also lost 21 pounds over the past two months. Patient has a private pay personal care aid that comes three days a week from 10 AM to 2 PM. Her daughter, who also lives with her has a personal care aid that comes five days a week for five hours a day. Caregivers report that she normally is able to go to the bathroom on her own using her Rollator for assistance. Patient has been sleeping in her recliner for at least the past year. She usually gets dressed on her own with minimal assistance for socks and shoes. Her caregivers always assist with bathing.    Clinical Assessment/Skilled reason for admission to home health (What this means for the patient overall and need for ongoing skilled care):patient is a 89-year-old female who presents with overall generalized debility. Per report from her caregivers, She is close to her baseline. Patient has significant range of motion limitations in bilateral knees with only 30° flexion. This affects her safety with transfers, as well as sitting stability and balance. When she sits on her raised toilet seat, her feet are suspended in the air due to inability to flex her knees far enough to touch the floor. She also demonstrates balance and proprioceptive deficits and is unsafe, getting on and off of her toilet due to losing her balance when standing to turn around and pull down her clothing. patient is a good candidate for physical therapy services, although has a fair prognosis secondary to chronic nature of her physical condition.    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

## 2024-03-22 NOTE — TELEPHONE ENCOUNTER
Last appt 3/18/2024      Next Apt:   None         [place], [place] - 2024 \[facility] [place] [facility]\"" - P [phone] - F [phone]  [address] [facility] [address]  Phone: [phone] Fax: [phone]

## 2024-03-22 NOTE — HOME HEALTH
Subjective: My shoulders hurt but not my feet.  This water pill keeps me going to the bathroom.  Falls since last visit No(if yes complete the Fall Tracking Form and include bsrifallreport):   Caregiver involvement changes: n/a  Home health supplies by type and quantity ordered/delivered this visit include: n/a    Clinician asked if patient has had any physician contact since last home care visit and patient states: NO  Clinician asked if patient has any new or changed medications and patient states:  YES physician told her to hold \"vitamins\"  If Yes, were medications reconciled? YES   Was the certifying physician notified of changes in medications? N/A     Clinical assessment (what this visit means for the patient overall and need for ongoing skilled care) and progress or lack of progress towards SPECIFIC goals: Patien't physician called her following recent blood work to inform her that her platelets are low.  The patient states the doctor told her to not take her \"vitamins\" until a repeat blood draw.  In reviewing physician's notes, nursing found that the physician specifically wants her to hold her calcium for at least 4 days.  (Patient did take calcium today).  Patient states she also wants to hold her iron and multivitamin until the redraw.  Caregiver Donita is at bedside and verbalizes understanding not to administer these medications until the redraw.  Patient remains at risk for infection due to presence of wound.  Wound healing goal not met.  Skilled nursing needed for continued wound care and wound assessment.    Written Teaching Material Utilized: N/A    Interdisciplinary communication with: N/A    Discharge planning as follows: Is no longer homebound, Per physician order and When goals are met    Specific plan for next visit: wound care and assessment

## 2024-03-23 NOTE — HOME HEALTH
admission to home health status called to attending physician. The following practitioner has agreed to sign the ongoing POC Amanda Kim NP, and was notified of the following: nursing visit frequency of twice weekly for wound care    Discharge planning discussed with patient and caregiver. Discharge planning as follows: Will discharge when the patient has reached their maximum functional potential and maximum safety in their home Patient/caregiver did verbalize agreement with discharge planning.     Specific plan for next visit: Educate in s/s of infection and when to call MD IRMA or 911, wound care to bilateral feet

## 2024-03-25 ENCOUNTER — HOME CARE VISIT (OUTPATIENT)
Facility: HOME HEALTH | Age: 89
End: 2024-03-25
Payer: COMMERCIAL

## 2024-03-25 VITALS
TEMPERATURE: 98.6 F | DIASTOLIC BLOOD PRESSURE: 70 MMHG | SYSTOLIC BLOOD PRESSURE: 110 MMHG | OXYGEN SATURATION: 99 % | HEART RATE: 66 BPM | RESPIRATION RATE: 16 BRPM

## 2024-03-25 PROCEDURE — G0151 HHCP-SERV OF PT,EA 15 MIN: HCPCS

## 2024-03-25 ASSESSMENT — ENCOUNTER SYMPTOMS: PAIN LOCATION - PAIN QUALITY: ACHE

## 2024-03-25 NOTE — HOME HEALTH
Subjective: Patient was in the kitchen standing cooking salmon cakes when PT arrived.  She states she is doing much better than Friday.   Falls since last visit No(if yes complete the Fall Tracking Form and include bsrifallreport):   Caregiver involvement changes: no change,  Home health supplies by type and quantity ordered/delivered this visit include: n/a    Clinician asked if patient has had any physician contact since last home care visit and patient states: NO  Clinician asked if patient has any new or changed medications and patient states:  NO    If Yes, were medications reconciled? N/A    Was the certifying physician notified of changes in medications? N/A      Clinical assessment (what this visit means for the patient overall and need for ongoing skilled care) and progress or lack of progress towards SPECIFIC goals: Patient demosntrates improved balance after weight shifting exercises.  She needs further training on safety, as she was walking throughout her house without any AD upon PT arrival today, grabbing walls and furniture for balance.      Written Teaching Material Utilized: N/A    Interdisciplinary communication with: N/A     Discharge planning as follows: Will discharge when the patient has reached their maximum functional potential and maximum safety in their home    Specific plan for next visit: Re-train on safe ambulation and transfers and progress to stair training if patient is willing.

## 2024-03-26 ENCOUNTER — HOME CARE VISIT (OUTPATIENT)
Facility: HOME HEALTH | Age: 89
End: 2024-03-26
Payer: COMMERCIAL

## 2024-03-26 PROCEDURE — G0299 HHS/HOSPICE OF RN EA 15 MIN: HCPCS

## 2024-03-27 VITALS
HEART RATE: 64 BPM | SYSTOLIC BLOOD PRESSURE: 100 MMHG | RESPIRATION RATE: 18 BRPM | DIASTOLIC BLOOD PRESSURE: 60 MMHG | TEMPERATURE: 99.4 F | OXYGEN SATURATION: 99 %

## 2024-03-27 NOTE — HOME HEALTH
Subjective: Pt states she her appetite has increased.  Falls since last visit No(if yes complete the Fall Tracking Form and include bsrifallreport):   Caregiver involvement changes: na  Home health supplies by type and quantity ordered/delivered this visit include: na    Clinician asked if patient has had any physician contact since last home care visit and patient states: NO  Clinician asked if patient has any new or changed medications and patient states:  NO   If Yes, were medications reconciled? N/A   Was the certifying physician notified of changes in medications? N/A     Clinical assessment (what this visit means for the patient overall and need for ongoing skilled care) and progress or lack of progress towards SPECIFIC goals: Continued SN needed for wound care, wound care goal not met. Wounds progressing.    Written Teaching Material Utilized: N/A    Interdisciplinary communication with: N/A  Discharge planning as follows: When wound is 95% healed    Specific plan for next visit: wound care

## 2024-03-28 ENCOUNTER — HOME CARE VISIT (OUTPATIENT)
Facility: HOME HEALTH | Age: 89
End: 2024-03-28
Payer: COMMERCIAL

## 2024-03-28 PROCEDURE — G0299 HHS/HOSPICE OF RN EA 15 MIN: HCPCS

## 2024-03-29 ENCOUNTER — HOME CARE VISIT (OUTPATIENT)
Facility: HOME HEALTH | Age: 89
End: 2024-03-29
Payer: COMMERCIAL

## 2024-03-29 VITALS
TEMPERATURE: 97.7 F | OXYGEN SATURATION: 96 % | DIASTOLIC BLOOD PRESSURE: 60 MMHG | SYSTOLIC BLOOD PRESSURE: 110 MMHG | RESPIRATION RATE: 18 BRPM | HEART RATE: 88 BPM

## 2024-03-29 PROCEDURE — G0151 HHCP-SERV OF PT,EA 15 MIN: HCPCS

## 2024-03-30 ENCOUNTER — HOME CARE VISIT (OUTPATIENT)
Dept: HOME HEALTH SERVICES | Facility: HOME HEALTH | Age: 89
End: 2024-03-30
Payer: COMMERCIAL

## 2024-03-30 VITALS
HEART RATE: 63 BPM | TEMPERATURE: 97.9 F | DIASTOLIC BLOOD PRESSURE: 65 MMHG | OXYGEN SATURATION: 98 % | RESPIRATION RATE: 14 BRPM | SYSTOLIC BLOOD PRESSURE: 128 MMHG

## 2024-03-30 ASSESSMENT — ENCOUNTER SYMPTOMS: PAIN LOCATION - PAIN QUALITY: SHARP

## 2024-03-30 NOTE — HOME HEALTH
Subjective: Pt states arthritis was bothering her hands.  Falls since last visit No(if yes complete the Fall Tracking Form and include bsrifallreport):   Caregiver involvement changes: na  Home health supplies by type and quantity ordered/delivered this visit include: na    Clinician asked if patient has had any physician contact since last home care visit and patient states: NO  Clinician asked if patient has any new or changed medications and patient states:  NO   If Yes, were medications reconciled? N/A   Was the certifying physician notified of changes in medications? N/A     Clinical assessment (what this visit means for the patient overall and need for ongoing skilled care) and progress or lack of progress towards SPECIFIC goals: Continued SN needed for wound care to prevent infection, wound care goal not met. Wounds progressing.    Written Teaching Material Utilized: N/A    Interdisciplinary communication with: N/A   Discharge planning as follows: When goals are met    Specific plan for next visit: wound care

## 2024-03-30 NOTE — HOME HEALTH
Subjective: Patient states her feet are hurting her a lot.  Patient was in the bed when therapist arrived.  Her caregiver cleared off the bed today so patient could lie down and caregiver worked on cleaning the room while patient rested.  Patient has not been in her bed in the past couple months.   Falls since last visit No(if yes complete the Fall Tracking Form and include bsrifallreport):   Caregiver involvement changes: no change  Home health supplies by type and quantity ordered/delivered this visit include: n/a    Clinician asked if patient has had any physician contact since last home care visit and patient states: NO  Clinician asked if patient has any new or changed medications and patient states:  NO    If Yes, were medications reconciled? YES    Was the certifying physician notified of changes in medications? N/A      Clinical assessment (what this visit means for the patient overall and need for ongoing skilled care) and progress or lack of progress towards SPECIFIC goals: New goals for bed mobility were added today due to patient being in the bed.  At Evaluation she had not been in her bed for months.  Patient needs further training on bed mobility and transfers, as she had a lot of confusion of which buttons to push on the bed control.      Written Teaching Material Utilized: N/A    Interdisciplinary communication with: N/A     Discharge planning as follows: Will discharge when the patient has reached their maximum functional potential and maximum safety in their home and When goals are met    Specific plan for next visit: Progress transfer training and bed mobility training to improve independence.

## 2024-04-01 ENCOUNTER — HOME CARE VISIT (OUTPATIENT)
Facility: HOME HEALTH | Age: 89
End: 2024-04-01
Payer: COMMERCIAL

## 2024-04-01 VITALS
OXYGEN SATURATION: 99 % | SYSTOLIC BLOOD PRESSURE: 102 MMHG | RESPIRATION RATE: 18 BRPM | TEMPERATURE: 98.2 F | DIASTOLIC BLOOD PRESSURE: 60 MMHG | HEART RATE: 61 BPM

## 2024-04-01 VITALS
RESPIRATION RATE: 16 BRPM | SYSTOLIC BLOOD PRESSURE: 120 MMHG | DIASTOLIC BLOOD PRESSURE: 60 MMHG | TEMPERATURE: 98.4 F | OXYGEN SATURATION: 98 % | HEART RATE: 64 BPM

## 2024-04-01 PROCEDURE — G0299 HHS/HOSPICE OF RN EA 15 MIN: HCPCS

## 2024-04-01 PROCEDURE — G0151 HHCP-SERV OF PT,EA 15 MIN: HCPCS

## 2024-04-01 ASSESSMENT — ENCOUNTER SYMPTOMS
STOOL DESCRIPTION: LIQUID
DIARRHEA: 1
PAIN LOCATION - PAIN QUALITY: BURNING

## 2024-04-01 NOTE — HOME HEALTH
Subjective: Patient states she's been having diarrhea for the past day.  Her feet are not bothering her as much today.Patient states she's slept in her bed at night, now that she knows how to elevate her legs and scoot in the bed..  Falls since last visit No(if yes complete the Fall Tracking Form and include bsrifallreport):   Caregiver involvement changes: no change  Home health supplies by type and quantity ordered/delivered this visit include: n/a    Clinician asked if patient has had any physician contact since last home care visit and patient states: NO  Clinician asked if patient has any new or changed medications and patient states:  NO    If Yes, were medications reconciled? N/A    Was the certifying physician notified of changes in medications? N/A      Clinical assessment (what this visit means for the patient overall and need for ongoing skilled care) and progress or lack of progress towards SPECIFIC goals: Patient is making slow improvement in functional indpendence in the home.  She needs further training on bed transfers, as that was not performed today due to patient taking extensive time to finish her lunch while therapist was there.      Written Teaching Material Utilized: N/A    Interdisciplinary communication with: N/A     Discharge planning as follows: When goals are met  p  Specific plan for next visit: re-educate patient on bed transfers at next visit.

## 2024-04-02 NOTE — HOME HEALTH
Subjective: Pt states she normally soaks her feet to help her calluses but because of bandage CG uses a wet cloth and soak around bandages.  Falls since last visit No(if yes complete the Fall Tracking Form and include bsrifallreport):   Caregiver involvement changes: na  Home health supplies by type and quantity ordered/delivered this visit include: na    Clinician asked if patient has had any physician contact since last home care visit and patient states: NO  Clinician asked if patient has any new or changed medications and patient states:  NO   If Yes, were medications reconciled? N/A   Was the certifying physician notified of changes in medications? N/A     Clinical assessment (what this visit means for the patient overall and need for ongoing skilled care) and progress or lack of progress towards SPECIFIC goals: Continued SN needed fow wound care, wounds not healed. Wounds progressing decreasing in size.    Written Teaching Material Utilized: N/A    Interdisciplinary communication with: N/A   Discharge planning as follows: When wound is 95% healed    Specific plan for next visit: wound care

## 2024-04-04 ENCOUNTER — HOME CARE VISIT (OUTPATIENT)
Facility: HOME HEALTH | Age: 89
End: 2024-04-04
Payer: COMMERCIAL

## 2024-04-04 VITALS
TEMPERATURE: 98 F | OXYGEN SATURATION: 98 % | HEART RATE: 65 BPM | SYSTOLIC BLOOD PRESSURE: 132 MMHG | RESPIRATION RATE: 16 BRPM | DIASTOLIC BLOOD PRESSURE: 70 MMHG

## 2024-04-04 PROCEDURE — G0299 HHS/HOSPICE OF RN EA 15 MIN: HCPCS

## 2024-04-04 PROCEDURE — G0151 HHCP-SERV OF PT,EA 15 MIN: HCPCS

## 2024-04-04 NOTE — HOME HEALTH
Subjective: Patient states \"I sleep too much.  I've noticed I even fall asleep when I'm standing at the refrigerator.'  Pt reports continued diarrhea and that it occurs almost after everytime she drinks water. She notes that she took too much metamucil so she has stopped taking it.   Falls since last visit No(if yes complete the Fall Tracking Form and include bsrifallreport):   Caregiver involvement changes: no change  Home health supplies by type and quantity ordered/delivered this visit include: n/a    Clinician asked if patient has had any physician contact since last home care visit and patient states: NO  Clinician asked if patient has any new or changed medications and patient states:  NO    If Yes, were medications recois prnciled? NO because patient doesn't know what medications she is taking and could not provide them to therapist.  Was the certifying physician notified of changes in medications? N/A      Clinical assessment (what this visit means for the patient overall and need for ongoing skilled care) and progress or lack of progress towards SPECIFIC goals: Patient is progressing in independence with bed mobiliy.  Further training needed with transfers to have consistent safety and decrease risk of feet slipping on the floor.    Written Teaching Material Utilized: N/A    Interdisciplinary communication with: Shameka Perez Physician for the purpose of continued diarrhea    Discharge planning as follows: Will discharge when the patient has reached their maximum functional potential and maximum safety in their home and When goals are met    Specific plan for next visit: progress transfer training and if weather allows train on oudoor stairs

## 2024-04-07 VITALS
RESPIRATION RATE: 18 BRPM | SYSTOLIC BLOOD PRESSURE: 118 MMHG | DIASTOLIC BLOOD PRESSURE: 70 MMHG | OXYGEN SATURATION: 98 % | HEART RATE: 88 BPM | TEMPERATURE: 98 F

## 2024-04-08 ENCOUNTER — HOME CARE VISIT (OUTPATIENT)
Facility: HOME HEALTH | Age: 89
End: 2024-04-08
Payer: COMMERCIAL

## 2024-04-08 VITALS
DIASTOLIC BLOOD PRESSURE: 60 MMHG | SYSTOLIC BLOOD PRESSURE: 110 MMHG | RESPIRATION RATE: 16 BRPM | TEMPERATURE: 98 F | HEART RATE: 66 BPM | OXYGEN SATURATION: 96 %

## 2024-04-08 PROCEDURE — G0299 HHS/HOSPICE OF RN EA 15 MIN: HCPCS

## 2024-04-08 PROCEDURE — G0151 HHCP-SERV OF PT,EA 15 MIN: HCPCS

## 2024-04-08 ASSESSMENT — ENCOUNTER SYMPTOMS: PAIN LOCATION - PAIN QUALITY: SHARP

## 2024-04-08 NOTE — HOME HEALTH
Subjective: It was a busy weekend, I was peeing all the time.  Falls since last visit No(if yes complete the Fall Tracking Form and include bsrifallreport):   Caregiver involvement changes: no change  Home health supplies by type and quantity ordered/delivered this visit include: n/a    Clinician asked if patient has had any physician contact since last home care visit and patient states: NO  Clinician asked if patient has any new or changed medications and patient states:  YES finished cephalexin  If Yes, were medications reconciled? YES    Was the certifying physician notified of changes in medications? N/A      Clinical assessment (what this visit means for the patient overall and need for ongoing skilled care) and progress or lack of progress towards SPECIFIC goals: Patient spent 35 minutes in the restroom today going to the bathroom and cleaning herself up.  Due to time, outdoor stair training was deferred.  Patient was instructed in LE strengthening including standing knee raises, standing hip abduction.  Followup is needed for outdoor stair training.     Written Teaching Material Utilized: N/A    Interdisciplinary communication with: N/A     Discharge planning as follows: Will discharge when the patient has reached their maximum functional potential and maximum safety in their home    Specific plan for next visit: progressto stair training outdoors

## 2024-04-09 VITALS
OXYGEN SATURATION: 100 % | SYSTOLIC BLOOD PRESSURE: 107 MMHG | TEMPERATURE: 98 F | RESPIRATION RATE: 16 BRPM | HEART RATE: 65 BPM | DIASTOLIC BLOOD PRESSURE: 55 MMHG

## 2024-04-09 ASSESSMENT — ENCOUNTER SYMPTOMS
DYSPNEA ACTIVITY LEVEL: AFTER AMBULATING MORE THAN 20 FT
PAIN LOCATION - PAIN QUALITY: TENDER
HEMOPTYSIS: 0

## 2024-04-10 ENCOUNTER — TELEPHONE (OUTPATIENT)
Age: 89
End: 2024-04-10

## 2024-04-10 DIAGNOSIS — M79.673 CHRONIC FOOT PAIN, UNSPECIFIED LATERALITY: Primary | ICD-10-CM

## 2024-04-10 DIAGNOSIS — G89.29 CHRONIC FOOT PAIN, UNSPECIFIED LATERALITY: Primary | ICD-10-CM

## 2024-04-10 NOTE — TELEPHONE ENCOUNTER
Message  Received: 5 days ago  Shameka Perez MD Beers, Bryan David, MA  Refer her to to podiatrist          Previous Messages    PT Home Visit  3/29/2024  Encounter Summary  Provider Department   Taylor Ramirez, PT BSR [facility]     All Conversations  (Oldest Message First)  March 30, 2024  Taylor Ramirez, PT   to Amanda Kim APRN - CNP       3/30/24  9:15 AM  Patient was reporting 10/10 pain on the bottom of her feet today, stating it was from the callouses on the bottom of her feet.  I educated on elevating her feet  above her heart and keeping them off the floor when she is sitting. Please let me know if you have anything else you can do for her pain.  Thank you.    -Taylor Ramirez PT  March 31, 2024           3/31/24 11:08 PM  Amanda Kim APRN - CNP routed this conversation to Me  Amanda Kim APRN - CNP   to Taylor Ramirez, PT       3/31/24 11:08 PM  Capo Vazquez,    I have forwarded this to her PCP's (Dr. Shameka Perez) nurse for follow up. Thank you.    Amanda

## 2024-04-11 ENCOUNTER — HOME CARE VISIT (OUTPATIENT)
Facility: HOME HEALTH | Age: 89
End: 2024-04-11
Payer: COMMERCIAL

## 2024-04-11 ENCOUNTER — TELEPHONE (OUTPATIENT)
Age: 89
End: 2024-04-11

## 2024-04-11 VITALS
RESPIRATION RATE: 16 BRPM | SYSTOLIC BLOOD PRESSURE: 120 MMHG | OXYGEN SATURATION: 97 % | HEART RATE: 65 BPM | DIASTOLIC BLOOD PRESSURE: 65 MMHG | TEMPERATURE: 98.7 F

## 2024-04-11 PROCEDURE — G0299 HHS/HOSPICE OF RN EA 15 MIN: HCPCS

## 2024-04-11 PROCEDURE — G0151 HHCP-SERV OF PT,EA 15 MIN: HCPCS

## 2024-04-11 ASSESSMENT — ENCOUNTER SYMPTOMS: DIARRHEA: 1

## 2024-04-11 NOTE — HOME HEALTH
Subjective: I keep hearing noises and thinking someone is at the door.  I go check it and no one is there.    Falls since last visit No(if yes complete the Fall Tracking Form and include bsrifallreport):   Caregiver involvement changes: no change    Clinician asked if patient has had any physician contact since last home care visit and patient states: NO  Clinician asked if patient has any new or changed medications and patient states:  NO    If Yes, were medications reconciled? YES    Was the certifying physician notified of changes in medications? YES clarification on ferrous sulfate requested from Dr. Perez.  Anand Perez's nurse called back and clarified that patient should not be taking anything with calcium and she should be taking iron.          Clinical assessment (what this visit means for the patient overall and need for ongoing skilled care) and progress or lack of progress towards SPECIFIC goals: Patient has been seen for 4 weeks home health physical therapy.  Patient demonstrates improved LE strength and balance and she is now independent with all ambulation and transfers within her home.  Tinetti balance assessment has improved from 7/28 to 18/28.  Stair training was not able to be performed due to patient not wanting to go outside.  Patient has reached her baseline status and achieved all but one goal.  Patient will be discharged from physical therapy at this time.      Discharge Instructions:    Written Teaching Material Utilized: HEP and discharge instructions    Instructed patient/caregiver on the following: Take all medications exactly as prescribed by physician. Updated medication list present in the home at discharge, Keep all scheduled medical appointments, Wash hands frequently to control the spread of infection, Follow safety and fall prevention education to prevent falls and Continue home exercises as instructed by your therapist    Call physician for: continuous pain, abnormal bleeding, high

## 2024-04-11 NOTE — TELEPHONE ENCOUNTER
Spoke with Taylor:    Sig: TAKE 1 TABLET BY MOUTH EVERY MORNING BEFORE BREAKFAST     Patient will have labs drawn today to follow up.

## 2024-04-11 NOTE — HOME HEALTH
Subjective: Angel Duckworth reports she already completed wound care to bilateral feet just before SN arrived.     Falls since last visit: no  Caregiver involvement changes: none  Home health supplies by type and quantity ordered/delivered this visit include: wound care supplies.     Clinician asked if patient has had any physician contact since last home care visit and patient states: NO  Clinician asked if patient has any new or changed medications and patient states: NO  If Yes, were medications reconciled? NA  Was the certifying physician notified of changes in medications? NA    Clinical assessment (what this visit means for the patient overall and need for ongoing skilled care) and progress or lack of progress towards SPECIFIC goals: Dressings intact to bilateral feet. Patient and caregiver reports that patient was told to stop taking calcium and Amanda Kim NP wanted repeat labs. SN found message in Epic notifying of patient of need for labwork. Labwork was never completed. SN ordered labwork for CBC and BMP to be obtained on next nursing visit. Patient continues to require skilled nursing for wound care and medication management.     Written Teaching Material Utilized: SAM    Interdisciplinary communication with: SAM    Discharge planning as follows: when wounds are healed    Specific plan for next visit: Obtain CBC and BMP by venipuncture, Wound care to bilateral dorsal feet.

## 2024-04-12 ENCOUNTER — OFFICE VISIT (OUTPATIENT)
Age: 89
End: 2024-04-12
Payer: MEDICARE

## 2024-04-12 VITALS
SYSTOLIC BLOOD PRESSURE: 116 MMHG | HEART RATE: 67 BPM | WEIGHT: 131 LBS | BODY MASS INDEX: 20.56 KG/M2 | RESPIRATION RATE: 99 BRPM | HEIGHT: 67 IN | DIASTOLIC BLOOD PRESSURE: 68 MMHG

## 2024-04-12 DIAGNOSIS — R82.90 ABNORMAL URINE: ICD-10-CM

## 2024-04-12 DIAGNOSIS — R44.3 HALLUCINATIONS: Primary | ICD-10-CM

## 2024-04-12 DIAGNOSIS — S09.90XA INJURY OF HEAD, INITIAL ENCOUNTER: ICD-10-CM

## 2024-04-12 LAB
ALBUMIN SERPL-MCNC: 3.9 G/DL (ref 3.6–4.6)
ALBUMIN/GLOB SERPL: 1 {RATIO} (ref 1.2–2.2)
ALP SERPL-CCNC: 103 IU/L (ref 44–121)
ALT SERPL-CCNC: 18 IU/L (ref 0–32)
AST SERPL-CCNC: 34 IU/L (ref 0–40)
BASOPHILS # BLD AUTO: 0.1 X10E3/UL (ref 0–0.2)
BASOPHILS NFR BLD AUTO: 2 %
BILIRUB SERPL-MCNC: 0.3 MG/DL (ref 0–1.2)
BILIRUBIN, URINE, POC: NEGATIVE
BLOOD URINE, POC: NEGATIVE
BUN SERPL-MCNC: 26 MG/DL (ref 10–36)
BUN/CREAT SERPL: 29 (ref 12–28)
CALCIUM SERPL-MCNC: 10.9 MG/DL (ref 8.7–10.3)
CHLORIDE SERPL-SCNC: 100 MMOL/L (ref 96–106)
CO2 SERPL-SCNC: 26 MMOL/L (ref 20–29)
CREAT SERPL-MCNC: 0.91 MG/DL (ref 0.57–1)
EGFRCR SERPLBLD CKD-EPI 2021: 60 ML/MIN/1.73
EOSINOPHIL # BLD AUTO: 0.2 X10E3/UL (ref 0–0.4)
EOSINOPHIL NFR BLD AUTO: 4 %
ERYTHROCYTE [DISTWIDTH] IN BLOOD BY AUTOMATED COUNT: 16.7 % (ref 11.7–15.4)
GLOBULIN SER CALC-MCNC: 3.8 G/DL (ref 1.5–4.5)
GLUCOSE SERPL-MCNC: 95 MG/DL (ref 70–99)
GLUCOSE URINE, POC: NEGATIVE
HCT VFR BLD AUTO: 29.6 % (ref 34–46.6)
HGB BLD-MCNC: 9 G/DL (ref 11.1–15.9)
KETONES, URINE, POC: NEGATIVE
LEUKOCYTE ESTERASE, URINE, POC: NORMAL
LYMPHOCYTES # BLD AUTO: 0.8 X10E3/UL (ref 0.7–3.1)
LYMPHOCYTES NFR BLD AUTO: 19 %
MCH RBC QN AUTO: 22 PG (ref 26.6–33)
MCHC RBC AUTO-ENTMCNC: 30.4 G/DL (ref 31.5–35.7)
MCV RBC AUTO: 72 FL (ref 79–97)
MONOCYTES # BLD AUTO: 0.7 X10E3/UL (ref 0.1–0.9)
MONOCYTES NFR BLD AUTO: 16 %
NEUTROPHILS # BLD AUTO: 2.5 X10E3/UL (ref 1.4–7)
NEUTROPHILS NFR BLD AUTO: 59 %
NITRITE, URINE, POC: NEGATIVE
PH, URINE, POC: 6 (ref 4.6–8)
PLATELET # BLD AUTO: 126 X10E3/UL (ref 150–450)
POTASSIUM SERPL-SCNC: 4.6 MMOL/L (ref 3.5–5.2)
PROT SERPL-MCNC: 7.7 G/DL (ref 6–8.5)
PROTEIN,URINE, POC: NEGATIVE
RBC # BLD AUTO: 4.09 X10E6/UL (ref 3.77–5.28)
SODIUM SERPL-SCNC: 136 MMOL/L (ref 134–144)
SPECIFIC GRAVITY, URINE, POC: 1.02 (ref 1–1.03)
URINALYSIS CLARITY, POC: CLEAR
URINALYSIS COLOR, POC: YELLOW
UROBILINOGEN, POC: NORMAL
WBC # BLD AUTO: 4.1 X10E3/UL (ref 3.4–10.8)

## 2024-04-12 PROCEDURE — G8420 CALC BMI NORM PARAMETERS: HCPCS | Performed by: CLINICAL NURSE SPECIALIST

## 2024-04-12 PROCEDURE — 99214 OFFICE O/P EST MOD 30 MIN: CPT | Performed by: CLINICAL NURSE SPECIALIST

## 2024-04-12 PROCEDURE — G8427 DOCREV CUR MEDS BY ELIG CLIN: HCPCS | Performed by: CLINICAL NURSE SPECIALIST

## 2024-04-12 PROCEDURE — 81003 URINALYSIS AUTO W/O SCOPE: CPT | Performed by: CLINICAL NURSE SPECIALIST

## 2024-04-12 PROCEDURE — 1036F TOBACCO NON-USER: CPT | Performed by: CLINICAL NURSE SPECIALIST

## 2024-04-12 PROCEDURE — 1090F PRES/ABSN URINE INCON ASSESS: CPT | Performed by: CLINICAL NURSE SPECIALIST

## 2024-04-12 PROCEDURE — PBSHW AMB POC URINALYSIS DIP STICK AUTO W/O MICRO: Performed by: CLINICAL NURSE SPECIALIST

## 2024-04-12 PROCEDURE — 1123F ACP DISCUSS/DSCN MKR DOCD: CPT | Performed by: CLINICAL NURSE SPECIALIST

## 2024-04-12 ASSESSMENT — ANXIETY QUESTIONNAIRES
6. BECOMING EASILY ANNOYED OR IRRITABLE: NOT AT ALL
1. FEELING NERVOUS, ANXIOUS, OR ON EDGE: NOT AT ALL
2. NOT BEING ABLE TO STOP OR CONTROL WORRYING: NOT AT ALL
IF YOU CHECKED OFF ANY PROBLEMS ON THIS QUESTIONNAIRE, HOW DIFFICULT HAVE THESE PROBLEMS MADE IT FOR YOU TO DO YOUR WORK, TAKE CARE OF THINGS AT HOME, OR GET ALONG WITH OTHER PEOPLE: NOT DIFFICULT AT ALL
7. FEELING AFRAID AS IF SOMETHING AWFUL MIGHT HAPPEN: NOT AT ALL
GAD7 TOTAL SCORE: 0
5. BEING SO RESTLESS THAT IT IS HARD TO SIT STILL: NOT AT ALL
4. TROUBLE RELAXING: NOT AT ALL
3. WORRYING TOO MUCH ABOUT DIFFERENT THINGS: NOT AT ALL

## 2024-04-12 ASSESSMENT — PATIENT HEALTH QUESTIONNAIRE - PHQ9
4. FEELING TIRED OR HAVING LITTLE ENERGY: NOT AT ALL
3. TROUBLE FALLING OR STAYING ASLEEP: NOT AT ALL
SUM OF ALL RESPONSES TO PHQ QUESTIONS 1-9: 0
SUM OF ALL RESPONSES TO PHQ9 QUESTIONS 1 & 2: 0
SUM OF ALL RESPONSES TO PHQ QUESTIONS 1-9: 0
SUM OF ALL RESPONSES TO PHQ QUESTIONS 1-9: 0
10. IF YOU CHECKED OFF ANY PROBLEMS, HOW DIFFICULT HAVE THESE PROBLEMS MADE IT FOR YOU TO DO YOUR WORK, TAKE CARE OF THINGS AT HOME, OR GET ALONG WITH OTHER PEOPLE: NOT DIFFICULT AT ALL
6. FEELING BAD ABOUT YOURSELF - OR THAT YOU ARE A FAILURE OR HAVE LET YOURSELF OR YOUR FAMILY DOWN: NOT AT ALL
8. MOVING OR SPEAKING SO SLOWLY THAT OTHER PEOPLE COULD HAVE NOTICED. OR THE OPPOSITE, BEING SO FIGETY OR RESTLESS THAT YOU HAVE BEEN MOVING AROUND A LOT MORE THAN USUAL: NOT AT ALL
SUM OF ALL RESPONSES TO PHQ QUESTIONS 1-9: 0
1. LITTLE INTEREST OR PLEASURE IN DOING THINGS: NOT AT ALL
5. POOR APPETITE OR OVEREATING: NOT AT ALL
9. THOUGHTS THAT YOU WOULD BE BETTER OFF DEAD, OR OF HURTING YOURSELF: NOT AT ALL
2. FEELING DOWN, DEPRESSED OR HOPELESS: NOT AT ALL
7. TROUBLE CONCENTRATING ON THINGS, SUCH AS READING THE NEWSPAPER OR WATCHING TELEVISION: NOT AT ALL

## 2024-04-12 NOTE — PROGRESS NOTES
Chief Complaint   Patient presents with    Hallucinations    Blurred Vision     Pt states she is concerned about hearing knocking on the door when there is no one at the door. She also states she is hearing music. She hit her head a few weeks ago in the kitchen. Home health recommended her to get imaging on her head.     \"Have you been to the ER, urgent care clinic since your last visit?  Hospitalized since your last visit?\"    NO    “Have you seen or consulted any other health care providers outside of [facility] since your last visit?”    NO            Click Here for Release of Records Request

## 2024-04-12 NOTE — PROGRESS NOTES
Carlie Newman (:  1933) is a 89 y.o. female,Established patient, here for evaluation of the following chief complaint(s):  Hallucinations and Blurred Vision         ASSESSMENT/PLAN:  1. Hallucinations  -     AMB POC URINALYSIS DIP STICK AUTO W/O MICRO  -     Comprehensive Metabolic Panel; Future  -     Ammonia; Future  -     CBC with Auto Differential; Future  2. Injury of head, initial encounter  -     CT HEAD WO CONTRAST; Future  3. Abnormal urine  -     Culture, Urine; Future    High suspicion for UTI given history.  Need to rule out systemic infection as well as metabolic encephalopathy.  Given recent head injury, will obtain CT though low suspicion as cause of symptoms.  Will request that home health nurse fill pillbox weekly.  Will also request that OT evaluate and treat.  Encouraged to consider relocating outside of the home, she needs additional support.  Will follow-up pending test results, and discuss any additional plan of care.  Reinforced signs and symptoms that require immediate ER care.  Maintain scheduled follow-up visit with PCP.            Return in about 2 weeks (around 2024).         Subjective   SUBJECTIVE/OBJECTIVE:  Ms. Newman presents for a problem visit.  Over the course of possibly 2 weeks, she has had auditory and visual hallucinations.  States that it almost feels as if she has been given a hallucinogenic drug.  Most recent hallucination was of an entire family watching her eat, it was very distressing as a child appeared to just be watching her take every bite.  Made her feel that the child was hungry, and wanted to take the food out of her mouth.  She is also hearing things such as knocks at the door, but then no one is there.  Reports that she is not experiencing any auditory or visual hallucinations well at this appointment.  Thinks that this only occurs in her home.  Reports a fall about 2 weeks ago, hit her head on the door frame of her kitchen.  Was not

## 2024-04-13 LAB — SPECIMEN STATUS REPORT: NORMAL

## 2024-04-15 ENCOUNTER — HOME CARE VISIT (OUTPATIENT)
Facility: HOME HEALTH | Age: 89
End: 2024-04-15
Payer: COMMERCIAL

## 2024-04-15 DIAGNOSIS — D72.829 LEUKOCYTOSIS, UNSPECIFIED TYPE: ICD-10-CM

## 2024-04-15 LAB — AMMONIA PLAS-MCNC: 33 UG/DL (ref 28–135)

## 2024-04-15 PROCEDURE — G0299 HHS/HOSPICE OF RN EA 15 MIN: HCPCS

## 2024-04-16 ENCOUNTER — HOSPITAL ENCOUNTER (OUTPATIENT)
Facility: HOSPITAL | Age: 89
Discharge: HOME | End: 2024-04-19
Payer: MEDICARE

## 2024-04-16 VITALS
DIASTOLIC BLOOD PRESSURE: 65 MMHG | SYSTOLIC BLOOD PRESSURE: 120 MMHG | HEART RATE: 88 BPM | RESPIRATION RATE: 18 BRPM | TEMPERATURE: 98 F | DIASTOLIC BLOOD PRESSURE: 78 MMHG | OXYGEN SATURATION: 98 % | RESPIRATION RATE: 18 BRPM | SYSTOLIC BLOOD PRESSURE: 128 MMHG | HEART RATE: 88 BPM | OXYGEN SATURATION: 98 % | TEMPERATURE: 98.7 F

## 2024-04-16 DIAGNOSIS — S09.90XA INJURY OF HEAD, INITIAL ENCOUNTER: ICD-10-CM

## 2024-04-16 DIAGNOSIS — R82.90 URINE FINDINGS ABNORMAL: Primary | ICD-10-CM

## 2024-04-16 LAB — BACTERIA UR CULT: ABNORMAL

## 2024-04-16 PROCEDURE — 70450 CT HEAD/BRAIN W/O DYE: CPT

## 2024-04-16 RX ORDER — CIPROFLOXACIN 250 MG/1
250 TABLET, FILM COATED ORAL 2 TIMES DAILY
Qty: 6 TABLET | Refills: 0 | Status: SHIPPED | OUTPATIENT
Start: 2024-04-16 | End: 2024-04-19

## 2024-04-16 ASSESSMENT — ENCOUNTER SYMPTOMS
SHORTNESS OF BREATH: 0
VOMITING: 0
NAUSEA: 0

## 2024-04-16 NOTE — HOME HEALTH
Subjective: Pt states she has been hearing things that other people do not hear, pt Son is taking her to be evaluated.   Falls since last visit No(if yes complete the Fall Tracking Form and include bsrifallreport):   Caregiver involvement changes: na  Home health supplies by type and quantity ordered/delivered this visit include: na    Clinician asked if patient has had any physician contact since last home care visit and patient states: NO  Clinician asked if patient has any new or changed medications and patient states:  N/A   If Yes, were medications reconciled? N/A   Was the certifying physician notified of changes in medications? N/A     Clinical assessment (what this visit means for the patient overall and need for ongoing skilled care) and progress or lack of progress towards SPECIFIC goals: Continued SN needed for wound care, education and assessment. Left foot wound healed, goal met this visit. Sn will continue to monitor for reopening.     Written Teaching Material Utilized: N/A    Interdisciplinary communication with: N/A     Discharge planning as follows: When wound is 90% healed    Specific plan for next visit: wound care

## 2024-04-16 NOTE — HOME HEALTH
Subjective: Pt states she is scheduled to have a CT Scan tomorrow, because she has been hearing things that other people cannot hear.  Falls since last visit No(if yes complete the Fall Tracking Form and include bsrifallreport):   Caregiver involvement changes: na  Home health supplies by type and quantity ordered/delivered this visit include: na    Clinician asked if patient has had any physician contact since last home care visit and patient states: NO  Clinician asked if patient has any new or changed medications and patient states:  N/A   If Yes, were medications reconciled? N/A   Was the certifying physician notified of changes in medications? N/A     Clinical assessment (what this visit means for the patient overall and need for ongoing skilled care) and progress or lack of progress towards SPECIFIC goals: Continued SN needed for wound care to right foot wound to prevent infection, left foot wound healed.     Written Teaching Material Utilized: N/A    Interdisciplinary communication with: N/A     Discharge planning as follows: When wound is 90% healed    Specific plan for next visit: wound care

## 2024-04-17 ENCOUNTER — TELEPHONE (OUTPATIENT)
Age: 89
End: 2024-04-17

## 2024-04-17 NOTE — TELEPHONE ENCOUNTER
Pt has been prescribed oscal  calcium vitamin D and cholecalciferol  Pharmacy wants to know if pt needs to be on both of these?

## 2024-04-17 NOTE — TELEPHONE ENCOUNTER
Amanda Kim, APRN - CNP  You13 minutes ago (10:01 AM)       No, she doesn't. Can just take the OTC vitamin D.

## 2024-04-18 ENCOUNTER — HOME CARE VISIT (OUTPATIENT)
Facility: HOME HEALTH | Age: 89
End: 2024-04-18
Payer: COMMERCIAL

## 2024-04-21 ENCOUNTER — APPOINTMENT (OUTPATIENT)
Facility: HOSPITAL | Age: 89
DRG: 871 | End: 2024-04-21
Payer: MEDICARE

## 2024-04-21 ENCOUNTER — HOSPITAL ENCOUNTER (INPATIENT)
Facility: HOSPITAL | Age: 89
LOS: 9 days | Discharge: HOSPICE | DRG: 871 | End: 2024-05-01
Attending: EMERGENCY MEDICINE | Admitting: INTERNAL MEDICINE
Payer: MEDICARE

## 2024-04-21 DIAGNOSIS — G93.49 INFECTIOUS ENCEPHALOPATHY: Primary | ICD-10-CM

## 2024-04-21 DIAGNOSIS — B99.9 INFECTIOUS ENCEPHALOPATHY: Primary | ICD-10-CM

## 2024-04-21 DIAGNOSIS — R65.10 SIRS (SYSTEMIC INFLAMMATORY RESPONSE SYNDROME) (HCC): ICD-10-CM

## 2024-04-21 DIAGNOSIS — N19 ACUTE PRERENAL AZOTEMIA: ICD-10-CM

## 2024-04-21 DIAGNOSIS — N30.00 ACUTE CYSTITIS WITHOUT HEMATURIA: ICD-10-CM

## 2024-04-21 DIAGNOSIS — I50.9 CHRONIC CONGESTIVE HEART FAILURE, UNSPECIFIED HEART FAILURE TYPE (HCC): ICD-10-CM

## 2024-04-21 PROCEDURE — 83605 ASSAY OF LACTIC ACID: CPT

## 2024-04-21 PROCEDURE — 80053 COMPREHEN METABOLIC PANEL: CPT

## 2024-04-21 PROCEDURE — 83735 ASSAY OF MAGNESIUM: CPT

## 2024-04-21 PROCEDURE — 99285 EMERGENCY DEPT VISIT HI MDM: CPT

## 2024-04-21 PROCEDURE — 83880 ASSAY OF NATRIURETIC PEPTIDE: CPT

## 2024-04-21 PROCEDURE — 84145 PROCALCITONIN (PCT): CPT

## 2024-04-21 PROCEDURE — 70450 CT HEAD/BRAIN W/O DYE: CPT

## 2024-04-21 PROCEDURE — 87186 SC STD MICRODIL/AGAR DIL: CPT

## 2024-04-21 PROCEDURE — 87077 CULTURE AEROBIC IDENTIFY: CPT

## 2024-04-21 PROCEDURE — 87154 CUL TYP ID BLD PTHGN 6+ TRGT: CPT

## 2024-04-21 PROCEDURE — 71045 X-RAY EXAM CHEST 1 VIEW: CPT

## 2024-04-21 PROCEDURE — 85027 COMPLETE CBC AUTOMATED: CPT

## 2024-04-21 RX ORDER — SODIUM CHLORIDE, SODIUM LACTATE, POTASSIUM CHLORIDE, AND CALCIUM CHLORIDE .6; .31; .03; .02 G/100ML; G/100ML; G/100ML; G/100ML
1000 INJECTION, SOLUTION INTRAVENOUS ONCE
Status: COMPLETED | OUTPATIENT
Start: 2024-04-21 | End: 2024-04-22

## 2024-04-21 ASSESSMENT — PAIN - FUNCTIONAL ASSESSMENT: PAIN_FUNCTIONAL_ASSESSMENT: 0-10

## 2024-04-21 ASSESSMENT — PAIN SCALES - GENERAL: PAINLEVEL_OUTOF10: 0

## 2024-04-22 ENCOUNTER — HOME CARE VISIT (OUTPATIENT)
Facility: HOME HEALTH | Age: 89
End: 2024-04-22

## 2024-04-22 ENCOUNTER — APPOINTMENT (OUTPATIENT)
Facility: HOSPITAL | Age: 89
DRG: 871 | End: 2024-04-22
Payer: MEDICARE

## 2024-04-22 ENCOUNTER — TELEPHONE (OUTPATIENT)
Age: 89
End: 2024-04-22

## 2024-04-22 PROBLEM — B99.9 INFECTIOUS ENCEPHALOPATHY: Status: ACTIVE | Noted: 2024-04-22

## 2024-04-22 PROBLEM — D69.6 SEVERE THROMBOCYTOPENIA (HCC): Status: ACTIVE | Noted: 2024-04-22

## 2024-04-22 PROBLEM — R78.81 BACTEREMIA: Status: ACTIVE | Noted: 2024-04-22

## 2024-04-22 PROBLEM — G93.49 INFECTIOUS ENCEPHALOPATHY: Status: ACTIVE | Noted: 2024-04-22

## 2024-04-22 PROBLEM — R41.0 ACUTE DELIRIUM: Status: ACTIVE | Noted: 2024-04-22

## 2024-04-22 LAB
ACCESSION NUMBER, LLC1M: ABNORMAL
ACINETOBACTER CALCOAC BAUMANNII COMPLEX BY PCR: NOT DETECTED
ALBUMIN SERPL-MCNC: 1.6 G/DL (ref 3.5–5)
ALBUMIN SERPL-MCNC: 2.2 G/DL (ref 3.5–5)
ALBUMIN/GLOB SERPL: 0.4 (ref 1.1–2.2)
ALBUMIN/GLOB SERPL: 0.4 (ref 1.1–2.2)
ALP SERPL-CCNC: 69 U/L (ref 45–117)
ALP SERPL-CCNC: 81 U/L (ref 45–117)
ALT SERPL-CCNC: 16 U/L (ref 12–78)
ALT SERPL-CCNC: 25 U/L (ref 12–78)
AMMONIA PLAS-SCNC: <10 UMOL/L
AMPHET UR QL SCN: NEGATIVE
ANION GAP SERPL CALC-SCNC: 5 MMOL/L (ref 5–15)
APPEARANCE UR: ABNORMAL
APTT PPP: 27.9 SEC (ref 22.1–31)
ARTERIAL PATENCY WRIST A: YES
AST SERPL-CCNC: 31 U/L (ref 15–37)
AST SERPL-CCNC: 61 U/L (ref 15–37)
B FRAGILIS DNA BLD POS QL NAA+NON-PROBE: NOT DETECTED
BACTERIA URNS QL MICRO: NEGATIVE /HPF
BARBITURATES UR QL SCN: NEGATIVE
BASE EXCESS BLD CALC-SCNC: 0.7 MMOL/L
BASE EXCESS BLDA CALC-SCNC: 0.7 MMOL/L
BASOPHILS # BLD: 0 K/UL (ref 0–0.1)
BASOPHILS NFR BLD: 0 % (ref 0–1)
BDY SITE: NORMAL
BENZODIAZ UR QL: NEGATIVE
BILIRUB SERPL-MCNC: 0.4 MG/DL (ref 0.2–1)
BILIRUB SERPL-MCNC: 1.1 MG/DL (ref 0.2–1)
BILIRUB UR QL: NEGATIVE
BIOFIRE TEST COMMENT: ABNORMAL
BUN SERPL-MCNC: 24 MG/DL (ref 6–20)
BUN SERPL-MCNC: 26 MG/DL (ref 6–20)
BUN SERPL-MCNC: 31 MG/DL (ref 6–20)
BUN/CREAT SERPL: 41 (ref 12–20)
BUN/CREAT SERPL: 41 (ref 12–20)
BUN/CREAT SERPL: 53 (ref 12–20)
C ALBICANS DNA BLD POS QL NAA+NON-PROBE: NOT DETECTED
C AURIS DNA BLD POS QL NAA+NON-PROBE: NOT DETECTED
C GATTII+NEOFOR DNA BLD POS QL NAA+N-PRB: NOT DETECTED
C GLABRATA DNA BLD POS QL NAA+NON-PROBE: NOT DETECTED
C KRUSEI DNA BLD POS QL NAA+NON-PROBE: NOT DETECTED
C PARAP DNA BLD POS QL NAA+NON-PROBE: NOT DETECTED
C TROPICLS DNA BLD POS QL NAA+NON-PROBE: NOT DETECTED
CA-I BLD-SCNC: 1.66 MMOL/L (ref 1.12–1.32)
CALCIUM SERPL-MCNC: 10.2 MG/DL (ref 8.5–10.1)
CALCIUM SERPL-MCNC: 10.8 MG/DL (ref 8.5–10.1)
CALCIUM SERPL-MCNC: 11.4 MG/DL (ref 8.5–10.1)
CALCIUM SERPL-MCNC: 11.6 MG/DL (ref 8.5–10.1)
CANNABINOIDS UR QL SCN: NEGATIVE
CHLORIDE SERPL-SCNC: 105 MMOL/L (ref 97–108)
CHLORIDE SERPL-SCNC: 108 MMOL/L (ref 97–108)
CHLORIDE SERPL-SCNC: 110 MMOL/L (ref 97–108)
CO2 SERPL-SCNC: 23 MMOL/L (ref 21–32)
CO2 SERPL-SCNC: 26 MMOL/L (ref 21–32)
CO2 SERPL-SCNC: 27 MMOL/L (ref 21–32)
COCAINE UR QL SCN: NEGATIVE
COLOR UR: ABNORMAL
COMMENT:: NORMAL
COMMENT:: NORMAL
CREAT SERPL-MCNC: 0.49 MG/DL (ref 0.55–1.02)
CREAT SERPL-MCNC: 0.59 MG/DL (ref 0.55–1.02)
CREAT SERPL-MCNC: 0.76 MG/DL (ref 0.55–1.02)
DIFFERENTIAL METHOD BLD: ABNORMAL
E CLOAC COMP DNA BLD POS NAA+NON-PROBE: NOT DETECTED
E COLI DNA BLD POS QL NAA+NON-PROBE: NOT DETECTED
E FAECALIS DNA BLD POS QL NAA+NON-PROBE: NOT DETECTED
E FAECIUM DNA BLD POS QL NAA+NON-PROBE: NOT DETECTED
ECHO AO ROOT DIAM: 2.8 CM
ECHO AO ROOT INDEX: 1.66 CM/M2
ECHO AV AREA PEAK VELOCITY: 1.9 CM2
ECHO AV AREA/BSA PEAK VELOCITY: 1.1 CM2/M2
ECHO AV PEAK GRADIENT: 12 MMHG
ECHO AV PEAK VELOCITY: 1.8 M/S
ECHO AV VELOCITY RATIO: 0.78
ECHO BSA: 1.68 M2
ECHO EST RA PRESSURE: 3 MMHG
ECHO LA DIAMETER INDEX: 2.31 CM/M2
ECHO LA DIAMETER: 3.9 CM
ECHO LA TO AORTIC ROOT RATIO: 1.39
ECHO LV E' LATERAL VELOCITY: 13 CM/S
ECHO LV E' SEPTAL VELOCITY: 9 CM/S
ECHO LV FRACTIONAL SHORTENING: 37 % (ref 28–44)
ECHO LV INTERNAL DIMENSION DIASTOLE INDEX: 2.54 CM/M2
ECHO LV INTERNAL DIMENSION DIASTOLIC: 4.3 CM (ref 3.9–5.3)
ECHO LV INTERNAL DIMENSION SYSTOLIC INDEX: 1.6 CM/M2
ECHO LV INTERNAL DIMENSION SYSTOLIC: 2.7 CM
ECHO LV IVSD: 0.7 CM (ref 0.6–0.9)
ECHO LV MASS 2D: 88.5 G (ref 67–162)
ECHO LV MASS INDEX 2D: 52.4 G/M2 (ref 43–95)
ECHO LV POSTERIOR WALL DIASTOLIC: 0.7 CM (ref 0.6–0.9)
ECHO LV RELATIVE WALL THICKNESS RATIO: 0.33
ECHO LVOT AREA: 2.3 CM2
ECHO LVOT DIAM: 1.7 CM
ECHO LVOT PEAK GRADIENT: 8 MMHG
ECHO LVOT PEAK VELOCITY: 1.4 M/S
ECHO MV A VELOCITY: 1.11 M/S
ECHO MV AREA PHT: 2.3 CM2
ECHO MV E DECELERATION TIME (DT): 331.1 MS
ECHO MV E VELOCITY: 0.68 M/S
ECHO MV E/A RATIO: 0.61
ECHO MV E/E' LATERAL: 5.23
ECHO MV E/E' RATIO (AVERAGED): 6.39
ECHO MV PRESSURE HALF TIME (PHT): 96 MS
ECHO PULMONARY ARTERY SYSTOLIC PRESSURE (PASP): 50 MMHG
ECHO RIGHT VENTRICULAR SYSTOLIC PRESSURE (RVSP): 53 MMHG
ECHO RV FREE WALL PEAK S': 12 CM/S
ECHO RV INTERNAL DIMENSION: 3.6 CM
ECHO RV TAPSE: 1.1 CM (ref 1.7–?)
ECHO TV REGURGITANT MAX VELOCITY: 3.52 M/S
ECHO TV REGURGITANT PEAK GRADIENT: 50 MMHG
ENTEROBACTERALES DNA BLD POS NAA+N-PRB: NOT DETECTED
EOSINOPHIL # BLD: 0 K/UL (ref 0–0.4)
EOSINOPHIL NFR BLD: 0 % (ref 0–7)
EPITH CASTS URNS QL MICRO: ABNORMAL /LPF
ERYTHROCYTE [DISTWIDTH] IN BLOOD BY AUTOMATED COUNT: 17 % (ref 11.5–14.5)
ERYTHROCYTE [DISTWIDTH] IN BLOOD BY AUTOMATED COUNT: 17.2 % (ref 11.5–14.5)
ETHANOL SERPL-MCNC: <10 MG/DL (ref 0–0.08)
ETHANOL SERPL-MCNC: <10 MG/DL (ref 0–0.08)
FERRITIN SERPL-MCNC: 497 NG/ML (ref 8–252)
FIBRINOGEN PPP-MCNC: >800 MG/DL (ref 200–475)
FOLATE SERPL-MCNC: 4.4 NG/ML (ref 5–21)
GLOBULIN SER CALC-MCNC: 4 G/DL (ref 2–4)
GLOBULIN SER CALC-MCNC: 5.4 G/DL (ref 2–4)
GLUCOSE SERPL-MCNC: 101 MG/DL (ref 65–100)
GLUCOSE SERPL-MCNC: 105 MG/DL (ref 65–100)
GLUCOSE SERPL-MCNC: 110 MG/DL (ref 65–100)
GLUCOSE UR STRIP.AUTO-MCNC: NEGATIVE MG/DL
GP B STREP DNA BLD POS QL NAA+NON-PROBE: DETECTED
HAEM INFLU DNA BLD POS QL NAA+NON-PROBE: NOT DETECTED
HAPTOGLOB SERPL-MCNC: 198 MG/DL (ref 30–200)
HCO3 BLD-SCNC: 27.6 MMOL/L (ref 22–26)
HCO3 BLDA-SCNC: 25 MMOL/L (ref 22–26)
HCT VFR BLD AUTO: 29 % (ref 35–47)
HCT VFR BLD AUTO: 35 % (ref 35–47)
HGB BLD-MCNC: 11.1 G/DL (ref 11.5–16)
HGB BLD-MCNC: 9.3 G/DL (ref 11.5–16)
HGB UR QL STRIP: NEGATIVE
IMM GRANULOCYTES # BLD AUTO: 0 K/UL
IMM GRANULOCYTES NFR BLD AUTO: 0 %
INR PPP: 1.1 (ref 0.9–1.1)
IRON SATN MFR SERPL: 4 % (ref 20–50)
IRON SERPL-MCNC: 7 UG/DL (ref 35–150)
K OXYTOCA DNA BLD POS QL NAA+NON-PROBE: NOT DETECTED
KETONES UR QL STRIP.AUTO: 15 MG/DL
KLEBSIELLA SP DNA BLD POS QL NAA+NON-PRB: NOT DETECTED
KLEBSIELLA SP DNA BLD POS QL NAA+NON-PRB: NOT DETECTED
L MONOCYTOG DNA BLD POS QL NAA+NON-PROBE: NOT DETECTED
LACTATE SERPL-SCNC: 1.1 MMOL/L (ref 0.4–2)
LACTATE SERPL-SCNC: 1.4 MMOL/L (ref 0.4–2)
LACTATE SERPL-SCNC: 2.6 MMOL/L (ref 0.4–2)
LACTATE SERPL-SCNC: 2.9 MMOL/L (ref 0.4–2)
LDH SERPL L TO P-CCNC: 144 U/L (ref 81–246)
LEUKOCYTE ESTERASE UR QL STRIP.AUTO: NEGATIVE
LYMPHOCYTES # BLD: 0.5 K/UL (ref 0.8–3.5)
LYMPHOCYTES NFR BLD: 3 % (ref 12–49)
Lab: NORMAL
MAGNESIUM SERPL-MCNC: 1.8 MG/DL (ref 1.6–2.4)
MCH RBC QN AUTO: 22.4 PG (ref 26–34)
MCH RBC QN AUTO: 22.6 PG (ref 26–34)
MCHC RBC AUTO-ENTMCNC: 31.7 G/DL (ref 30–36.5)
MCHC RBC AUTO-ENTMCNC: 32.1 G/DL (ref 30–36.5)
MCV RBC AUTO: 70.6 FL (ref 80–99)
MCV RBC AUTO: 70.7 FL (ref 80–99)
METAMYELOCYTES NFR BLD MANUAL: 1 %
METHADONE UR QL: NEGATIVE
MONOCYTES # BLD: 0.3 K/UL (ref 0–1)
MONOCYTES NFR BLD: 2 % (ref 5–13)
N MEN DNA BLD POS QL NAA+NON-PROBE: NOT DETECTED
NEUTS BAND NFR BLD MANUAL: 11 % (ref 0–6)
NEUTS SEG # BLD: 14.1 K/UL (ref 1.8–8)
NEUTS SEG NFR BLD: 83 % (ref 32–75)
NITRITE UR QL STRIP.AUTO: NEGATIVE
NRBC # BLD: 0.02 K/UL (ref 0–0.01)
NRBC # BLD: 0.03 K/UL (ref 0–0.01)
NRBC BLD-RTO: 0.2 PER 100 WBC
NRBC BLD-RTO: 0.2 PER 100 WBC
NT PRO BNP: 1527 PG/ML
OPIATES UR QL: NEGATIVE
P AERUGINOSA DNA BLD POS NAA+NON-PROBE: NOT DETECTED
PCO2 BLD: 53 MMHG (ref 35–45)
PCO2 BLDA: 40 MMHG (ref 35–45)
PCP UR QL: NEGATIVE
PH BLD: 7.33 (ref 7.35–7.45)
PH BLDA: 7.42 (ref 7.35–7.45)
PH UR STRIP: 5.5 (ref 5–8)
PHOSPHATE SERPL-MCNC: 2.2 MG/DL (ref 2.6–4.7)
PLATELET # BLD AUTO: 23 K/UL (ref 150–400)
PLATELET # BLD AUTO: 29 K/UL (ref 150–400)
PO2 BLD: <27 MMHG (ref 80–100)
PO2 BLDA: 85 MMHG (ref 80–100)
POTASSIUM SERPL-SCNC: 2.9 MMOL/L (ref 3.5–5.1)
POTASSIUM SERPL-SCNC: 3.3 MMOL/L (ref 3.5–5.1)
POTASSIUM SERPL-SCNC: 5.3 MMOL/L (ref 3.5–5.1)
PROCALCITONIN SERPL-MCNC: 8.17 NG/ML
PROT SERPL-MCNC: 5.6 G/DL (ref 6.4–8.2)
PROT SERPL-MCNC: 7.6 G/DL (ref 6.4–8.2)
PROT UR STRIP-MCNC: 30 MG/DL
PROTEUS SP DNA BLD POS QL NAA+NON-PROBE: NOT DETECTED
PROTHROMBIN TIME: 11.1 SEC (ref 9–11.1)
PTH-INTACT SERPL-MCNC: 91.4 PG/ML (ref 18.4–88)
RBC # BLD AUTO: 4.11 M/UL (ref 3.8–5.2)
RBC # BLD AUTO: 4.95 M/UL (ref 3.8–5.2)
RBC #/AREA URNS HPF: ABNORMAL /HPF (ref 0–5)
RBC MORPH BLD: ABNORMAL
RBC MORPH BLD: ABNORMAL
RESISTANT GENE TARGETS: ABNORMAL
RETICS # AUTO: 0.05 M/UL (ref 0.02–0.08)
RETICS/RBC NFR AUTO: 1.2 % (ref 0.7–2.1)
S AUREUS DNA BLD POS QL NAA+NON-PROBE: NOT DETECTED
S AUREUS+CONS DNA BLD POS NAA+NON-PROBE: NOT DETECTED
S EPIDERMIDIS DNA BLD POS QL NAA+NON-PRB: NOT DETECTED
S LUGDUNENSIS DNA BLD POS QL NAA+NON-PRB: NOT DETECTED
S MALTOPHILIA DNA BLD POS QL NAA+NON-PRB: NOT DETECTED
S MARCESCENS DNA BLD POS NAA+NON-PROBE: NOT DETECTED
S PNEUM DNA BLD POS QL NAA+NON-PROBE: NOT DETECTED
S PYO DNA BLD POS QL NAA+NON-PROBE: NOT DETECTED
SALICYLATES SERPL-MCNC: 3.6 MG/DL (ref 2.8–20)
SALMONELLA DNA BLD POS QL NAA+NON-PROBE: NOT DETECTED
SAO2 % BLD: 97 % (ref 92–97)
SAO2% DEVICE SAO2% SENSOR NAME: NORMAL
SERVICE CMNT-IMP: ABNORMAL
SODIUM SERPL-SCNC: 136 MMOL/L (ref 136–145)
SODIUM SERPL-SCNC: 138 MMOL/L (ref 136–145)
SODIUM SERPL-SCNC: 140 MMOL/L (ref 136–145)
SP GR UR REFRACTOMETRY: 1.02 (ref 1–1.03)
SPECIMEN HOLD: NORMAL
SPECIMEN SITE: NORMAL
SPECIMEN TYPE: ABNORMAL
STREPTOCOCCUS DNA BLD POS NAA+NON-PROBE: DETECTED
THERAPEUTIC RANGE: NORMAL SECS (ref 58–77)
TIBC SERPL-MCNC: 163 UG/DL (ref 250–450)
TROPONIN I SERPL HS-MCNC: 17 NG/L (ref 0–51)
TROPONIN I SERPL HS-MCNC: 20 NG/L (ref 0–51)
UROBILINOGEN UR QL STRIP.AUTO: 1 EU/DL (ref 0.2–1)
VIT B12 SERPL-MCNC: >2000 PG/ML (ref 193–986)
WBC # BLD AUTO: 15 K/UL (ref 3.6–11)
WBC # BLD AUTO: 9.7 K/UL (ref 3.6–11)
WBC URNS QL MICRO: ABNORMAL /HPF (ref 0–4)

## 2024-04-22 PROCEDURE — 93306 TTE W/DOPPLER COMPLETE: CPT

## 2024-04-22 PROCEDURE — 82803 BLOOD GASES ANY COMBINATION: CPT

## 2024-04-22 PROCEDURE — 6360000002 HC RX W HCPCS: Performed by: INTERNAL MEDICINE

## 2024-04-22 PROCEDURE — 2580000003 HC RX 258: Performed by: INTERNAL MEDICINE

## 2024-04-22 PROCEDURE — 80307 DRUG TEST PRSMV CHEM ANLYZR: CPT

## 2024-04-22 PROCEDURE — 96360 HYDRATION IV INFUSION INIT: CPT

## 2024-04-22 PROCEDURE — 74177 CT ABD & PELVIS W/CONTRAST: CPT

## 2024-04-22 PROCEDURE — 84100 ASSAY OF PHOSPHORUS: CPT

## 2024-04-22 PROCEDURE — 2060000000 HC ICU INTERMEDIATE R&B

## 2024-04-22 PROCEDURE — 85384 FIBRINOGEN ACTIVITY: CPT

## 2024-04-22 PROCEDURE — 36415 COLL VENOUS BLD VENIPUNCTURE: CPT

## 2024-04-22 PROCEDURE — C1751 CATH, INF, PER/CENT/MIDLINE: HCPCS

## 2024-04-22 PROCEDURE — 6360000002 HC RX W HCPCS

## 2024-04-22 PROCEDURE — 6370000000 HC RX 637 (ALT 250 FOR IP): Performed by: FAMILY MEDICINE

## 2024-04-22 PROCEDURE — 82607 VITAMIN B-12: CPT

## 2024-04-22 PROCEDURE — 2580000003 HC RX 258

## 2024-04-22 PROCEDURE — 96361 HYDRATE IV INFUSION ADD-ON: CPT

## 2024-04-22 PROCEDURE — 71275 CT ANGIOGRAPHY CHEST: CPT

## 2024-04-22 PROCEDURE — 96374 THER/PROPH/DIAG INJ IV PUSH: CPT

## 2024-04-22 PROCEDURE — 82077 ASSAY SPEC XCP UR&BREATH IA: CPT

## 2024-04-22 PROCEDURE — 2709999900 HC NON-CHARGEABLE SUPPLY

## 2024-04-22 PROCEDURE — 6360000002 HC RX W HCPCS: Performed by: EMERGENCY MEDICINE

## 2024-04-22 PROCEDURE — 2580000003 HC RX 258: Performed by: EMERGENCY MEDICINE

## 2024-04-22 PROCEDURE — 85045 AUTOMATED RETICULOCYTE COUNT: CPT

## 2024-04-22 PROCEDURE — 99223 1ST HOSP IP/OBS HIGH 75: CPT | Performed by: INTERNAL MEDICINE

## 2024-04-22 PROCEDURE — 36600 WITHDRAWAL OF ARTERIAL BLOOD: CPT

## 2024-04-22 PROCEDURE — 85730 THROMBOPLASTIN TIME PARTIAL: CPT

## 2024-04-22 PROCEDURE — 80179 DRUG ASSAY SALICYLATE: CPT

## 2024-04-22 PROCEDURE — 83010 ASSAY OF HAPTOGLOBIN QUANT: CPT

## 2024-04-22 PROCEDURE — 83540 ASSAY OF IRON: CPT

## 2024-04-22 PROCEDURE — 83970 ASSAY OF PARATHORMONE: CPT

## 2024-04-22 PROCEDURE — 83550 IRON BINDING TEST: CPT

## 2024-04-22 PROCEDURE — 99222 1ST HOSP IP/OBS MODERATE 55: CPT | Performed by: INTERNAL MEDICINE

## 2024-04-22 PROCEDURE — 87040 BLOOD CULTURE FOR BACTERIA: CPT

## 2024-04-22 PROCEDURE — 83615 LACTATE (LD) (LDH) ENZYME: CPT

## 2024-04-22 PROCEDURE — 76937 US GUIDE VASCULAR ACCESS: CPT

## 2024-04-22 PROCEDURE — 80053 COMPREHEN METABOLIC PANEL: CPT

## 2024-04-22 PROCEDURE — 85610 PROTHROMBIN TIME: CPT

## 2024-04-22 PROCEDURE — 82746 ASSAY OF FOLIC ACID SERUM: CPT

## 2024-04-22 PROCEDURE — 83605 ASSAY OF LACTIC ACID: CPT

## 2024-04-22 PROCEDURE — 81001 URINALYSIS AUTO W/SCOPE: CPT

## 2024-04-22 PROCEDURE — 2580000003 HC RX 258: Performed by: FAMILY MEDICINE

## 2024-04-22 PROCEDURE — 85025 COMPLETE CBC W/AUTO DIFF WBC: CPT

## 2024-04-22 PROCEDURE — C9113 INJ PANTOPRAZOLE SODIUM, VIA: HCPCS

## 2024-04-22 PROCEDURE — A4216 STERILE WATER/SALINE, 10 ML: HCPCS

## 2024-04-22 PROCEDURE — 82140 ASSAY OF AMMONIA: CPT

## 2024-04-22 PROCEDURE — 05HA33Z INSERTION OF INFUSION DEVICE INTO LEFT BRACHIAL VEIN, PERCUTANEOUS APPROACH: ICD-10-PCS | Performed by: INTERNAL MEDICINE

## 2024-04-22 PROCEDURE — 93306 TTE W/DOPPLER COMPLETE: CPT | Performed by: INTERNAL MEDICINE

## 2024-04-22 PROCEDURE — 6360000004 HC RX CONTRAST MEDICATION: Performed by: RADIOLOGY

## 2024-04-22 PROCEDURE — 84484 ASSAY OF TROPONIN QUANT: CPT

## 2024-04-22 PROCEDURE — 82728 ASSAY OF FERRITIN: CPT

## 2024-04-22 RX ORDER — ACETAMINOPHEN 325 MG/1
650 TABLET ORAL EVERY 6 HOURS PRN
Status: DISCONTINUED | OUTPATIENT
Start: 2024-04-22 | End: 2024-05-01 | Stop reason: HOSPADM

## 2024-04-22 RX ORDER — MIDODRINE HYDROCHLORIDE 5 MG/1
2.5 TABLET ORAL 2 TIMES DAILY
Status: DISCONTINUED | OUTPATIENT
Start: 2024-04-22 | End: 2024-05-01

## 2024-04-22 RX ORDER — 0.9 % SODIUM CHLORIDE 0.9 %
250 INTRAVENOUS SOLUTION INTRAVENOUS ONCE
Status: COMPLETED | OUTPATIENT
Start: 2024-04-22 | End: 2024-04-22

## 2024-04-22 RX ORDER — AMOXICILLIN AND CLAVULANATE POTASSIUM 875; 125 MG/1; MG/1
1 TABLET, FILM COATED ORAL EVERY 12 HOURS SCHEDULED
Status: DISCONTINUED | OUTPATIENT
Start: 2024-04-22 | End: 2024-04-22

## 2024-04-22 RX ORDER — POTASSIUM CHLORIDE 7.45 MG/ML
10 INJECTION INTRAVENOUS
Status: COMPLETED | OUTPATIENT
Start: 2024-04-22 | End: 2024-04-22

## 2024-04-22 RX ORDER — MAGNESIUM SULFATE IN WATER 40 MG/ML
2000 INJECTION, SOLUTION INTRAVENOUS PRN
Status: DISCONTINUED | OUTPATIENT
Start: 2024-04-22 | End: 2024-04-27

## 2024-04-22 RX ORDER — PANTOPRAZOLE SODIUM 40 MG/1
40 TABLET, DELAYED RELEASE ORAL
Status: DISCONTINUED | OUTPATIENT
Start: 2024-04-22 | End: 2024-05-01

## 2024-04-22 RX ORDER — SODIUM CHLORIDE 9 MG/ML
INJECTION, SOLUTION INTRAVENOUS CONTINUOUS
Status: DISCONTINUED | OUTPATIENT
Start: 2024-04-22 | End: 2024-04-23

## 2024-04-22 RX ORDER — CASTOR OIL AND BALSAM, PERU 788; 87 MG/G; MG/G
OINTMENT TOPICAL 2 TIMES DAILY
Status: DISCONTINUED | OUTPATIENT
Start: 2024-04-22 | End: 2024-05-01 | Stop reason: HOSPADM

## 2024-04-22 RX ORDER — SODIUM CHLORIDE 0.9 % (FLUSH) 0.9 %
5-40 SYRINGE (ML) INJECTION EVERY 12 HOURS SCHEDULED
Status: DISCONTINUED | OUTPATIENT
Start: 2024-04-22 | End: 2024-05-01

## 2024-04-22 RX ORDER — SODIUM CHLORIDE 0.9 % (FLUSH) 0.9 %
5-40 SYRINGE (ML) INJECTION PRN
Status: DISCONTINUED | OUTPATIENT
Start: 2024-04-22 | End: 2024-05-01 | Stop reason: HOSPADM

## 2024-04-22 RX ORDER — SODIUM CHLORIDE 9 MG/ML
INJECTION, SOLUTION INTRAVENOUS PRN
Status: DISCONTINUED | OUTPATIENT
Start: 2024-04-22 | End: 2024-05-01

## 2024-04-22 RX ORDER — ONDANSETRON 4 MG/1
4 TABLET, ORALLY DISINTEGRATING ORAL EVERY 8 HOURS PRN
Status: DISCONTINUED | OUTPATIENT
Start: 2024-04-22 | End: 2024-05-01 | Stop reason: HOSPADM

## 2024-04-22 RX ORDER — ACETAMINOPHEN 650 MG/1
650 SUPPOSITORY RECTAL EVERY 6 HOURS PRN
Status: DISCONTINUED | OUTPATIENT
Start: 2024-04-22 | End: 2024-05-01 | Stop reason: HOSPADM

## 2024-04-22 RX ORDER — ONDANSETRON 2 MG/ML
4 INJECTION INTRAMUSCULAR; INTRAVENOUS EVERY 6 HOURS PRN
Status: DISCONTINUED | OUTPATIENT
Start: 2024-04-22 | End: 2024-05-01 | Stop reason: HOSPADM

## 2024-04-22 RX ORDER — CITALOPRAM 20 MG/1
10 TABLET ORAL DAILY
Status: DISCONTINUED | OUTPATIENT
Start: 2024-04-22 | End: 2024-05-01

## 2024-04-22 RX ORDER — ASPIRIN 81 MG/1
81 TABLET, CHEWABLE ORAL DAILY
Status: DISCONTINUED | OUTPATIENT
Start: 2024-04-22 | End: 2024-05-01

## 2024-04-22 RX ORDER — POTASSIUM CHLORIDE 750 MG/1
40 TABLET, FILM COATED, EXTENDED RELEASE ORAL PRN
Status: DISCONTINUED | OUTPATIENT
Start: 2024-04-22 | End: 2024-05-01

## 2024-04-22 RX ORDER — POTASSIUM CHLORIDE 7.45 MG/ML
10 INJECTION INTRAVENOUS PRN
Status: DISCONTINUED | OUTPATIENT
Start: 2024-04-22 | End: 2024-05-01

## 2024-04-22 RX ORDER — FERROUS SULFATE 325(65) MG
325 TABLET ORAL
Status: DISCONTINUED | OUTPATIENT
Start: 2024-04-22 | End: 2024-05-01

## 2024-04-22 RX ORDER — POLYETHYLENE GLYCOL 3350 17 G/17G
17 POWDER, FOR SOLUTION ORAL DAILY PRN
Status: DISCONTINUED | OUTPATIENT
Start: 2024-04-22 | End: 2024-05-01 | Stop reason: HOSPADM

## 2024-04-22 RX ADMIN — Medication: at 20:44

## 2024-04-22 RX ADMIN — SODIUM CHLORIDE 250 ML: 9 INJECTION, SOLUTION INTRAVENOUS at 07:15

## 2024-04-22 RX ADMIN — POTASSIUM CHLORIDE 10 MEQ: 10 INJECTION, SOLUTION INTRAVENOUS at 12:32

## 2024-04-22 RX ADMIN — POTASSIUM CHLORIDE 10 MEQ: 10 INJECTION, SOLUTION INTRAVENOUS at 13:56

## 2024-04-22 RX ADMIN — SODIUM CHLORIDE: 9 INJECTION, SOLUTION INTRAVENOUS at 07:35

## 2024-04-22 RX ADMIN — SODIUM CHLORIDE, PRESERVATIVE FREE 10 ML: 5 INJECTION INTRAVENOUS at 20:40

## 2024-04-22 RX ADMIN — IOPAMIDOL 100 ML: 755 INJECTION, SOLUTION INTRAVENOUS at 06:31

## 2024-04-22 RX ADMIN — SODIUM CHLORIDE 10 ML: 9 INJECTION, SOLUTION INTRAVENOUS at 10:02

## 2024-04-22 RX ADMIN — POTASSIUM CHLORIDE 10 MEQ: 10 INJECTION, SOLUTION INTRAVENOUS at 16:55

## 2024-04-22 RX ADMIN — PANTOPRAZOLE SODIUM 40 MG: 40 INJECTION, POWDER, LYOPHILIZED, FOR SOLUTION INTRAVENOUS at 11:52

## 2024-04-22 RX ADMIN — POTASSIUM CHLORIDE 10 MEQ: 7.46 INJECTION, SOLUTION INTRAVENOUS at 16:42

## 2024-04-22 RX ADMIN — WATER 1000 MG: 1 INJECTION INTRAMUSCULAR; INTRAVENOUS; SUBCUTANEOUS at 00:34

## 2024-04-22 RX ADMIN — SODIUM CHLORIDE: 9 INJECTION, SOLUTION INTRAVENOUS at 22:14

## 2024-04-22 RX ADMIN — SODIUM CHLORIDE, POTASSIUM CHLORIDE, SODIUM LACTATE AND CALCIUM CHLORIDE 1000 ML: 600; 310; 30; 20 INJECTION, SOLUTION INTRAVENOUS at 00:34

## 2024-04-22 RX ADMIN — POTASSIUM CHLORIDE 10 MEQ: 10 INJECTION, SOLUTION INTRAVENOUS at 15:06

## 2024-04-22 RX ADMIN — POTASSIUM CHLORIDE 10 MEQ: 10 INJECTION, SOLUTION INTRAVENOUS at 11:23

## 2024-04-22 RX ADMIN — POTASSIUM CHLORIDE 10 MEQ: 7.46 INJECTION, SOLUTION INTRAVENOUS at 10:03

## 2024-04-22 RX ADMIN — WATER 2000 MG: 1 INJECTION INTRAMUSCULAR; INTRAVENOUS; SUBCUTANEOUS at 11:27

## 2024-04-22 ASSESSMENT — PAIN SCALES - GENERAL
PAINLEVEL_OUTOF10: 0
PAINLEVEL_OUTOF10: 0

## 2024-04-22 NOTE — CONSULTS
[facility] Cardiology Consultation    Date of Consult:  04/22/24  Date of Admission: 4/21/2024  Primary Cardiologist: Dr. Hadley Kenney  Physician Requesting consult: Dr. Olivas    Assessment/Recommendations:  Group B Strep bacteremia, possible right ventricular lead vegetation  - I reviewed echo which shows small mobile element on right ventricular lead which may represent fibrin vs vegetation.   - She is at increased risk of bleeding and other complications for KD with her thrombocytopenia, age, and comorbidities  - She is not a candidate for lead extraction with her age and comorbidities.  - Given above, would recommend treatment as presumed device related infection. Will defer antibiotic recommendations to ID.  - Repeat cultures pending    Sick sinus syndrome, paroxysmal AF per device interrogation   - currently NSR  - She has not been on anticoagulation due to her bleeding risk    Chronic HFpEF  - BNP elevated but appears hypovolemic on exam. Agree with holding diuretics    Thrombocytopenia  - thought to be related to infection per hematology    Encephalopathy with h/o dementia    Will ask Dr. Kenney to follow up.    Thank you for the opportunity to participate in the care of Carlie Newman and please do not hesitate to contact us should you have any questions.      Chief Complaint / Reason for Consult:   Bacteremia, RV lead vegetation.    History of Present Illness:  Carlie Newman is a 89 y.o. female. Patient is unable to provide history due to her encephalopathy. History obtain from chart review.    She was admitted for altered mental status. Noted to have GBS bacteremia. Echo noted possible RV lead vegetation.    She is reportedly conversant at baseline but wheelchair bound.     Unable to perform ROS or review PMH, PSH, FMH and social history given patient status.    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 Authorizing Provider   FEROSUL 325 (65 Fe) MG tablet TAKE 1 TABLET BY MOUTH EVERY MORNING BEFORE BREAKFAST 3/22/24   Shameka Perez MD   ammonium lactate (LAC-HYDRIN) 12 % lotion Apply to legs daily after bathing.  Patient taking differently: Apply topically to legs daily after bathing. 3/19/24   Amanda Kim, APRN - CNP   furosemide (LASIX) 20 MG tablet Take 1 tablet by mouth daily as needed (Signs and symptoms of peripheral volume overload;) 2/7/24   Shameka Perez MD   nystatin (MYCOSTATIN) 761339 UNIT/GM powder Apply 3 times daily.  Patient not taking: Reported on 3/21/2024 2/7/24   Shameka Perez MD   midodrine (PROAMATINE) 2.5 MG tablet Take 1 tablet by mouth 2 times daily 2/7/24   Shameka Perez MD   citalopram (CELEXA) 10 MG tablet Take 1 tablet by mouth daily Indications: Feeling Anxious 2/7/24   Shameka Perez MD   pantoprazole (PROTONIX) 40 MG tablet Take 1 tablet by mouth every morning (before breakfast) 2/7/24   Shameka Perez MD   loratadine (CLARITIN) 10 MG tablet TAKE 1 TABLET BY MOUTH NIGHTLY AS NEEDED FOR ALLERGIES 2/7/24   Shameka Perez MD   aspirin (ASPIRIN 81) 81 MG chewable tablet Take 1 tablet by mouth daily 2/7/24   Shameka Perez MD   vitamin D3 (CHOLECALCIFEROL) 25 MCG (1000 UT) TABS tablet Take 1 tablet by mouth daily  Patient not taking: Reported on 4/12/2024 1/24/24   Shameka Perez MD   mometasone (NASONEX) 50 MCG/ACT nasal spray 2 sprays by Each Nostril route daily 11/25/23 12/25/23  Rahul Bustamante MD   Multiple Vitamin (MVI, CELEBRATE, CHEWABLE TABLET) Take 1 tablet by mouth daily 6/7/23   Shameka Perez MD   diclofenac sodium (VOLTAREN) 1 % GEL Apply 4 g topically 2 times daily as needed for Pain Apply topically to joints two

## 2024-04-22 NOTE — H&P
History and Physical    Date of Service:  4/22/2024  Primary Care Provider: Shameka Perez MD  Source of information: Chart review and patient's sister at bedside    Chief Complaint: Altered Mental Status      History of Presenting Illness:   Carlie Newman is a 89 y.o. female with past medical history significant for dyslipidemia, congestive heart failure, hypertension, dementia, dementia, sick sinus syndrome with resultant pacemaker insertion presented at the emergency room with change in mental status.  Patient symptoms started few days ago and progressively getting worse.  She was recently prescribed antibiotic for treatment of UTI.  Family is unsure if patient has been taking the antibiotics appropriately. No fever, rigors or chills reported.  Patient and her daughter lives together, her daughter as a stroke and they both require considerable care at home according to the patient's sister who was present at the bedside. Her sister also reported that the patient has sacral wounds.  Patient was last admitted to this hospital in October 2023, she was admitted and treated for urinary tract infection.  Patient was noted to have low platelet count during that hospitalization and platelet count recovered prior to discharge to SNF.  CT of the head done on arrival had emergency room negative for acute pathology.  Chest x-ray shows a trace left pleural effusion and BNP level is elevated.  She was referred to the hospitalist service for admission.         REVIEW OF SYSTEMS:  Review of systems not obtained due to patient factors.     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

## 2024-04-22 NOTE — TELEPHONE ENCOUNTER
----- Message from Sivan Gonzalez MA sent at 4/22/2024  9:27 AM EDT -----  Subject: Message to Provider    QUESTIONS  Information for Provider? Son Misael calling with [facility], states pt was   admitted yesterday to [facility], she was not taking her medication for   UTI and couldn't move.   ---------------------------------------------------------------------------  --------------  CALL BACK INFO  [phone]; OK to leave message on voicemail  ---------------------------------------------------------------------------  --------------  SCRIPT ANSWERS  Relationship to Patient? Other/Third Party  Representative Name? sung miller  Is the representative on the Communication Release of Information (KAREN)   form in Epic? Yes

## 2024-04-22 NOTE — CARE COORDINATION
Care Management Initial Assessment       RUR: 18% Moderate  Readmission? No  1st IM letter given? 4/22/24  1st  letter given: NA          Patient presented to the ED on 4/22/24 with altered mental status and history of dyslipidemia, congestive heart failure, hypertension, dementia, dementia, sick sinus syndrome with resultant pacemaker insertion. Patient resides with her daughter who reportedly is disabled due to stroke. Both the patient and her daughter have personal care aides M-F, family assist on the weekends. CM attempted follow up phone call to son but he was not available. CM left message. Patient will likely need SNF-LTC at [place]. CM to monitor.           04/22/24 1223   Service Assessment   Patient Orientation Unable to Assess   Cognition Other (see comment)  (Patient asleep.)   History Provided By Child/Family  (Sister Esme Hedrick at the bedside)   Primary Caregiver Other (Comment)  (Has care aides M-F/ Family assist on weekends)   Accompanied By/Relationship Sister Esme at the bedside   Support Systems Family Members   PCP Verified by CM Yes  (Dr. Shameka Perez, [phone])   Last Visit to PCP Within last 6 months   Prior Functional Level Assistance with the following:;Bathing;Toileting;Dressing;Cooking;Housework;Mobility   Current Functional Level Assistance with the following:;Bathing;Dressing;Toileting;Cooking;Housework;Mobility   Can patient return to prior living arrangement Unknown at present   Ability to make needs known:   (Unknown)   Family able to assist with home care needs: Yes   Financial Resources Medicare  ([employer])   Community Resources None   Social/Functional History   Lives With Daughter   Type of Home House   Home Equipment Hospital bed   Receives Help From Family;Personal care attendant   ADL Assistance Needs assistance   Toileting Needs assistance   Homemaking Assistance Needs assistance   Ambulation Assistance Non-ambulatory   Transfer Assistance Needs assistance   Active   No   Mode of Transportation Car   Discharge Planning   Type of Residence   (Likely SNF but TBD)   Living Arrangements Children  (Lives with daughter Abner who is disabled due to stroke.)   Current Services Prior To Admission Private Duty Homecare   Potential Assistance Needed   (TBD)   DME Ordered? No   Potential Assistance Purchasing Medications No   Type of Home Care Services Aide Services;OT;PT   Patient expects to be discharged to:   (TBD, likely SNF-has been to [facility] in the past.)   Services At/After Discharge   Transition of Care Consult (CM Consult)   (TBD)   Services At/After Discharge   (TBD)   Mode of Transport at Discharge BLS   Confirm Follow Up Transport Family   Condition of Participation: Discharge Planning   The Plan for Transition of Care is related to the following treatment goals: Likely SNF       Tanisha Castaneda, MS  [phone]

## 2024-04-22 NOTE — ED PROVIDER NOTES
Course.  ECG/medicine tests: ordered.    Risk  Decision regarding hospitalization.        ED Course as of 04/22/24 0221   Sun Apr 21, 2024   2252 This patient is being evaluated and treated for disease continuum including SIRS, sepsis, and severe sepsis. Empiric rocephin ordered [RS]   2343 EKG obtained at 2314. Per my read, noted with sinus rhythm at a rate of 73. Baseline artifact. Normal axis and intervals. Narrow complex QRS. No ST changes noted. No ischemia noted.   [RS]   Mon Apr 22, 2024   0026 Platelet Count(!!): 29 [RS]   0026 WBC: 9.7 [RS]   0026 Hemoglobin Quant(!): 11.1 [RS]   0026 CT HEAD WO CONTRAST  Brain Parenchyma/Brainstem: Chronic periventricular white matter hypodensities.  Chronic right cerebellar infarct.   [RS]   0040 BUN,BUNPL(!): 31 [RS]   0040 Creatinine: 0.76 [RS]   0040 NT Pro-BNP(!): 1,527 [RS]   0040 Lactic Acid, Sepsis(!!): 2.6 [RS]   0047 Discussed suspicion for UTI and urine culture results from 4/12. Pt with sensitivity to PCNs, bactrim, and nitrofurantoin. Given age, unknown reaction to PCN, may benefit from augmentin. Discussed with pt's sister, who was unaware if pt is allergic to PCN. She is agreeable with plan for admission. [RS]      ED Course User Index  [RS] Cornelio Brock MD       ED Sepsis Documentation (2024)  - Broad Spectrum Antibiotics ordered: Ceftriaxone  - Repeat lactic acid: ordered and pending at this time  - Sepsis re-assessment performed 04/22/24 at time 0100 and clinical condition not changed.  Recent Labs     04/21/24  2358   LACTSEPSIS 2.6*   CREATININE 0.76   BILITOT 1.1*   PLT 29*   Organ dysfunction (Lactic acid >2, Cr>2, bili >2, INR>1.5, Plt<100, BiPap, intubated) exclusions if applicable: chronic thrombocytopenia and Other CKD    - Hypotension or Lactic Acidosis present (SBP<90, MAP<65, Lactate?4): NO  If YES:  - IVF: Limited IVF given (1000mL) because patient has CHF classification NYHA III or IV Total volume: 1000 cc (cannot be 0)  - Persistent  recognition program.  Efforts were made to edit the dictations but occasionally words are mis-transcribed.)    Cornelio Brock MD (electronically signed)  Emergency Attending Physician              Cornelio Brock MD  04/22/24 0343

## 2024-04-22 NOTE — PROGRESS NOTES
[facility] Adult  Hospitalist Group                                                                                          Hospitalist Progress Note  Alaina Payan PA-C  Answering service: [phone] OR [phone] from in house phone        Date of Service:  2024  NAME:  Carlie Newman  :  1933  MRN:  [id number]       Admission Summary:     Carlie Newman is a 89 y.o. female with past medical history significant for dyslipidemia, congestive heart failure, hypertension, dementia, dementia, sick sinus syndrome with resultant pacemaker insertion presented at the emergency room with change in mental status.  Patient symptoms started few days ago and progressively getting worse.  She was recently prescribed antibiotic for treatment of UTI.  Family is unsure if patient has been taking the antibiotics appropriately. No fever, rigors or chills reported.  Patient and her daughter lives together, her daughter as a stroke and they both require considerable care at home according to the patient's sister who was present at the bedside. Her sister also reported that the patient has sacral wounds.  Patient was last admitted to this hospital in 2023, she was admitted and treated for urinary tract infection.  Patient was noted to have low platelet count during that hospitalization and platelet count recovered prior to discharge to SNF.  CT of the head done on arrival had emergency room negative for acute pathology.  Chest x-ray shows a trace left pleural effusion and BNP level is elevated.  She was referred to the hospitalist service for admission.         Interval history / Subjective:     Patient is seen in ED. Lethargic, will attempt to open eyes with verbal and physical cueing. Hemodynamically stable at this time. Sister at bedside. Noted positive blood cultures and leukocytosis c/b PPM. Consulted ID, ceftriaxone started. Imaging pending.      Assessment & Plan:     Acute

## 2024-04-22 NOTE — PROGRESS NOTES
16:00 Unable to get labs, pt is a hard stick. Followed all protocol. Left message for vascular access. Waiting for them to arrive.

## 2024-04-22 NOTE — WOUND CARE
WOCN Note:     New consult for sacral pressure injury   Seen in 205/01 with NP.    89 y.o. y/o female admitted on 4/21/2024 from home.  Admitted for delirium and UTI   Acute delirium [R41.0]  SIRS (systemic inflammatory response syndrome) (HCC) [R65.10]  Acute cystitis without hematuria [N30.00]  Acute prerenal azotemia [N19]  Infectious encephalopathy [G93.49, B99.9]  Chronic congestive heart failure, unspecified heart failure type (HCC) [I50.9]   History of CHF, dementia   No indication for wound culture.   Diet: Diet NPO           Assessment:   Minimally Communicative and reports pain with leg movement.    Requires full assists in repositioning.  Wearing briefs and Pure Wick to wall suction.   Surface: parviz gel mattress    Bilateral heels intact and without erythema.  Heels offloaded with pillows.  Bilateral dorsal feet with dry crusts left open to air.    1. POA sacral deep tissue injury.  ~15 x 20 x 0.1 cm with fading margins that may be dyschromia v deep tissue injury - hard to discern clear margins  Central devitalized dark tissue cool to the touch; bright red area is non-blanching  Tx: covered with foam until Venelex is up from pharmacy       2. POA deep tissue injury to left lower buttock  3 x 9 x 0 cm    Recommendations:    Buttocks: venelex twice daily and cover with foam    Turn/reposition approximately every 2 hours  Offload heels with heels hanging off end of pillow at all times while in bed.  Low Air Loss mattress ordered today: Use only flat sheet and one incontinence pad.     Discussed with NP at bedside who visualized DTI.    Transition of Care: Plan to follow weekly and as needed while admitted to hospital.     Meme See, KADENN, RN, CWOCN  Certified Wound, Ostomy, Continence Nurse  office [phone]  Available via [email or web address]

## 2024-04-22 NOTE — ED TRIAGE NOTES
Pt presents to ED with c/o AMS. Family at bedside states pt has 'not been herself' since Friday. Pt is A/Ox1 to self. Pt has recent diagnosis of UTI; family is unsure if pt has been taking antibiotics appropriately.

## 2024-04-22 NOTE — TELEPHONE ENCOUNTER
Spoke with son.     Patient is hospitalized, son believes that she needs memory care, he also noted that mother missed her follow up on 04/16/ 24 (which was to educate mother further on meds), son confirmed that mother picked up her prescription and did not take.

## 2024-04-22 NOTE — ED NOTES
ED TO INPATIENT SBAR HANDOFF    Patient Name: Carlie Newman   :  1933  89 y.o.   MRN:  [id number]  ED Room #:  ER09/09  Family/Caregiver Present no       Situation  Code Status: Full Code     Allergies: Codeine, Morphine, Penicillins, and Pseudoephedrine  Weight: Patient Vitals for the past 96 hrs (Last 3 readings):   Weight   24 0242 59.6 kg (131 lb 6.3 oz)     Arrived from: home  Chief Complaint:   Chief Complaint   Patient presents with    Altered Mental Status     Hospital Problem/Diagnosis:  Principal Problem:    Acute delirium  Resolved Problems:    * No resolved hospital problems. *    Imaging:   CT HEAD WO CONTRAST   Final Result      No acute process.      XR CHEST PORTABLE   Final Result   Trace left pleural effusion.      CTA CHEST W WO CONTRAST    (Results Pending)   CT ABDOMEN PELVIS W IV CONTRAST Additional Contrast? Radiologist Recommendation    (Results Pending)     Abnormal labs:   Abnormal Labs Reviewed   CULTURE, BLOOD 1 - Abnormal; Notable for the following components:       Result Value    Culture   (*)     Value: PROBABLE STREPTOCOCCUS AGALACTIAE SERO GROUP B GROWING IN 1 OF 1 BOTTLES DRAWN SITE=RAC    All other components within normal limits   CULTURE, BLOOD, PCR ID PANEL RESULTS REPORT - Abnormal; Notable for the following components:    STREPTOCOCCUS Detected (*)     Streptococcus agalactiae (Group B) Detected (*)     All other components within normal limits   CBC - Abnormal; Notable for the following components:    Hemoglobin 11.1 (*)     MCV 70.7 (*)     MCH 22.4 (*)     RDW 17.2 (*)     Platelets 29 (*)     Nucleated RBCs 0.2 (*)     nRBC 0.02 (*)     All other components within normal limits   COMPREHENSIVE METABOLIC PANEL - Abnormal; Notable for the following components:    Potassium 5.3 (*)     Glucose 110 (*)     BUN 31 (*)     Bun/Cre Ratio 41 (*)     Calcium 10.8 (*)     Total Bilirubin 1.1 (*)     AST 61 (*)     Albumin 2.2 (*)     Globulin 5.4 (*)      Albumin/Globulin Ratio 0.4 (*)     All other components within normal limits   URINALYSIS WITH MICROSCOPIC - Abnormal; Notable for the following components:    Appearance CLOUDY (*)     Protein, UA 30 (*)     Ketones, Urine 15 (*)     All other components within normal limits   BRAIN NATRIURETIC PEPTIDE - Abnormal; Notable for the following components:    NT Pro-BNP 1,527 (*)     All other components within normal limits   LACTATE, SEPSIS - Abnormal; Notable for the following components:    Lactic Acid, Sepsis 2.6 (*)     All other components within normal limits   LACTIC ACID - Abnormal; Notable for the following components:    Lactic Acid, Plasma 2.9 (*)     All other components within normal limits   COMPREHENSIVE METABOLIC PANEL - Abnormal; Notable for the following components:    Potassium 2.9 (*)     Chloride 110 (*)     Glucose 105 (*)     BUN 26 (*)     Creatinine 0.49 (*)     Bun/Cre Ratio 53 (*)     Calcium 10.2 (*)     Total Protein 5.6 (*)     Albumin 1.6 (*)     Albumin/Globulin Ratio 0.4 (*)     All other components within normal limits   PHOSPHORUS - Abnormal; Notable for the following components:    Phosphorus 2.2 (*)     All other components within normal limits     Abnormal Assessment Findings: altered oriented to just self, lethargic failed swallow screening due to that, pressure wound on sacrum    SAFETY    Mobility: new onset weakness   ED Fall Risk: No data recorded   Restraints no   Sitter no     Background  History:   Past Medical History:   Diagnosis Date    Arrhythmia     bradycardia,S/P pacemaker    CHF (congestive heart failure) (HCC)     Chronic renal disease, stage III (HCC) 09/19/2022    Heart failure (HCC)     Hypertension     Irregular heart rate     Major depressive disorder, recurrent, 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

## 2024-04-22 NOTE — CONSULTS
Infectious Disease Consult Note    Reason for Consult: Group B Streptococcal bacteremia  Date of Consultation: April 22, 2024  Date of Admission: 4/21/2024  Referring Physician: Dr. Ellison    HPI:    89 y/o female with CHF, SSS s/p ppm, HTN admitted for AMS, difficult to arouse, found to have Group B Streptococcal bacteremia.  Known left knee TKA, prior ppm LCW of unclear duration and has what appear to be superficial wounds to b/l dorsal feet.  Also lives at home with family support and with pressure offloading mattress with at least stage II sacral decub ulcer noted.  Prescribed Keflex course 3/2024 and then Ciprofloxacin? Several days prior to admission for presumed UTI without improvement and admitted for further evaluation.  Noted elevated WBC and Group B Streptococcus on blood cultures.  History per charts, discussion with patient's sister who attested to patient being normally fully conversant, immobile and relegated to wheelchair.    Noted PCN allergy - tolerated Keflex to appears, OK to use cephalosporin with monitoring.    Past Medical History:  Past Medical History:   Diagnosis Date    Arrhythmia     bradycardia,S/P pacemaker    CHF (congestive heart failure) (HCC)     Chronic renal disease, stage III (HCC) 09/19/2022    Heart failure (HCC)     Hypertension     Irregular heart rate     Major depressive disorder, recurrent, mild 1/11/2024    Pure hypercholesterolemia 3/27/2017         Surgical History:  Past Surgical History:   Procedure Laterality Date    ORTHOPEDIC SURGERY      left total knee replacement    OTHER SURGICAL HISTORY  11/2019    Toe surgery    PACEMAKER           Family History:   Family History   Problem Relation Age of Onset    Cancer Father         prostate    Hypertension Father     Hypertension Mother          Social History:     Living Situation:  Tobacco:  Alcohol:  Illicit Drugs:  Sexual History:   Travel History  Exposures:   Outdoor/Hiking: denied  Animal/Pet: Dogs/ Cats  3.6 - 11.0 K/uL    RBC 4.11 3.80 - 5.20 M/uL    Hemoglobin 9.3 (L) 11.5 - 16.0 g/dL    Hematocrit 29.0 (L) 35.0 - 47.0 %    MCV 70.6 (L) 80.0 - 99.0 FL    MCH 22.6 (L) 26.0 - 34.0 PG    MCHC 32.1 30.0 - 36.5 g/dL    RDW 17.0 (H) 11.5 - 14.5 %    Platelets 23 (LL) 150 - 400 K/uL    Nucleated RBCs 0.2 (H) 0  WBC    nRBC 0.03 (H) 0.00 - 0.01 K/uL    Neutrophils % 83 (H) 32 - 75 %    Band Neutrophils 11 (H) 0 - 6 %    Lymphocytes % 3 (L) 12 - 49 %    Monocytes % 2 (L) 5 - 13 %    Eosinophils % 0 0 - 7 %    Basophils % 0 0 - 1 %    Metamyelocytes 1 (H) 0 %    Immature Granulocytes % 0 %    Neutrophils Absolute 14.1 (H) 1.8 - 8.0 K/UL    Lymphocytes Absolute 0.5 (L) 0.8 - 3.5 K/UL    Monocytes Absolute 0.3 0.0 - 1.0 K/UL    Eosinophils Absolute 0.0 0.0 - 0.4 K/UL    Basophils Absolute 0.0 0.0 - 0.1 K/UL    Immature Granulocytes Absolute 0.0 K/UL    Differential Type MANUAL      RBC Comment ANISOCYTOSIS  1+        RBC Comment MCKAYLA CELLS  PRESENT       Ammonia    Collection Time: 04/22/24 12:51 PM   Result Value Ref Range    Ammonia <10 <32 UMOL/L   Lactic Acid    Collection Time: 04/22/24 12:52 PM   Result Value Ref Range    Lactic Acid, Plasma 1.4 0.4 - 2.0 MMOL/L   POC Blood Gas & Ionized Calcium    Collection Time: 04/22/24  1:37 PM   Result Value Ref Range    IONIZED CALCIUM 1.66 (HH) 1.12 - 1.32 mmol/L    POC pH 7.33 (L) 7.35 - 7.45      POC pCO2 53.0 (H) 35.0 - 45.0 MMHG    POC PO2 <27 (LL) 80 - 100 MMHG    POC HCO3 27.6 (H) 22 - 26 MMOL/L    Base Excess 0.7 mmol/L    Specimen type: VENOUS BLOOD      Critical Value Read Back P. AURA. RN        Microbiology Data:       Blood: Group B Streptococcus in 2/2 sets from 4/21/24      Urine: ESBL E coli 4/12/24    Imaging:     Assessment / Plan:       89 y/o female with known SSS s/p ppm admitted for AMS found to have Group B Streptococcal bacteremia.    Source unclear - suspect skin wounds on feet or sacral decub ulcer.  In light of LCW ppm, need to evaluate for

## 2024-04-22 NOTE — CONSULTS
[facility] [place]  [address], [address] [facility] [address]  W: [phone]  F: [phone]    Reason for Evaluation:   Carlie Newman is a 89 y.o. female with multiple co-morbidities who is seen in consultation at the request of Dr. Ellison for evaluation of thrombocytopenia.    History of Present Illness:   Carlie Newman is a pleasant 89 y.o. female who presents today for evaluation of thrombocytopenia.    Patient initially presented with AMS. She had been prescribed an antibiotic for treatment of UTI. Labs on admission notable from platelets 29 (declined from 126 on 4/12), hemoglobin 11.1 (near baseline), MCV 70, high-normal WBC, lactic acid 2.9. Prelim blood cultures with GPC in chains in one of 2 bottles. Sensitivities show group B strep. She has been admitted and is currently on ceftriaxone for UTI and blood stream infection.      Of note, she was hospitalized 10/2023 for UTI and had a low platelet count during that admission (heather ~70). Plate count recovered prior to discharge.     Review of systems was obtained and pertinent findings reviewed above. Past medical history, social history, family history, medications, and allergies are located in the electronic medical record.    Physical Exam:   /62   Pulse 75   Temp 98.4 °F (36.9 °C) (Axillary)   Resp 20   Ht 1.702 m (5' 7\")   Wt 59.4 kg (131 lb)   SpO2 99%   BMI 20.52 kg/m²   General: frail, does not arouse to voice  Eyes: anicteric sclerae  HENT: oropharynx clear  Neck: supple  Respiratory: normal respiratory effort  CV: no peripheral edema  Ext: warm, well perfused, no edema  GI: soft, nontender, nondistended, no masses  Skin: no rashes, no ecchymoses, no petechiae  Neuro: encephalopathic    Results:     Lab Results   Component Value Date/Time    WBC 15.0 04/22/2024 08:12 AM    HGB 9.3 04/22/2024 08:12 AM    HCT 29.0 04/22/2024 08:12 AM    PLT 23 04/22/2024 08:12 AM    MCV 70.6 04/22/2024 08:12 AM     Lab Results

## 2024-04-22 NOTE — PROCEDURES
Midline Insertion and Progress Note    PRE-PROCEDURE VERIFICATION  Correct Procedure: yes  Correct Site: yes  Temperature: Temp: 97.3 °F (36.3 °C), Temperature Source:    Recent Labs     04/22/24  0812 04/22/24  1254   BUN  --  24*   PLT 23*  --    WBC 15.0*  --      Allergies: Codeine, Morphine, Penicillins, and Pseudoephedrine    PROCEDURE DETAIL  A single midline IV catheter was started for desire for reliable access. The following documentation is in addition to the Midline properties in the lines/airways flowsheet:  Xylocaine 1% used intradermally: Yes  Lot #: [id number]  Catheter to vein ratio: 25%  Catheter Total Length: 12 (cm)  External Catheter Length: 0 (cm)  Circumference at 10 cm above ac: 29 (cm)  Vein Selection for Midline: left brachial  Complication Related to Insertion: none  This line is for non-vesicant/non- irritant IV infusion only.  Line is okay to use.    RAFI CEBALLOS RN

## 2024-04-23 ENCOUNTER — APPOINTMENT (OUTPATIENT)
Facility: HOSPITAL | Age: 89
DRG: 871 | End: 2024-04-23
Payer: MEDICARE

## 2024-04-23 DIAGNOSIS — I10 ESSENTIAL HYPERTENSION: ICD-10-CM

## 2024-04-23 DIAGNOSIS — E61.1 IRON DEFICIENCY: ICD-10-CM

## 2024-04-23 DIAGNOSIS — I50.9 CONGESTIVE HEART FAILURE, UNSPECIFIED HF CHRONICITY, UNSPECIFIED HEART FAILURE TYPE (HCC): ICD-10-CM

## 2024-04-23 PROBLEM — E53.8 FOLIC ACID DEFICIENCY: Status: ACTIVE | Noted: 2024-04-23

## 2024-04-23 PROBLEM — Z51.5 PALLIATIVE CARE ENCOUNTER: Status: ACTIVE | Noted: 2024-04-23

## 2024-04-23 PROBLEM — R53.81 DEBILITY: Status: ACTIVE | Noted: 2024-04-23

## 2024-04-23 PROBLEM — R41.0 CONFUSION: Status: ACTIVE | Noted: 2024-04-23

## 2024-04-23 PROBLEM — R53.83 LETHARGY: Status: ACTIVE | Noted: 2024-04-23

## 2024-04-23 LAB
ALBUMIN SERPL-MCNC: 1.6 G/DL (ref 3.5–5)
ALBUMIN/GLOB SERPL: 0.4 (ref 1.1–2.2)
ALP SERPL-CCNC: 80 U/L (ref 45–117)
ALT SERPL-CCNC: 21 U/L (ref 12–78)
ANION GAP SERPL CALC-SCNC: 2 MMOL/L (ref 5–15)
AST SERPL-CCNC: 30 U/L (ref 15–37)
BASOPHILS # BLD: 0 K/UL (ref 0–0.1)
BASOPHILS NFR BLD: 0 % (ref 0–1)
BILIRUB SERPL-MCNC: 0.5 MG/DL (ref 0.2–1)
BUN SERPL-MCNC: 20 MG/DL (ref 6–20)
BUN/CREAT SERPL: 44 (ref 12–20)
CALCIUM SERPL-MCNC: 11 MG/DL (ref 8.5–10.1)
CHLORIDE SERPL-SCNC: 112 MMOL/L (ref 97–108)
CO2 SERPL-SCNC: 28 MMOL/L (ref 21–32)
CREAT SERPL-MCNC: 0.45 MG/DL (ref 0.55–1.02)
DIFFERENTIAL METHOD BLD: ABNORMAL
EOSINOPHIL # BLD: 0 K/UL (ref 0–0.4)
EOSINOPHIL NFR BLD: 0 % (ref 0–7)
ERYTHROCYTE [DISTWIDTH] IN BLOOD BY AUTOMATED COUNT: 16.3 % (ref 11.5–14.5)
GLOBULIN SER CALC-MCNC: 4.2 G/DL (ref 2–4)
GLUCOSE SERPL-MCNC: 103 MG/DL (ref 65–100)
HCT VFR BLD AUTO: 27.7 % (ref 35–47)
HGB BLD-MCNC: 9.1 G/DL (ref 11.5–16)
IMM GRANULOCYTES # BLD AUTO: 0 K/UL
IMM GRANULOCYTES NFR BLD AUTO: 0 %
LYMPHOCYTES # BLD: 0.7 K/UL (ref 0.8–3.5)
LYMPHOCYTES NFR BLD: 4 % (ref 12–49)
MCH RBC QN AUTO: 22.3 PG (ref 26–34)
MCHC RBC AUTO-ENTMCNC: 32.9 G/DL (ref 30–36.5)
MCV RBC AUTO: 67.9 FL (ref 80–99)
MONOCYTES # BLD: 1.1 K/UL (ref 0–1)
MONOCYTES NFR BLD: 6 % (ref 5–13)
NEUTS BAND NFR BLD MANUAL: 3 % (ref 0–6)
NEUTS SEG # BLD: 15.7 K/UL (ref 1.8–8)
NEUTS SEG NFR BLD: 87 % (ref 32–75)
NRBC # BLD: 0.02 K/UL (ref 0–0.01)
NRBC BLD-RTO: 0.1 PER 100 WBC
PERIPHERAL SMEAR, MD REVIEW: NORMAL
PLATELET # BLD AUTO: 23 K/UL (ref 150–400)
POTASSIUM SERPL-SCNC: 3.3 MMOL/L (ref 3.5–5.1)
PROT SERPL-MCNC: 5.8 G/DL (ref 6.4–8.2)
RBC # BLD AUTO: 4.08 M/UL (ref 3.8–5.2)
RBC MORPH BLD: ABNORMAL
SODIUM SERPL-SCNC: 142 MMOL/L (ref 136–145)
WBC # BLD AUTO: 17.5 K/UL (ref 3.6–11)

## 2024-04-23 PROCEDURE — A4216 STERILE WATER/SALINE, 10 ML: HCPCS

## 2024-04-23 PROCEDURE — 85025 COMPLETE CBC W/AUTO DIFF WBC: CPT

## 2024-04-23 PROCEDURE — 87040 BLOOD CULTURE FOR BACTERIA: CPT

## 2024-04-23 PROCEDURE — 2580000003 HC RX 258: Performed by: INTERNAL MEDICINE

## 2024-04-23 PROCEDURE — 6360000002 HC RX W HCPCS

## 2024-04-23 PROCEDURE — 80053 COMPREHEN METABOLIC PANEL: CPT

## 2024-04-23 PROCEDURE — 99231 SBSQ HOSP IP/OBS SF/LOW 25: CPT | Performed by: INTERNAL MEDICINE

## 2024-04-23 PROCEDURE — 36415 COLL VENOUS BLD VENIPUNCTURE: CPT

## 2024-04-23 PROCEDURE — 2580000003 HC RX 258: Performed by: STUDENT IN AN ORGANIZED HEALTH CARE EDUCATION/TRAINING PROGRAM

## 2024-04-23 PROCEDURE — 76604 US EXAM CHEST: CPT

## 2024-04-23 PROCEDURE — 6360000002 HC RX W HCPCS: Performed by: INTERNAL MEDICINE

## 2024-04-23 PROCEDURE — 2580000003 HC RX 258

## 2024-04-23 PROCEDURE — 2060000000 HC ICU INTERMEDIATE R&B

## 2024-04-23 PROCEDURE — 99233 SBSQ HOSP IP/OBS HIGH 50: CPT | Performed by: INTERNAL MEDICINE

## 2024-04-23 PROCEDURE — 99223 1ST HOSP IP/OBS HIGH 75: CPT | Performed by: PHYSICAL MEDICINE & REHABILITATION

## 2024-04-23 PROCEDURE — 73630 X-RAY EXAM OF FOOT: CPT

## 2024-04-23 PROCEDURE — 6360000002 HC RX W HCPCS: Performed by: STUDENT IN AN ORGANIZED HEALTH CARE EDUCATION/TRAINING PROGRAM

## 2024-04-23 PROCEDURE — C9113 INJ PANTOPRAZOLE SODIUM, VIA: HCPCS

## 2024-04-23 RX ORDER — FERROUS SULFATE 325(65) MG
1 TABLET ORAL
Qty: 90 TABLET | Refills: 0 | OUTPATIENT
Start: 2024-04-23

## 2024-04-23 RX ORDER — HYDROMORPHONE HYDROCHLORIDE 1 MG/ML
0.5 INJECTION, SOLUTION INTRAMUSCULAR; INTRAVENOUS; SUBCUTANEOUS
Status: DISCONTINUED | OUTPATIENT
Start: 2024-04-23 | End: 2024-05-01

## 2024-04-23 RX ORDER — DEXTROSE, SODIUM CHLORIDE, SODIUM LACTATE, POTASSIUM CHLORIDE, AND CALCIUM CHLORIDE 5; .6; .31; .03; .02 G/100ML; G/100ML; G/100ML; G/100ML; G/100ML
INJECTION, SOLUTION INTRAVENOUS CONTINUOUS
Status: DISCONTINUED | OUTPATIENT
Start: 2024-04-23 | End: 2024-04-24

## 2024-04-23 RX ORDER — ASPIRIN 81 MG
81 TABLET,CHEWABLE ORAL DAILY
Qty: 90 TABLET | Refills: 0 | OUTPATIENT
Start: 2024-04-23

## 2024-04-23 RX ORDER — FOLIC ACID 1 MG/1
1 TABLET ORAL DAILY
Status: DISCONTINUED | OUTPATIENT
Start: 2024-04-23 | End: 2024-05-01

## 2024-04-23 RX ORDER — HYDROMORPHONE HYDROCHLORIDE 1 MG/ML
0.25 INJECTION, SOLUTION INTRAMUSCULAR; INTRAVENOUS; SUBCUTANEOUS
Status: DISCONTINUED | OUTPATIENT
Start: 2024-04-23 | End: 2024-05-01

## 2024-04-23 RX ADMIN — SODIUM CHLORIDE, SODIUM LACTATE, POTASSIUM CHLORIDE, CALCIUM CHLORIDE AND DEXTROSE MONOHYDRATE: 5; 600; 310; 30; 20 INJECTION, SOLUTION INTRAVENOUS at 15:35

## 2024-04-23 RX ADMIN — WATER 2000 MG: 1 INJECTION INTRAMUSCULAR; INTRAVENOUS; SUBCUTANEOUS at 12:17

## 2024-04-23 RX ADMIN — HYDROMORPHONE HYDROCHLORIDE 0.25 MG: 1 INJECTION, SOLUTION INTRAMUSCULAR; INTRAVENOUS; SUBCUTANEOUS at 15:59

## 2024-04-23 RX ADMIN — Medication: at 22:36

## 2024-04-23 RX ADMIN — SODIUM CHLORIDE, PRESERVATIVE FREE 10 ML: 5 INJECTION INTRAVENOUS at 22:36

## 2024-04-23 RX ADMIN — Medication: at 09:23

## 2024-04-23 RX ADMIN — PANTOPRAZOLE SODIUM 40 MG: 40 INJECTION, POWDER, LYOPHILIZED, FOR SOLUTION INTRAVENOUS at 09:19

## 2024-04-23 ASSESSMENT — PAIN SCALES - GENERAL
PAINLEVEL_OUTOF10: 0
PAINLEVEL_OUTOF10: 0

## 2024-04-23 ASSESSMENT — PAIN DESCRIPTION - ORIENTATION
ORIENTATION: LOWER
ORIENTATION: LOWER

## 2024-04-23 ASSESSMENT — PAIN DESCRIPTION - LOCATION
LOCATION: BACK
LOCATION: BACK

## 2024-04-23 NOTE — PROGRESS NOTES
[facility]  Hospitalist Group                                                                                          Hospitalist Progress Note  Drake Pillai MD  Answering service: [phone] OR [phone] from in house phone        Date of Service:  2024  NAME:  Carlie Newman  :  1933  MRN:  [id number]       Admission Summary:     Carlie Newman is a 89 y.o. female with past medical history significant for dyslipidemia, congestive heart failure, hypertension, dementia, dementia, sick sinus syndrome with resultant pacemaker insertion presented at the emergency room with change in mental status.  Patient symptoms started few days ago and progressively getting worse.  She was recently prescribed antibiotic for treatment of UTI.  Family is unsure if patient has been taking the antibiotics appropriately. No fever, rigors or chills reported.  Patient and her daughter lives together, her daughter as a stroke and they both require considerable care at home according to the patient's sister who was present at the bedside. Her sister also reported that the patient has sacral wounds.  Patient was last admitted to this hospital in 2023, she was admitted and treated for urinary tract infection.  Patient was noted to have low platelet count during that hospitalization and platelet count recovered prior to discharge to SNF.  CT of the head done on arrival had emergency room negative for acute pathology.  Chest x-ray shows a trace left pleural effusion and BNP level is elevated.  She was referred to the hospitalist service for admission.         Interval history / Subjective:     Patient is seen in ED. Lethargic, will attempt to open eyes with verbal and physical cueing. Hemodynamically stable at this time. Sister at bedside. Noted positive blood cultures and leukocytosis c/b PPM. Consulted ID, ceftriaxone started. Imaging pending.      Assessment & Plan:     Acute  today.      Home Medications were reconciled to the best of my ability given all available resources at the time of admission. Route is PO if not otherwise noted.      Admission Status:[id number]:::1}      Medications Reviewed:     Current Facility-Administered Medications   Medication Dose Route Frequency    [Held by provider] aspirin chewable tablet 81 mg  81 mg Oral Daily    citalopram (CELEXA) tablet 10 mg  10 mg Oral Daily    ferrous sulfate (IRON 325) tablet 325 mg  325 mg Oral QAM AC    [Held by provider] midodrine (PROAMATINE) tablet 2.5 mg  2.5 mg Oral BID    [Held by provider] pantoprazole (PROTONIX) tablet 40 mg  40 mg Oral QAM AC    sodium chloride flush 0.9 % injection 5-40 mL  5-40 mL IntraVENous 2 times per day    sodium chloride flush 0.9 % injection 5-40 mL  5-40 mL IntraVENous PRN    0.9 % sodium chloride infusion   IntraVENous PRN    potassium chloride (KLOR-CON) extended release tablet 40 mEq  40 mEq Oral PRN    Or    potassium bicarb-citric acid (EFFER-K) effervescent tablet 40 mEq  40 mEq Oral PRN    Or    potassium chloride 10 mEq/100 mL IVPB (Peripheral Line)  10 mEq IntraVENous PRN    magnesium sulfate 2000 mg in 50 mL IVPB premix  2,000 mg IntraVENous PRN    ondansetron (ZOFRAN-ODT) disintegrating tablet 4 mg  4 mg Oral Q8H PRN    Or    ondansetron (ZOFRAN) injection 4 mg  4 mg IntraVENous Q6H PRN    polyethylene glycol (GLYCOLAX) packet 17 g  17 g Oral Daily PRN    acetaminophen (TYLENOL) tablet 650 mg  650 mg Oral Q6H PRN    Or    acetaminophen (TYLENOL) suppository 650 mg  650 mg Rectal Q6H PRN    0.9 % sodium chloride infusion   IntraVENous Continuous    cefTRIAXone (ROCEPHIN) 2,000 mg in sterile water 20 mL IV syringe  2,000 mg IntraVENous Q24H    pantoprazole (PROTONIX) 40 mg in sodium chloride (PF) 0.9 % 10 mL injection  40 mg IntraVENous Daily    balsum peru-castor oil (VENELEX) ointment   Topical BID     ______________________________________________________________________  EXPECTED

## 2024-04-23 NOTE — PROGRESS NOTES
[facility] [place] [facility] [place]  [address], [address] [facility] [address]  W: [phone]  F: [phone]    Reason for Evaluation:   Carlie Newman is a 89 y.o. female with multiple co-morbidities who is seen  for follow up of severe thrombocytopenia.    History of Present Illness:   Carlie Newman is a pleasant 89 y.o. female who presents today for evaluation of thrombocytopenia.    Patient initially presented with AMS. She had been prescribed an antibiotic for treatment of UTI. Labs on admission notable from platelets 29 (declined from 126 on 4/12), hemoglobin 11.1 (near baseline), MCV 70, high-normal WBC, lactic acid 2.9. Prelim blood cultures with GPC in chains in one of 2 bottles. Sensitivities show group B strep. She has been admitted and is currently on ceftriaxone for UTI and blood stream infection.      Of note, she was hospitalized 10/2023 for UTI and had a low platelet count during that admission (heather ~70). Plate count recovered prior to discharge.     Interval History:  Platelets stable at 23k. Hemolysis labs negative. Patient sleeping in bed. Her sister is at the bedside.    Physical Exam:   /65   Pulse 68   Temp 97.3 °F (36.3 °C) (Axillary)   Resp 16   Ht 1.702 m (5' 7\")   Wt 59.4 kg (131 lb)   SpO2 100%   BMI 20.52 kg/m²   General: frail, does not arouse to voice  Eyes: anicteric sclerae  HENT: oropharynx clear  Neck: supple  Respiratory: normal respiratory effort  CV: no peripheral edema  Ext: warm, well perfused, no edema  GI: soft, nontender, nondistended, no masses  Skin: no rashes, no ecchymoses, no petechiae  Neuro: encephalopathic    Results:     Lab Results   Component Value Date/Time    WBC 17.5 04/23/2024 04:45 AM    HGB 9.1 04/23/2024 04:45 AM    HCT 27.7 04/23/2024 04:45 AM    PLT 23 04/23/2024 04:45 AM    MCV 67.9 04/23/2024 04:45 AM     Lab Results   Component Value Date/Time     04/23/2024 04:45 AM    K 3.3 04/23/2024 04:45 AM     04/23/2024  very mild thrombocytopenia on labs from 4/12/24, now acutely declined to 29 on admission.   Work up: Folic acid mildly low. Hemolysis labs negative. Coags normal (fibrinogen elevated as acute phase reactant). Smear without schistocytes. Thrombocytopenia noted with increased immature platelet fraction.   Most likely due to marrow suppression and increased destruction in setting of group B strep bacteremia, UTI, and antibiotics (currently on ceftriaxone, was on oral antibiotic PTA) as well as mild folic acid deficiency  - Monitor CBC with differential daily  - Hold home aspirin  - Transfuse for platelets <10k  - Management of bacteremia per hospitalist  - Start daily folic acid     #) Iron deficiency anemia:  On oral iron  - Consider IV iron; would not start in setting of bacteremia.    #) Group B Strep Bacteremia  - On ceftriaxone per hospitalist  - Follow up sensitivities  - Repeat blood cultures NGTD    #) Acute metabolic encephalopathy:  Likely due to bacteremia and hypercalcemia  - PTH pending; further work up of hypercalcemia per hospitalist    Thank you for involving me in the care of this patient. Hematology will sign off at this time. Please page if questions.    Signed By: Jennifer Ansari MD

## 2024-04-23 NOTE — PLAN OF CARE
Problem: Discharge Planning  Goal: Discharge to home or other facility with appropriate resources  4/23/2024 1025 by Yosi Armendariz RN  Outcome: Progressing  Flowsheets (Taken 4/23/2024 0923)  Discharge to home or other facility with appropriate resources: Identify barriers to discharge with patient and caregiver  4/22/2024 2131 by Debi Ryan, RN  Outcome: Progressing  Flowsheets (Taken 4/22/2024 2015)  Discharge to home or other facility with appropriate resources:   Identify barriers to discharge with patient and caregiver   Identify discharge learning needs (meds, wound care, etc)     Problem: Pain  Goal: Verbalizes/displays adequate comfort level or baseline comfort level  Outcome: Progressing     Problem: Chronic Conditions and Co-morbidities  Goal: Patient's chronic conditions and co-morbidity symptoms are monitored and maintained or improved  Outcome: Progressing  Flowsheets (Taken 4/23/2024 0923)  Care Plan - Patient's Chronic Conditions and Co-Morbidity Symptoms are Monitored and Maintained or Improved: Monitor and assess patient's chronic conditions and comorbid symptoms for stability, deterioration, or improvement     Problem: Safety - Adult  Goal: Free from fall injury  4/22/2024 2131 by Debi Ryan, RN  Outcome: Progressing

## 2024-04-23 NOTE — PROGRESS NOTES
PICC consult reviewed. VAT placed midline yesterday for access and lab draws. BC's early 4/22 were positive, recommend waiting 48 hours for BC to clear before placing PICC. Discharge plan not definite at this time. MD notified, RN aware.

## 2024-04-23 NOTE — CONSULTS
Palliative Medicine  Patient Name: Carlie Newman  YOB: 1933  MRN: [id number]  Age: 89 y.o.  Gender: female    Date of Initial Consult: 4/23/24  Date of Service: 4/23/2024  Time: 2:18 PM  Provider: Taylor Mcdaniel MD  Hospital Day: 3  Admit Date: 4/21/2024  Referring Provider: Dr Olivas        Reasons for Consultation:  Goals of Care    HISTORY OF PRESENT ILLNESS (HPI):   Carlie Newman is a 89 y.o. female with a past medical history of HTN, CKD, CAD, bradycardia w/ pacemaker, L TKA recently treated for possible UTI who was admitted on 4/21/2024 from home w/ confusion/altered mental status. She has seen her PCP recently for confusion and visual/auditory hallucinations. Head CT w/out acute findings. +Bcx - group B strep. Has sacral decub ulcer. ID consulted, on cefrtiraxone. Cardiology consulted- small possible vegetation on R ventricular lead of pacemaker. They recommend conservative management w/ IV abx alone, is not candidate for pacemaker removal, too high risk. Also high risk for KD and this is not being pursued. She has thrombocytopenai as well.     Psychosocial: at baseline alert/oriented, conversant, uses walker a bit, mainly wheelchair. Family notes no problems with short or long term memory. She has 2 children- son Misael (POJILLIAN) and dtr Neeru. Dtr lives w/ her- she herself had a stroke and is limited in how much physical help she can provide. Sister Maryam local and also involved. Pt is the oldest sister. 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 Has some  benefits, her  was in the .       PALLIATIVE DIAGNOSES:    Altered mental status, confusion   Generalized weakness, debility   Bacteremia   Palliative care encounter/advance care planning    ASSESSMENT AND PLAN:   Pt lethargic, although more responsive per staff. Sister Maryam at bedside.   Baseline:   Per Maryam, who lives in [place]  Normal skin color/temp  [] Clubbing/cyanosis  [] No edema  [] Other:    Wt Readings from Last 15 Encounters:   04/22/24 59.4 kg (131 lb)   04/12/24 59.4 kg (131 lb)   03/20/24 60.3 kg (133 lb)   03/18/24 60.3 kg (133 lb)   03/08/24 62.1 kg (137 lb)   01/11/24 70.1 kg (154 lb 9.6 oz)   10/09/23 68 kg (150 lb)   10/03/23 67.6 kg (149 lb)   09/11/23 66.4 kg (146 lb 6.4 oz)   07/24/23 68.5 kg (151 lb)   06/01/23 69.6 kg (153 lb 6.4 oz)   05/12/23 68.2 kg (150 lb 5.7 oz)   02/01/23 71.3 kg (157 lb 3.2 oz)   12/07/22 67.9 kg (149 lb 9.6 oz)   09/19/22 68.6 kg (151 lb 3.2 oz)        Current Diet: Diet NPO       PSYCHOSOCIAL/SPIRITUAL SCREENING:   Palliative IDT has assessed this patient for cultural preferences / practices and a referral made as appropriate to needs (Cultural Services, Patient Advocacy, Ethics, etc.)    Spiritual Affiliation: Non-Congregation    Any spiritual / Episcopalian concerns:  [] Yes /  [x] No   If \"Yes\" to discuss with pastoral care during IDT     Does caregiver feel burdened by caring for their loved one:   [] Yes /  [x] No /  [] No Caregiver Present/Available [] No Caregiver [] Pt Lives at Facility  If \"Yes\" to discuss with social work during IDT    Anticipatory grief assessment:   [x] Normal  / [] Maladaptive     If \"Maladaptive\" to discuss with social work during IDT    ESAS Anxiety:      ESAS Depression:          LAB AND IMAGING FINDINGS:   Objective data reviewed:  labs, images, records, medication use, vitals, and chart     FINAL COMMENTS   Thank you for allowing Palliative Medicine to participate in the care of Carlie Newman.    Only check if applicable and billing time based rather than MDM  [x] The total encounter time on this service date was _ 74 ___ minutes which was spent performing a face-to-face encounter and personally completing the provider-level activities documented in the note. This includes time spent prior to the visit and after the visit in direct care of the patient.

## 2024-04-23 NOTE — PROGRESS NOTES
Value Ref Range    Troponin, High Sensitivity 20 0 - 51 ng/L   Basic Metabolic Panel    Collection Time: 04/22/24 12:54 PM   Result Value Ref Range    Sodium 140 136 - 145 mmol/L    Potassium 3.3 (L) 3.5 - 5.1 mmol/L    Chloride 108 97 - 108 mmol/L    CO2 27 21 - 32 mmol/L    Anion Gap 5 5 - 15 mmol/L    Glucose 101 (H) 65 - 100 mg/dL    BUN 24 (H) 6 - 20 MG/DL    Creatinine 0.59 0.55 - 1.02 MG/DL    Bun/Cre Ratio 41 (H) 12 - 20      Est, Glom Filt Rate 85 >60 ml/min/1.73m2    Calcium 11.6 (H) 8.5 - 10.1 MG/DL   PTH, Intact    Collection Time: 04/22/24 12:54 PM   Result Value Ref Range    Calcium 11.4 (H) 8.5 - 10.1 MG/DL    Pth Intact 91.4 (H) 18.4 - 88.0 pg/mL   Salicylate    Collection Time: 04/22/24 12:54 PM   Result Value Ref Range    Salicylate, Serum 3.6 2.8 - 20.0 MG/DL   Ethanol    Collection Time: 04/22/24 12:54 PM   Result Value Ref Range    Ethanol Lvl <10 <10 MG/DL   POC Blood Gas & Ionized Calcium    Collection Time: 04/22/24  1:37 PM   Result Value Ref Range    IONIZED CALCIUM 1.66 (HH) 1.12 - 1.32 mmol/L    POC pH 7.33 (L) 7.35 - 7.45      POC pCO2 53.0 (H) 35.0 - 45.0 MMHG    POC PO2 <27 (LL) 80 - 100 MMHG    POC HCO3 27.6 (H) 22 - 26 MMOL/L    Base Excess 0.7 mmol/L    Specimen type: VENOUS BLOOD      Critical Value Read Back YVONNE CHAVARRIA. RN    Culture, Blood 1    Collection Time: 04/22/24  1:52 PM    Specimen: Blood   Result Value Ref Range    Special Requests NO SPECIAL REQUESTS      Culture NO GROWTH <24 HRS     Culture, Blood 2    Collection Time: 04/22/24  1:52 PM    Specimen: Blood   Result Value Ref Range    Special Requests NO SPECIAL REQUESTS      Culture NO GROWTH <24 HRS     Echo (TTE) complete (PRN contrast/bubble/strain/3D)    Collection Time: 04/22/24  3:42 PM   Result Value Ref Range    Body Surface Area 1.68 m2    IVSd 0.7 0.6 - 0.9 cm    LVIDd 4.3 3.9 - 5.3 cm    LVIDs 2.7 cm    LVOT Diameter 1.7 cm    LVPWd 0.7 0.6 - 0.9 cm    LVOT Peak Gradient 8 mmHg    LVOT Peak Velocity 1.4 m/s     RVIDd 3.6 cm    RVSP 53 mmHg    RV Free Wall Peak S' 12 cm/s    LA Diameter 3.9 cm    Est. RA Pressure 3 mmHg    AV Area by Peak Velocity 1.9 cm2    AV Peak Gradient 12 mmHg    AV Peak Velocity 1.8 m/s    MV A Velocity 1.11 m/s    MV E Wave Deceleration Time 331.1 ms    MV E Velocity 0.68 m/s    LV E' Lateral Velocity 13 cm/s    LV E' Septal Velocity 9 cm/s    MV PHT 96.0 ms    MV Area by PHT 2.3 cm2    TAPSE 1.1 (A) 1.7 cm    TR Peak Gradient 50 mmHg    TR Max Velocity 3.52 m/s    Aortic Root 2.8 cm    Fractional Shortening 2D 37 28 - 44 %    LVIDd Index 2.54 cm/m2    LVIDs Index 1.60 cm/m2    LV RWT Ratio 0.33     LV Mass 2D 88.5 67 - 162 g    LV Mass 2D Index 52.4 43 - 95 g/m2    MV E/A 0.61     E/E' Ratio (Averaged) 6.39     E/E' Lateral 5.23     LVOT Area 2.3 cm2    LA Size Index 2.31 cm/m2    LA/AO Root Ratio 1.39     Ao Root Index 1.66 cm/m2    AV Velocity Ratio 0.78     LOS/BSA Peak Velocity 1.1 cm2/m2    PASP 50 mmHg   Blood Gas, Arterial    Collection Time: 04/22/24  3:52 PM   Result Value Ref Range    pH, Arterial 7.42 7.35 - 7.45      pCO2, Arterial 40 35 - 45 mmHg    pO2, Arterial 85 80 - 100 mmHg    POC O2 SAT 97 92 - 97 %    HCO3, Arterial 25 22 - 26 mmol/L    Base Excess, Arterial 0.7 mmol/L    O2 Method ROOM AIR      Source ARTERIAL      Site RIGHT RADIAL      Shan Test YES     Troponin    Collection Time: 04/22/24  4:01 PM   Result Value Ref Range    Troponin, High Sensitivity 17 0 - 51 ng/L   Protime-INR    Collection Time: 04/22/24  4:03 PM   Result Value Ref Range    INR 1.1 0.9 - 1.1      Protime 11.1 9.0 - 11.1 sec   APTT    Collection Time: 04/22/24  4:03 PM   Result Value Ref Range    APTT 27.9 22.1 - 31.0 sec    Therapeutic Range   58.0 - 77.0 SECS   Fibrinogen    Collection Time: 04/22/24  4:03 PM   Result Value Ref Range    Fibrinogen >800 (H) 200 - 475 mg/dL   Path Review, Smear    Collection Time: 04/22/24  4:03 PM   Result Value Ref Range    Peripheral Smear, MD Review

## 2024-04-23 NOTE — PROGRESS NOTES
Infectious Disease Progress Note     Subjective:      Slightly more responsive today still lethargic, afebrile, TTE with evidence of ppm lead vegetation    Objective:    Vitals:   Patient Vitals for the past 24 hrs:   Temp Pulse Resp BP SpO2   04/23/24 1200 -- 79 -- -- --   04/23/24 1158 97.5 °F (36.4 °C) 76 16 134/66 99 %   04/23/24 1000 -- 68 -- -- --   04/23/24 0800 97.3 °F (36.3 °C) 70 16 129/65 100 %   04/23/24 0547 -- 75 -- -- --   04/23/24 0437 97.5 °F (36.4 °C) 74 18 119/73 95 %   04/23/24 0341 -- 79 -- -- --   04/23/24 0152 -- 75 -- -- --   04/22/24 2352 -- 71 16 (!) 112/53 98 %   04/22/24 2143 -- 69 -- -- --   04/22/24 2015 97.5 °F (36.4 °C) 78 18 137/68 98 %   04/22/24 1941 -- 70 -- -- --   04/22/24 1800 -- 72 -- -- --   04/22/24 1612 97.3 °F (36.3 °C) 78 16 127/75 100 %   04/22/24 1525 -- -- -- 114/62 --   04/22/24 1400 -- 75 -- -- --       Physical Exam:  Gen: lethargy  HEENT:  Normocephalic, atraumatic  CV: 2/6 systolic murmur  Lungs: no wheezing  Abdomen: soft, non tender  Genitourinary:  no espana catheter   Skin: sacral decub ulcer, photos appreciated  Psych: lethargic, not following commands  Neuro: lethargy, minimally arousable  Musculoskeletal:  No joint edema, erythema or tenderness noted     Central Line:      Medications:    Current Facility-Administered Medications:     folic acid (FOLVITE) tablet 1 mg, 1 mg, Oral, Daily, Jennifer Ansari MD    [Held by provider] aspirin chewable tablet 81 mg, 81 mg, Oral, Daily, Tere Subramanian MD    citalopram (CELEXA) tablet 10 mg, 10 mg, Oral, Daily, Tere Subramanian MD    ferrous sulfate (IRON 325) tablet 325 mg, 325 mg, Oral, Marilynn WHITE Razaak A, MD    [Held by provider] midodrine (PROAMATINE) tablet 2.5 mg, 2.5 mg, Oral, BID, Tere Subramanian MD    [Held by provider] pantoprazole (PROTONIX) tablet 40 mg, 40 mg, Oral, Marilynn WHITE Razaak A, MD    sodium chloride flush 0.9 % injection 5-40 mL, 5-40 mL, IntraVENous, 2 times per day,

## 2024-04-24 ENCOUNTER — APPOINTMENT (OUTPATIENT)
Facility: HOSPITAL | Age: 89
DRG: 871 | End: 2024-04-24
Payer: MEDICARE

## 2024-04-24 LAB
ALBUMIN SERPL-MCNC: 1.6 G/DL (ref 3.5–5)
ALBUMIN/GLOB SERPL: 0.4 (ref 1.1–2.2)
ALP SERPL-CCNC: 82 U/L (ref 45–117)
ALT SERPL-CCNC: 19 U/L (ref 12–78)
ANION GAP SERPL CALC-SCNC: 3 MMOL/L (ref 5–15)
AST SERPL-CCNC: 23 U/L (ref 15–37)
BACTERIA SPEC CULT: ABNORMAL
BASOPHILS # BLD: 0 K/UL (ref 0–0.1)
BASOPHILS NFR BLD: 0 % (ref 0–1)
BILIRUB SERPL-MCNC: 0.5 MG/DL (ref 0.2–1)
BUN SERPL-MCNC: 15 MG/DL (ref 6–20)
BUN/CREAT SERPL: 31 (ref 12–20)
CALCIUM SERPL-MCNC: 11.7 MG/DL (ref 8.5–10.1)
CHLORIDE SERPL-SCNC: 115 MMOL/L (ref 97–108)
CO2 SERPL-SCNC: 32 MMOL/L (ref 21–32)
CREAT SERPL-MCNC: 0.49 MG/DL (ref 0.55–1.02)
DIFFERENTIAL METHOD BLD: ABNORMAL
EOSINOPHIL # BLD: 0 K/UL (ref 0–0.4)
EOSINOPHIL NFR BLD: 0 % (ref 0–7)
ERYTHROCYTE [DISTWIDTH] IN BLOOD BY AUTOMATED COUNT: 16.2 % (ref 11.5–14.5)
GLOBULIN SER CALC-MCNC: 4 G/DL (ref 2–4)
GLUCOSE SERPL-MCNC: 136 MG/DL (ref 65–100)
HCT VFR BLD AUTO: 27.5 % (ref 35–47)
HGB BLD-MCNC: 9.2 G/DL (ref 11.5–16)
IMM GRANULOCYTES # BLD AUTO: 0.2 K/UL (ref 0–0.04)
IMM GRANULOCYTES NFR BLD AUTO: 1 % (ref 0–0.5)
LYMPHOCYTES # BLD: 0.5 K/UL (ref 0.8–3.5)
LYMPHOCYTES NFR BLD: 3 % (ref 12–49)
MAGNESIUM SERPL-MCNC: 1.7 MG/DL (ref 1.6–2.4)
MCH RBC QN AUTO: 22.4 PG (ref 26–34)
MCHC RBC AUTO-ENTMCNC: 33.5 G/DL (ref 30–36.5)
MCV RBC AUTO: 67.1 FL (ref 80–99)
MONOCYTES # BLD: 0.9 K/UL (ref 0–1)
MONOCYTES NFR BLD: 6 % (ref 5–13)
NEUTS SEG # BLD: 14.2 K/UL (ref 1.8–8)
NEUTS SEG NFR BLD: 90 % (ref 32–75)
NRBC # BLD: 0.02 K/UL (ref 0–0.01)
NRBC BLD-RTO: 0.1 PER 100 WBC
PLATELET # BLD AUTO: 30 K/UL (ref 150–400)
POTASSIUM SERPL-SCNC: 3 MMOL/L (ref 3.5–5.1)
POTASSIUM SERPL-SCNC: 4 MMOL/L (ref 3.5–5.1)
PROT SERPL-MCNC: 5.6 G/DL (ref 6.4–8.2)
RBC # BLD AUTO: 4.1 M/UL (ref 3.8–5.2)
RBC MORPH BLD: ABNORMAL
SERVICE CMNT-IMP: ABNORMAL
SERVICE CMNT-IMP: ABNORMAL
SODIUM SERPL-SCNC: 150 MMOL/L (ref 136–145)
WBC # BLD AUTO: 15.8 K/UL (ref 3.6–11)

## 2024-04-24 PROCEDURE — 84132 ASSAY OF SERUM POTASSIUM: CPT

## 2024-04-24 PROCEDURE — 2580000003 HC RX 258: Performed by: STUDENT IN AN ORGANIZED HEALTH CARE EDUCATION/TRAINING PROGRAM

## 2024-04-24 PROCEDURE — 2580000003 HC RX 258: Performed by: INTERNAL MEDICINE

## 2024-04-24 PROCEDURE — 99231 SBSQ HOSP IP/OBS SF/LOW 25: CPT | Performed by: PHYSICAL MEDICINE & REHABILITATION

## 2024-04-24 PROCEDURE — 2580000003 HC RX 258

## 2024-04-24 PROCEDURE — 85025 COMPLETE CBC W/AUTO DIFF WBC: CPT

## 2024-04-24 PROCEDURE — 6360000002 HC RX W HCPCS: Performed by: INTERNAL MEDICINE

## 2024-04-24 PROCEDURE — A4216 STERILE WATER/SALINE, 10 ML: HCPCS

## 2024-04-24 PROCEDURE — 2500000003 HC RX 250 WO HCPCS: Performed by: STUDENT IN AN ORGANIZED HEALTH CARE EDUCATION/TRAINING PROGRAM

## 2024-04-24 PROCEDURE — 6370000000 HC RX 637 (ALT 250 FOR IP): Performed by: STUDENT IN AN ORGANIZED HEALTH CARE EDUCATION/TRAINING PROGRAM

## 2024-04-24 PROCEDURE — C9113 INJ PANTOPRAZOLE SODIUM, VIA: HCPCS

## 2024-04-24 PROCEDURE — 1100000000 HC RM PRIVATE

## 2024-04-24 PROCEDURE — 83735 ASSAY OF MAGNESIUM: CPT

## 2024-04-24 PROCEDURE — 80053 COMPREHEN METABOLIC PANEL: CPT

## 2024-04-24 PROCEDURE — 74018 RADEX ABDOMEN 1 VIEW: CPT

## 2024-04-24 PROCEDURE — 36415 COLL VENOUS BLD VENIPUNCTURE: CPT

## 2024-04-24 PROCEDURE — 6360000002 HC RX W HCPCS

## 2024-04-24 RX ORDER — LIDOCAINE HYDROCHLORIDE 20 MG/ML
JELLY TOPICAL ONCE
Status: COMPLETED | OUTPATIENT
Start: 2024-04-24 | End: 2024-04-24

## 2024-04-24 RX ORDER — DEXTROSE MONOHYDRATE, SODIUM CHLORIDE, AND POTASSIUM CHLORIDE 50; 1.49; 9 G/1000ML; G/1000ML; G/1000ML
INJECTION, SOLUTION INTRAVENOUS CONTINUOUS
Status: DISCONTINUED | OUTPATIENT
Start: 2024-04-24 | End: 2024-04-24

## 2024-04-24 RX ORDER — DEXTROSE MONOHYDRATE, SODIUM CHLORIDE, AND POTASSIUM CHLORIDE 50; 1.49; 4.5 G/1000ML; G/1000ML; G/1000ML
INJECTION, SOLUTION INTRAVENOUS CONTINUOUS
Status: DISCONTINUED | OUTPATIENT
Start: 2024-04-24 | End: 2024-04-26

## 2024-04-24 RX ADMIN — POTASSIUM CHLORIDE 10 MEQ: 7.46 INJECTION, SOLUTION INTRAVENOUS at 13:51

## 2024-04-24 RX ADMIN — POTASSIUM CHLORIDE 10 MEQ: 7.46 INJECTION, SOLUTION INTRAVENOUS at 12:43

## 2024-04-24 RX ADMIN — LIDOCAINE HYDROCHLORIDE: 20 JELLY TOPICAL at 15:42

## 2024-04-24 RX ADMIN — POTASSIUM CHLORIDE 10 MEQ: 7.46 INJECTION, SOLUTION INTRAVENOUS at 06:56

## 2024-04-24 RX ADMIN — SODIUM CHLORIDE, SODIUM LACTATE, POTASSIUM CHLORIDE, CALCIUM CHLORIDE AND DEXTROSE MONOHYDRATE: 5; 600; 310; 30; 20 INJECTION, SOLUTION INTRAVENOUS at 04:11

## 2024-04-24 RX ADMIN — WATER 2000 MG: 1 INJECTION INTRAMUSCULAR; INTRAVENOUS; SUBCUTANEOUS at 10:33

## 2024-04-24 RX ADMIN — Medication: at 21:21

## 2024-04-24 RX ADMIN — Medication: at 09:25

## 2024-04-24 RX ADMIN — POTASSIUM CHLORIDE 10 MEQ: 7.46 INJECTION, SOLUTION INTRAVENOUS at 09:22

## 2024-04-24 RX ADMIN — PANTOPRAZOLE SODIUM 40 MG: 40 INJECTION, POWDER, LYOPHILIZED, FOR SOLUTION INTRAVENOUS at 09:17

## 2024-04-24 RX ADMIN — POTASSIUM CHLORIDE 10 MEQ: 7.46 INJECTION, SOLUTION INTRAVENOUS at 11:42

## 2024-04-24 RX ADMIN — POTASSIUM CHLORIDE 10 MEQ: 7.46 INJECTION, SOLUTION INTRAVENOUS at 10:40

## 2024-04-24 RX ADMIN — POTASSIUM CHLORIDE, DEXTROSE MONOHYDRATE AND SODIUM CHLORIDE: 150; 5; 450 INJECTION, SOLUTION INTRAVENOUS at 16:28

## 2024-04-24 RX ADMIN — SODIUM CHLORIDE, PRESERVATIVE FREE 10 ML: 5 INJECTION INTRAVENOUS at 09:19

## 2024-04-24 RX ADMIN — SODIUM CHLORIDE, PRESERVATIVE FREE 10 ML: 5 INJECTION INTRAVENOUS at 21:21

## 2024-04-24 NOTE — PROGRESS NOTES
\"CPK\" in the last 72 hours.    Invalid input(s): \"CPKMB\", \"CKNDX\", \"TROIQ\"  Lab Results   Component Value Date/Time    CHOL 192 04/20/2022 03:22 PM    HDL 82 04/20/2022 03:22 PM     No results found for: \"GLUCPOC\"  [unfilled]    Notes reviewed from all clinical/nonclinical/nursing services involved in patient's clinical care. Care coordination discussions were held with appropriate clinical/nonclinical/ nursing providers based on care coordination needs.         Patients current active Medications were reviewed, considered, added and adjusted based on the clinical condition today.      Home Medications were reconciled to the best of my ability given all available resources at the time of admission. Route is PO if not otherwise noted.      Admission Status:[id number]:::1}      Medications Reviewed:     Current Facility-Administered Medications   Medication Dose Route Frequency    folic acid (FOLVITE) tablet 1 mg  1 mg Oral Daily    dextrose 5 % in lactated ringers infusion   IntraVENous Continuous    HYDROmorphone HCl PF (DILAUDID) injection 0.25 mg  0.25 mg IntraVENous Q3H PRN    Or    HYDROmorphone HCl PF (DILAUDID) injection 0.5 mg  0.5 mg IntraVENous Q3H PRN    [Held by provider] aspirin chewable tablet 81 mg  81 mg Oral Daily    citalopram (CELEXA) tablet 10 mg  10 mg Oral Daily    ferrous sulfate (IRON 325) tablet 325 mg  325 mg Oral QAM AC    [Held by provider] midodrine (PROAMATINE) tablet 2.5 mg  2.5 mg Oral BID    [Held by provider] pantoprazole (PROTONIX) tablet 40 mg  40 mg Oral QAM AC    sodium chloride flush 0.9 % injection 5-40 mL  5-40 mL IntraVENous 2 times per day    sodium chloride flush 0.9 % injection 5-40 mL  5-40 mL IntraVENous PRN    0.9 % sodium chloride infusion   IntraVENous PRN    potassium chloride (KLOR-CON) extended release tablet 40 mEq  40 mEq Oral PRN    Or    potassium bicarb-citric acid (EFFER-K) effervescent tablet 40 mEq  40 mEq Oral PRN    Or    potassium chloride 10 mEq/100 mL IVPB

## 2024-04-24 NOTE — PROGRESS NOTES
was _ 25 ___ minutes which was spent performing a face-to-face encounter and personally completing the provider-level activities documented in the note. This includes time spent prior to the visit and after the visit in direct care of the patient. This time does not include time spent in any separately reportable services.    Electronically signed by   Taylor Mcdaniel MD  Palliative Care Team  on 4/24/2024 at 10:55 AM

## 2024-04-24 NOTE — PROGRESS NOTES
Spiritual Care Assessment/Progress Note  [facility]    Name: Carlie Newman MRN: [id number]    Age: 89 y.o.     Sex: female   Language: English     Date: 4/24/2024            Total Time Calculated: 15 min              Spiritual Assessment begun in [facility] MED SURG  Service Provided For:: Patient not available  Referral/Consult From:: Palliative Care  Encounter Overview/Reason : Palliative Care    Spiritual beliefs:      [] Involved in a jordana tradition/spiritual practice:      [] Supported by a jordana community:      [] Claims no spiritual orientation:      [] Seeking spiritual identity:           [] Adheres to an individual form of spirituality:      [x] Not able to assess:                Identified resources for coping and support system:   Support System: Children, Family members, Palliative Care       [] Prayer                  [] Devotional reading               [] Music                  [] Guided Imagery     [] Pet visits                                        [] Other: (COMMENT)     Specific area/focus of visit   Encounter:    Crisis:    Spiritual/Emotional needs:    Ritual, Rites and Sacraments:    Grief, Loss, and Adjustments:    Ethics/Mediation:    Behavioral Health:    Palliative Care: Type: Palliative Care, Initial/Spiritual Assessment  Advance Care Planning:      I attempted to visit Carlie Newman for a palliative initial spiritual assessment. Her chart was consulted. Unable to complete assessment at this time, as she was sleeping and did not awake. No family present.   Chaplain Kamari, Ariana, MS, BCC

## 2024-04-24 NOTE — CARE COORDINATION
Transition of Care Plan:    RUR:   Prior Level of Functioning: Home with care aids and disabled daughter  Disposition: SNF-LTCneeds 4 weeks IV ABX  If SNF or IPR: Date FOC offered:   Date FOC received:   Accepting facility:   Follow up appointments: NA  DME needed: NA  Transportation at discharge: [facility]  IM/IMM Medicare/Caitlin letter given: 4/22/24  Caregiver Contact: Sister Esme  Discharge Caregiver contacted prior to discharge? NA  Care Conference needed? NA  Barriers to discharge: NA    CM updated in IDRs patient may DC end of week. Cultures pending, picc line needed for IV ABX. Patient will need SNF referrals placed. Palliative care following. CM to monitor.     4:00 CM contacted patient's son who is in agreement with SNF recommendation. He also states patient would need to transition to LTC. CM requested his top 5 preferences for SNF. CM to monitor.    Tanisha Castaneda, MS  [phone]

## 2024-04-24 NOTE — PLAN OF CARE
Problem: Discharge Planning  Goal: Discharge to home or other facility with appropriate resources  Outcome: Progressing  Flowsheets (Taken 4/24/2024 0922)  Discharge to home or other facility with appropriate resources: Identify barriers to discharge with patient and caregiver     Problem: Pain  Goal: Verbalizes/displays adequate comfort level or baseline comfort level  Outcome: Progressing     Problem: Chronic Conditions and Co-morbidities  Goal: Patient's chronic conditions and co-morbidity symptoms are monitored and maintained or improved  Outcome: Progressing  Flowsheets (Taken 4/24/2024 0922)  Care Plan - Patient's Chronic Conditions and Co-Morbidity Symptoms are Monitored and Maintained or Improved: Monitor and assess patient's chronic conditions and comorbid symptoms for stability, deterioration, or improvement     Problem: Safety - Adult  Goal: Free from fall injury  4/24/2024 1028 by Kalpana Penn, RN  Outcome: Progressing  Flowsheets (Taken 4/24/2024 0922)  Free From Fall Injury: Instruct family/caregiver on patient safety  4/24/2024 0007 by Debi Ryan, RN  Outcome: Progressing  Flowsheets (Taken 4/23/2024 2013)  Free From Fall Injury: Instruct family/caregiver on patient safety

## 2024-04-24 NOTE — PROGRESS NOTES
Physical Therapy    Chart reviewed. Discussed patient with RN and new PT orders acknowledged. Noted HHPT note indicating patient was ambulatory with walker on 4/11/24. Notes from rounds indicate bedbound status at home. Patient's family at bedside report patient has not been able to engage during hospitalization due to altered mental status. Patient unarousable to meaningfully participate in skilled therapy at this time. Plan for IV antibiotics at SNF. No Acute PT needs identified at this time. Will sign off. Patient's family is aware of ability to ask for PT if patient is able to engage. Please re-consult if patient's cognition improves that she is able to meaningfully participate in skilled therapy.    Kyra Montez, PT, DPT

## 2024-04-25 ENCOUNTER — HOME CARE VISIT (OUTPATIENT)
Facility: HOME HEALTH | Age: 89
End: 2024-04-25

## 2024-04-25 LAB
25(OH)D3 SERPL-MCNC: 27.3 NG/ML (ref 30–100)
ALBUMIN SERPL-MCNC: 1.6 G/DL (ref 3.5–5)
ALBUMIN/GLOB SERPL: 0.5 (ref 1.1–2.2)
ALP SERPL-CCNC: 88 U/L (ref 45–117)
ALT SERPL-CCNC: 19 U/L (ref 12–78)
ANION GAP SERPL CALC-SCNC: 2 MMOL/L (ref 5–15)
AST SERPL-CCNC: 22 U/L (ref 15–37)
BASOPHILS # BLD: 0 K/UL (ref 0–0.1)
BASOPHILS NFR BLD: 0 % (ref 0–1)
BILIRUB SERPL-MCNC: 0.6 MG/DL (ref 0.2–1)
BUN SERPL-MCNC: 11 MG/DL (ref 6–20)
BUN/CREAT SERPL: 23 (ref 12–20)
CALCIUM SERPL-MCNC: 11.1 MG/DL (ref 8.5–10.1)
CHLORIDE SERPL-SCNC: 114 MMOL/L (ref 97–108)
CO2 SERPL-SCNC: 31 MMOL/L (ref 21–32)
CREAT SERPL-MCNC: 0.48 MG/DL (ref 0.55–1.02)
DIFFERENTIAL METHOD BLD: ABNORMAL
EOSINOPHIL # BLD: 0 K/UL (ref 0–0.4)
EOSINOPHIL NFR BLD: 0 % (ref 0–7)
ERYTHROCYTE [DISTWIDTH] IN BLOOD BY AUTOMATED COUNT: 15.9 % (ref 11.5–14.5)
GLOBULIN SER CALC-MCNC: 3.2 G/DL (ref 2–4)
GLUCOSE SERPL-MCNC: 155 MG/DL (ref 65–100)
HCT VFR BLD AUTO: 27 % (ref 35–47)
HGB BLD-MCNC: 8.8 G/DL (ref 11.5–16)
IMM GRANULOCYTES # BLD AUTO: 0.4 K/UL (ref 0–0.04)
IMM GRANULOCYTES NFR BLD AUTO: 2 % (ref 0–0.5)
LYMPHOCYTES # BLD: 0.5 K/UL (ref 0.8–3.5)
LYMPHOCYTES NFR BLD: 3 % (ref 12–49)
MAGNESIUM SERPL-MCNC: 1.5 MG/DL (ref 1.6–2.4)
MCH RBC QN AUTO: 22.3 PG (ref 26–34)
MCHC RBC AUTO-ENTMCNC: 32.6 G/DL (ref 30–36.5)
MCV RBC AUTO: 68.5 FL (ref 80–99)
MONOCYTES # BLD: 1.1 K/UL (ref 0–1)
MONOCYTES NFR BLD: 6 % (ref 5–13)
NEUTS SEG # BLD: 16 K/UL (ref 1.8–8)
NEUTS SEG NFR BLD: 89 % (ref 32–75)
NRBC # BLD: 0 K/UL (ref 0–0.01)
NRBC BLD-RTO: 0 PER 100 WBC
PLATELET # BLD AUTO: 36 K/UL (ref 150–400)
POTASSIUM SERPL-SCNC: 3.8 MMOL/L (ref 3.5–5.1)
PROT SERPL-MCNC: 4.8 G/DL (ref 6.4–8.2)
RBC # BLD AUTO: 3.94 M/UL (ref 3.8–5.2)
RBC MORPH BLD: ABNORMAL
SODIUM SERPL-SCNC: 147 MMOL/L (ref 136–145)
WBC # BLD AUTO: 18 K/UL (ref 3.6–11)

## 2024-04-25 PROCEDURE — 83735 ASSAY OF MAGNESIUM: CPT

## 2024-04-25 PROCEDURE — 85025 COMPLETE CBC W/AUTO DIFF WBC: CPT

## 2024-04-25 PROCEDURE — 2580000003 HC RX 258: Performed by: INTERNAL MEDICINE

## 2024-04-25 PROCEDURE — 6370000000 HC RX 637 (ALT 250 FOR IP): Performed by: INTERNAL MEDICINE

## 2024-04-25 PROCEDURE — 2500000003 HC RX 250 WO HCPCS: Performed by: STUDENT IN AN ORGANIZED HEALTH CARE EDUCATION/TRAINING PROGRAM

## 2024-04-25 PROCEDURE — 99233 SBSQ HOSP IP/OBS HIGH 50: CPT | Performed by: INTERNAL MEDICINE

## 2024-04-25 PROCEDURE — A4216 STERILE WATER/SALINE, 10 ML: HCPCS

## 2024-04-25 PROCEDURE — 6360000002 HC RX W HCPCS

## 2024-04-25 PROCEDURE — 6360000002 HC RX W HCPCS: Performed by: INTERNAL MEDICINE

## 2024-04-25 PROCEDURE — 80053 COMPREHEN METABOLIC PANEL: CPT

## 2024-04-25 PROCEDURE — C9113 INJ PANTOPRAZOLE SODIUM, VIA: HCPCS

## 2024-04-25 PROCEDURE — 6360000002 HC RX W HCPCS: Performed by: STUDENT IN AN ORGANIZED HEALTH CARE EDUCATION/TRAINING PROGRAM

## 2024-04-25 PROCEDURE — 82306 VITAMIN D 25 HYDROXY: CPT

## 2024-04-25 PROCEDURE — 36415 COLL VENOUS BLD VENIPUNCTURE: CPT

## 2024-04-25 PROCEDURE — 1100000000 HC RM PRIVATE

## 2024-04-25 PROCEDURE — 2580000003 HC RX 258

## 2024-04-25 RX ORDER — MAGNESIUM SULFATE IN WATER 40 MG/ML
2000 INJECTION, SOLUTION INTRAVENOUS ONCE
Status: COMPLETED | OUTPATIENT
Start: 2024-04-25 | End: 2024-04-25

## 2024-04-25 RX ADMIN — SODIUM CHLORIDE, PRESERVATIVE FREE 10 ML: 5 INJECTION INTRAVENOUS at 21:37

## 2024-04-25 RX ADMIN — MAGNESIUM SULFATE HEPTAHYDRATE 2000 MG: 40 INJECTION, SOLUTION INTRAVENOUS at 15:06

## 2024-04-25 RX ADMIN — POTASSIUM CHLORIDE, DEXTROSE MONOHYDRATE AND SODIUM CHLORIDE: 150; 5; 450 INJECTION, SOLUTION INTRAVENOUS at 21:37

## 2024-04-25 RX ADMIN — PANTOPRAZOLE SODIUM 40 MG: 40 INJECTION, POWDER, LYOPHILIZED, FOR SOLUTION INTRAVENOUS at 09:21

## 2024-04-25 RX ADMIN — WATER 2000 MG: 1 INJECTION INTRAMUSCULAR; INTRAVENOUS; SUBCUTANEOUS at 11:25

## 2024-04-25 RX ADMIN — POTASSIUM CHLORIDE, DEXTROSE MONOHYDRATE AND SODIUM CHLORIDE: 150; 5; 450 INJECTION, SOLUTION INTRAVENOUS at 00:38

## 2024-04-25 RX ADMIN — HYDROMORPHONE HYDROCHLORIDE 0.25 MG: 1 INJECTION, SOLUTION INTRAMUSCULAR; INTRAVENOUS; SUBCUTANEOUS at 18:50

## 2024-04-25 RX ADMIN — SODIUM CHLORIDE, PRESERVATIVE FREE 10 ML: 5 INJECTION INTRAVENOUS at 09:21

## 2024-04-25 RX ADMIN — Medication: at 09:21

## 2024-04-25 RX ADMIN — Medication: at 21:37

## 2024-04-25 RX ADMIN — POTASSIUM CHLORIDE, DEXTROSE MONOHYDRATE AND SODIUM CHLORIDE: 150; 5; 450 INJECTION, SOLUTION INTRAVENOUS at 11:41

## 2024-04-25 RX ADMIN — FERROUS SULFATE TAB 325 MG (65 MG ELEMENTAL FE) 325 MG: 325 (65 FE) TAB at 06:15

## 2024-04-25 RX ADMIN — HYDROMORPHONE HYDROCHLORIDE 0.25 MG: 1 INJECTION, SOLUTION INTRAMUSCULAR; INTRAVENOUS; SUBCUTANEOUS at 08:17

## 2024-04-25 NOTE — PROGRESS NOTES
Infectious Disease Progress Note     Subjective:      Afebrile, still not awakening, dobhoff placed yesterday    Objective:    Vitals:   Patient Vitals for the past 24 hrs:   Temp Pulse Resp BP SpO2   04/25/24 0838 98.6 °F (37 °C) 83 18 (!) 141/64 99 %   04/24/24 2008 98.1 °F (36.7 °C) 82 18 121/71 94 %   04/24/24 1557 98.6 °F (37 °C) 87 15 (!) 144/70 97 %         Physical Exam:  Gen: lethargy  HEENT:  Normocephalic, atraumatic with dobhoff +  CV: 2/6 systolic murmur  Lungs: no wheezing  Abdomen: soft, non tender  Genitourinary:  no espana catheter + pure-wick   Skin: sacral decub ulcer, photos appreciated  Psych: lethargic, not following commands  Neuro: lethargy, minimally arousable  Musculoskeletal:  No joint edema, erythema or tenderness noted     Central Line:      Medications:    Current Facility-Administered Medications:     dextrose 5 % and 0.45 % NaCl with KCl 20 mEq infusion, , IntraVENous, Continuous, Raad Alvarado MD, Last Rate: 125 mL/hr at 04/25/24 0038, New Bag at 04/25/24 0038    folic acid (FOLVITE) tablet 1 mg, 1 mg, Oral, Daily, Jennifer Ansari MD    HYDROmorphone HCl PF (DILAUDID) injection 0.25 mg, 0.25 mg, IntraVENous, Q3H PRN, 0.25 mg at 04/25/24 0817 **OR** HYDROmorphone HCl PF (DILAUDID) injection 0.5 mg, 0.5 mg, IntraVENous, Q3H PRN, Drake Pillai MD    [Held by provider] aspirin chewable tablet 81 mg, 81 mg, Oral, Daily, Tere Subramanian MD    citalopram (CELEXA) tablet 10 mg, 10 mg, Oral, Daily, Tere Subramanian MD    ferrous sulfate (IRON 325) tablet 325 mg, 325 mg, Oral, [facility], Tere Subramanian MD, 325 mg at 04/25/24 0615    [Held by provider] midodrine (PROAMATINE) tablet 2.5 mg, 2.5 mg, Oral, BID, Tere Subramanian MD    [Held by provider] pantoprazole (PROTONIX) tablet 40 mg, 40 mg, Oral, [facility], Tere Subramanian MD    sodium chloride flush 0.9 % injection 5-40 mL, 5-40 mL, IntraVENous, 2 times per day, Tere Subramanian MD, 10 mL at 04/25/24 0921    sodium chloride  (H) 97 - 108 mmol/L    CO2 31 21 - 32 mmol/L    Anion Gap 2 (L) 5 - 15 mmol/L    Glucose 155 (H) 65 - 100 mg/dL    BUN 11 6 - 20 MG/DL    Creatinine 0.48 (L) 0.55 - 1.02 MG/DL    Bun/Cre Ratio 23 (H) 12 - 20      Est, Glom Filt Rate 89 >60 ml/min/1.73m2    Calcium 11.1 (H) 8.5 - 10.1 MG/DL    Total Bilirubin 0.6 0.2 - 1.0 MG/DL    ALT 19 12 - 78 U/L    AST 22 15 - 37 U/L    Alk Phosphatase 88 45 - 117 U/L    Total Protein 4.8 (L) 6.4 - 8.2 g/dL    Albumin 1.6 (L) 3.5 - 5.0 g/dL    Globulin 3.2 2.0 - 4.0 g/dL    Albumin/Globulin Ratio 0.5 (L) 1.1 - 2.2     CBC with Auto Differential    Collection Time: 04/25/24  6:43 AM   Result Value Ref Range    WBC 18.0 (H) 3.6 - 11.0 K/uL    RBC 3.94 3.80 - 5.20 M/uL    Hemoglobin 8.8 (L) 11.5 - 16.0 g/dL    Hematocrit 27.0 (L) 35.0 - 47.0 %    MCV 68.5 (L) 80.0 - 99.0 FL    MCH 22.3 (L) 26.0 - 34.0 PG    MCHC 32.6 30.0 - 36.5 g/dL    RDW 15.9 (H) 11.5 - 14.5 %    Platelets 36 (LL) 150 - 400 K/uL    Nucleated RBCs 0.0 0  WBC    nRBC 0.00 0.00 - 0.01 K/uL    Neutrophils % 89 (H) 32 - 75 %    Lymphocytes % 3 (L) 12 - 49 %    Monocytes % 6 5 - 13 %    Eosinophils % 0 0 - 7 %    Basophils % 0 0 - 1 %    Immature Granulocytes % 2 (H) 0.0 - 0.5 %    Neutrophils Absolute 16.0 (H) 1.8 - 8.0 K/UL    Lymphocytes Absolute 0.5 (L) 0.8 - 3.5 K/UL    Monocytes Absolute 1.1 (H) 0.0 - 1.0 K/UL    Eosinophils Absolute 0.0 0.0 - 0.4 K/UL    Basophils Absolute 0.0 0.0 - 0.1 K/UL    Immature Granulocytes Absolute 0.4 (H) 0.00 - 0.04 K/UL    Differential Type SMEAR SCANNED      RBC Comment ANISOCYTOSIS  1+        RBC Comment MICROCYTOSIS  1+        RBC Comment OVALOCYTES  1+       Vitamin D 25 Hydroxy    Collection Time: 04/25/24  6:43 AM   Result Value Ref Range    Vit D, 25-Hydroxy 27.3 (L) 30 - 100 ng/mL           Micro:     Blood: Group B Strep Bacteremia 2/2 sets on 4/21/24, pending repeat blood cultures 4/22, 4/23.    Imaging: TTE with ppm lead vegetation    Assessment / Plan     89 y/o

## 2024-04-25 NOTE — PROGRESS NOTES
[facility]  Hospitalist Group                                                                                          Hospitalist Progress Note  Reshma Cowan MD  Office Phone: [phone]        Date of Service:  2024  NAME:  Carlie Newman  :  1933  MRN:  [id number]       Admission Summary:   Carlie Newman is a 89 y.o. female with past medical history significant for dyslipidemia, congestive heart failure, hypertension, dementia, dementia, sick sinus syndrome with resultant pacemaker insertion presented at the emergency room with change in mental status.  Patient symptoms started few days ago and progressively getting worse.  She was recently prescribed antibiotic for treatment of UTI.  Family is unsure if patient has been taking the antibiotics appropriately. No fever, rigors or chills reported.  Patient and her daughter lives together, her daughter as a stroke and they both require considerable care at home according to the patient's sister who was present at the bedside. Her sister also reported that the patient has sacral wounds.  Patient was last admitted to this hospital in 2023, she was admitted and treated for urinary tract infection.  Patient was noted to have low platelet count during that hospitalization and platelet count recovered prior to discharge to SNF.  CT of the head done on arrival had emergency room negative for acute pathology.  Chest x-ray shows a trace left pleural effusion and BNP level is elevated.  She was referred to the hospitalist service for admission.       Interval history / Subjective:   Patient is continues to be confused, minimally responsive, unable to provide ROS or history.   Discussed with patient's sister at bedside.     Starting TF today. Nephro consult for hypercalcemia.      Assessment & Plan:        Streptococcus agalactiae bacteremia  Sick sinus syndrome status post pacemaker  Pacemaker lead vegetation  Blood

## 2024-04-25 NOTE — CONSULTS
Comprehensive Nutrition Assessment    Type and Reason for Visit: Initial, Consult    Nutrition Recommendations/Plan:   NPO, advance diet as medically appropriate/as pt tolerated     Initiate Glucerna 1.5 at 15ml/hr, increasing 10ml/hr q6hrs to goal of 55ml/hr. Flush 145 ml H2O q4hrs  Provides: 1815 kcal (102%), 77 gm pro (100%), 1788 ml H2O (100%)  D5@125ml/hr (510 kcal) - consider weaning s/p TF initiated/tolerated    Replete phosphorus/magnesium - continue to monitor/replete potassium prn   Continue folic acid, consider addition of MVI/thiamine prn   Monitor/replete vitamin D prn     Please obtain an updated scaled wt as able   Monitor/document TF rate, tolerance, and Bms in I/Os        Malnutrition Assessment:  Malnutrition Status:  Severe malnutrition (04/25/24 1041)    Context:  Acute Illness     Findings of the 6 clinical characteristics of malnutrition:  Energy Intake:  Mild decrease in energy intake (Comment) (poor intakes 1-2 days PTA per family report)  Weight Loss:  Greater than 7.5% over 3 months     Body Fat Loss:  Moderate body fat loss Buccal region, Orbital   Muscle Mass Loss:  Moderate muscle mass loss Clavicles (pectoralis & deltoids), Temples (temporalis)  Fluid Accumulation:  No significant fluid accumulation     Strength:  Not Performed       Nutrition Assessment:    Past Medical History:   Diagnosis Date    Arrhythmia     bradycardia,S/P pacemaker    CHF (congestive heart failure) (HCC)     Chronic renal disease, stage III (HCC) 09/19/2022    Heart failure (HCC)     Hypertension     Irregular heart rate     Major depressive disorder, recurrent, mild 1/11/2024    Pure hypercholesterolemia 3/27/2017   NKFA    Presents w/ AMS; hx of UTI per chart review. Palliative care following, noted that pt is not alert enough to discuss a diet/have SLP work w/her. S/p temporary NGT, plan to initiate TF today. TF placement checked via KUB. RD consulted for TF order and management. Discussed w/ MD, pt w/

## 2024-04-25 NOTE — PLAN OF CARE
Problem: Discharge Planning  Goal: Discharge to home or other facility with appropriate resources  Outcome: Progressing  Flowsheets (Taken 4/25/2024 0921)  Discharge to home or other facility with appropriate resources: Identify barriers to discharge with patient and caregiver     Problem: Pain  Goal: Verbalizes/displays adequate comfort level or baseline comfort level  Outcome: Progressing     Problem: Chronic Conditions and Co-morbidities  Goal: Patient's chronic conditions and co-morbidity symptoms are monitored and maintained or improved  Outcome: Progressing  Flowsheets (Taken 4/25/2024 0921)  Care Plan - Patient's Chronic Conditions and Co-Morbidity Symptoms are Monitored and Maintained or Improved: Monitor and assess patient's chronic conditions and comorbid symptoms for stability, deterioration, or improvement     Problem: Skin/Tissue Integrity  Goal: Absence of new skin breakdown  Description: 1.  Monitor for areas of redness and/or skin breakdown  2.  Assess vascular access sites hourly  3.  Every 4-6 hours minimum:  Change oxygen saturation probe site  4.  Every 4-6 hours:  If on nasal continuous positive airway pressure, respiratory therapy assess nares and determine need for appliance change or resting period.  Outcome: Progressing     Problem: Safety - Adult  Goal: Free from fall injury  Outcome: Progressing  Flowsheets (Taken 4/25/2024 0921)  Free From Fall Injury: Instruct family/caregiver on patient safety

## 2024-04-25 NOTE — CARE COORDINATION
Medicaid LTSS Screening submitted for processing on the [facility] portal.      BEATRIZ TORRES

## 2024-04-26 ENCOUNTER — HOME CARE VISIT (OUTPATIENT)
Dept: HOME HEALTH SERVICES | Facility: HOME HEALTH | Age: 89
End: 2024-04-26

## 2024-04-26 LAB
ALBUMIN SERPL-MCNC: 1.4 G/DL (ref 3.5–5)
ALBUMIN/GLOB SERPL: 0.4 (ref 1.1–2.2)
ALP SERPL-CCNC: 133 U/L (ref 45–117)
ALT SERPL-CCNC: 66 U/L (ref 12–78)
ANION GAP SERPL CALC-SCNC: 4 MMOL/L (ref 5–15)
AST SERPL-CCNC: 109 U/L (ref 15–37)
BASOPHILS # BLD: 0 K/UL (ref 0–0.1)
BASOPHILS NFR BLD: 0 % (ref 0–1)
BILIRUB SERPL-MCNC: 0.5 MG/DL (ref 0.2–1)
BUN SERPL-MCNC: 12 MG/DL (ref 6–20)
BUN/CREAT SERPL: 24 (ref 12–20)
CALCIUM SERPL-MCNC: 10.2 MG/DL (ref 8.5–10.1)
CHLORIDE SERPL-SCNC: 109 MMOL/L (ref 97–108)
CO2 SERPL-SCNC: 30 MMOL/L (ref 21–32)
COMMENT:: NORMAL
CREAT SERPL-MCNC: 0.51 MG/DL (ref 0.55–1.02)
DIFFERENTIAL METHOD BLD: ABNORMAL
EOSINOPHIL # BLD: 0 K/UL (ref 0–0.4)
EOSINOPHIL NFR BLD: 0 % (ref 0–7)
ERYTHROCYTE [DISTWIDTH] IN BLOOD BY AUTOMATED COUNT: 15.9 % (ref 11.5–14.5)
GLOBULIN SER CALC-MCNC: 3.4 G/DL (ref 2–4)
GLUCOSE SERPL-MCNC: 137 MG/DL (ref 65–100)
HCT VFR BLD AUTO: 26 % (ref 35–47)
HGB BLD-MCNC: 8.5 G/DL (ref 11.5–16)
IMM GRANULOCYTES # BLD AUTO: 0 K/UL
IMM GRANULOCYTES NFR BLD AUTO: 0 %
LYMPHOCYTES # BLD: 0.8 K/UL (ref 0.8–3.5)
LYMPHOCYTES NFR BLD: 5 % (ref 12–49)
MAGNESIUM SERPL-MCNC: 1.6 MG/DL (ref 1.6–2.4)
MCH RBC QN AUTO: 22.2 PG (ref 26–34)
MCHC RBC AUTO-ENTMCNC: 32.7 G/DL (ref 30–36.5)
MCV RBC AUTO: 67.9 FL (ref 80–99)
MONOCYTES # BLD: 0.8 K/UL (ref 0–1)
MONOCYTES NFR BLD: 5 % (ref 5–13)
NEUTS SEG # BLD: 15.1 K/UL (ref 1.8–8)
NEUTS SEG NFR BLD: 90 % (ref 32–75)
NRBC # BLD: 0.04 K/UL (ref 0–0.01)
NRBC BLD-RTO: 0.2 PER 100 WBC
PHOSPHATE SERPL-MCNC: 1.8 MG/DL (ref 2.6–4.7)
PLATELET # BLD AUTO: 40 K/UL (ref 150–400)
POTASSIUM SERPL-SCNC: 3.9 MMOL/L (ref 3.5–5.1)
PROT SERPL-MCNC: 4.8 G/DL (ref 6.4–8.2)
RBC # BLD AUTO: 3.83 M/UL (ref 3.8–5.2)
RBC MORPH BLD: ABNORMAL
SODIUM SERPL-SCNC: 143 MMOL/L (ref 136–145)
SPECIMEN HOLD: NORMAL
WBC # BLD AUTO: 16.7 K/UL (ref 3.6–11)

## 2024-04-26 PROCEDURE — 85025 COMPLETE CBC W/AUTO DIFF WBC: CPT

## 2024-04-26 PROCEDURE — A4216 STERILE WATER/SALINE, 10 ML: HCPCS

## 2024-04-26 PROCEDURE — 6370000000 HC RX 637 (ALT 250 FOR IP): Performed by: STUDENT IN AN ORGANIZED HEALTH CARE EDUCATION/TRAINING PROGRAM

## 2024-04-26 PROCEDURE — 2500000003 HC RX 250 WO HCPCS: Performed by: STUDENT IN AN ORGANIZED HEALTH CARE EDUCATION/TRAINING PROGRAM

## 2024-04-26 PROCEDURE — 83735 ASSAY OF MAGNESIUM: CPT

## 2024-04-26 PROCEDURE — 36415 COLL VENOUS BLD VENIPUNCTURE: CPT

## 2024-04-26 PROCEDURE — 99232 SBSQ HOSP IP/OBS MODERATE 35: CPT | Performed by: PHYSICAL MEDICINE & REHABILITATION

## 2024-04-26 PROCEDURE — C9113 INJ PANTOPRAZOLE SODIUM, VIA: HCPCS

## 2024-04-26 PROCEDURE — 6360000002 HC RX W HCPCS: Performed by: STUDENT IN AN ORGANIZED HEALTH CARE EDUCATION/TRAINING PROGRAM

## 2024-04-26 PROCEDURE — 99233 SBSQ HOSP IP/OBS HIGH 50: CPT | Performed by: INTERNAL MEDICINE

## 2024-04-26 PROCEDURE — 6360000002 HC RX W HCPCS

## 2024-04-26 PROCEDURE — 80053 COMPREHEN METABOLIC PANEL: CPT

## 2024-04-26 PROCEDURE — 6370000000 HC RX 637 (ALT 250 FOR IP): Performed by: INTERNAL MEDICINE

## 2024-04-26 PROCEDURE — 2580000003 HC RX 258: Performed by: INTERNAL MEDICINE

## 2024-04-26 PROCEDURE — 2580000003 HC RX 258

## 2024-04-26 PROCEDURE — 84100 ASSAY OF PHOSPHORUS: CPT

## 2024-04-26 PROCEDURE — 1100000000 HC RM PRIVATE

## 2024-04-26 PROCEDURE — 6360000002 HC RX W HCPCS: Performed by: INTERNAL MEDICINE

## 2024-04-26 RX ORDER — MAGNESIUM SULFATE 1 G/100ML
1000 INJECTION INTRAVENOUS ONCE
Status: COMPLETED | OUTPATIENT
Start: 2024-04-26 | End: 2024-04-26

## 2024-04-26 RX ORDER — DEXTROSE MONOHYDRATE, SODIUM CHLORIDE, AND POTASSIUM CHLORIDE 50; 1.49; 4.5 G/1000ML; G/1000ML; G/1000ML
INJECTION, SOLUTION INTRAVENOUS CONTINUOUS
Status: DISCONTINUED | OUTPATIENT
Start: 2024-04-26 | End: 2024-04-27

## 2024-04-26 RX ADMIN — ACETAMINOPHEN 650 MG: 650 SUPPOSITORY RECTAL at 18:58

## 2024-04-26 RX ADMIN — WATER 2000 MG: 1 INJECTION INTRAMUSCULAR; INTRAVENOUS; SUBCUTANEOUS at 10:51

## 2024-04-26 RX ADMIN — FERROUS SULFATE TAB 325 MG (65 MG ELEMENTAL FE) 325 MG: 325 (65 FE) TAB at 05:21

## 2024-04-26 RX ADMIN — HYDROMORPHONE HYDROCHLORIDE 0.25 MG: 1 INJECTION, SOLUTION INTRAMUSCULAR; INTRAVENOUS; SUBCUTANEOUS at 19:12

## 2024-04-26 RX ADMIN — POTASSIUM CHLORIDE, DEXTROSE MONOHYDRATE AND SODIUM CHLORIDE: 150; 5; 450 INJECTION, SOLUTION INTRAVENOUS at 05:26

## 2024-04-26 RX ADMIN — MAGNESIUM SULFATE HEPTAHYDRATE 1000 MG: 1 INJECTION, SOLUTION INTRAVENOUS at 10:57

## 2024-04-26 RX ADMIN — Medication: at 21:45

## 2024-04-26 RX ADMIN — Medication: at 10:51

## 2024-04-26 RX ADMIN — PANTOPRAZOLE SODIUM 40 MG: 40 INJECTION, POWDER, LYOPHILIZED, FOR SOLUTION INTRAVENOUS at 09:19

## 2024-04-26 RX ADMIN — DIBASIC SODIUM PHOSPHATE, MONOBASIC POTASSIUM PHOSPHATE AND MONOBASIC SODIUM PHOSPHATE 2 TABLET: 852; 155; 130 TABLET ORAL at 09:20

## 2024-04-26 RX ADMIN — SODIUM CHLORIDE, PRESERVATIVE FREE 10 ML: 5 INJECTION INTRAVENOUS at 09:20

## 2024-04-26 RX ADMIN — HYDROMORPHONE HYDROCHLORIDE 0.25 MG: 1 INJECTION, SOLUTION INTRAMUSCULAR; INTRAVENOUS; SUBCUTANEOUS at 09:19

## 2024-04-26 RX ADMIN — POTASSIUM CHLORIDE, DEXTROSE MONOHYDRATE AND SODIUM CHLORIDE: 150; 5; 450 INJECTION, SOLUTION INTRAVENOUS at 15:45

## 2024-04-26 RX ADMIN — SODIUM CHLORIDE, PRESERVATIVE FREE 10 ML: 5 INJECTION INTRAVENOUS at 21:45

## 2024-04-26 NOTE — PROGRESS NOTES
Palliative Medicine  Patient Name: Carlie Newman  YOB: 1933  MRN: [id number]  Age: 89 y.o.  Gender: female    Date of Initial Consult: 4/23/24  Date of Service: 4/26/2024  Time: 12:20 PM  Provider: Taylor Mcdaniel MD  Hospital Day: 6  Admit Date: 4/21/2024  Referring Provider: Dr Olivas        Reasons for Consultation:  Goals of Care    HISTORY OF PRESENT ILLNESS (HPI):   Carlie Newman is a 89 y.o. female with a past medical history of HTN, CKD, CAD, bradycardia w/ pacemaker, L TKA recently treated for possible UTI who was admitted on 4/21/2024 from home w/ confusion/altered mental status. She has seen her PCP recently for confusion and visual/auditory hallucinations. Head CT w/out acute findings. +Bcx - group B strep. Has sacral decub ulcer. ID consulted, on cefrtiraxone. Cardiology consulted- small possible vegetation on R ventricular lead of pacemaker. They recommend conservative management w/ IV abx alone, is not candidate for pacemaker removal, too high risk. Also high risk for KD and this is not being pursued. She has thrombocytopenai as well.     4/26- remains somnolent. Tolerating tube feeds via DHT. Nephrology assisting w/ hypercalcemia.     Psychosocial: at baseline alert/oriented, conversant, uses walker a bit, mainly wheelchair. Family notes no problems with short or long term memory. She has 2 children- son Misael (ABDOULAYE) and dtr Neeru. Dtr lives w/ her- she herself had a stroke and is limited in how much physical help she can provide. Sister Maryam local and also involved. Pt is the oldest sister. 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 Has some  benefits, her  was in the .       PALLIATIVE DIAGNOSES:    Altered mental status, confusion   Generalized weakness, debility   Bacteremia   Palliative care encounter/advance care planning    ASSESSMENT AND PLAN:   Meet w/ pt and sister  touch.    Modified ESAS:  Modified-Barton Symptom Assessment Scale (ESAS)  Drowsiness Score: 9  Pain Score: No pain  Appetite Score: Worst possible appetite  Dyspnea Score: No shortness of breath    Clinical Pain Assessment (nonverbal scale for severity on nonverbal patients):   Clinical Pain Assessment  Severity: 0                Vital Signs: Blood pressure (!) 121/53, pulse 78, temperature 98.4 °F (36.9 °C), temperature source Axillary, resp. rate 16, height 1.702 m (5' 7.01\"), weight 59.4 kg (131 lb), SpO2 99 %.    PHYSICAL ASSESSMENT:   General: [] Oriented x3  [] Well appearing  [] Intubated  [x]Ill appearing, DHT in place   []Other:  Mental Status: [] Normal mental status exam  [x] Drowsy  [] Confused  []Other:  Cardiovascular: [] Regular rate/rhythm  [] Arrhythmia  [] Other:  Chest: [x] Effort normal  []Lungs clear  [] Respiratory distress  []Tachypnea  [] Other:  Abdomen: [] Soft/non-tender  [] Normal appearance  [] Distended  [] Ascites  [] Other:  Neurological: [] Normal speech  [] Normal sensation  []Deficits present:  Extremity: [] Normal skin color/temp  [] Clubbing/cyanosis  [] No edema  [] Other:    Wt Readings from Last 15 Encounters:   04/22/24 59.4 kg (131 lb)   04/12/24 59.4 kg (131 lb)   03/20/24 60.3 kg (133 lb)   03/18/24 60.3 kg (133 lb)   03/08/24 62.1 kg (137 lb)   01/11/24 70.1 kg (154 lb 9.6 oz)   10/09/23 68 kg (150 lb)   10/03/23 67.6 kg (149 lb)   09/11/23 66.4 kg (146 lb 6.4 oz)   07/24/23 68.5 kg (151 lb)   06/01/23 69.6 kg (153 lb 6.4 oz)   05/12/23 68.2 kg (150 lb 5.7 oz)   02/01/23 71.3 kg (157 lb 3.2 oz)   12/07/22 67.9 kg (149 lb 9.6 oz)   09/19/22 68.6 kg (151 lb 3.2 oz)        Current Diet: Diet NPO  ADULT TUBE FEEDING; Nasogastric; Diabetic; Continuous; 15; Yes; 10; Q 6 hours; 55; 145; Q 4 hours       PSYCHOSOCIAL/SPIRITUAL SCREENING:   Palliative IDT has assessed this patient for cultural preferences / practices and a referral made as appropriate to needs (Cultural

## 2024-04-26 NOTE — PROGRESS NOTES
[facility] Adult  Hospitalist Group                                                                                          Hospitalist Progress Note  Reshma Cowan MD  Office Phone: [phone]        Date of Service:  2024  NAME:  Carlie Newman  :  1933  MRN:  [id number]       Admission Summary:   Carlie Newman is a 89 y.o. female with past medical history significant for dyslipidemia, congestive heart failure, hypertension, dementia, dementia, sick sinus syndrome with resultant pacemaker insertion presented at the emergency room with change in mental status.  Patient symptoms started few days ago and progressively getting worse.  She was recently prescribed antibiotic for treatment of UTI.  Family is unsure if patient has been taking the antibiotics appropriately. No fever, rigors or chills reported.  Patient and her daughter lives together, her daughter as a stroke and they both require considerable care at home according to the patient's sister who was present at the bedside. Her sister also reported that the patient has sacral wounds.  Patient was last admitted to this hospital in 2023, she was admitted and treated for urinary tract infection.  Patient was noted to have low platelet count during that hospitalization and platelet count recovered prior to discharge to SNF.  CT of the head done on arrival had emergency room negative for acute pathology.  Chest x-ray shows a trace left pleural effusion and BNP level is elevated.  She was referred to the hospitalist service for admission.       Interval history / Subjective:   Patient is continues to be confused, minimally responsive, unable to provide ROS or history.   Discussed with patient's sister at bedside. Will continue current management through the weekend in hopes if meaningful improvement, if not will need to consider GOC discussions.     Tolerating tube feeds.      Assessment & Plan:

## 2024-04-26 NOTE — PLAN OF CARE
Problem: Discharge Planning  Goal: Discharge to home or other facility with appropriate resources  Outcome: Progressing  Flowsheets (Taken 4/26/2024 0920)  Discharge to home or other facility with appropriate resources: Identify barriers to discharge with patient and caregiver     Problem: Pain  Goal: Verbalizes/displays adequate comfort level or baseline comfort level  Outcome: Progressing     Problem: Chronic Conditions and Co-morbidities  Goal: Patient's chronic conditions and co-morbidity symptoms are monitored and maintained or improved  Outcome: Progressing  Flowsheets (Taken 4/26/2024 0920)  Care Plan - Patient's Chronic Conditions and Co-Morbidity Symptoms are Monitored and Maintained or Improved: Monitor and assess patient's chronic conditions and comorbid symptoms for stability, deterioration, or improvement     Problem: Skin/Tissue Integrity  Goal: Absence of new skin breakdown  Description: 1.  Monitor for areas of redness and/or skin breakdown  2.  Assess vascular access sites hourly  3.  Every 4-6 hours minimum:  Change oxygen saturation probe site  4.  Every 4-6 hours:  If on nasal continuous positive airway pressure, respiratory therapy assess nares and determine need for appliance change or resting period.  Outcome: Progressing     Problem: Safety - Adult  Goal: Free from fall injury  Outcome: Progressing  Flowsheets (Taken 4/26/2024 0920)  Free From Fall Injury: Instruct family/caregiver on patient safety

## 2024-04-26 NOTE — PROGRESS NOTES
Infectious Disease Progress Note     Subjective:      Afebrile, slightly opening eyes to sternal rub, no diarrhea    Objective:    Vitals:   Patient Vitals for the past 24 hrs:   Temp Pulse Resp BP SpO2   04/26/24 0804 98.4 °F (36.9 °C) 78 16 (!) 121/53 99 %   04/25/24 2026 99 °F (37.2 °C) 80 -- 128/87 97 %   04/25/24 1524 98.6 °F (37 °C) 77 18 (!) 140/76 91 %   04/25/24 1237 97.5 °F (36.4 °C) 67 18 (!) 141/78 96 %         Physical Exam:  Gen: lethargy  HEENT:  Normocephalic, atraumatic with dobhoff + receiving TFs  CV: 2/6 systolic murmur  Lungs: no wheezing  Abdomen: soft, non-distended  Genitourinary:  no espana catheter + pure-wick   Skin: sacral decub ulcer  Psych: lethargic, not following commands  Neuro: lethargy, minimally arousable  Musculoskeletal:  No joint edema, erythema or tenderness noted     Central Line:      Medications:    Current Facility-Administered Medications:     magnesium sulfate 1000 mg in dextrose 5% 100 mL IVPB, 1,000 mg, IntraVENous, Once, Reshma Cowan MD, Last Rate: 100 mL/hr at 04/26/24 1057, 1,000 mg at 04/26/24 1057    folic acid (FOLVITE) tablet 1 mg, 1 mg, Oral, Daily, Jennifer Ansari MD    HYDROmorphone HCl PF (DILAUDID) injection 0.25 mg, 0.25 mg, IntraVENous, Q3H PRN, 0.25 mg at 04/26/24 0919 **OR** HYDROmorphone HCl PF (DILAUDID) injection 0.5 mg, 0.5 mg, IntraVENous, Q3H PRN, Drake Pillai MD    [Held by provider] aspirin chewable tablet 81 mg, 81 mg, Oral, Daily, Tere Subramanian MD    citalopram (CELEXA) tablet 10 mg, 10 mg, Oral, Daily, Tere Subramanian MD    ferrous sulfate (IRON 325) tablet 325 mg, 325 mg, Oral, QAM AC, Tere Subramanian A, MD, 325 mg at 04/26/24 0521    [Held by provider] midodrine (PROAMATINE) tablet 2.5 mg, 2.5 mg, Oral, BID, Tere Subramanian MD    [Held by provider] pantoprazole (PROTONIX) tablet 40 mg, 40 mg, Oral, QAM Marilynn SWANN Razaak A, MD    sodium chloride flush 0.9 % injection 5-40 mL, 5-40 mL, IntraVENous, 2 times per day, Marilynn  (L) 5 - 15 mmol/L    Glucose 137 (H) 65 - 100 mg/dL    BUN 12 6 - 20 MG/DL    Creatinine 0.51 (L) 0.55 - 1.02 MG/DL    Bun/Cre Ratio 24 (H) 12 - 20      Est, Glom Filt Rate 88 >60 ml/min/1.73m2    Calcium 10.2 (H) 8.5 - 10.1 MG/DL    Total Bilirubin 0.5 0.2 - 1.0 MG/DL    ALT 66 12 - 78 U/L     (H) 15 - 37 U/L    Alk Phosphatase 133 (H) 45 - 117 U/L    Total Protein 4.8 (L) 6.4 - 8.2 g/dL    Albumin 1.4 (L) 3.5 - 5.0 g/dL    Globulin 3.4 2.0 - 4.0 g/dL    Albumin/Globulin Ratio 0.4 (L) 1.1 - 2.2     CBC with Auto Differential    Collection Time: 04/26/24  6:20 AM   Result Value Ref Range    WBC 16.7 (H) 3.6 - 11.0 K/uL    RBC 3.83 3.80 - 5.20 M/uL    Hemoglobin 8.5 (L) 11.5 - 16.0 g/dL    Hematocrit 26.0 (L) 35.0 - 47.0 %    MCV 67.9 (L) 80.0 - 99.0 FL    MCH 22.2 (L) 26.0 - 34.0 PG    MCHC 32.7 30.0 - 36.5 g/dL    RDW 15.9 (H) 11.5 - 14.5 %    Platelets 40 (LL) 150 - 400 K/uL    Nucleated RBCs 0.2 (H) 0  WBC    nRBC 0.04 (H) 0.00 - 0.01 K/uL    Neutrophils % 90 (H) 32 - 75 %    Lymphocytes % 5 (L) 12 - 49 %    Monocytes % 5 5 - 13 %    Eosinophils % 0 0 - 7 %    Basophils % 0 0 - 1 %    Immature Granulocytes % 0 %    Neutrophils Absolute 15.1 (H) 1.8 - 8.0 K/UL    Lymphocytes Absolute 0.8 0.8 - 3.5 K/UL    Monocytes Absolute 0.8 0.0 - 1.0 K/UL    Eosinophils Absolute 0.0 0.0 - 0.4 K/UL    Basophils Absolute 0.0 0.0 - 0.1 K/UL    Immature Granulocytes Absolute 0.0 K/UL    Differential Type AUTOMATED      RBC Comment ANISOCYTOSIS  1+        RBC Comment MICROCYTOSIS  2+        RBC Comment HYPOCHROMIA  2+       Phosphorus    Collection Time: 04/26/24  6:20 AM   Result Value Ref Range    Phosphorus 1.8 (L) 2.6 - 4.7 MG/DL   Extra Tubes Hold    Collection Time: 04/26/24  6:21 AM   Result Value Ref Range    Specimen HOld 1sst     Comment:        Add-on orders for these samples will be processed based on acceptable specimen integrity and analyte stability, which may vary by analyte.           Micro:

## 2024-04-26 NOTE — PROGRESS NOTES
Name: Carlie Newman MRN: [id number]   : 1933 Hospital: [facility]   Date: 2024        IMPRESSION:   Hypercalcemia of likely primary hyperparathyroidism. Increased PTH. Ca is coming down with hydration  Severe protein deficiency  AMS- not changed  Normal GFR  Hypernatremia from free water deficit  Hyponatremia - corrected with replacement      PLAN:   Continue hydration  Ca in AM. If it remains high- utilize low dose sinecalcet. I would not recommend a work up for localizing parathyroid adenoma. Patient is not a candidate for surgery  If Ca remains high she may benefit from a dose of Zometa.  Palliative care consult would be helpful.     Subjective/Interval History:   I have reviewed the flowsheet and previous day’s notes.    ROS:Review of systems not obtained due to patient factors.    Objective:   Vital Signs:    BP (!) 121/53   Pulse 78   Temp 98.4 °F (36.9 °C) (Axillary)   Resp 16   Ht 1.702 m (5' 7.01\")   Wt 59.4 kg (131 lb)   SpO2 99%   BMI 20.51 kg/m²             Temp (24hrs), Av.4 °F (36.9 °C), Min:97.5 °F (36.4 °C), Max:99 °F (37.2 °C)       Intake/Output:   Last shift:      No intake/output data recorded.  Last 3 shifts:  1901 -  0700  In: 0   Out: 1100 [Urine:1100]    Intake/Output Summary (Last 24 hours) at 2024 1211  Last data filed at 2024 0318  Gross per 24 hour   Intake --   Output 650 ml   Net -650 ml        Current Facility-Administered Medications   Medication Dose Route Frequency    folic acid (FOLVITE) tablet 1 mg  1 mg Oral Daily    HYDROmorphone HCl PF (DILAUDID) injection 0.25 mg  0.25 mg IntraVENous Q3H PRN    Or    HYDROmorphone HCl PF (DILAUDID) injection 0.5 mg  0.5 mg IntraVENous Q3H PRN    [Held by provider] aspirin chewable tablet 81 mg  81 mg Oral Daily    citalopram (CELEXA) tablet 10 mg  10 mg Oral Daily    ferrous sulfate (IRON 325) tablet 325 mg  325 mg Oral QAM AC    [Held by provider] midodrine (PROAMATINE) tablet 2.5 mg   assess  Neurologic: Does not move spontaneously       DATA:  LABS:  Recent Labs     04/26/24  0620 04/25/24  0643 04/24/24  1633 04/24/24  0410 04/22/24  1254 04/22/24  0319    147*  --  150*   < > 138   K 3.9 3.8 4.0 3.0*   < > 2.9*   * 114*  --  115*   < > 110*   CO2 30 31  --  32   < > 23   BUN 12 11  --  15   < > 26*   CREATININE 0.51* 0.48*  --  0.49*   < > 0.49*   CALCIUM 10.2* 11.1*  --  11.7*   < > 10.2*   PHOS 1.8*  --   --   --   --  2.2*   MG 1.6 1.5*  --  1.7  --   --     < > = values in this interval not displayed.     Recent Labs     04/26/24  0620 04/25/24  0643 04/24/24  0410   WBC 16.7* 18.0* 15.8*   HGB 8.5* 8.8* 9.2*   HCT 26.0* 27.0* 27.5*   PLT 40* 36* 30*     No results for input(s): \"MARITZA\", \"KU\", \"CLU\", \"CREAU\" in the last 720 hours.    Invalid input(s): \"PROU\"    Radiology  XR ABDOMEN (KUB) (SINGLE AP VIEW)    Result Date: 4/24/2024  Feeding tube within the gastric lumen     US CHEST INCLUDING MEDIASTINUM    Result Date: 4/23/2024  No fluid collection around the left chest pacemaker generator.     XR FOOT RIGHT (MIN 3 VIEWS)    Result Date: 4/23/2024  No radiographic evidence of osteomyelitis..    CTA CHEST W WO CONTRAST    Result Date: 4/22/2024  No pulmonary embolism. Khan hernia containing anterior wall of transverse colon. Cholelithiasis/sludge. Severe diverticulosis. 2.4 x 2.1 cm segment 5 hepatic lesion, potentially representing hemangioma. This can be further evaluated MRI as indicated. Indeterminate 3 mm nodule right lower lobe. Consider 12-month CT as indicated. Small bilateral pleural effusions.     CT ABDOMEN PELVIS W IV CONTRAST Additional Contrast? Radiologist Recommendation    Result Date: 4/22/2024  No pulmonary embolism. Khan hernia containing anterior wall of transverse colon. Cholelithiasis/sludge. Severe diverticulosis. 2.4 x 2.1 cm segment 5 hepatic lesion, potentially representing hemangioma. This can be further evaluated MRI as indicated. Indeterminate 3

## 2024-04-26 NOTE — CONSULTS
[address]  [address]                              CONSULTATION      PATIENT NAME: MELINA AYERS            : 1933  MED REC NO: [id number]                       ROOM: 205  ACCOUNT NO: [id number]                       ADMIT DATE: 2024  PROVIDER: Jade Napoles MD    DATE OF SERVICE:      ATTENDING PHYSICIAN:  JOHN TORRES    REFERRING PHYSICIAN:  Dr. Olivas.    REASON FOR CONSULTATION:  Hypercalcemia.    HISTORY OF PRESENT ILLNESS:  The patient is a 89-year-old black woman, who was brought in by her family stating that her mental status has changed.  The patient has underlying disease of dementia, hypertension, sick sinus syndrome, dyslipidemia, hypertension.  As initial workup, she had blood work done, which revealed a very poor nutritional status with albumin below 2 and high calcium.  The corrected calcium is above 13.  There is no mention of previous workup for hypercalcemia or primary hyperparathyroidism.  The patient was on vitamin D though.    PAST MEDICAL HISTORY:  Arrhythmia, bradycardia, CHF, hypertension, hypotension, depression, dementia, hypercholesterolemia.    PAST SURGICAL HISTORY:  Total knee replacement on the left side from pacemaker placement.    HOME MEDICATION LIST:  Consists of ferrous sulfate, ammonium lactate, Lasix 20, nystatin, midodrine, Celexa, Claritin, Protonix, aspirin, cholecalciferol 1000 units, Nasonex, diclofenac gel, albuterol.    ALLERGIES:  ALLERGIC TO MORPHINE, CODEINE, PENICILLIN, AND PSEUDOEPHEDRINE.      FAMILY HISTORY:  Positive for hypertension.    SOCIAL HISTORY:  The patient is a lifetime nonsmoker.  She apparently lives with her family.    REVIEW OF SYSTEMS:  Unobtainable.  The patient is nonverbal.    PHYSICAL EXAMINATION:  GENERAL:  Elderly black woman, who is nonverbal.  She does not respond to speech or to touch.  She is lying supine in bed, on IV fluids.  She looks  to be part of this patient's care.  Our service will follow up with you closely.        JADE AVILA MD      [facility]  D:  04/25/2024 17:48:08  T:  04/25/2024 19:50:45  JOB #:  [id number]    CC:   Jade Avila MD

## 2024-04-27 ENCOUNTER — APPOINTMENT (OUTPATIENT)
Facility: HOSPITAL | Age: 89
DRG: 871 | End: 2024-04-27
Payer: MEDICARE

## 2024-04-27 LAB
ALBUMIN SERPL-MCNC: 1.4 G/DL (ref 3.5–5)
ALBUMIN/GLOB SERPL: 0.4 (ref 1.1–2.2)
ALP SERPL-CCNC: 171 U/L (ref 45–117)
ALT SERPL-CCNC: 98 U/L (ref 12–78)
ANION GAP SERPL CALC-SCNC: 4 MMOL/L (ref 5–15)
AST SERPL-CCNC: 132 U/L (ref 15–37)
BACTERIA SPEC CULT: NORMAL
BACTERIA SPEC CULT: NORMAL
BASOPHILS # BLD: 0.1 K/UL (ref 0–0.1)
BASOPHILS NFR BLD: 1 % (ref 0–1)
BILIRUB SERPL-MCNC: 0.4 MG/DL (ref 0.2–1)
BUN SERPL-MCNC: 19 MG/DL (ref 6–20)
BUN/CREAT SERPL: 35 (ref 12–20)
CALCIUM SERPL-MCNC: 9.5 MG/DL (ref 8.5–10.1)
CHLORIDE SERPL-SCNC: 107 MMOL/L (ref 97–108)
CO2 SERPL-SCNC: 29 MMOL/L (ref 21–32)
COMMENT:: NORMAL
CREAT SERPL-MCNC: 0.55 MG/DL (ref 0.55–1.02)
DIFFERENTIAL METHOD BLD: ABNORMAL
EOSINOPHIL # BLD: 0 K/UL (ref 0–0.4)
EOSINOPHIL NFR BLD: 0 % (ref 0–7)
ERYTHROCYTE [DISTWIDTH] IN BLOOD BY AUTOMATED COUNT: 16 % (ref 11.5–14.5)
FLUAV RNA SPEC QL NAA+PROBE: NOT DETECTED
FLUBV RNA SPEC QL NAA+PROBE: NOT DETECTED
GLOBULIN SER CALC-MCNC: 4 G/DL (ref 2–4)
GLUCOSE SERPL-MCNC: 144 MG/DL (ref 65–100)
HCT VFR BLD AUTO: 25.7 % (ref 35–47)
HGB BLD-MCNC: 8.5 G/DL (ref 11.5–16)
IMM GRANULOCYTES # BLD AUTO: 0 K/UL
IMM GRANULOCYTES NFR BLD AUTO: 0 %
LYMPHOCYTES # BLD: 0.9 K/UL (ref 0.8–3.5)
LYMPHOCYTES NFR BLD: 7 % (ref 12–49)
MAGNESIUM SERPL-MCNC: 1.7 MG/DL (ref 1.6–2.4)
MCH RBC QN AUTO: 22.5 PG (ref 26–34)
MCHC RBC AUTO-ENTMCNC: 33.1 G/DL (ref 30–36.5)
MCV RBC AUTO: 68 FL (ref 80–99)
MONOCYTES # BLD: 1.5 K/UL (ref 0–1)
MONOCYTES NFR BLD: 12 % (ref 5–13)
NEUTS BAND NFR BLD MANUAL: 3 % (ref 0–6)
NEUTS SEG # BLD: 10.4 K/UL (ref 1.8–8)
NEUTS SEG NFR BLD: 77 % (ref 32–75)
NRBC # BLD: 0.06 K/UL (ref 0–0.01)
NRBC BLD-RTO: 0.5 PER 100 WBC
PHOSPHATE SERPL-MCNC: 2.3 MG/DL (ref 2.6–4.7)
PLATELET # BLD AUTO: 50 K/UL (ref 150–400)
POTASSIUM SERPL-SCNC: 4.2 MMOL/L (ref 3.5–5.1)
PROT SERPL-MCNC: 5.4 G/DL (ref 6.4–8.2)
RBC # BLD AUTO: 3.78 M/UL (ref 3.8–5.2)
RBC MORPH BLD: ABNORMAL
SARS-COV-2 RNA RESP QL NAA+PROBE: NOT DETECTED
SERVICE CMNT-IMP: NORMAL
SERVICE CMNT-IMP: NORMAL
SODIUM SERPL-SCNC: 140 MMOL/L (ref 136–145)
SPECIMEN HOLD: NORMAL
WBC # BLD AUTO: 12.9 K/UL (ref 3.6–11)

## 2024-04-27 PROCEDURE — 2580000003 HC RX 258: Performed by: INTERNAL MEDICINE

## 2024-04-27 PROCEDURE — 6360000002 HC RX W HCPCS: Performed by: INTERNAL MEDICINE

## 2024-04-27 PROCEDURE — 36415 COLL VENOUS BLD VENIPUNCTURE: CPT

## 2024-04-27 PROCEDURE — 2500000003 HC RX 250 WO HCPCS: Performed by: STUDENT IN AN ORGANIZED HEALTH CARE EDUCATION/TRAINING PROGRAM

## 2024-04-27 PROCEDURE — 87636 SARSCOV2 & INF A&B AMP PRB: CPT

## 2024-04-27 PROCEDURE — 6370000000 HC RX 637 (ALT 250 FOR IP): Performed by: FAMILY MEDICINE

## 2024-04-27 PROCEDURE — 87040 BLOOD CULTURE FOR BACTERIA: CPT

## 2024-04-27 PROCEDURE — 6360000002 HC RX W HCPCS: Performed by: STUDENT IN AN ORGANIZED HEALTH CARE EDUCATION/TRAINING PROGRAM

## 2024-04-27 PROCEDURE — 2580000003 HC RX 258

## 2024-04-27 PROCEDURE — 74018 RADEX ABDOMEN 1 VIEW: CPT

## 2024-04-27 PROCEDURE — 84100 ASSAY OF PHOSPHORUS: CPT

## 2024-04-27 PROCEDURE — C9113 INJ PANTOPRAZOLE SODIUM, VIA: HCPCS

## 2024-04-27 PROCEDURE — 6370000000 HC RX 637 (ALT 250 FOR IP): Performed by: STUDENT IN AN ORGANIZED HEALTH CARE EDUCATION/TRAINING PROGRAM

## 2024-04-27 PROCEDURE — 76705 ECHO EXAM OF ABDOMEN: CPT

## 2024-04-27 PROCEDURE — 83735 ASSAY OF MAGNESIUM: CPT

## 2024-04-27 PROCEDURE — A4216 STERILE WATER/SALINE, 10 ML: HCPCS

## 2024-04-27 PROCEDURE — 80053 COMPREHEN METABOLIC PANEL: CPT

## 2024-04-27 PROCEDURE — 71045 X-RAY EXAM CHEST 1 VIEW: CPT

## 2024-04-27 PROCEDURE — 1100000000 HC RM PRIVATE

## 2024-04-27 PROCEDURE — 6370000000 HC RX 637 (ALT 250 FOR IP): Performed by: INTERNAL MEDICINE

## 2024-04-27 PROCEDURE — 85025 COMPLETE CBC W/AUTO DIFF WBC: CPT

## 2024-04-27 PROCEDURE — 6360000002 HC RX W HCPCS

## 2024-04-27 RX ORDER — MAGNESIUM SULFATE 1 G/100ML
1000 INJECTION INTRAVENOUS ONCE
Status: COMPLETED | OUTPATIENT
Start: 2024-04-27 | End: 2024-04-27

## 2024-04-27 RX ORDER — FUROSEMIDE 10 MG/ML
20 INJECTION INTRAMUSCULAR; INTRAVENOUS ONCE
Status: COMPLETED | OUTPATIENT
Start: 2024-04-27 | End: 2024-04-27

## 2024-04-27 RX ADMIN — Medication: at 23:47

## 2024-04-27 RX ADMIN — PANTOPRAZOLE SODIUM 40 MG: 40 INJECTION, POWDER, LYOPHILIZED, FOR SOLUTION INTRAVENOUS at 10:17

## 2024-04-27 RX ADMIN — WATER 2000 MG: 1 INJECTION INTRAMUSCULAR; INTRAVENOUS; SUBCUTANEOUS at 10:32

## 2024-04-27 RX ADMIN — Medication 2 G: at 10:22

## 2024-04-27 RX ADMIN — Medication: at 10:24

## 2024-04-27 RX ADMIN — SODIUM CHLORIDE, PRESERVATIVE FREE 10 ML: 5 INJECTION INTRAVENOUS at 19:03

## 2024-04-27 RX ADMIN — SODIUM CHLORIDE, PRESERVATIVE FREE 10 ML: 5 INJECTION INTRAVENOUS at 19:04

## 2024-04-27 RX ADMIN — MAGNESIUM SULFATE HEPTAHYDRATE 1000 MG: 1 INJECTION, SOLUTION INTRAVENOUS at 10:37

## 2024-04-27 RX ADMIN — DIBASIC SODIUM PHOSPHATE, MONOBASIC POTASSIUM PHOSPHATE AND MONOBASIC SODIUM PHOSPHATE 2 TABLET: 852; 155; 130 TABLET ORAL at 10:24

## 2024-04-27 RX ADMIN — POTASSIUM CHLORIDE, DEXTROSE MONOHYDRATE AND SODIUM CHLORIDE: 150; 5; 450 INJECTION, SOLUTION INTRAVENOUS at 02:46

## 2024-04-27 RX ADMIN — CITALOPRAM HYDROBROMIDE 10 MG: 20 TABLET ORAL at 10:22

## 2024-04-27 RX ADMIN — SODIUM CHLORIDE, PRESERVATIVE FREE 10 ML: 5 INJECTION INTRAVENOUS at 10:28

## 2024-04-27 RX ADMIN — FUROSEMIDE 20 MG: 10 INJECTION, SOLUTION INTRAMUSCULAR; INTRAVENOUS at 19:04

## 2024-04-27 ASSESSMENT — PAIN SCALES - GENERAL: PAINLEVEL_OUTOF10: 0

## 2024-04-27 NOTE — PROGRESS NOTES
[facility] Adult  Hospitalist Group                                                                                          Hospitalist Progress Note  Reshma Cowan MD  Office Phone: [phone]        Date of Service:  2024  NAME:  Carlie Newman  :  1933  MRN:  [id number]       Admission Summary:   Carlie Newman is a 89 y.o. female with past medical history significant for dyslipidemia, congestive heart failure, hypertension, dementia, dementia, sick sinus syndrome with resultant pacemaker insertion presented at the emergency room with change in mental status.  Patient symptoms started few days ago and progressively getting worse.  She was recently prescribed antibiotic for treatment of UTI.  Family is unsure if patient has been taking the antibiotics appropriately. No fever, rigors or chills reported.  Patient and her daughter lives together, her daughter as a stroke and they both require considerable care at home according to the patient's sister who was present at the bedside. Her sister also reported that the patient has sacral wounds.  Patient was last admitted to this hospital in 2023, she was admitted and treated for urinary tract infection.  Patient was noted to have low platelet count during that hospitalization and platelet count recovered prior to discharge to SNF.  CT of the head done on arrival had emergency room negative for acute pathology.  Chest x-ray shows a trace left pleural effusion and BNP level is elevated.  She was referred to the hospitalist service for admission.       Interval history / Subjective:   Patient with more eye opening today and did squeeze hand on command. Per patient's sister, she was able to say family members names today. Did not witness any verbalizations myself during morning or afternoon rounds.     Fever overnight.      Assessment & Plan:        Streptococcus agalactiae bacteremia  Sick sinus syndrome status post  Status:    Intimate Partner Violence: Not on file   Depression: Not at risk (4/12/2024)    PHQ-2     PHQ-2 Score: 0   Housing Stability: Low Risk  (12/27/2023)    Housing Stability Vital Sign     Unable to Pay for Housing in the Last Year: No     Number of Places Lived in the Last Year: 1     Unstable Housing in the Last Year: No   Interpersonal Safety: Not At Risk (10/5/2023)    Interpersonal Safety Domain Source: IP Abuse Screening     Physical abuse: Denies     Verbal abuse: Denies     Emotional abuse: Denies     Financial abuse: Denies     Sexual abuse: Denies   Utilities: Not At Risk (2/2/2024)    [facility] Utilities     Threatened with loss of utilities: No       Review of Systems:   Pertinent items are noted in HPI.       Vital Signs:    Last 24hrs VS reviewed since prior progress note. Most recent are:  Vitals:    04/27/24 1309   BP: 134/61   Pulse: 87   Resp: 20   Temp: 98.6 °F (37 °C)   SpO2: 98%         Intake/Output Summary (Last 24 hours) at 4/27/2024 1551  Last data filed at 4/27/2024 0349  Gross per 24 hour   Intake 6941.28 ml   Output 800 ml   Net 6141.28 ml        Physical Examination:     I had a face to face encounter with this patient and independently examined them on 4/27/2024 as outlined below:          General : alert x 0   no acute distress,   HEENT: PEERL, EOMI, moist mucus membrane, NG in place  Neck: supple, no JVD, no meningeal signs  Chest: Clear to auscultation bilaterally   CVS: S1 S2 heard, Capillary refill less than 2 seconds  Abd: soft/ non tender, non distended, BS physiological,   Ext: no clubbing, no cyanosis, no edema, brisk 2+ DP pulses  Neuro/Psych: Unable to assess   skin: warm     Data Review:    Review and/or order of clinical lab test  Review and/or order of tests in the radiology section of CPT  Review and/or order of tests in the medicine section of CPT  Discussion of test results with performing physician  I personally reviewed  Image and EKG/Monitor Tracing      I

## 2024-04-27 NOTE — ACP (ADVANCE CARE PLANNING)
Advance Care Planning     Advance Care Planning (ACP) Physician/NP/PA Conversation    Date of Conversation: 4/21/2024  Conducted with: Legal next of kin  Other persons present:  Patient's nurse    Healthcare Decision Maker:   Primary Decision Maker: Misael Newman Jr. - Child - [phone]    Care Preferences:    Hospitalization:  \"If your health worsens and it becomes clear that your chance of recovery is unlikely, what would be your preference regarding hospitalization?\"  The patient would prefer comfort-focused treatment without hospitalization.    Ventilation:  \"If you were unable to breath on your own and your chance of recovery was unlikely, what would be your preference about the use of a ventilator (breathing machine) if it was available to you?\"  The patient would desire the use of a ventilator.    Resuscitation:  \"In the event your heart stopped as a result of an underlying serious health condition, would you want attempts made to restart your heart, or would you prefer a natural death?\"  Yes, attempt to resuscitate.    treatment goals, benefit/burden of treatment options, artificial nutrition, resuscitation preferences, and end of life care preferences (vegetative state/imminent death)    Conversation Outcomes / Follow-Up Plan:  ACP in process - information provided, considering goals and options  Reviewed DNR/DNI and patient elects Full Code (Attempt Resuscitation)    Length of Voluntary ACP Conversation in minutes:  30 minutes    Lengthy discussion with patient's son this afternoon.  He states that he has had discussions with his mother and understands what her wants are.  He feels that she would want to be DNR and would not want life prolonging measures such as artificial nutrition.  However he notes that his family is not ready for this decision and we will keep her full code at this time.  Plan is for further discussions with palliative care on Monday. Patient did have improvement in her alertness

## 2024-04-27 NOTE — PROGRESS NOTES
PF (DILAUDID) injection 0.25 mg  0.25 mg IntraVENous Q3H PRN    Or    HYDROmorphone HCl PF (DILAUDID) injection 0.5 mg  0.5 mg IntraVENous Q3H PRN    [Held by provider] aspirin chewable tablet 81 mg  81 mg Oral Daily    citalopram (CELEXA) tablet 10 mg  10 mg Oral Daily    ferrous sulfate (IRON 325) tablet 325 mg  325 mg Oral QAM AC    [Held by provider] midodrine (PROAMATINE) tablet 2.5 mg  2.5 mg Oral BID    [Held by provider] pantoprazole (PROTONIX) tablet 40 mg  40 mg Oral QAM AC    sodium chloride flush 0.9 % injection 5-40 mL  5-40 mL IntraVENous 2 times per day    sodium chloride flush 0.9 % injection 5-40 mL  5-40 mL IntraVENous PRN    0.9 % sodium chloride infusion   IntraVENous PRN    potassium chloride (KLOR-CON) extended release tablet 40 mEq  40 mEq Oral PRN    Or    potassium bicarb-citric acid (EFFER-K) effervescent tablet 40 mEq  40 mEq Oral PRN    Or    potassium chloride 10 mEq/100 mL IVPB (Peripheral Line)  10 mEq IntraVENous PRN    ondansetron (ZOFRAN-ODT) disintegrating tablet 4 mg  4 mg Oral Q8H PRN    Or    ondansetron (ZOFRAN) injection 4 mg  4 mg IntraVENous Q6H PRN    polyethylene glycol (GLYCOLAX) packet 17 g  17 g Oral Daily PRN    acetaminophen (TYLENOL) tablet 650 mg  650 mg Oral Q6H PRN    Or    acetaminophen (TYLENOL) suppository 650 mg  650 mg Rectal Q6H PRN    cefTRIAXone (ROCEPHIN) 2,000 mg in sterile water 20 mL IV syringe  2,000 mg IntraVENous Q24H    pantoprazole (PROTONIX) 40 mg in sodium chloride (PF) 0.9 % 10 mL injection  40 mg IntraVENous Daily    balsum peru-castor oil (VENELEX) ointment   Topical BID        Objective:   Vitals:  /61   Pulse 88   Temp 98.6 °F (37 °C)   Resp 18   Ht 1.702 m (5' 7.01\")   Wt 59.4 kg (131 lb)   SpO2 97%   BMI 20.51 kg/m²   Temp (24hrs), Av °F (37.2 °C), Min:97.7 °F (36.5 °C), Max:100.6 °F (38.1 °C)           Last 24hr Input/Output:    Intake/Output Summary (Last 24 hours) at 2024 1250  Last data filed at 2024  MD Urvashi

## 2024-04-28 LAB
ALBUMIN SERPL-MCNC: 1.3 G/DL (ref 3.5–5)
ALBUMIN/GLOB SERPL: 0.3 (ref 1.1–2.2)
ALP SERPL-CCNC: 171 U/L (ref 45–117)
ALT SERPL-CCNC: 68 U/L (ref 12–78)
ANION GAP SERPL CALC-SCNC: 4 MMOL/L (ref 5–15)
APPEARANCE UR: CLEAR
AST SERPL-CCNC: 75 U/L (ref 15–37)
BACTERIA SPEC CULT: NORMAL
BACTERIA SPEC CULT: NORMAL
BACTERIA URNS QL MICRO: NEGATIVE /HPF
BASOPHILS # BLD: 0 K/UL (ref 0–0.1)
BASOPHILS NFR BLD: 0 % (ref 0–1)
BILIRUB SERPL-MCNC: 0.4 MG/DL (ref 0.2–1)
BILIRUB UR QL: NEGATIVE
BUN SERPL-MCNC: 21 MG/DL (ref 6–20)
BUN/CREAT SERPL: 40 (ref 12–20)
CALCIUM SERPL-MCNC: 9.1 MG/DL (ref 8.5–10.1)
CHLORIDE SERPL-SCNC: 100 MMOL/L (ref 97–108)
CO2 SERPL-SCNC: 29 MMOL/L (ref 21–32)
COLOR UR: NORMAL
CREAT SERPL-MCNC: 0.52 MG/DL (ref 0.55–1.02)
DIFFERENTIAL METHOD BLD: ABNORMAL
EOSINOPHIL # BLD: 0 K/UL (ref 0–0.4)
EOSINOPHIL NFR BLD: 0 % (ref 0–7)
EPITH CASTS URNS QL MICRO: NORMAL /LPF
ERYTHROCYTE [DISTWIDTH] IN BLOOD BY AUTOMATED COUNT: 15.8 % (ref 11.5–14.5)
GLOBULIN SER CALC-MCNC: 4.2 G/DL (ref 2–4)
GLUCOSE SERPL-MCNC: 126 MG/DL (ref 65–100)
GLUCOSE UR STRIP.AUTO-MCNC: NEGATIVE MG/DL
HCT VFR BLD AUTO: 24.4 % (ref 35–47)
HGB BLD-MCNC: 8 G/DL (ref 11.5–16)
HGB UR QL STRIP: NEGATIVE
HYALINE CASTS URNS QL MICRO: NORMAL /LPF (ref 0–5)
IMM GRANULOCYTES # BLD AUTO: 0 K/UL
IMM GRANULOCYTES NFR BLD AUTO: 0 %
KETONES UR QL STRIP.AUTO: NEGATIVE MG/DL
LEUKOCYTE ESTERASE UR QL STRIP.AUTO: NEGATIVE
LYMPHOCYTES # BLD: 0.9 K/UL (ref 0.8–3.5)
LYMPHOCYTES NFR BLD: 6 % (ref 12–49)
MAGNESIUM SERPL-MCNC: 1.8 MG/DL (ref 1.6–2.4)
MCH RBC QN AUTO: 22.3 PG (ref 26–34)
MCHC RBC AUTO-ENTMCNC: 32.8 G/DL (ref 30–36.5)
MCV RBC AUTO: 68 FL (ref 80–99)
METAMYELOCYTES NFR BLD MANUAL: 1 %
MONOCYTES # BLD: 0.7 K/UL (ref 0–1)
MONOCYTES NFR BLD: 5 % (ref 5–13)
NEUTS SEG # BLD: 12.9 K/UL (ref 1.8–8)
NEUTS SEG NFR BLD: 88 % (ref 32–75)
NITRITE UR QL STRIP.AUTO: NEGATIVE
NRBC # BLD: 0.02 K/UL (ref 0–0.01)
NRBC BLD-RTO: 0.1 PER 100 WBC
PH UR STRIP: 8 (ref 5–8)
PHOSPHATE SERPL-MCNC: 3.2 MG/DL (ref 2.6–4.7)
PLATELET # BLD AUTO: 65 K/UL (ref 150–400)
POTASSIUM SERPL-SCNC: 4.1 MMOL/L (ref 3.5–5.1)
PROT SERPL-MCNC: 5.5 G/DL (ref 6.4–8.2)
PROT UR STRIP-MCNC: NEGATIVE MG/DL
RBC # BLD AUTO: 3.59 M/UL (ref 3.8–5.2)
RBC #/AREA URNS HPF: NORMAL /HPF (ref 0–5)
RBC MORPH BLD: ABNORMAL
SERVICE CMNT-IMP: NORMAL
SERVICE CMNT-IMP: NORMAL
SODIUM SERPL-SCNC: 133 MMOL/L (ref 136–145)
SP GR UR REFRACTOMETRY: 1.01 (ref 1–1.03)
URINE CULTURE IF INDICATED: NORMAL
UROBILINOGEN UR QL STRIP.AUTO: 1 EU/DL (ref 0.2–1)
WBC # BLD AUTO: 14.7 K/UL (ref 3.6–11)
WBC URNS QL MICRO: NORMAL /HPF (ref 0–4)

## 2024-04-28 PROCEDURE — 6360000002 HC RX W HCPCS: Performed by: INTERNAL MEDICINE

## 2024-04-28 PROCEDURE — 6370000000 HC RX 637 (ALT 250 FOR IP): Performed by: INTERNAL MEDICINE

## 2024-04-28 PROCEDURE — A4216 STERILE WATER/SALINE, 10 ML: HCPCS

## 2024-04-28 PROCEDURE — 51701 INSERT BLADDER CATHETER: CPT

## 2024-04-28 PROCEDURE — 2580000003 HC RX 258: Performed by: INTERNAL MEDICINE

## 2024-04-28 PROCEDURE — 84100 ASSAY OF PHOSPHORUS: CPT

## 2024-04-28 PROCEDURE — 6360000002 HC RX W HCPCS

## 2024-04-28 PROCEDURE — 6360000002 HC RX W HCPCS: Performed by: STUDENT IN AN ORGANIZED HEALTH CARE EDUCATION/TRAINING PROGRAM

## 2024-04-28 PROCEDURE — 85025 COMPLETE CBC W/AUTO DIFF WBC: CPT

## 2024-04-28 PROCEDURE — 80053 COMPREHEN METABOLIC PANEL: CPT

## 2024-04-28 PROCEDURE — 1100000000 HC RM PRIVATE

## 2024-04-28 PROCEDURE — C9113 INJ PANTOPRAZOLE SODIUM, VIA: HCPCS

## 2024-04-28 PROCEDURE — 81001 URINALYSIS AUTO W/SCOPE: CPT

## 2024-04-28 PROCEDURE — 83735 ASSAY OF MAGNESIUM: CPT

## 2024-04-28 PROCEDURE — 36415 COLL VENOUS BLD VENIPUNCTURE: CPT

## 2024-04-28 PROCEDURE — 2580000003 HC RX 258

## 2024-04-28 RX ORDER — FUROSEMIDE 10 MG/ML
20 INJECTION INTRAMUSCULAR; INTRAVENOUS ONCE
Status: COMPLETED | OUTPATIENT
Start: 2024-04-28 | End: 2024-04-28

## 2024-04-28 RX ADMIN — Medication: at 21:59

## 2024-04-28 RX ADMIN — HYDROMORPHONE HYDROCHLORIDE 0.25 MG: 1 INJECTION, SOLUTION INTRAMUSCULAR; INTRAVENOUS; SUBCUTANEOUS at 17:40

## 2024-04-28 RX ADMIN — SODIUM CHLORIDE, PRESERVATIVE FREE 10 ML: 5 INJECTION INTRAVENOUS at 14:54

## 2024-04-28 RX ADMIN — WATER 2000 MG: 1 INJECTION INTRAMUSCULAR; INTRAVENOUS; SUBCUTANEOUS at 13:04

## 2024-04-28 RX ADMIN — SODIUM CHLORIDE, PRESERVATIVE FREE 10 ML: 5 INJECTION INTRAVENOUS at 22:00

## 2024-04-28 RX ADMIN — SODIUM CHLORIDE, PRESERVATIVE FREE 10 ML: 5 INJECTION INTRAVENOUS at 09:22

## 2024-04-28 RX ADMIN — FERROUS SULFATE TAB 325 MG (65 MG ELEMENTAL FE) 325 MG: 325 (65 FE) TAB at 06:06

## 2024-04-28 RX ADMIN — CITALOPRAM HYDROBROMIDE 10 MG: 20 TABLET ORAL at 09:21

## 2024-04-28 RX ADMIN — PANTOPRAZOLE SODIUM 40 MG: 40 INJECTION, POWDER, LYOPHILIZED, FOR SOLUTION INTRAVENOUS at 09:20

## 2024-04-28 RX ADMIN — Medication 1 MG: at 09:21

## 2024-04-28 RX ADMIN — Medication: at 09:22

## 2024-04-28 RX ADMIN — FUROSEMIDE 20 MG: 10 INJECTION, SOLUTION INTRAMUSCULAR; INTRAVENOUS at 14:53

## 2024-04-28 ASSESSMENT — PAIN SCALES - GENERAL
PAINLEVEL_OUTOF10: 5
PAINLEVEL_OUTOF10: 10
PAINLEVEL_OUTOF10: 0

## 2024-04-28 ASSESSMENT — PAIN DESCRIPTION - LOCATION: LOCATION: GENERALIZED

## 2024-04-28 NOTE — PROGRESS NOTES
Renal Progress Note    NAME:  Carlie Newman   :   1933   MRN:   [id number]     Date/Time:  2024 12:36 PM      Assessment:     Hypercalcemia-likely secondary to primary hyperparathyroidism  Hyponatremia- resolved       Plan:     Clinical picture seems consistent with primary hyperparathyroidism.  I would not recommend surgical management given her age and comorbidities.  Sensipar/Cinacalcet is the next option to help lower the PTH and maintain normocalcemia.  However the consistency of the pill does not lend itself to delivery via the feeding tube.  Hopefully, a swallowing evaluation will provide clarity in terms of her ability to take medications/tablets for now.  Will hold off for now.  Follow electrolytes.         Subjective:       F/Z-vrfrjxqihditd-8/27/2024    Obtaining the ROS was challenging.  The chart was reviewed.  Her course was also discussed extremity bedside      F/M-jawyxlmgessob-1/28/2024    No new complaints were offered this afternoon.  Ms. Newman was transferred to room 609.      Review of Systems:  Y  N       Y  N  []         []          Fever/chills                                               []         []          Chest Pain  []         []          Cough                                                       []         []          Diarrhea   []         []          Sputum                                                     []         []          Constipation  []         []          SOB/GUPTA                                                []         []          Nausea/Vomit  []         []          Abd Pain                                                    []         []          Tolerating PT  []         []          Dysuria                                                      []         []          Tolerating Diet     []        Unable to obtain  ROS due to  []        mental status change  []        sedated   []        intubated    Medications reviewed:  Current     AST 75 (H) 15 - 37 U/L    Alk Phosphatase 171 (H) 45 - 117 U/L    Total Protein 5.5 (L) 6.4 - 8.2 g/dL    Albumin 1.3 (L) 3.5 - 5.0 g/dL    Globulin 4.2 (H) 2.0 - 4.0 g/dL    Albumin/Globulin Ratio 0.3 (L) 1.1 - 2.2     CBC with Auto Differential    Collection Time: 04/28/24  9:55 AM   Result Value Ref Range    WBC 14.7 (H) 3.6 - 11.0 K/uL    RBC 3.59 (L) 3.80 - 5.20 M/uL    Hemoglobin 8.0 (L) 11.5 - 16.0 g/dL    Hematocrit 24.4 (L) 35.0 - 47.0 %    MCV 68.0 (L) 80.0 - 99.0 FL    MCH 22.3 (L) 26.0 - 34.0 PG    MCHC 32.8 30.0 - 36.5 g/dL    RDW 15.8 (H) 11.5 - 14.5 %    Platelets 65 (L) 150 - 400 K/uL    Nucleated RBCs 0.1 (H) 0  WBC    nRBC 0.02 (H) 0.00 - 0.01 K/uL    Neutrophils % 88 (H) 32 - 75 %    Lymphocytes % 6 (L) 12 - 49 %    Monocytes % 5 5 - 13 %    Eosinophils % 0 0 - 7 %    Basophils % 0 0 - 1 %    Metamyelocytes 1 (H) 0 %    Immature Granulocytes % 0 %    Neutrophils Absolute 12.9 (H) 1.8 - 8.0 K/UL    Lymphocytes Absolute 0.9 0.8 - 3.5 K/UL    Monocytes Absolute 0.7 0.0 - 1.0 K/UL    Eosinophils Absolute 0.0 0.0 - 0.4 K/UL    Basophils Absolute 0.0 0.0 - 0.1 K/UL    Immature Granulocytes Absolute 0.0 K/UL    Differential Type MANUAL      RBC Comment ANISOCYTOSIS  1+        RBC Comment HYPOCHROMIA  2+        RBC Comment MICROCYTOSIS  2+           Total time spent with patient:  []        15   []        25   []        35   []         __ minutes    []        Critical Care Provided    Care Plan discussed with:        []        Patient   []        Family    []        Care Manager   []        Consultant/Specialist :      []          >50% of visit spent in counseling and coordination of care   (Discussed []        CODE status,  []        Care Plan, []        D/C Planning)    ___________________________________________________    Attending Physician: Salazar Landin MD

## 2024-04-28 NOTE — PROGRESS NOTES
Bedside and Verbal shift change report given to DEEP Guadarrama (oncoming nurse) by Gurvinder (offgoing nurse). Report included the following information Nurse Handoff Report, Index, ED Encounter Summary, ED SBAR, Adult Overview, Surgery Report, Intake/Output, MAR, Recent Results, Quality Measures, and Neuro Assessment.

## 2024-04-28 NOTE — PLAN OF CARE
Problem: Pain  Goal: Verbalizes/displays adequate comfort level or baseline comfort level  Outcome: Progressing     Problem: Chronic Conditions and Co-morbidities  Goal: Patient's chronic conditions and co-morbidity symptoms are monitored and maintained or improved  Outcome: Progressing     Problem: Skin/Tissue Integrity  Goal: Absence of new skin breakdown  Description: 1.  Monitor for areas of redness and/or skin breakdown  2.  Assess vascular access sites hourly  Outcome: Progressing     Problem: Safety - Adult  Goal: Free from fall injury  Outcome: Progressing     Problem: ABCDS Injury Assessment  Goal: Absence of physical injury  Outcome: Progressing    Problem: Nutrition Deficit:  Goal: Optimize nutritional status  Outcome: Progressing     Problem: Discharge Planning  Goal: Discharge to home or other facility with appropriate resources  Outcome: Progressing

## 2024-04-28 NOTE — PROGRESS NOTES
Bedside and Verbal shift change report given to Ciro RN (oncoming nurse) by Jet RN (offgoing nurse). Report included the following information Nurse Handoff Report.

## 2024-04-28 NOTE — PROGRESS NOTES
supple, no JVD, no meningeal signs  Chest: Clear to auscultation bilaterally   CVS: S1 S2 heard, Capillary refill less than 2 seconds  Abd: soft/ non tender, non distended, BS physiological,   Ext: no clubbing, no cyanosis, no edema, brisk 2+ DP pulses  Neuro/Psych: squeezes hand on command, more interactive than previous  skin: warm     Data Review:    Review and/or order of clinical lab test  Review and/or order of tests in the radiology section of CPT  Review and/or order of tests in the medicine section of CPT  Discussion of test results with performing physician  I personally reviewed  Image and EKG/Monitor Tracing      I have personally and independently reviewed all pertinent labs, diagnostic studies, imaging, and have provided independent interpretation of the same.     Labs:     Recent Labs     04/27/24 0405 04/28/24  0955   WBC 12.9* 14.7*   HGB 8.5* 8.0*   HCT 25.7* 24.4*   PLT 50* 65*     Recent Labs     04/26/24  0620 04/27/24  0405 04/28/24  0056 04/28/24  0955    140  --  133*   K 3.9 4.2  --  4.1   * 107  --  100   CO2 30 29  --  29   BUN 12 19  --  21*   MG 1.6 1.7  --  1.8   PHOS 1.8* 2.3* 3.2  --      Recent Labs     04/26/24  0620 04/27/24  0405 04/28/24  0955   ALT 66 98* 68   GLOB 3.4 4.0 4.2*     No results for input(s): \"INR\", \"APTT\" in the last 72 hours.    Invalid input(s): \"PTP\"     No results for input(s): \"TIBC\", \"FERR\" in the last 72 hours.    Invalid input(s): \"FE\", \"PSAT\"     No results found for: \"FOL\", \"RBCF\"   No results for input(s): \"PH\", \"PCO2\", \"PO2\" in the last 72 hours.  No results for input(s): \"CPK\" in the last 72 hours.    Invalid input(s): \"CPKMB\", \"CKNDX\", \"TROIQ\"  Lab Results   Component Value Date/Time    CHOL 192 04/20/2022 03:22 PM    HDL 82 04/20/2022 03:22 PM     No results found for: \"GLUCPOC\"  [unfilled]    Notes reviewed from all clinical/nonclinical/nursing services involved in patient's clinical care. Care coordination discussions were held with

## 2024-04-29 PROBLEM — R41.82 ALTERED MENTAL STATUS: Status: ACTIVE | Noted: 2024-04-29

## 2024-04-29 PROBLEM — E88.09 HYPOALBUMINEMIA: Status: ACTIVE | Noted: 2024-04-29

## 2024-04-29 LAB
ALBUMIN SERPL-MCNC: 1.3 G/DL (ref 3.5–5)
ALBUMIN/GLOB SERPL: 0.3 (ref 1.1–2.2)
ALP SERPL-CCNC: 171 U/L (ref 45–117)
ALT SERPL-CCNC: 59 U/L (ref 12–78)
ANION GAP SERPL CALC-SCNC: 6 MMOL/L (ref 5–15)
AST SERPL-CCNC: 65 U/L (ref 15–37)
BASOPHILS # BLD: 0 K/UL (ref 0–0.1)
BASOPHILS NFR BLD: 0 % (ref 0–1)
BILIRUB SERPL-MCNC: 0.3 MG/DL (ref 0.2–1)
BUN SERPL-MCNC: 23 MG/DL (ref 6–20)
BUN/CREAT SERPL: 43 (ref 12–20)
CALCIUM SERPL-MCNC: 9.4 MG/DL (ref 8.5–10.1)
CHLORIDE SERPL-SCNC: 99 MMOL/L (ref 97–108)
CO2 SERPL-SCNC: 29 MMOL/L (ref 21–32)
CREAT SERPL-MCNC: 0.54 MG/DL (ref 0.55–1.02)
DIFFERENTIAL METHOD BLD: ABNORMAL
EOSINOPHIL # BLD: 0 K/UL (ref 0–0.4)
EOSINOPHIL NFR BLD: 0 % (ref 0–7)
ERYTHROCYTE [DISTWIDTH] IN BLOOD BY AUTOMATED COUNT: 15.5 % (ref 11.5–14.5)
GLOBULIN SER CALC-MCNC: 4.5 G/DL (ref 2–4)
GLUCOSE SERPL-MCNC: 130 MG/DL (ref 65–100)
HCT VFR BLD AUTO: 25.8 % (ref 35–47)
HGB BLD-MCNC: 8.2 G/DL (ref 11.5–16)
IMM GRANULOCYTES # BLD AUTO: 0 K/UL
IMM GRANULOCYTES NFR BLD AUTO: 0 %
LYMPHOCYTES # BLD: 1.5 K/UL (ref 0.8–3.5)
LYMPHOCYTES NFR BLD: 13 % (ref 12–49)
MAGNESIUM SERPL-MCNC: 1.8 MG/DL (ref 1.6–2.4)
MCH RBC QN AUTO: 21.5 PG (ref 26–34)
MCHC RBC AUTO-ENTMCNC: 31.8 G/DL (ref 30–36.5)
MCV RBC AUTO: 67.7 FL (ref 80–99)
MONOCYTES # BLD: 0.9 K/UL (ref 0–1)
MONOCYTES NFR BLD: 8 % (ref 5–13)
NEUTS SEG # BLD: 9.1 K/UL (ref 1.8–8)
NEUTS SEG NFR BLD: 79 % (ref 32–75)
NRBC # BLD: 0.05 K/UL (ref 0–0.01)
NRBC BLD-RTO: 0.4 PER 100 WBC
PLATELET # BLD AUTO: 81 K/UL (ref 150–400)
POTASSIUM SERPL-SCNC: 4.3 MMOL/L (ref 3.5–5.1)
PROT SERPL-MCNC: 5.8 G/DL (ref 6.4–8.2)
RBC # BLD AUTO: 3.81 M/UL (ref 3.8–5.2)
RBC MORPH BLD: ABNORMAL
RBC MORPH BLD: ABNORMAL
SODIUM SERPL-SCNC: 134 MMOL/L (ref 136–145)
WBC # BLD AUTO: 11.5 K/UL (ref 3.6–11)

## 2024-04-29 PROCEDURE — 80053 COMPREHEN METABOLIC PANEL: CPT

## 2024-04-29 PROCEDURE — C9113 INJ PANTOPRAZOLE SODIUM, VIA: HCPCS

## 2024-04-29 PROCEDURE — A4216 STERILE WATER/SALINE, 10 ML: HCPCS

## 2024-04-29 PROCEDURE — 6360000002 HC RX W HCPCS: Performed by: INTERNAL MEDICINE

## 2024-04-29 PROCEDURE — 1100000000 HC RM PRIVATE

## 2024-04-29 PROCEDURE — 83735 ASSAY OF MAGNESIUM: CPT

## 2024-04-29 PROCEDURE — A4216 STERILE WATER/SALINE, 10 ML: HCPCS | Performed by: INTERNAL MEDICINE

## 2024-04-29 PROCEDURE — 36415 COLL VENOUS BLD VENIPUNCTURE: CPT

## 2024-04-29 PROCEDURE — 99233 SBSQ HOSP IP/OBS HIGH 50: CPT | Performed by: INTERNAL MEDICINE

## 2024-04-29 PROCEDURE — 6370000000 HC RX 637 (ALT 250 FOR IP): Performed by: INTERNAL MEDICINE

## 2024-04-29 PROCEDURE — 6360000002 HC RX W HCPCS

## 2024-04-29 PROCEDURE — 2580000003 HC RX 258: Performed by: INTERNAL MEDICINE

## 2024-04-29 PROCEDURE — 2580000003 HC RX 258

## 2024-04-29 PROCEDURE — 85025 COMPLETE CBC W/AUTO DIFF WBC: CPT

## 2024-04-29 PROCEDURE — 99232 SBSQ HOSP IP/OBS MODERATE 35: CPT | Performed by: NURSE PRACTITIONER

## 2024-04-29 RX ADMIN — SODIUM CHLORIDE, PRESERVATIVE FREE 10 ML: 5 INJECTION INTRAVENOUS at 08:24

## 2024-04-29 RX ADMIN — FERROUS SULFATE TAB 325 MG (65 MG ELEMENTAL FE) 325 MG: 325 (65 FE) TAB at 06:51

## 2024-04-29 RX ADMIN — Medication: at 20:48

## 2024-04-29 RX ADMIN — Medication: at 08:24

## 2024-04-29 RX ADMIN — Medication 1 MG: at 08:23

## 2024-04-29 RX ADMIN — CITALOPRAM HYDROBROMIDE 10 MG: 20 TABLET ORAL at 08:23

## 2024-04-29 RX ADMIN — WATER 2000 MG: 1 INJECTION INTRAMUSCULAR; INTRAVENOUS; SUBCUTANEOUS at 12:00

## 2024-04-29 RX ADMIN — PANTOPRAZOLE SODIUM 40 MG: 40 INJECTION, POWDER, LYOPHILIZED, FOR SOLUTION INTRAVENOUS at 08:23

## 2024-04-29 NOTE — WOUND CARE
WOCN Note:     Follow up for for sacral pressure injury   Seen in 609/01     89 y.o. y/o female admitted on 4/21/2024 from home.  Admitted for delirium and UTI   Acute delirium [R41.0]  SIRS (systemic inflammatory response syndrome) ([facility]) [R65.10]  Acute cystitis without hematuria [N30.00]  Acute prerenal azotemia [N19]  Infectious encephalopathy [G93.49, B99.9]  Chronic congestive heart failure, unspecified heart failure type (HCC) [I50.9]   History of CHF, dementia   No indication for wound culture.   Diet: Diet NPO  ADULT TUBE FEEDING; Nasogastric; Diabetic; Continuous; 15; Yes; 10; Q 6 hours; 55; 145; Q 4 hours           Assessment:   Minimally Communicative and cries out with turning.  Requires full assists in repositioning.  Wearing briefs and Pure Wick to wall suction.   Surface: CAMILA mattress    Bilateral heels intact and without erythema.  Heels offloaded with pillows.  Bilateral dorsal feet with dry crusts left open to air.    1. POA deep tissue injury evolution into sacral unstageable pressure injury  5.5 x 6 x 0.1 cm   Central devitalized tan tissue with surrounding blanching pink tissue  Tx: venelex discontinued and triad applied with foam to cover    4/22:       Now evolved into unstageable 4/29:      2. POA deep tissue injury to left lower buttock  Now drying and unroofing with new epithelial tissue noted    Recommendations:    Buttocks: Triad wound paste daily and cover with foam; cover dressing is optional if stool is contaminating the foam    Turn/reposition approximately every 2 hours  Offload heels with heels hanging off end of pillow at all times while in bed.  Low Air Loss mattress: Use only flat sheet and one incontinence pad.     Transition of Care: Plan to follow weekly and as needed while admitted to hospital.     Meme See, KADENN, RN, CWOCN  Certified Wound, Ostomy, Continence Nurse  office [phone]  Available via [employer]

## 2024-04-29 NOTE — PROGRESS NOTES
Comprehensive Nutrition Assessment    Type and Reason for Visit: Reassess    Nutrition Recommendations/Plan:   NPO - advance diet as medically appropriate/per SLP rec'd   FWF adjusted to 100ml q4hrs 2/2 fluid overload per nephrology - agree w/ rec'd, provides 1518 ml fluid (85% of estimated fluid needs)  Continue Glucerna @55ml/hr - provides 1815 kcal (102%), 77 gm pro (100%)  Monitor/document TF rate, tolerance, flushes, and Bms in I/Os   Consider goals of care discussion for long-term nutrition plan of care       Malnutrition Assessment:  Malnutrition Status:  Severe malnutrition (04/25/24 1041)    Context:  Acute Illness     Findings of the 6 clinical characteristics of malnutrition:  Energy Intake:  Mild decrease in energy intake (Comment) (poor intakes 1-2 days PTA per family report)  Weight Loss:  Greater than 7.5% over 3 months     Body Fat Loss:  Moderate body fat loss Buccal region, Orbital   Muscle Mass Loss:  Moderate muscle mass loss Clavicles (pectoralis & deltoids), Temples (temporalis)  Fluid Accumulation:  No significant fluid accumulation     Strength:  Not Performed       Nutrition Assessment:    Past Medical History:   Diagnosis Date    Arrhythmia     bradycardia,S/P pacemaker    CHF (congestive heart failure) (HCC)     Chronic renal disease, stage III (HCC) 09/19/2022    Heart failure (HCC)     Hypertension     Irregular heart rate     Major depressive disorder, recurrent, mild 1/11/2024    Pure hypercholesterolemia 3/27/2017     NKFA    (4/29) Follow-up. TF running at goal, no issues with tolerance per RN report. No reported diarrhea however, soft stools per RN. Nephrology following, corrected calcium improving (10.2); hypercalcemia likely 2/2 primary hyperparathyroidism however, not a surgical candidate given age and co morbidities per chart review. +Fluid overload, decreased FWF to 100ml q4hrs per nephrology - agree w/ rec'd. Significant wt gain since previous RD visit (131 - 172#'s) -  Status    Discharge Planning:    Too soon to determine     Denise Cruz RD  Available via [employer]

## 2024-04-29 NOTE — PLAN OF CARE

## 2024-04-29 NOTE — PLAN OF CARE
Problem: Discharge Planning  Goal: Discharge to home or other facility with appropriate resources  4/29/2024 1119 by Clyde Vargas, RN  Outcome: Progressing  4/29/2024 0109 by Ana M Telles, RN  Outcome: Progressing

## 2024-04-29 NOTE — PROGRESS NOTES
Bedside shift change report given to DEEP Tang (oncoming nurse) by DEEP Viramontes (offgoing nurse). Report included the following information Nurse Handoff Report, Index, ED Encounter Summary, ED SBAR, Adult Overview, Surgery Report, Intake/Output, MAR, Recent Results, Med Rec Status, Alarm Parameters, Quality Measures, and Neuro Assessment.

## 2024-04-29 NOTE — PROGRESS NOTES
Renal Progress Note    NAME:  Carlie Newman   :   1933   MRN:   [id number]     Date/Time:  2024 11:08 AM      Assessment:     Hypercalcemia-likely secondary to primary hyperparathyroidism-not a candidate for surgical management given her age and comorbidities.corrected calcium improving and is 10.2 today  Hypernatremia- resolved, now hyponatremia  Fluid overload, off IVF. s/p lasix x1  Strep bacteremia       Plan:       Repeat PTH  Sensipar/Cinacalcet is the next option to help lower the PTH and maintain normocalcemia.  However can not be given via the feeding tube.  Will hold off for now.  No need for Zometa at this point  Decrease water flushes with dropping sodium-decreased to 100 ml q4h  Follow electrolytes.         Subjective:       F/M-hdpkgnqlfxjis-0/27/2024  Obtaining the ROS was challenging.  The chart was reviewed.  Her course was also discussed extremity bedside      F/H-zvdnbijsrdopn-1/28/2024  no new complaints were offered this afternoon.  Ms. Newman was transferred to room 609.      -awake but not answering the questions    Medications reviewed:  Current Facility-Administered Medications   Medication Dose Route Frequency    folic acid (FOLVITE) tablet 1 mg  1 mg Oral Daily    HYDROmorphone HCl PF (DILAUDID) injection 0.25 mg  0.25 mg IntraVENous Q3H PRN    Or    HYDROmorphone HCl PF (DILAUDID) injection 0.5 mg  0.5 mg IntraVENous Q3H PRN    [Held by provider] aspirin chewable tablet 81 mg  81 mg Oral Daily    citalopram (CELEXA) tablet 10 mg  10 mg Oral Daily    ferrous sulfate (IRON 325) tablet 325 mg  325 mg Oral QAM AC    [Held by provider] midodrine (PROAMATINE) tablet 2.5 mg  2.5 mg Oral BID    [Held by provider] pantoprazole (PROTONIX) tablet 40 mg  40 mg Oral QAM AC    sodium chloride flush 0.9 % injection 5-40 mL  5-40 mL IntraVENous 2 times per day    sodium chloride flush 0.9 % injection 5-40 mL  5-40 mL IntraVENous PRN    0.9 % sodium chloride infusion    Reviewed:    Recent Results (from the past 24 hour(s))   Urinalysis with Reflex to Culture    Collection Time: 04/28/24  8:27 PM    Specimen: Urine   Result Value Ref Range    Color, UA YELLOW/STRAW      Appearance CLEAR CLEAR      Specific Gravity, UA 1.014 1.003 - 1.030      pH, Urine 8.0 5.0 - 8.0      Protein, UA Negative NEG mg/dL    Glucose, UA Negative NEG mg/dL    Ketones, Urine Negative NEG mg/dL    Bilirubin Urine Negative NEG      Blood, Urine Negative NEG      Urobilinogen, Urine 1.0 0.2 - 1.0 EU/dL    Nitrite, Urine Negative NEG      Leukocyte Esterase, Urine Negative NEG      WBC, UA 0-4 0 - 4 /hpf    RBC, UA 0-5 0 - 5 /hpf    Epithelial Cells UA FEW FEW /lpf    BACTERIA, URINE Negative NEG /hpf    Urine Culture if Indicated CULTURE NOT INDICATED BY UA RESULT CNI      Hyaline Casts, UA 0-2 0 - 5 /lpf   CBC with Auto Differential    Collection Time: 04/29/24  4:57 AM   Result Value Ref Range    WBC 11.5 (H) 3.6 - 11.0 K/uL    RBC 3.81 3.80 - 5.20 M/uL    Hemoglobin 8.2 (L) 11.5 - 16.0 g/dL    Hematocrit 25.8 (L) 35.0 - 47.0 %    MCV 67.7 (L) 80.0 - 99.0 FL    MCH 21.5 (L) 26.0 - 34.0 PG    MCHC 31.8 30.0 - 36.5 g/dL    RDW 15.5 (H) 11.5 - 14.5 %    Platelets 81 (L) 150 - 400 K/uL    Nucleated RBCs 0.4 (H) 0  WBC    nRBC 0.05 (H) 0.00 - 0.01 K/uL    Neutrophils % 79 (H) 32 - 75 %    Lymphocytes % 13 12 - 49 %    Monocytes % 8 5 - 13 %    Eosinophils % 0 0 - 7 %    Basophils % 0 0 - 1 %    Immature Granulocytes % 0 %    Neutrophils Absolute 9.1 (H) 1.8 - 8.0 K/UL    Lymphocytes Absolute 1.5 0.8 - 3.5 K/UL    Monocytes Absolute 0.9 0.0 - 1.0 K/UL    Eosinophils Absolute 0.0 0.0 - 0.4 K/UL    Basophils Absolute 0.0 0.0 - 0.1 K/UL    Immature Granulocytes Absolute 0.0 K/UL    Differential Type MANUAL      RBC Comment ANISOCYTOSIS  1+        RBC Comment MICROCYTOSIS  2+       Comprehensive Metabolic Panel    Collection Time: 04/29/24  4:57 AM   Result Value Ref Range    Sodium 134 (L) 136 - 145 mmol/L

## 2024-04-29 NOTE — PROGRESS NOTES
[facility] Adult  Hospitalist Group                                                                                          Hospitalist Progress Note  Aye Hutton MD  Office Phone: [phone]        Date of Service:  2024  NAME:  Carlie Newman  :  1933  MRN:  [id number]       Admission Summary:   Carlie Newman is a 89 y.o. female with past medical history significant for dyslipidemia, congestive heart failure, hypertension, dementia, dementia, sick sinus syndrome with resultant pacemaker insertion presented at the emergency room with change in mental status.  Patient symptoms started few days ago and progressively getting worse.  She was recently prescribed antibiotic for treatment of UTI.  Family is unsure if patient has been taking the antibiotics appropriately. No fever, rigors or chills reported.  Patient and her daughter lives together, her daughter as a stroke and they both require considerable care at home according to the patient's sister who was present at the bedside. Her sister also reported that the patient has sacral wounds.  Patient was last admitted to this hospital in 2023, she was admitted and treated for urinary tract infection.  Patient was noted to have low platelet count during that hospitalization and platelet count recovered prior to discharge to SNF.  CT of the head done on arrival had emergency room negative for acute pathology.  Chest x-ray shows a trace left pleural effusion and BNP level is elevated.  She was referred to the hospitalist service for admission.       Interval history / Subjective:   Patient more alert this morning than previous.  Awaiting [facility] care family meeting cont current MGMT d/w ID  If cont current care need PICC line IV abx      Assessment & Plan:        Streptococcus agalactiae bacteremia  Sick sinus syndrome status post pacemaker  Pacemaker lead vegetation  Blood culture 2024 and 2024 positive for  IntraVENous Q24H    pantoprazole (PROTONIX) 40 mg in sodium chloride (PF) 0.9 % 10 mL injection  40 mg IntraVENous Daily    balsum peru-castor oil (VENELEX) ointment   Topical BID     ______________________________________________________________________  EXPECTED LENGTH OF STAY: 12  ACTUAL LENGTH OF STAY:          7                 Aye Hutton MD

## 2024-04-29 NOTE — CARE COORDINATION
Transition of Care Plan:    RUR: 18%  Prior Level of Functioning: patient was at home with care aides and disabled daughter  Disposition: tbd medical stability and palliative care discussions  If SNF or IPR: Date FOC offered: 4/24/24  Follow up appointments: pcp/specialist  DME needed: n/a  Transportation at discharge: stretcher  IM/IMM Medicare/ letter given: will need prior to d/c  Caregiver Contact: Sister Esme  Discharge Caregiver contacted prior to discharge? yes  Care Conference needed? no  Barriers to discharge: medical stability.       Cm following for cassidy needs, noted palliative care following.      KADEN BasurtoAZAEL  Care Management Department  Ph:[phone]

## 2024-04-29 NOTE — PROGRESS NOTES
Infectious Disease Progress Note     Subjective:      Afebrile, somewhat responsive!    Objective:    Vitals:   Patient Vitals for the past 24 hrs:   Temp Pulse Resp BP SpO2   04/29/24 0645 98.2 °F (36.8 °C) 76 18 (!) 123/56 97 %   04/28/24 2049 98.4 °F (36.9 °C) 78 18 (!) 120/48 98 %   04/28/24 1445 -- 72 18 110/60 --         Physical Exam:  Gen: lethargy  HEENT:  Normocephalic, atraumatic with dobhoff + receiving TFs  CV: 2/6 systolic murmur  Lungs: no wheezing  Abdomen: soft, non-distended, non-TTP  Genitourinary:  no espana catheter + pure-wick   Skin: sacral decub ulcer  Psych: opens eyes, responsive more so  Neuro: more responsive  Musculoskeletal:  No joint edema, erythema or tenderness noted     Central Line:      Medications:    Current Facility-Administered Medications:     folic acid (FOLVITE) tablet 1 mg, 1 mg, Oral, Daily, Jennifer Ansari MD, 1 mg at 04/29/24 0823    HYDROmorphone HCl PF (DILAUDID) injection 0.25 mg, 0.25 mg, IntraVENous, Q3H PRN, 0.25 mg at 04/28/24 1740 **OR** HYDROmorphone HCl PF (DILAUDID) injection 0.5 mg, 0.5 mg, IntraVENous, Q3H PRN, Drake Pillai MD    [Held by provider] aspirin chewable tablet 81 mg, 81 mg, Oral, Daily, Tere Subramanian MD    citalopram (CELEXA) tablet 10 mg, 10 mg, Oral, Daily, Tere Subramanian MD, 10 mg at 04/29/24 0823    ferrous sulfate (IRON 325) tablet 325 mg, 325 mg, Oral, Marilynn WHITE Razaak A, MD, 325 mg at 04/29/24 0651    [Held by provider] midodrine (PROAMATINE) tablet 2.5 mg, 2.5 mg, Oral, BID, Tere Subramanian MD    [Held by provider] pantoprazole (PROTONIX) tablet 40 mg, 40 mg, Oral, QAM AC, Tere Subramanian MD    sodium chloride flush 0.9 % injection 5-40 mL, 5-40 mL, IntraVENous, 2 times per day, Tere Subramanian MD, 10 mL at 04/29/24 0824    sodium chloride flush 0.9 % injection 5-40 mL, 5-40 mL, IntraVENous, PRN, Tere Subramanian MD, 10 mL at 04/28/24 1454    0.9 % sodium chloride infusion, , IntraVENous, PRN, Tere Subramanian  MD JILLIAN, Last Rate: 10 mL/hr at 04/22/24 1103, Restarted at 04/22/24 1103    potassium chloride (KLOR-CON) extended release tablet 40 mEq, 40 mEq, Oral, PRN **OR** potassium bicarb-citric acid (EFFER-K) effervescent tablet 40 mEq, 40 mEq, Oral, PRN **OR** potassium chloride 10 mEq/100 mL IVPB (Peripheral Line), 10 mEq, IntraVENous, PRN, Tere Subramanian MD, Stopped at 04/24/24 1452    ondansetron (ZOFRAN-ODT) disintegrating tablet 4 mg, 4 mg, Oral, Q8H PRN **OR** ondansetron (ZOFRAN) injection 4 mg, 4 mg, IntraVENous, Q6H PRN, Tere Subramanian MD    polyethylene glycol (GLYCOLAX) packet 17 g, 17 g, Oral, Daily PRN, Tere Subramanian MD    acetaminophen (TYLENOL) tablet 650 mg, 650 mg, Oral, Q6H PRN **OR** acetaminophen (TYLENOL) suppository 650 mg, 650 mg, Rectal, Q6H PRN, Tere Subramanian MD, 650 mg at 04/26/24 1858    cefTRIAXone (ROCEPHIN) 2,000 mg in sterile water 20 mL IV syringe, 2,000 mg, IntraVENous, Q24H, Devan Diaz MD, 2,000 mg at 04/29/24 1200    pantoprazole (PROTONIX) 40 mg in sodium chloride (PF) 0.9 % 10 mL injection, 40 mg, IntraVENous, Daily, Alaina Payan PA-C, 40 mg at 04/29/24 0823    balsum peru-castor oil (VENELEX) ointment, , Topical, BID, Aye Hutton MD, Given at 04/29/24 0824      Labs:  Recent Results (from the past 24 hour(s))   Urinalysis with Reflex to Culture    Collection Time: 04/28/24  8:27 PM    Specimen: Urine   Result Value Ref Range    Color, UA YELLOW/STRAW      Appearance CLEAR CLEAR      Specific Gravity, UA 1.014 1.003 - 1.030      pH, Urine 8.0 5.0 - 8.0      Protein, UA Negative NEG mg/dL    Glucose, UA Negative NEG mg/dL    Ketones, Urine Negative NEG mg/dL    Bilirubin Urine Negative NEG      Blood, Urine Negative NEG      Urobilinogen, Urine 1.0 0.2 - 1.0 EU/dL    Nitrite, Urine Negative NEG      Leukocyte Esterase, Urine Negative NEG      WBC, UA 0-4 0 - 4 /hpf    RBC, UA 0-5 0 - 5 /hpf    Epithelial Cells UA FEW FEW /lpf    BACTERIA, URINE Negative NEG

## 2024-04-30 LAB
ALBUMIN SERPL-MCNC: 1.5 G/DL (ref 3.5–5)
ANION GAP SERPL CALC-SCNC: 6 MMOL/L (ref 5–15)
BUN SERPL-MCNC: 22 MG/DL (ref 6–20)
BUN/CREAT SERPL: 42 (ref 12–20)
CALCIUM SERPL-MCNC: 9.5 MG/DL (ref 8.5–10.1)
CALCIUM SERPL-MCNC: 9.7 MG/DL (ref 8.5–10.1)
CHLORIDE SERPL-SCNC: 97 MMOL/L (ref 97–108)
CO2 SERPL-SCNC: 30 MMOL/L (ref 21–32)
CREAT SERPL-MCNC: 0.53 MG/DL (ref 0.55–1.02)
GLUCOSE BLD STRIP.AUTO-MCNC: 133 MG/DL (ref 65–117)
GLUCOSE SERPL-MCNC: 120 MG/DL (ref 65–100)
PHOSPHATE SERPL-MCNC: 2.7 MG/DL (ref 2.6–4.7)
POTASSIUM SERPL-SCNC: 4.5 MMOL/L (ref 3.5–5.1)
PTH-INTACT SERPL-MCNC: 75.6 PG/ML (ref 18.4–88)
SERVICE CMNT-IMP: ABNORMAL
SODIUM SERPL-SCNC: 133 MMOL/L (ref 136–145)

## 2024-04-30 PROCEDURE — 1100000000 HC RM PRIVATE

## 2024-04-30 PROCEDURE — 36415 COLL VENOUS BLD VENIPUNCTURE: CPT

## 2024-04-30 PROCEDURE — 2580000003 HC RX 258: Performed by: INTERNAL MEDICINE

## 2024-04-30 PROCEDURE — 82962 GLUCOSE BLOOD TEST: CPT

## 2024-04-30 PROCEDURE — 83970 ASSAY OF PARATHORMONE: CPT

## 2024-04-30 PROCEDURE — A4216 STERILE WATER/SALINE, 10 ML: HCPCS

## 2024-04-30 PROCEDURE — 6360000002 HC RX W HCPCS: Performed by: INTERNAL MEDICINE

## 2024-04-30 PROCEDURE — C9113 INJ PANTOPRAZOLE SODIUM, VIA: HCPCS

## 2024-04-30 PROCEDURE — 99231 SBSQ HOSP IP/OBS SF/LOW 25: CPT | Performed by: NURSE PRACTITIONER

## 2024-04-30 PROCEDURE — 6370000000 HC RX 637 (ALT 250 FOR IP): Performed by: INTERNAL MEDICINE

## 2024-04-30 PROCEDURE — 6360000002 HC RX W HCPCS: Performed by: STUDENT IN AN ORGANIZED HEALTH CARE EDUCATION/TRAINING PROGRAM

## 2024-04-30 PROCEDURE — 6360000002 HC RX W HCPCS

## 2024-04-30 PROCEDURE — 2580000003 HC RX 258

## 2024-04-30 PROCEDURE — 80069 RENAL FUNCTION PANEL: CPT

## 2024-04-30 PROCEDURE — 99232 SBSQ HOSP IP/OBS MODERATE 35: CPT | Performed by: INTERNAL MEDICINE

## 2024-04-30 RX ORDER — FUROSEMIDE 10 MG/ML
20 INJECTION INTRAMUSCULAR; INTRAVENOUS 2 TIMES DAILY
Status: DISCONTINUED | OUTPATIENT
Start: 2024-04-30 | End: 2024-05-01

## 2024-04-30 RX ADMIN — Medication: at 08:56

## 2024-04-30 RX ADMIN — CITALOPRAM HYDROBROMIDE 10 MG: 20 TABLET ORAL at 08:56

## 2024-04-30 RX ADMIN — SODIUM CHLORIDE, PRESERVATIVE FREE 10 ML: 5 INJECTION INTRAVENOUS at 20:57

## 2024-04-30 RX ADMIN — FUROSEMIDE 20 MG: 10 INJECTION, SOLUTION INTRAMUSCULAR; INTRAVENOUS at 18:41

## 2024-04-30 RX ADMIN — HYDROMORPHONE HYDROCHLORIDE 0.25 MG: 1 INJECTION, SOLUTION INTRAMUSCULAR; INTRAVENOUS; SUBCUTANEOUS at 20:57

## 2024-04-30 RX ADMIN — PANTOPRAZOLE SODIUM 40 MG: 40 INJECTION, POWDER, LYOPHILIZED, FOR SOLUTION INTRAVENOUS at 08:57

## 2024-04-30 RX ADMIN — Medication 1 MG: at 08:56

## 2024-04-30 RX ADMIN — SODIUM CHLORIDE, PRESERVATIVE FREE 10 ML: 5 INJECTION INTRAVENOUS at 08:57

## 2024-04-30 RX ADMIN — FUROSEMIDE 20 MG: 10 INJECTION, SOLUTION INTRAMUSCULAR; INTRAVENOUS at 10:17

## 2024-04-30 RX ADMIN — WATER 2000 MG: 1 INJECTION INTRAMUSCULAR; INTRAVENOUS; SUBCUTANEOUS at 10:13

## 2024-04-30 RX ADMIN — HYDROMORPHONE HYDROCHLORIDE 0.5 MG: 1 INJECTION, SOLUTION INTRAMUSCULAR; INTRAVENOUS; SUBCUTANEOUS at 10:09

## 2024-04-30 RX ADMIN — Medication: at 20:57

## 2024-04-30 ASSESSMENT — PAIN DESCRIPTION - LOCATION: LOCATION: GENERALIZED

## 2024-04-30 ASSESSMENT — PAIN SCALES - GENERAL: PAINLEVEL_OUTOF10: 7

## 2024-04-30 NOTE — PROGRESS NOTES
Palliative Medicine  Patient Name: Carlie Newman  YOB: 1933  MRN: [id number]  Age: 89 y.o.  Gender: female    Date of Initial Consult: 4/23/24  Date of Service: 4/29/2024  Time: 8:20 PM  Provider: SAMUEL Buckley NP  Hospital Day: 9  Admit Date: 4/21/2024  Referring Provider: Dr Olivas        Reasons for Consultation:  Goals of Care    HISTORY OF PRESENT ILLNESS (HPI):   Carlie Newman is a 89 y.o. female with a past medical history of HTN, CKD, CAD, bradycardia w/ pacemaker, L TKA recently treated for possible UTI who was admitted on 4/21/2024 from home w/ confusion/altered mental status. She has seen her PCP recently for confusion and visual/auditory hallucinations. Head CT w/out acute findings. +Bcx - group B strep. Has sacral decub ulcer. ID consulted, on cefrtiraxone. Cardiology consulted- small possible vegetation on R ventricular lead of pacemaker. They recommend conservative management w/ IV abx alone, is not candidate for pacemaker removal, too high risk. Also high risk for KD and this is not being pursued. She has thrombocytopenia as well.     4/26- remains somnolent. Tolerating tube feeds via DHT. Nephrology assisting w/ hypercalcemia.     4/29-WBC down 11.5, platelets 81, , albumin 1.5.  Calcium now normal at 9.4.  Wound care reports patient crying out in pain with turning.  Wakes to voice, makes eye contact, able to answer simple yes/no questions, denied pain.       Psychosocial: at baseline alert/oriented, conversant, uses walker a bit, mainly wheelchair. Family notes no problems with short or long term memory. She has 2 children- son Misael (ABDOULAYE) and dtr Neeru. Dtr lives w/ her- she herself had a stroke and is limited in how much physical help she can provide. Sister Maryam atkins and also involved. Pt is the oldest sister. 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 Has some   benefits, her  was in the .       PALLIATIVE DIAGNOSES:    Altered mental status, confusion   Debility  Endocarditis  Hypoalbuminemia, severe, albumin 1.3  Palliative care encounter/advance care planning    ASSESSMENT AND PLAN:   Chart reviewed for updated information, discussed with patient's nurse Keyonna ADAME  Pt continues to tube feeds via DHT.   Ms. Newman was seen and evaluated, no family at bedside.    Ms. Newman woke briefly to voice, made eye contact and was able to answer some simple yes no questions and stated \"Im hungry\"  before falling back to sleep.  Son Misael Newman works during the day , I made multiple attempts to  contact him this evening but was unsuccessful. I did not have her sisters contact information.   Will try calling son tomorrow morning.       Please call with any palliative questions or concerns.  Palliative Care Team is available via perfect serve or via phone.    Referrals to:   [] Outpatient Palliative Care  [] Home Based Palliative Care  [] Home Based Primary Care  [] Hospice       ADVANCE CARE PLANNING:   [x] The [facility] Interdisciplinary Team has updated the ACP Navigator with Health Care Decision Maker and Patient Capacity      Primary Decision Maker: Misael Newman Jr. - Child - [phone]  Confirm Advance Directive: None    Current Code Status: Full Code     Goals of Care: Goals of Care and Interventions  Patient/Health Care Proxy Stated Goals: Recovery from acute illness       Please refer to Palliative Medicine ACP notes for further details.    PALLIATIVE ASSESSMENT:      Palliative Performance Scale (PPS):  PPS: 20    Pt does not respond to voice or gentle touch.    Modified ESAS:  Modified-Girard Symptom Assessment Scale (ESAS)  Drowsiness Score: 9  Pain Score: No pain  Appetite Score: Worst possible appetite  Dyspnea Score: No shortness of breath    Clinical Pain Assessment (nonverbal scale for severity on nonverbal patients):   Clinical Pain

## 2024-04-30 NOTE — PROGRESS NOTES
[facility]  Hospitalist Group                                                                                          Hospitalist Progress Note  Aye Hutton MD  Office Phone: [phone]        Date of Service:  2024  NAME:  Carlie Newman  :  1933  MRN:  [id number]       Admission Summary:   Carlie Newman is a 89 y.o. female with past medical history significant for dyslipidemia, congestive heart failure, hypertension, dementia, dementia, sick sinus syndrome with resultant pacemaker insertion presented at the emergency room with change in mental status.  Patient symptoms started few days ago and progressively getting worse.  She was recently prescribed antibiotic for treatment of UTI.  Family is unsure if patient has been taking the antibiotics appropriately. No fever, rigors or chills reported.  Patient and her daughter lives together, her daughter as a stroke and they both require considerable care at home according to the patient's sister who was present at the bedside. Her sister also reported that the patient has sacral wounds.  Patient was last admitted to this hospital in 2023, she was admitted and treated for urinary tract infection.  Patient was noted to have low platelet count during that hospitalization and platelet count recovered prior to discharge to SNF.  CT of the head done on arrival had emergency room negative for acute pathology.  Chest x-ray shows a trace left pleural effusion and BNP level is elevated.  She was referred to the hospitalist service for admission.       Interval history / Subjective:   Patient more alert this morning than previous.  Discussed with patient's family sitting at bedside along with palliative care following  Discussed with ID patient will need IV antibiotics still with PICC till  and thereafter suppressive therapy with oral antibiotics as well for feeding purpose patient will need a PEG tube for long-term  4/30/2024 1521  Last data filed at 4/30/2024 0607  Gross per 24 hour   Intake --   Output 1300 ml   Net -1300 ml          Physical Examination:     I had a face to face encounter with this patient and independently examined them on 4/30/2024 as outlined below:          General : alert x 1   no acute distress,   HEENT: PEERL, EOMI, moist mucus membrane, NG in place  Neck: supple, no JVD, no meningeal signs  Chest: Clear to auscultation bilaterally   CVS: S1 S2 heard, Capillary refill less than 2 seconds  Abd: soft/ non tender, non distended, BS physiological,   Ext: no clubbing, no cyanosis, no edema, brisk 2+ DP pulses  Neuro/Psych: squeezes hand on command, more interactive than previous  skin: warm     Data Review:    Review and/or order of clinical lab test  Review and/or order of tests in the radiology section of CPT  Review and/or order of tests in the medicine section of CPT  Discussion of test results with performing physician  I personally reviewed  Image and EKG/Monitor Tracing      I have personally and independently reviewed all pertinent labs, diagnostic studies, imaging, and have provided independent interpretation of the same.     Labs:     Recent Labs     04/28/24  0955 04/29/24 0457   WBC 14.7* 11.5*   HGB 8.0* 8.2*   HCT 24.4* 25.8*   PLT 65* 81*       Recent Labs     04/28/24  0056 04/28/24  0955 04/29/24 0457 04/30/24  0139   NA  --  133* 134* 133*   K  --  4.1 4.3 4.5   CL  --  100 99 97   CO2  --  29 29 30   BUN  --  21* 23* 22*   MG  --  1.8 1.8  --    PHOS 3.2  --   --  2.7       Recent Labs     04/28/24  0955 04/29/24 0457   ALT 68 59   GLOB 4.2* 4.5*       No results for input(s): \"INR\", \"APTT\" in the last 72 hours.    Invalid input(s): \"PTP\"     No results for input(s): \"TIBC\", \"FERR\" in the last 72 hours.    Invalid input(s): \"FE\", \"PSAT\"     No results found for: \"FOL\", \"RBCF\"   No results for input(s): \"PH\", \"PCO2\", \"PO2\" in the last 72 hours.  No results for input(s): \"CPK\" in the

## 2024-04-30 NOTE — PROGRESS NOTES
Infectious Disease Progress Note     Subjective:      Largely unchanged, pending family meeting    Objective:    Vitals:   Patient Vitals for the past 24 hrs:   Temp Pulse Resp BP SpO2   04/30/24 1009 -- -- 20 -- --   04/30/24 0618 98.8 °F (37.1 °C) 73 20 (!) 112/59 97 %   04/29/24 1840 98.1 °F (36.7 °C) 70 18 (!) 104/45 98 %         Physical Exam:  Gen: lethargy  HEENT:  Normocephalic, atraumatic with dobhoff + receiving TFs  CV: 2/6 systolic murmur  Lungs: no wheezing  Abdomen: soft, non-distended, non-TTP  Skin: sacral decub ulcer  Psych: opens eyes, same level of responsiveness  Neuro: more responsive  Musculoskeletal:  No joint edema, erythema or tenderness note    Central Line:      Medications:    Current Facility-Administered Medications:     furosemide (LASIX) injection 20 mg, 20 mg, IntraVENous, BID, Lisa Be MD, 20 mg at 04/30/24 1017    folic acid (FOLVITE) tablet 1 mg, 1 mg, Oral, Daily, Jennifer Ansari MD, 1 mg at 04/30/24 0856    HYDROmorphone HCl PF (DILAUDID) injection 0.25 mg, 0.25 mg, IntraVENous, Q3H PRN, 0.25 mg at 04/28/24 1740 **OR** HYDROmorphone HCl PF (DILAUDID) injection 0.5 mg, 0.5 mg, IntraVENous, Q3H PRN, Drake Pillai MD, 0.5 mg at 04/30/24 1009    [Held by provider] aspirin chewable tablet 81 mg, 81 mg, Oral, Daily, Tere Subramanian MD    citalopram (CELEXA) tablet 10 mg, 10 mg, Oral, Daily, Tere Subramanian MD, 10 mg at 04/30/24 0856    ferrous sulfate (IRON 325) tablet 325 mg, 325 mg, Oral, QAM AC, Tere Subramanian MD, 325 mg at 04/29/24 0651    [Held by provider] midodrine (PROAMATINE) tablet 2.5 mg, 2.5 mg, Oral, BID, Tere Subramanian MD    [Held by provider] pantoprazole (PROTONIX) tablet 40 mg, 40 mg, Oral, QAM AC, Tere Subramanian MD    sodium chloride flush 0.9 % injection 5-40 mL, 5-40 mL, IntraVENous, 2 times per day, Tere Subramanian MD, 10 mL at 04/30/24 0857    sodium chloride flush 0.9 % injection 5-40 mL, 5-40 mL, IntraVENous, PRN, Tere Subramanian

## 2024-04-30 NOTE — PROGRESS NOTES
Renal Progress Note    NAME:  Carlie Newman   :   1933   MRN:   [id number]     Date/Time:  2024 11:55 AM      Assessment:     Hypercalcemia-likely secondary to primary hyperparathyroidism-not a candidate for surgical management given her age and comorbidities.corrected calcium improving and is 10.5 today. Repeat PTH 75  Hypernatremia- resolved, now hyponatremia  Fluid overload, off IVF. s/p lasix x1  Strep bacteremia       Plan:       Lasix 20 mgIv bid x4 dosses  Sensipar/Cinacalcet is the next option to help lower the PTH and maintain normocalcemia.  However can not be given via the feeding tube.  Will hold off for now.  No need for Zometa at this point. Will give one dose if calcium continues to increase  Decrease water flushes with dropping sodium-decreased to 100 ml q4h (still receiving 145 mL every 4 hours.  Discussed with the nurse.)  Follow electrolytes.         Subjective:       F/Y-yjmrjfdxzkkll-0/27/2024  Obtaining the ROS was challenging.  The chart was reviewed.  Her course was also discussed extremity bedside      F/H-bdrnrldyhkzut-0/28/2024  no new complaints were offered this afternoon.  Ms. Newman was transferred to room 609.      -awake but not answering the questions  -no change, poor interaction    Medications reviewed:  Current Facility-Administered Medications   Medication Dose Route Frequency    furosemide (LASIX) injection 20 mg  20 mg IntraVENous BID    folic acid (FOLVITE) tablet 1 mg  1 mg Oral Daily    HYDROmorphone HCl PF (DILAUDID) injection 0.25 mg  0.25 mg IntraVENous Q3H PRN    Or    HYDROmorphone HCl PF (DILAUDID) injection 0.5 mg  0.5 mg IntraVENous Q3H PRN    [Held by provider] aspirin chewable tablet 81 mg  81 mg Oral Daily    citalopram (CELEXA) tablet 10 mg  10 mg Oral Daily    ferrous sulfate (IRON 325) tablet 325 mg  325 mg Oral QAM AC    [Held by provider] midodrine (PROAMATINE) tablet 2.5 mg  2.5 mg Oral BID    [Held by provider] pantoprazole

## 2024-05-01 ENCOUNTER — APPOINTMENT (OUTPATIENT)
Facility: HOSPITAL | Age: 89
DRG: 871 | End: 2024-05-01
Payer: MEDICARE

## 2024-05-01 ENCOUNTER — HOSPITAL ENCOUNTER (INPATIENT)
Facility: HOSPITAL | Age: 89
LOS: 1 days | Discharge: NURSING, REHAB OR LONG-TERM CARE | End: 2024-05-01
Attending: FAMILY MEDICINE | Admitting: FAMILY MEDICINE
Payer: MEDICARE

## 2024-05-01 ENCOUNTER — HOSPICE ADMISSION (OUTPATIENT)
Age: 89
End: 2024-05-01
Payer: MEDICARE

## 2024-05-01 VITALS
HEIGHT: 67 IN | HEART RATE: 72 BPM | DIASTOLIC BLOOD PRESSURE: 53 MMHG | WEIGHT: 192.46 LBS | BODY MASS INDEX: 30.21 KG/M2 | RESPIRATION RATE: 16 BRPM | SYSTOLIC BLOOD PRESSURE: 117 MMHG | TEMPERATURE: 99.9 F | OXYGEN SATURATION: 94 %

## 2024-05-01 PROBLEM — Z71.89 GOALS OF CARE, COUNSELING/DISCUSSION: Status: ACTIVE | Noted: 2024-05-01

## 2024-05-01 PROBLEM — A41.9 SEPSIS (HCC): Status: ACTIVE | Noted: 2024-05-01

## 2024-05-01 PROBLEM — Z71.89 DNR (DO NOT RESUSCITATE) DISCUSSION: Status: ACTIVE | Noted: 2024-05-01

## 2024-05-01 LAB
ALBUMIN SERPL-MCNC: 1.3 G/DL (ref 3.5–5)
ANION GAP SERPL CALC-SCNC: 2 MMOL/L (ref 5–15)
BUN SERPL-MCNC: 30 MG/DL (ref 6–20)
BUN/CREAT SERPL: 48 (ref 12–20)
CALCIUM SERPL-MCNC: 9.8 MG/DL (ref 8.5–10.1)
CHLORIDE SERPL-SCNC: 97 MMOL/L (ref 97–108)
CO2 SERPL-SCNC: 29 MMOL/L (ref 21–32)
CREAT SERPL-MCNC: 0.63 MG/DL (ref 0.55–1.02)
GLUCOSE SERPL-MCNC: 125 MG/DL (ref 65–100)
PHOSPHATE SERPL-MCNC: 3.1 MG/DL (ref 2.6–4.7)
POTASSIUM SERPL-SCNC: 4.5 MMOL/L (ref 3.5–5.1)
SODIUM SERPL-SCNC: 128 MMOL/L (ref 136–145)

## 2024-05-01 PROCEDURE — 6360000002 HC RX W HCPCS: Performed by: INTERNAL MEDICINE

## 2024-05-01 PROCEDURE — 0651 HSPC ROUTINE HOME CARE

## 2024-05-01 PROCEDURE — 80069 RENAL FUNCTION PANEL: CPT

## 2024-05-01 PROCEDURE — C9113 INJ PANTOPRAZOLE SODIUM, VIA: HCPCS

## 2024-05-01 PROCEDURE — 6370000000 HC RX 637 (ALT 250 FOR IP): Performed by: INTERNAL MEDICINE

## 2024-05-01 PROCEDURE — 99233 SBSQ HOSP IP/OBS HIGH 50: CPT | Performed by: INTERNAL MEDICINE

## 2024-05-01 PROCEDURE — 36415 COLL VENOUS BLD VENIPUNCTURE: CPT

## 2024-05-01 PROCEDURE — 6360000002 HC RX W HCPCS: Performed by: STUDENT IN AN ORGANIZED HEALTH CARE EDUCATION/TRAINING PROGRAM

## 2024-05-01 PROCEDURE — 2580000003 HC RX 258

## 2024-05-01 PROCEDURE — 1100000000 HC RM PRIVATE

## 2024-05-01 PROCEDURE — 0656 HSPC GENERAL INPATIENT

## 2024-05-01 PROCEDURE — A4216 STERILE WATER/SALINE, 10 ML: HCPCS

## 2024-05-01 PROCEDURE — 6360000002 HC RX W HCPCS: Performed by: FAMILY MEDICINE

## 2024-05-01 PROCEDURE — 2580000003 HC RX 258: Performed by: INTERNAL MEDICINE

## 2024-05-01 PROCEDURE — 99233 SBSQ HOSP IP/OBS HIGH 50: CPT | Performed by: NURSE PRACTITIONER

## 2024-05-01 PROCEDURE — 6360000002 HC RX W HCPCS

## 2024-05-01 RX ORDER — GLYCOPYRROLATE 0.2 MG/ML
0.2 INJECTION INTRAMUSCULAR; INTRAVENOUS EVERY 6 HOURS PRN
Status: DISCONTINUED | OUTPATIENT
Start: 2024-05-01 | End: 2024-05-01 | Stop reason: HOSPADM

## 2024-05-01 RX ORDER — SODIUM CHLORIDE 0.9 % (FLUSH) 0.9 %
5-40 SYRINGE (ML) INJECTION PRN
Status: DISCONTINUED | OUTPATIENT
Start: 2024-05-01 | End: 2024-05-01 | Stop reason: HOSPADM

## 2024-05-01 RX ORDER — KETOROLAC TROMETHAMINE 30 MG/ML
30 INJECTION, SOLUTION INTRAMUSCULAR; INTRAVENOUS EVERY 6 HOURS PRN
Status: DISCONTINUED | OUTPATIENT
Start: 2024-05-01 | End: 2024-05-01 | Stop reason: HOSPADM

## 2024-05-01 RX ORDER — HYDROMORPHONE HYDROCHLORIDE 1 MG/ML
1 INJECTION, SOLUTION INTRAMUSCULAR; INTRAVENOUS; SUBCUTANEOUS
Status: DISCONTINUED | OUTPATIENT
Start: 2024-05-01 | End: 2024-05-01 | Stop reason: HOSPADM

## 2024-05-01 RX ORDER — HYDROMORPHONE HYDROCHLORIDE 1 MG/ML
0.5 INJECTION, SOLUTION INTRAMUSCULAR; INTRAVENOUS; SUBCUTANEOUS EVERY 4 HOURS
Status: DISCONTINUED | OUTPATIENT
Start: 2024-05-01 | End: 2024-05-01 | Stop reason: HOSPADM

## 2024-05-01 RX ORDER — LORAZEPAM 2 MG/ML
1 INJECTION INTRAMUSCULAR
Status: DISCONTINUED | OUTPATIENT
Start: 2024-05-01 | End: 2024-05-01 | Stop reason: HOSPADM

## 2024-05-01 RX ORDER — BISACODYL 10 MG
10 SUPPOSITORY, RECTAL RECTAL DAILY PRN
Status: DISCONTINUED | OUTPATIENT
Start: 2024-05-01 | End: 2024-05-01 | Stop reason: HOSPADM

## 2024-05-01 RX ORDER — HYDROMORPHONE HYDROCHLORIDE 1 MG/ML
1 INJECTION, SOLUTION INTRAMUSCULAR; INTRAVENOUS; SUBCUTANEOUS EVERY 4 HOURS
Status: DISCONTINUED | OUTPATIENT
Start: 2024-05-01 | End: 2024-05-01 | Stop reason: HOSPADM

## 2024-05-01 RX ORDER — GLYCOPYRROLATE 0.2 MG/ML
0.2 INJECTION INTRAMUSCULAR; INTRAVENOUS EVERY 4 HOURS PRN
Status: DISCONTINUED | OUTPATIENT
Start: 2024-05-01 | End: 2024-05-01 | Stop reason: HOSPADM

## 2024-05-01 RX ORDER — HYDROMORPHONE HYDROCHLORIDE 1 MG/ML
0.5 INJECTION, SOLUTION INTRAMUSCULAR; INTRAVENOUS; SUBCUTANEOUS EVERY 30 MIN PRN
Status: DISCONTINUED | OUTPATIENT
Start: 2024-05-01 | End: 2024-05-01 | Stop reason: HOSPADM

## 2024-05-01 RX ORDER — FUROSEMIDE 10 MG/ML
20 INJECTION INTRAMUSCULAR; INTRAVENOUS 2 TIMES DAILY PRN
Status: DISCONTINUED | OUTPATIENT
Start: 2024-05-01 | End: 2024-05-01 | Stop reason: HOSPADM

## 2024-05-01 RX ADMIN — PANTOPRAZOLE SODIUM 40 MG: 40 INJECTION, POWDER, LYOPHILIZED, FOR SOLUTION INTRAVENOUS at 10:08

## 2024-05-01 RX ADMIN — HYDROMORPHONE HYDROCHLORIDE 1 MG: 1 INJECTION, SOLUTION INTRAMUSCULAR; INTRAVENOUS; SUBCUTANEOUS at 14:25

## 2024-05-01 RX ADMIN — SODIUM CHLORIDE, PRESERVATIVE FREE 10 ML: 5 INJECTION INTRAVENOUS at 10:15

## 2024-05-01 RX ADMIN — HYDROMORPHONE HYDROCHLORIDE 0.5 MG: 1 INJECTION, SOLUTION INTRAMUSCULAR; INTRAVENOUS; SUBCUTANEOUS at 10:09

## 2024-05-01 RX ADMIN — HYDROMORPHONE HYDROCHLORIDE 0.25 MG: 1 INJECTION, SOLUTION INTRAMUSCULAR; INTRAVENOUS; SUBCUTANEOUS at 03:56

## 2024-05-01 RX ADMIN — FUROSEMIDE 20 MG: 10 INJECTION, SOLUTION INTRAMUSCULAR; INTRAVENOUS at 10:06

## 2024-05-01 RX ADMIN — Medication: at 10:03

## 2024-05-01 ASSESSMENT — PAIN DESCRIPTION - LOCATION: LOCATION: GENERALIZED

## 2024-05-01 ASSESSMENT — PAIN DESCRIPTION - ORIENTATION: ORIENTATION: ANTERIOR;LOWER

## 2024-05-01 ASSESSMENT — PAIN DESCRIPTION - DESCRIPTORS: DESCRIPTORS: ACHING;DISCOMFORT;SORE

## 2024-05-01 NOTE — CARE COORDINATION
05/01/24 1553   Readmission Assessment   Number of Days since last admission? 1-7 days  (Family revoked hospice)   Previous Disposition Other (comment)  (Hospice)   Who is being Interviewed Caregiver   What was the patient's/caregiver's perception as to why they think they needed to return back to the hospital? Other (Comment)  (Patient was under hospice care, family revoked.)   Did you visit your Primary Care Physician after you left the hospital, before you returned this time? No  (Patient was under hospice)   Why weren't you able to visit your PCP? Other (Comment)  (Patient was under hospice)   Did you see a specialist, such as Cardiac, Pulmonary, Orthopedic Physician, etc. after you left the hospital? No   Who advised the patient to return to the hospital? Physician  (Patient was under hospice care)   Does the patient report anything that got in the way of taking their medications? No   In our efforts to provide the best possible care to you and others like you, can you think of anything that we could have done to help you after you left the hospital the first time, so that you might not have needed to return so soon? Teach back instructions regarding management of illness       KADEN BasurtoAZAEL  Care Management Department  Ph:[phone]

## 2024-05-01 NOTE — PROGRESS NOTES
Palliative Medicine  Patient Name: Carlie Newman  YOB: 1933  MRN: [id number]  Age: 89 y.o.  Gender: female    Date of Initial Consult: 4/23/24  Date of Service: 4/30/2024  Time: 10:01 PM  Provider: SAMUEL Buckley NP  Hospital Day: 10  Admit Date: 4/21/2024  Referring Provider: Dr Olivas        Reasons for Consultation:  Goals of Care    HISTORY OF PRESENT ILLNESS (HPI):   Carlie Newman is a 89 y.o. female with a past medical history of HTN, CKD, CAD, bradycardia w/ pacemaker, L TKA recently treated for possible UTI who was admitted on 4/21/2024 from home w/ confusion/altered mental status. She has seen her PCP recently for confusion and visual/auditory hallucinations. Head CT w/out acute findings. +Bcx - group B strep. Has sacral decub ulcer. ID consulted, on cefrtiraxone. Cardiology consulted- small possible vegetation on R ventricular lead of pacemaker. They recommend conservative management w/ IV abx alone, is not candidate for pacemaker removal, too high risk. Also high risk for KD and this is not being pursued. She has thrombocytopenia as well.     4/26- remains somnolent. Tolerating tube feeds via DHT. Nephrology assisting w/ hypercalcemia.     4/29-WBC down 11.5, platelets 81, , albumin 1.5.  Calcium now normal at 9.4.  Wound care reports patient crying out in pain with turning.  Wakes to voice, makes eye contact, able to answer simple yes/no questions, denied pain.       Psychosocial: at baseline alert/oriented, conversant, uses walker a bit, mainly wheelchair. Family notes no problems with short or long term memory. She has 2 children- son Misael (ABDOULAYE) and dtr Neeru. Dtr lives w/ her- she herself had a stroke and is limited in how much physical help she can provide. Sister Maryam atkins and also involved. Pt is the oldest sister. 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

## 2024-05-01 NOTE — PROGRESS NOTES
ID Progress Note    INFECTIOUS DISEASE Attending:     I agree with the above infectious disease daily progress note in its entirety as authored by and discussed in detail with the nurse practitioner.   I have reviewed pertinent laboratory studies, microbiology cultures, radiologic reports with review of the consultations and progress notes as appropriate.   I agree with today's subjective findings, physical examination, assessment and plan of care as described above and discussed extensively with the nurse practitioner.      Discussed at length with hospice, Dr. Hutton, family.  In light of new findings of right knee edema, TTP, warmth, and fever, discussed options for continuing hospice vs diagnostic arthrocentesis of right knee.  Fully informed patient's family that this may not impact prognosis and if fluid is infected then surgery may not be in her best interests.    Ceftriaxone for now if peripheral IV access can be established.      Admission Summary:   89 year old female with known Strep bacteremia with CIED.   ID team was consulted on 4/22 for bacteremia.     Antimicrobial therapy history      IV rocephin 4/21-  PO cipro 4/16-4/19  Assessment   Metabolic encephalopathy  Bacteremia with CIED  Pacer lead with vegetation; not a candidate for removal  Sacral Decubitus Ulcer  Hx of ESBL E-coli UTI  - T-max 101.5, wbc 18->11.5    Blood cx (4/21) strep agalactiae, group B strep, (4/22, 4/23, 4/27) no growth so far    U/A (4/28) wbc 0-4     Urine cx (4/12) ESBL E-coli        Plan   - continue with IV rocephin 2 gm every 24 hours, end date 5/20, then transition to Keflex 500 mg twice a day lifelong suppressive therapy     Fever while on abx therapy; Urine cx + for ESBL E-coli; treated with Cipro which was resistant. It is also resistant to rocephin. Congested cough, difficulty with clearing mucus. Right knee effusion. Concerned with aspiration PNA &/or possible knee infection vs gout.    Plan of care discussed with  pacemaker. Enteric tube extends inferiorly field of view. Cardiomediastinal silhouette within normal limits. Worsening moderate edema pattern. Stable small left pleural effusion. No definite pneumothorax.     Worsening moderate edema pattern. Stable small left pleural effusion.     US ABDOMEN LIMITED Specify organ? GALLBLADDER, LIVER    Result Date: 4/27/2024  EXAM: US ABDOMEN LIMITED INDICATION: elevated LFTs COMPARISON: Abdominal radiograph 4/24/2024, CT abdomen pelvis 4/22/2020 TECHNIQUE: Limited abdominal ultrasound. FINDINGS: Liver: echogenicity is increased.  No focal liver lesion. Main portal vein flow: Toward the liver. Fluid: No ascites. Right pleural effusion. Gallbladder: Contracted. No gallstones. No gallbladder wall thickening or pericholecystic fluid. Negative sonographic Espinosa sign. Bile ducts: There is no intra or extrahepatic biliary ductal dilatation.  The common bile duct measures 5 mm. Pancreas: The visualized portions are within normal limits. Kidneys: Right length: 10.7 cm. No hydronephrosis.     1.  Hepatic parenchymal disease. Right lobe mass seen on prior CT is not visualized on ultrasound. 2.  Contracted gallbladder. 3.  Unremarkable right kidney. 4.  Right pleural effusion.       Thank you for the opportunity to participate in the care of this patient.   Please contact with questions or concerns.      The above plan of care that has been discussed and agreed upon by Dr. Diaz.    SAMUEL Allen NP    A total time of 50 minutes was spent on today's encounter.  Greater than 50% of the time was spent on the following:  Preparing for visit and chart review.  Obtaining and/or reviewing separately obtained history  Performing a medically appropriate exam and/or evaluation  Counseling and educating a patient/family/caregiver as noted above  Referring and communicating with other professionals (not separately reported)  Independently interpreting results (not separately reported) and

## 2024-05-01 NOTE — CARE COORDINATION
Brief note:     CM spoke with son, Misael Newman via telephone [phone]. CM informed about Palliative care team attempting to communicate with him. He is waiting their phone call. CM sent message to Didi Gracia.     7386- CM spoke with palliative, they stated  the son would like comfort and hospice consult to [facility] for GIP status.     Cm to follow.       [facility]AD[facility]  Care Management Department  Ph:[phone]

## 2024-05-01 NOTE — PLAN OF CARE
Problem: Chronic Conditions and Co-morbidities  Goal: Patient's chronic conditions and co-morbidity symptoms are monitored and maintained or improved  Outcome: Progressing     Problem: Safety - Adult  Goal: Free from fall injury  Outcome: Progressing     Problem: Hospice Orientation  Goal: Demonstrate understanding of hospice philosophy, plan of care, and inpatient hospice program  Description: The patient/family/caregiver will demonstrate understanding of hospice philosophy, plan of care and the inpatient hospice program as evidenced by participation in meeting the patient's psychosocial, spiritual, medical, and physical needs inclusive of medical supplies/equipment focusing on symptoms.  Outcome: Progressing     Problem: Potential for Alteration in Skin Integrity  Goal: Monitor skin for areas of alteration in skin integrity  Description: Patient [unfilled] will remain free from alterations of or worsening skin integrity as evidenced by no changes to skin during assessment each shift during the inpatient hospice stay.  Outcome: Progressing     Problem: Risk for Falls  Goal: Fall prevention  Description: Patient  will remain free from falls as evidenced by no witnessed or reported falls each shift during the inpatient hospice stay.     Patient  and or family/caregiver will receive education on fall prevention as evidenced by verbalizing recall of using the call lights system, fall prevention devices, and asking for help during the admission process and ongoing as needed during the inpatient hospice stay.    Outcome: Progressing     Problem: Pain  Goal: Control of acute pain  Description: Patient  will exhibit a decrease in pain as evidenced by a pain rating less than 1 on the Adult Nonverbal Pain  Problem: Anticipatory Grief  Goal: Explore reactions to and verbalize acceptance of impending loss  Description: Patient  and or family/caregiver will demonstrate appropriate behavior related to impending loss as evidenced

## 2024-05-01 NOTE — CONSULTS
Palliative brief note-    Called son Misael Newman again this morning, received voicemail, left message stating not emergency, but stressed the importance of calling back today as we have come to a point treatment decisions need to be made.    Will update team once I speak with son.    Any questions or concerns, please contact me via [employer].    Thank you.

## 2024-05-01 NOTE — CONSULTS
Palliative brief note- Full visit documentation pending.    Son Misael Newman returned my call.    Discussed options for care moving forward in setting of of persistent AMS, Bacteremia and requiring alternate route for nutrition.     GOC-   Son is clear, his mother would not want PEG.   Son electing to transition his mother to comfort care.   Ok with me consulting Hospice to evaluate for possible GIP 2/2 pain.   Comfort orders to be placed    DNR discussion- Son elected DNR. Order placed in EMR.

## 2024-05-01 NOTE — PROGRESS NOTES
[facility]  Good Help to Those in Need  [phone]     Patient Name: Carlie Newman  YOB: 1933  Age: 89 y.o.    [facility]ospice RN Note:  Hospice consult received, reviewing chart. Will follow up with Unit Nurse and Care Manager to discuss plan of care, patient status and discharge disposition within the hour.     1200: patient assessed. NG tube has been removed and son agrees with comfort care only at this time. Palliative has placed comfort car orders.   She is grimacing in pain. She is septic and at risk of rapid decline. Currently on scheduled IV Dilaudid for pain.   Her sister Maryam is bedside. I spoke with her son, Misael, by phone and he agrees with [facility] hospice admission.   Dr Hall reviewed patient with me and approved GIP admission with dx Sepsis.   Consents sent to her son Misael by DocBennygn  Once received will set up for transfer to [facility] if bed available at that time or admit GIP at [facility] with transfer to [facility] when bed is available.  CM and Dr Hutton made aware of the plan      Thank you for the opportunity to be of service to this patient.     Christine Catherine RN  Clinical Nurse Liaison  [facility]  [phone] Mobile  [phone] Office   Available on Perfect Serve

## 2024-05-02 PROBLEM — T82.7XXA PACEMAKER INFECTION (HCC): Status: ACTIVE | Noted: 2024-05-02

## 2024-05-02 PROBLEM — M19.90 INFLAMMATORY ARTHRITIS: Status: ACTIVE | Noted: 2024-05-02

## 2024-05-02 PROBLEM — B95.1 BACTEREMIA DUE TO GROUP B STREPTOCOCCUS: Status: ACTIVE | Noted: 2024-04-22

## 2024-05-02 LAB
BACTERIA SPEC CULT: NORMAL
BACTERIA SPEC CULT: NORMAL
SERVICE CMNT-IMP: NORMAL
SERVICE CMNT-IMP: NORMAL

## 2024-05-02 PROCEDURE — 0Y9F3ZX DRAINAGE OF RIGHT KNEE REGION, PERCUTANEOUS APPROACH, DIAGNOSTIC: ICD-10-PCS | Performed by: STUDENT IN AN ORGANIZED HEALTH CARE EDUCATION/TRAINING PROGRAM

## 2024-05-02 NOTE — CONSULTS
revoked per family request, plan to pursue needle aspiration of R knee effusion, possible re-admission to hospice after? NGT removed. SLP following, advanced diet to puree w/ thin liq. RD visited pt at bedside, more awake/alert. Pt amenable to try Ensure, prefers vanilla. If pt unable to adequately meet nutritional needs via PO and if congruent with goals of care, consider TF : Initiate Glucerna at 25ml/hr, advancing 10 ml/hr q4 hrs as pt tolerated to goal of 55ml/hr, flush 100ml q 4 hrs or per provider - provides 1995 kcal (100%), 82 gm pro (100%),1518 ml fluid (85%)  .     RD to monitor intakes of diet and ONS at f/u to assess appropriateness of Julien supplement in addition to Ensure 2/2 sacral PI stage 3.     Labs: Na 134, Ca 10.7, Alb 1.4, AST 66, H&H 7.8/24, MCV 67.4      Nutritionally Significant Medications:  Lovenox, NaCl      Estimated Daily Nutrient Needs:  Energy Requirements Based On: Kcal/kg  Weight Used for Energy Requirements: Admission  Energy (kcal/day): 1782 - 2079 kcal/day (30 - 35 kcal/kg - wounds)  Weight Used for Protein Requirements: Admission  Protein (g/day): 77 - 89 g/day (1.3 - 1.5 g/kg - wounds)  Method Used for Fluid Requirements: 1 ml/kcal  Fluid (ml/day): 1782 - 2079 ml/day (1ml/kcal)    Nutrition Related Findings:   Edema: Generalized   Edema Generalized: +1        RLE Edema: +1  LLE Edema: +1    Last BM:      Wounds:   Wound Type: Stage III, Pressure Injury      Current Nutrition Therapies:  Diet: Pureed w/ thin liq  Supplements: none  Meal Intake:   No data found.  Supplement Intake:  No data found.  Nutrition Support: none      Anthropometric Measures:  Height: 170.2 cm (5' 7.01\")  Ideal Body Weight (IBW): 135 lbs (61 kg)       Current Body Weight: 87.3 kg (192 lb 7.4 oz), 142.6 % IBW. Weight Source: Bed Scale  Current BMI (kg/m2): 30.1        Weight Adjustment For: No Adjustment                 BMI Categories: Obese Class 1 (BMI 30.0-34.9)    Wt Readings from Last 10 Encounters:

## 2024-05-02 NOTE — H&P
Hemolytic Streptococcus - Group B      BACTERIAL SUSCEPTIBILITY PANEL JEIMY      ampicillin <=0.25 ug/mL Sensitive      cefotaxime <=0.12 ug/mL Sensitive      cefTRIAXone <=0.12 ug/mL Sensitive      clindamycin >=1 ug/mL Resistant      Inducible Clindamycin Negative ug/mL Sensitive      levofloxacin 0.5 ug/mL Sensitive      linezolid <=2 ug/mL Sensitive      Penicillin G <=0.06 ug/mL Sensitive      vancomycin 0.5 ug/mL Sensitive                           Culture, Blood, PCR ID Panel [[id number]]  (Abnormal) Collected: 04/21/24 4271    Order Status: Completed Specimen: Blood Updated: 04/22/24 0909     Accession Number [id number]     Enterococcus faecalis by PCR Not detected        Enterococcus faecium by PCR Not detected        Listeria monocytogenes by PCR Not detected        STAPHYLOCOCCUS Not detected        Staphylococcus Aureus Not detected        Staphylococcus epidermidis by PCR Not detected        Staphylococcus lugdunensis by PCR Not detected        STREPTOCOCCUS Detected        Streptococcus agalactiae (Group B) Detected        Strep pneumoniae Not detected        Strep pyogenes,(Grp. A) Not detected        Acinetobacter calcoac baumannii complex by PCR Not detected        Bacteroides fragilis by PCR Not detected        Enterobacteriaceae by PCR Not detected        Enterobacter cloacae complex by PCR Not detected        Escherichia Coli Not detected        Klebsiella aerogenes by PCR Not detected        Klebsiella oxytoca by PCR Not detected        Klebsiella pneumoniae group by PCR Not detected        Proteus by PCR Not detected        Salmonella species by PCR Not detected        Serratia marcescens by PCR Not detected        Haemophilus Influenzae by PCR Not detected        Neisseria meningitidis by PCR Not detected        Pseudomonas aeruginosa Not detected        Stenotrophomonas maltophilia by PCR Not detected        Candida albicans by PCR Not detected        Candida auris by PCR Not detected

## 2024-05-02 NOTE — CONSULTS
(24hrs), Av.3 °F (36.8 °C), Min:98.1 °F (36.7 °C), Max:98.6 °F (37 °C)      EXAM:   Sleeping, but awake to verbal stimulation. Pain with micromotion of the R knee. There is increased swelling in the leg and knee compared to the L. Pain with palpation over all aspects of the knee. No increased warmth, no erythema. Does not participate with exam.     Imaging Review:   XR CHEST 1 VIEW  Narrative: EXAM: XR CHEST 1 VIEW    INDICATION: supplemental O2 need vs was in RA yesterday    COMPARISON: Chest x-ray 2024 and CT 2024.    FINDINGS: A portable AP radiograph of the chest was obtained at 08:35 hours. The  patient is on a cardiac monitor. Pacemaker generator body projects over the left  chest wall with intact appearing leads traversing in expected course. The lungs  show pulmonary vascular congestion with diffuse interstitial prominence and  curly B lines with small right pleural effusion and no pneumothorax. The cardiac  and mediastinal contours are normal.  The bones and soft tissues are grossly  within normal limits.   Impression: Congestive failure with interstitial pulmonary edema and small right  pleural effusion  XR KNEE RIGHT (1-2 VIEWS)  Narrative: INDICATION:  pain and swelling     Exam: AP, lateral views of the right knee.    FINDINGS: There is no acute fracture-dislocation. The osseous structures are  diffusely demineralized. No suprapatellar joint fluid is visualized. There is  marked narrowing of the medial tibiofemoral joint space and moderate to severe  narrowing of the lateral tibiofemoral joint space. There are extensive  tricompartmental osteophytes.  Impression: No acute fracture or dislocation.  .      Labs:   Recent Results (from the past 24 hour(s))   CBC with Auto Differential    Collection Time: 24  2:28 AM   Result Value Ref Range    WBC 9.7 3.6 - 11.0 K/uL    RBC 3.56 (L) 3.80 - 5.20 M/uL    Hemoglobin 7.8 (L) 11.5 - 16.0 g/dL    Hematocrit 24.0 (L) 35.0 - 47.0 %    MCV 67.4

## 2024-05-02 NOTE — PROGRESS NOTES
[facility]  Good Help to Those in Need  [phone]     Patient Name: Carlie Newman  YOB: 1933  Age: 89 y.o.    [facility] RN Note:  Hospice liaison, NP and Palliative NP met with son and DIL. They had met with ID after she was admitted to hospice and were offered to have her knee drained, cultured and pursue IV antibiotics. After a lengthy discussion they have decided to pursue treatment  and revoke hospice.   Revocation form signed and scanned.   Dr Hall aware and gave discharge order  Dr Gamez notified that he would be resuming care as her attending  Nursing supervisor called as well      Thank you for the opportunity to be of service to this patient.     Christine Catherine RN  Clinical Nurse Liaison  [facility]  [phone] Mobile  [phone] Office   Available on Perfect Serve    
benefits, her  was in the .       PALLIATIVE DIAGNOSES:    Altered mental status, confusion   Debility  Bacteremia  Hypoalbuminemia, severe, albumin 1.3  DNR discussion  Palliative care encounter/advance care planning    ASSESSMENT AND PLAN:   Chart reviewed for updated information, discussed with patient's nurse .  Pt continues to tube feeds via DHT.   Ms. Newman was seen and evaluated this morning, she was sleeping, opened eyes briefly on exam, when asked, she was moira to tell me she had pain, unable to tell me location.   Approx 1000 am, I I spoke with her son, Misael, Discussed options for care moving forward in setting of of persistent AMS, Bacteremia and requiring alternate route for nutrition.   Son clear that his mother would not want PEG, therefore he elected to  transition her to comfort care. At this time, I proceeded to place comfort orders and consulted Hospice.   2. At approx 230 pm, Christine Weaver notified me that they had admitted Ms. Newman GIP, but soon afterwards, plan changed as family now wanting to additional testing per ID recommendations. I returned to unit, met with son Misael and his wife, discussed change in plan. Ultimately, son needs to be sure that he has done everything he can for his mother, so has elected to revoke hospice to pursue needle aspiration of new right knee effusion for Cx. Son anticipates after results of Culture return, he will elect to have her re-admitted onto Hospice.    3. DNR Discussion- Son elected DNR, Order placed in EMR.    Please call with any palliative questions or concerns.  Palliative Care Team is available via perfect serve or via phone.    Referrals to:   [] Outpatient Palliative Care  [] Home Based Palliative Care  [] Home Based Primary Care  [x] Hospice       ADVANCE CARE PLANNING:   [x] [facility] Interdisciplinary Team has updated the ACP Navigator with Health Care Decision Maker and Patient Capacity      Primary Decision Maker 
mucus membrane, NG in place  Neck: supple, no JVD, no meningeal signs  Chest: Clear to auscultation bilaterally   CVS: S1 S2 heard, Capillary refill less than 2 seconds  Abd: soft/ non tender, non distended, BS physiological,   Ext: no clubbing, no cyanosis, no edema, brisk 2+ DP pulses  Neuro/Psych: squeezes hand on command, more interactive than previous  skin: warm     Data Review:    Review and/or order of clinical lab test  Review and/or order of tests in the radiology section of CPT  Review and/or order of tests in the medicine section of CPT  Discussion of test results with performing physician  I personally reviewed  Image and EKG/Monitor Tracing      I have personally and independently reviewed all pertinent labs, diagnostic studies, imaging, and have provided independent interpretation of the same.     Labs:     Recent Labs     04/29/24 0457   WBC 11.5*   HGB 8.2*   HCT 25.8*   PLT 81*       Recent Labs     04/29/24 0457 04/30/24  0139 05/01/24  0352   * 133* 128*   K 4.3 4.5 4.5   CL 99 97 97   CO2 29 30 29   BUN 23* 22* 30*   MG 1.8  --   --    PHOS  --  2.7 3.1       Recent Labs     04/29/24 0457   ALT 59   GLOB 4.5*       No results for input(s): \"INR\", \"APTT\" in the last 72 hours.    Invalid input(s): \"PTP\"     No results for input(s): \"TIBC\", \"FERR\" in the last 72 hours.    Invalid input(s): \"FE\", \"PSAT\"     No results found for: \"RBCF\"   No results for input(s): \"PH\", \"PCO2\", \"PO2\" in the last 72 hours.  No results for input(s): \"CPK\" in the last 72 hours.    Invalid input(s): \"CPKMB\", \"CKNDX\", \"TROIQ\"  Lab Results   Component Value Date/Time    CHOL 192 04/20/2022 03:22 PM    HDL 82 04/20/2022 03:22 PM    LDL 98 04/20/2022 03:22 PM     No results found for: \"GLUCPOC\"  [unfilled]    Notes reviewed from all clinical/nonclinical/nursing services involved in patient's clinical care. Care coordination discussions were held with appropriate clinical/nonclinical/ nursing providers based on care

## 2024-05-02 NOTE — WOUND CARE
WOCN Note:     Follow up for for sacral pressure injury   Seen in 609/01     89 y.o. y/o female admitted on 5/1/2024 from home.  Admitted for delirium and UTI   Sepsis ([facility]) [A41.9]   History of CHF, dementia   No indication for wound culture.   Diet: ADULT DIET; Dysphagia - Pureed  ADULT ORAL NUTRITION SUPPLEMENT; Breakfast, Dinner; Other Oral Supplement; Ensure Enlive - vanilla           Assessment:   Minimally Communicative and cries out with turning.  Requires full assists in repositioning.  Wearing briefs and Pure Wick to wall suction.   Surface: CAMILA mattress    Bilateral heels intact and without erythema.  Heels offloaded with pillows.  Bilateral dorsal feet with dry crusts left open to air.    1. POA sacral unstageable pressure injury  5.5 x 6 x 0.1 cm   Central devitalized tan tissue with surrounding blanching pink tissue  Tx: venelex discontinued and triad applied with foam to cover        Recommendations:    Buttocks: Triad wound paste daily and cover with foam; cover dressing is optional if stool is contaminating the foam    Turn/reposition approximately every 2 hours  Offload heels with heels hanging off end of pillow at all times while in bed.  Low Air Loss mattress: Use only flat sheet and one incontinence pad.     Transition of Care: Plan to follow weekly and as needed while admitted to hospital.     KADEN JarrettN, RN, CWOCN  Certified Wound, Ostomy, Continence Nurse  office [phone]  Available via PetLove

## 2024-05-02 NOTE — DISCHARGE SUMMARY
Discharge Summary       PATIENT ID: Carlie Newman  MRN: [id number]   YOB: 1933    DATE OF ADMISSION: 5/1/2024 12:58 PM    DATE OF DISCHARGE: 5/1/24   PRIMARY CARE PROVIDER: Shameka Perez MD     ATTENDING PHYSICIAN: aye hutton  DISCHARGING PROVIDER: Aye Hutton MD    To contact this individual call [phone] and ask the  to page.  If unavailable ask to be transferred the Adult Hospitalist Department.    CONSULTATIONS: IP WOUND CARE NURSE CONSULT TO EVAL    PROCEDURES/SURGERIES: * No surgery found *    ADMITTING DIAGNOSES & HOSPITAL COURSE:     Carlie Newman is a 89 y.o. female with past medical history significant for dyslipidemia, congestive heart failure, hypertension, dementia, dementia, sick sinus syndrome with resultant pacemaker insertion presented at the emergency room with change in mental status. Patient symptoms started few days ago and progressively getting worse. She was recently prescribed antibiotic for treatment of UTI. Family is unsure if patient has been taking the antibiotics appropriately. No fever, rigors or chills reported.     Pt d/c to hospice , after d/w ID the family revoked hospice readmitted cont current MGMT        DISCHARGE DIAGNOSES / PLAN:              PENDING TEST RESULTS:   At the time of discharge the following test results are still pending:     FOLLOW UP APPOINTMENTS:    [unfilled]     ADDITIONAL CARE RECOMMENDATIONS:     DIET:  TF  Oral Nutritional Supplements:     ACTIVITY: activity as tolerated    WOUND CARE:     EQUIPMENT needed:       DISCHARGE MEDICATIONS:     Medication List        ASK your doctor about these medications      ammonium lactate 12 % lotion  Commonly known as: LAC-HYDRIN  Apply to legs daily after bathing.     aspirin 81 MG chewable tablet  Commonly known as: Aspirin 81  Take 1 tablet by mouth daily     citalopram 10 MG tablet  Commonly known as: CELEXA  Take 1 tablet by mouth daily Indications: Feeling Anxious

## 2024-05-03 PROBLEM — E63.9 POOR NUTRITION: Status: ACTIVE | Noted: 2024-05-03

## 2024-05-03 NOTE — CARE COORDINATION
Transition of Care Plan:    RUR: 20%  Prior Level of Functioning: Patient with decreased mobility  Disposition: TBD, may need SNF or LTC  If SNF or IPR: Date FOC offered: tbd  Date FOC received: tbd  Accepting facility: [facility]  Date authorization started with reference number: [facility]  Date authorization received and expires: tbd  Follow up appointments: pcp/specialist  DME needed: tbd  Transportation at discharge: stretcher  IM/IMM Medicare/ letter given: will need prior to d/c  Caregiver Contact: SonMisael [phone]  Discharge Caregiver contacted prior to discharge? yes  Care Conference needed? Palliative consult noted.   Barriers to discharge: medical stability.       Cm following for discharge needs, patient was under hospice services on 5/1 however family revoked services. Cm noted PT eval today, and Palliative consult placed. Cm will continue to follow for discharge needs and discuss disposition when appropriate. Per IDRs, patient may also need IV ABX.      [facility]  Care Management Department  Ph:[phone]

## 2024-05-06 PROBLEM — R52 PAIN: Status: ACTIVE | Noted: 2024-01-01

## 2024-05-07 NOTE — CARE COORDINATION
Transition of Care Plan: TBD; possible hospice vs rehab; pending GOC meeting with palliative care and medical progress    Transport Plan: likely stretcher (not set up yet)     RUR: 20%    Main contact is buffy Newman [phone]    Discharge pending:  -pending GOC meeting with family and palliative care  -patient continues on IV antibiotics  -pending medical progress and care recommendations     RUR: 20%  Prior Level of Functioning: Patient with decreased mobility  Disposition: TBD, may need SNF/LTC or hospice  If SNF or IPR: Date FOC offered: tbd  Date FOC received: tbd  Accepting facility: d  Date authorization started with reference number: [facility]  Date authorization received and expires: tbd  Follow up appointments: pcp/specialist  DME needed: tbd  Transportation at discharge: stretcher  IM/IMM Medicare/[facility] letter given: will need prior to d/c  Caregiver Contact: Misael Diaz [phone]  Discharge Caregiver contacted prior to discharge? Not yet  Care Conference needed? Palliative is following  Barriers to discharge: GOC plan, medical stability.     CM following   Evette Braswell RN        NOTE: per previous CM note: Cm following for discharge needs, patient was under hospice services on 5/1 however family revoked services. Cm noted PT eval  Palliative consult placed.

## 2024-05-08 NOTE — CARE COORDINATION
Transition of Care Plan:  pending referral sent to [facility] (for possible GIP) on 5/8       Transport Plan: likely stretcher (not set up yet)      RUR: 20%     Main contact is buffy Newman [phone]     Discharge pending:  -pending hospice consult (reconsult) to [facility]  -patient continues on IV antibiotics  -pending medical progress and care recommendations     RUR: 20%  Prior Level of Functioning: Patient with decreased mobility  Disposition: likely hospice  If SNF or IPR: Date FOC offered: n/a  Date FOC received: n/a  Accepting facility: n/a  Date authorization started with reference number: n/a  Date authorization received and expires: n/a  Follow up appointments: pcp/specialist  DME needed: none ordered  Transportation at discharge: stretcher  IM/IMM Medicare/[facility] letter given: will need prior to d/c  Caregiver Contact: SonMisael [phone]  Discharge Caregiver contacted prior to discharge? Not yet  Care Conference needed? Palliative is following  Barriers to discharge: hospice consult     CM following   Evette Braswell RN        NOTE: per previous CM note: Cm following for discharge needs, patient was under hospice services on 5/1 however family revoked services. Cm noted PT eval  Palliative consult placed.            Revision History

## 2024-05-08 NOTE — ACP (ADVANCE CARE PLANNING)
Advance Care Planning     Advance Care Planning (ACP) Physician/NP/PA Conversation    Date of Conversation: 5/1/2024  Conducted with: Healthcare Decision Maker  Other persons present: Son Misael Newman       Care Preferences:    Hospitalization:  \"If your health worsens and it becomes clear that your chance of recovery is unlikely, what would be your preference regarding hospitalization?\"  The patient would prefer comfort-focused treatment without hospitalization.    hospice care    Spoke with patient's son and daughter in law at bedside - long conversation - pt declining, poor oral intake, appropriate for hospice- son's main concern is nutrition - tried my best to and comfort foods will be offered. He agreed to hospice        Length of Voluntary ACP Conversation in minutes:  20 minutes    Sushma Madala, MD

## 2024-05-09 PROBLEM — R45.1 RESTLESSNESS AND AGITATION: Status: ACTIVE | Noted: 2024-01-01

## 2024-05-09 PROBLEM — M25.461 EFFUSION OF RIGHT KNEE: Status: ACTIVE | Noted: 2024-01-01

## 2024-05-09 PROBLEM — Z51.5 HOSPICE CARE: Status: ACTIVE | Noted: 2024-01-01

## 2024-05-09 NOTE — DISCHARGE INSTRUCTIONS
Discharge Instructions       PATIENT ID: Carlie Newman  MRN: [id number]   YOB: 1933    DATE OF ADMISSION: 5/1/2024   DATE OF DISCHARGE: 5/9/2024    PRIMARY CARE PROVIDER: Shameka Perez     ATTENDING PHYSICIAN: Aye Hutton MD   DISCHARGING PROVIDER: Aye Hutton MD    To contact this individual call [phone] and ask the  to page.   If unavailable ask to be transferred the Adult Hospitalist Department.    DISCHARGE DIAGNOSES sepsis     CONSULTATIONS: [unfilled]    PROCEDURES/SURGERIES: * No surgery found *    PENDING TEST RESULTS:   At the time of discharge the following test results are still pending:     FO[facility] UP APPOINTMENTS:   [facility]     ADDITIONAL CARE RECOMMENDATIONS:     DIET:  comfort  Oral Nutritional Supplements:     ACTIVITY: bedrest    WOUND CARE:     EQUIPMENT needed:       DISCHARGE MEDICATIONS:   See Medication Reconciliation Form    It is important that you take the medication exactly as they are prescribed.   Keep your medication in the bottles provided by the pharmacist and keep a list of the medication names, dosages, and times to be taken in your wallet.   Do not take other medications without consulting your doctor.       NOTIFY YOUR PHYSICIAN FOR ANY OF THE FOLLOWING:   Fever over 101 degrees for 24 hours.   Chest pain, shortness of breath, fever, chills, nausea, vomiting, diarrhea, change in mentation, falling, weakness, bleeding. Severe pain or pain not relieved by medications.  Or, any other signs or symptoms that you may have questions about.      DISPOSITION:    Home With:   OT  PT  HH  RN       SNF/Inpatient Rehab/LTAC    Independent/assisted living   x Hospice    Other:     CDMP Checked:   Yes x     PROBLEM LIST Updated:  Yes x       Signed:   Aye Hutton MD  5/9/2024  9:53 AM

## 2024-05-09 NOTE — DISCHARGE SUMMARY
1045: DEEP Newby, gave report to [facility] RNBere, who will be getting pt.  1225: transport here to take pt. Rapid covid test done and sent down. Pt cleaned up and brief changed. Pt is stable and on room air. Pt going to [facility] with L upper arm midline in place.  1245: charge RNBonny, called [facility] to let them know the rapid covid test resulted positive.

## 2024-05-09 NOTE — PROGRESS NOTES
[facility] Adult  Hospitalist Group                                                                                          Hospitalist Progress Note  Sushma Madala, MD  Office Phone: [phone]        Date of Service:  2024  NAME:  Carlie Newman  :  1933  MRN:  [id number]       Admission Summary:   Carlie Newman is a 89 y.o. female with past medical history significant for dyslipidemia, congestive heart failure, hypertension, dementia, dementia, sick sinus syndrome with resultant pacemaker insertion presented at the emergency room with change in mental status.  Patient symptoms started few days ago and progressively getting worse.  She was recently prescribed antibiotic for treatment of UTI.  Family is unsure if patient has been taking the antibiotics appropriately. No fever, rigors or chills reported.  Patient and her daughter lives together, her daughter as a stroke and they both require considerable care at home according to the patient's sister who was present at the bedside. Her sister also reported that the patient has sacral wounds.  Patient was last admitted to this hospital in 2023, she was admitted and treated for urinary tract infection.  Patient was noted to have low platelet count during that hospitalization and platelet count recovered prior to discharge to SNF.  CT of the head done on arrival had emergency room negative for acute pathology.  Chest x-ray shows a trace left pleural effusion and BNP level is elevated.  She was referred to the hospitalist service for admission.    Family revoked hospice and patient readmitted on         Interval history / Subjective:     Patient is seen and examined at bedside this AM. She is lethargic. Not talking much today  Discussed with nursing , cm -  unable to take meds     Addendum 2pm  Spoke with patient's son and daughter in law at bedside - long conversation - pt declining, poor oral intake, appropriate for 
        [facility] Adult  Hospitalist Group                                                                                          Hospitalist Progress Note  Sushma Madala, MD  Office Phone: [phone]        Date of Service:  2024  NAME:  Carlie Newman  :  1933  MRN:  [id number]       Admission Summary:   Carlie Newman is a 89 y.o. female with past medical history significant for dyslipidemia, congestive heart failure, hypertension, dementia, dementia, sick sinus syndrome with resultant pacemaker insertion presented at the emergency room with change in mental status.  Patient symptoms started few days ago and progressively getting worse.  She was recently prescribed antibiotic for treatment of UTI.  Family is unsure if patient has been taking the antibiotics appropriately. No fever, rigors or chills reported.  Patient and her daughter lives together, her daughter as a stroke and they both require considerable care at home according to the patient's sister who was present at the bedside. Her sister also reported that the patient has sacral wounds.  Patient was last admitted to this hospital in 2023, she was admitted and treated for urinary tract infection.  Patient was noted to have low platelet count during that hospitalization and platelet count recovered prior to discharge to SNF.  CT of the head done on arrival had emergency room negative for acute pathology.  Chest x-ray shows a trace left pleural effusion and BNP level is elevated.  She was referred to the hospitalist service for admission.    Family revoked hospice and patient readmitted on         Interval history / Subjective:     Patient is seen and examined at bedside this AM. She is lethargic. Answered appropriately. C/w right knee pain    Discussed with nursing , cm , ID Dr. Diaz, Palliative NP        Assessment & Plan:        Streptococcus agalactiae bacteremia  Sick sinus syndrome status post 
        [facility] Adult  Hospitalist Group                                                                                          Hospitalist Progress Note  Sushma Madala, MD  Office Phone: [phone]        Date of Service:  5/3/2024  NAME:  Carlie Newman  :  1933  MRN:  [id number]       Admission Summary:   Carlie Newman is a 89 y.o. female with past medical history significant for dyslipidemia, congestive heart failure, hypertension, dementia, dementia, sick sinus syndrome with resultant pacemaker insertion presented at the emergency room with change in mental status.  Patient symptoms started few days ago and progressively getting worse.  She was recently prescribed antibiotic for treatment of UTI.  Family is unsure if patient has been taking the antibiotics appropriately. No fever, rigors or chills reported.  Patient and her daughter lives together, her daughter as a stroke and they both require considerable care at home according to the patient's sister who was present at the bedside. Her sister also reported that the patient has sacral wounds.  Patient was last admitted to this hospital in 2023, she was admitted and treated for urinary tract infection.  Patient was noted to have low platelet count during that hospitalization and platelet count recovered prior to discharge to SNF.  CT of the head done on arrival had emergency room negative for acute pathology.  Chest x-ray shows a trace left pleural effusion and BNP level is elevated.  She was referred to the hospitalist service for admission.    Family revoked hospice and patient readmitted on         Interval history / Subjective:     Patient is seen and examined at bedside this AM. She is alert and oriented x 2- answering appropriately. US tech present- trying to aspirate Right knee  Discussed with nursing, cm          Assessment & Plan:        Streptococcus agalactiae bacteremia  Sick sinus syndrome status post 
        [facility] Adult  Hospitalist Group                                                                                          Hospitalist Progress Note  Sushma Madala, MD  Office Phone: [phone]        Date of Service:  2024  NAME:  Carlie Newman  :  1933  MRN:  [id number]       Admission Summary:   Carlie Newman is a 89 y.o. female with past medical history significant for dyslipidemia, congestive heart failure, hypertension, dementia, dementia, sick sinus syndrome with resultant pacemaker insertion presented at the emergency room with change in mental status.  Patient symptoms started few days ago and progressively getting worse.  She was recently prescribed antibiotic for treatment of UTI.  Family is unsure if patient has been taking the antibiotics appropriately. No fever, rigors or chills reported.  Patient and her daughter lives together, her daughter as a stroke and they both require considerable care at home according to the patient's sister who was present at the bedside. Her sister also reported that the patient has sacral wounds.  Patient was last admitted to this hospital in 2023, she was admitted and treated for urinary tract infection.  Patient was noted to have low platelet count during that hospitalization and platelet count recovered prior to discharge to SNF.  CT of the head done on arrival had emergency room negative for acute pathology.  Chest x-ray shows a trace left pleural effusion and BNP level is elevated.  She was referred to the hospitalist service for admission.    Family revoked hospice and patient readmitted on         Interval history / Subjective:     Patient is seen and examined at bedside this AM. She is lethargic. Answered few words only.   Discussed with nursing - did not eat much yesterday        Assessment & Plan:        Streptococcus agalactiae bacteremia  Sick sinus syndrome status post pacemaker  Pacemaker lead 
        [facility] Adult  Hospitalist Group                                                                                          Hospitalist Progress Note  Sushma Madala, MD  Office Phone: [phone]        Date of Service:  2024  NAME:  Carlie Newman  :  1933  MRN:  [id number]       Admission Summary:   Carlie Newman is a 89 y.o. female with past medical history significant for dyslipidemia, congestive heart failure, hypertension, dementia, dementia, sick sinus syndrome with resultant pacemaker insertion presented at the emergency room with change in mental status.  Patient symptoms started few days ago and progressively getting worse.  She was recently prescribed antibiotic for treatment of UTI.  Family is unsure if patient has been taking the antibiotics appropriately. No fever, rigors or chills reported.  Patient and her daughter lives together, her daughter as a stroke and they both require considerable care at home according to the patient's sister who was present at the bedside. Her sister also reported that the patient has sacral wounds.  Patient was last admitted to this hospital in 2023, she was admitted and treated for urinary tract infection.  Patient was noted to have low platelet count during that hospitalization and platelet count recovered prior to discharge to SNF.  CT of the head done on arrival had emergency room negative for acute pathology.  Chest x-ray shows a trace left pleural effusion and BNP level is elevated.  She was referred to the hospitalist service for admission.    Family revoked hospice and patient readmitted on         Interval history / Subjective:     Patient is seen and examined at bedside this AM. She is lethargic. Answered appropriately. Pain controlled   Discussed with nursing , cm , ID Dr. Diaz    Tried to reach son, left voicemail        Assessment & Plan:        Streptococcus agalactiae bacteremia  Sick sinus syndrome 
  [facility]  Good Help to Those in Need  [phone]    Inpatient Nursing Admission   Patient Name: Carlie Newman  YOB: 1933  Age: 89 y.o.       Date of Hospice Admission: 5/1/2024  Hospice Attending Elected by Patient: No att. providers found  Primary Care Physician: Shameka Perez MD  Admitting RN: Christine Catherine RN  :      Level of Care (GIP/Routine/Respite): [facility]  Facility of Care: [facility]  Patient Room: 609/01     HOSPICE SUMMARY   ER Visits/ Hospitalizations in past year: 3  Hospice Diagnosis: Sepsis (HCC) [A41.9]  Onset Date of Hospice Diagnosis: 5/1/2024  Summary of Disease Progression Leading to Hospice Diagnosis:   Jason Gracia NP Palliative Medicine:  HISTORY OF PRESENT ILLNESS (HPI):   Carlie Newman is a 89 y.o. female with a past medical history of HTN, CKD, CAD, bradycardia w/ pacemaker, L TKA recently treated for possible UTI who was admitted on 4/21/2024 from home w/ confusion/altered mental status. She has seen her PCP recently for confusion and visual/auditory hallucinations. Head CT w/out acute findings. +Bcx - group B strep. Has sacral decub ulcer. ID consulted, on cefrtiraxone. Cardiology consulted- small possible vegetation on R ventricular lead of pacemaker. They recommend conservative management w/ IV abx alone, is not candidate for pacemaker removal, too high risk. Also high risk for KD and this is not being pursued. She has thrombocytopenia as well.      4/26- remains somnolent. Tolerating tube feeds via DHT. Nephrology assisting w/ hypercalcemia.      4/29-WBC down 11.5, platelets 81, , albumin 1.5.  Calcium now normal at 9.4.  Wound care reports patient crying out in pain with turning.  Wakes to voice, makes eye contact, able to answer simple yes/no questions, denied pain.         Psychosocial: at baseline alert/oriented, conversant, uses walker a bit, mainly wheelchair. Family notes no problems with short or long term memory. She 
  Physician Progress Note      PATIENT:               MELINA AYERS  CSN #:                  [id number]  :                       1933  ADMIT DATE:       2024 3:49 PM  DISCH DATE:  RESPONDING  PROVIDER #:        Sushma Madala MD          QUERY TEXT:    Dear Hospitalist    Patient admitted with bacteremia.   Noted documentation of   DTI  of  sacrum   by attending   POA sacral unstageable pressure injury on    by  wound  care    nurse.  If possible, please document in progress notes and discharge summary if you   are evaluating and /or treating any of the following:      [The medical record reflects the following:  Risk Factors: advanced  age;  dementia  Clinical Indicators: wound  care- POA sacral unstageable pressure injury  5.5 x 6 x 0.1 cm  Central devitalized tan tissue with surrounding blanching pink tissue  Tx: venelex discontinued and triad applied with foam to cover    Treatment:  Triad wound paste daily and cover with foam; cover dressing is   optional if stool is contaminating the foam    Thank you,   Venus Patel RN   CCDS  Options provided:  -- DTI  of  sacrum confirmed  POA and  POA sacral unstageable pressure injury    ruled out  -- POA sacral unstageable pressure injury  confirmed and  DTI  of  sacrum POA   ruled out  -- Other - I will add my own diagnosis  -- Disagree - Not applicable / Not valid  -- Disagree - Clinically unable to determine / Unknown  -- Refer to Clinical Documentation Reviewer    PROVIDER RESPONSE TEXT:    After study, POA sacral unstageable pressure injury confirmed and DTI of   sacrum POA ruled out.    Query created by: Aurea Patel on 2024 10:38 AM      QUERY TEXT:    Pt admitted with bacteremia.  Pt noted to have Moderate pulm edema and   anasarca.  If possible, please document in the progress notes and discharge   summary if you are evaluating and/or treating any of the following:    The medical record reflects the following:  Risk Factors: age; 
  Physician Progress Note      PATIENT:               MELINA AYERS  CSN #:                  [id number]  :                       1933  ADMIT DATE:       2024 3:49 PM  DISCH DATE:  RESPONDING  PROVIDER #:        Sushma Madala MD          QUERY TEXT:    Dear  Hospitalist    Patient admitted with encephalopathy.   Noted to have   severe  malnutrition    on  eval  on  .   If possible, please document in progress notes and   discharge summary if you are evaluating and /or treating any of the following:      The medical record reflects the following:  Risk Factors:   Pt w/ poor oral intakes.   Pt would not likely consume Julien   ONS if ordered given poor intakes.  Clinical Indicators:  Severe malnutrition (24 1401)  Context:  Acute Illness  Findings of the 6 clinical characteristics of malnutrition:  Energy Intake:  Mild decrease in energy intake (Comment)  Weight Loss:  Greater than 7.5% over 3 months  Body Fat Loss:  Moderate body fat loss Orbital, Buccal region  Muscle Mass Loss:  Moderate muscle mass loss Clavicles (pectoralis &   deltoids), Temples (temporalis)  Fluid Accumulation:  No significant fluid accumulation   Strength:  Not Performed    Treatment: Ensure    2  x  day;  Consider addition of IV dextrose to provide protein-sparing kcals while   intakes low    ASPEN Criteria:    https://aspenjournals.onlinelibrary.legih.com/doi/full/10.1177/478063053934636  5    Thank you,   Venus Patel RN   CCDS  Options provided:  -- Protein calorie malnutrition severe  -- Other - I will add my own diagnosis  -- Disagree - Not applicable / Not valid  -- Disagree - Clinically unable to determine / Unknown  -- Refer to Clinical Documentation Reviewer    PROVIDER RESPONSE TEXT:    This patient has severe protein calorie malnutrition.    Query created by: Aurea Patel on 2024 1:18 PM      Electronically signed by:  Sushma Madala MD 2024 3:13 PM          
ABG as follows on RA     Latest Reference Range & Units 05/02/24 02:31   POC pH 7.35 - 7.45   7.45   POC pCO2 35.0 - 45.0 MMHG 43.7   POC PO2 80 - 100 MMHG 67 (L)   POC HCO3 22 - 26 MMOL/L 30.5 (H)   POC O2 SAT 92 - 97 % 93.7   POC Shan's Test -   Positive   FIO2 % 21   Base Excess mmol/L 6.0     
Bedside shift change report given to DEEP Newby (oncoming nurse) by DEEP Viramontes (offgoing nurse). Report included the following information Nurse Handoff Report, Index, ED Encounter Summary, ED SBAR, Adult Overview, Surgery Report, Intake/Output, MAR, Recent Results, Med Rec Status, Alarm Parameters, Quality Measures, and Neuro Assessment.                     
[facility]  Good Help to Those in Need  [phone]     Patient Name: Carlie Newman  YOB: 1933  Age: 89 y.o.    [facility] Hospice RN Note:  Hospice liaison spoke with patient's son, Misael. He states he spoke with Dr aGmez yesterday and there is nothing more medically that can be done for his mother. She is only taking bites and sips and is requiring scheduled IV Dilaudid for her pain.  Misael agrees with [facility] hospice admission today.  I have confirmed we have a bed at [facility]  Consents will be sent DocuSign to Misael  CM please set up transport for 11:30-12 today  CTI from Dr Hall for sepsis    [facility] will call unit to get report from bedside nurse.   Leave Midline in place and medicate with IV Dilaudid prior to transport    Thank you for the opportunity to be of service to this patient.     Christine Catherine RN  Clinical Nurse Liaison  [facility]  [phone] Mobile  [phone] Office   Available on Perfect Serve     
[facility]  Good Help to Those in Need  [phone]     Patient Name: Carlie Newman  YOB: 1933  Age: 89 y.o.    [facility] Hospice RN Note:  Hospice consult received, reviewing chart. Will follow up with Unit Nurse and Care Manager to discuss plan of care, patient status and discharge disposition.    Plan to review with Dr. Hall, Hospice medical director, for Hospice diagnosis and Hospice plan of care     Thank you for the opportunity to be of service to this patient.    16:15: Bedside. No family bedside. Pt appears to be sleeping, however, she opened her eyes when spoken too. Pt with furrowed brow, recently received IV PRN medications as pt swallowing reflex is poor.   Plan to contact pt's son, Misael NewmanJr. To arrange for bedside meeting Thursday.    17:30: Call made to pt son. No answer, however voice mail left with return contact information with request to meet bedside Friday. Plan to try to contact Misael Trent again in the am.      Kimmie Hernandes RN, [facility]  Hospice Nurse Liaison  [phone] Mobile  [phone] Office  Available on Perfect Serve    
ID Progress Note    Admission Summary:   89 year old female with known Strep bacteremia with CIED.     ID team was consulted on  for bacteremia.    Transferred to hospice but later pt's family revoked hospice on   Antimicrobial therapy history      IV rocephin -  PO cipro -    Assessment   Metabolic encephalopathy  Bacteremia with CIED  Pacer lead with vegetation; not a candidate for removal  Sacral Decubitus Ulcer  Hx of ESBL E-coli UTI  - afebrile, wbc 18->11.5->9.7    Blood cx () strep agalactiae, group B strep, (, , ) no growth so far    U/A () wbc 0-4     Urine cx () ESBL E-coli      Right knee effusion  - winces with pain and decreased ROM  Plan   - continue with IV rocephin 2 gm every 24 hours but unfortunately patient not improving and with clear FTT.    Appreciate re-evaluation with hospice given lack of improvement.    Plan of care discussed with palliative care, Dr. Ayala.     Subjective:     Moans with position change. Not following any commends  Review of Systems:            Symptom Y/N Comments   Symptom Y/N Comments   Fever/Chills       Chest Pain        Poor Appetite       Edema        Cough       Abdominal Pain        Sputum       Joint Pain        SOB/GUPTA       Pruritis/Rash        Nausea/vomit       Tolerating PT/OT        Diarrhea       Tolerating Diet        Constipation       Other           Could NOT obtain due to:       Objective:     Vitals: BP (!) 129/57   Pulse 66   Temp 97.5 °F (36.4 °C) (Axillary)   Resp 15   Ht 1.702 m (5' 7.01\")   SpO2 97%   BMI 30.14 kg/m²      Tmax:  Temp (24hrs), Av.5 °F (36.4 °C), Min:97.3 °F (36.3 °C), Max:97.5 °F (36.4 °C)      PHYSICAL EXAM:  General: Cachectic, arousable  EENT:  Anicteric sclerae.  Resp:  Clear in apex with decreased breath sounds, some rhonchi  CV:  Regular  rhythm,  + RLE edema  GI:  Soft, Non distended, Non tender.  +Bowel sounds  Neurologic:  Alert, orient to self, moans  with position 
ID Progress Note    Admission Summary:   89 year old female with known Strep bacteremia with CIED.     ID team was consulted on  for bacteremia.    Transferred to hospice but later pt's family revoked hospice on   Antimicrobial therapy history      IV rocephin -  PO cipro -    Assessment   Metabolic encephalopathy  Bacteremia with CIED  Pacer lead with vegetation; not a candidate for removal  Sacral Decubitus Ulcer  Hx of ESBL E-coli UTI  - afebrile, wbc 18->11.5->9.7    Blood cx () strep agalactiae, group B strep, (, , ) no growth so far    U/A () wbc 0-4     Urine cx () ESBL E-coli      Right knee effusion  - winces with pain and decreased ROM  Plan   - continue with IV rocephin 2 gm every 24 hours, end date , then transition to Keflex 500 mg twice a day lifelong suppressive therapy    CT of right knee - evaluate for effusion that can be drained and sent for culture, cell count, crystal analysis.        Plan of care discussed with pt, Dr. Ayala.       Subjective:     Moans with position change. Not following any commends  Review of Systems:            Symptom Y/N Comments   Symptom Y/N Comments   Fever/Chills       Chest Pain        Poor Appetite       Edema        Cough       Abdominal Pain        Sputum       Joint Pain        SOB/GUPTA       Pruritis/Rash        Nausea/vomit       Tolerating PT/OT        Diarrhea       Tolerating Diet        Constipation       Other           Could NOT obtain due to:       Objective:     Vitals: /61   Pulse 66   Temp 97.9 °F (36.6 °C) (Axillary)   Resp 22   Ht 1.702 m (5' 7.01\")   SpO2 100%   BMI 30.14 kg/m²      Tmax:  Temp (24hrs), Av.8 °F (36.6 °C), Min:97.3 °F (36.3 °C), Max:98.4 °F (36.9 °C)      PHYSICAL EXAM:  General:  Alert, not following any commends, mild acute distress    EENT:  EOMI. Anicteric sclerae. MMM  Resp:  Clear in apex with decreased breath sounds, some rhonchi bilaterally. no wheezing or rales. 
ID Progress Note    INFECTIOUS DISEASE Attending:     I agree with the above infectious disease daily progress note in its entirety as authored by and discussed in detail with the nurse practitioner.   I have reviewed pertinent laboratory studies, microbiology cultures, radiologic reports with review of the consultations and progress notes as appropriate.   I agree with today's subjective findings, physical examination, assessment and plan of care as described above and discussed extensively with the nurse practitioner.       Appears that patient's level of care will be changed to hospice.    Stop Ceftriaxone.    Will d/c ID follow up.    Admission Summary:   89 year old female with known Strep bacteremia with CIED.     ID team was consulted on 4/22 for bacteremia.    Transferred to hospice but later pt's family revoked hospice on 5/1  Antimicrobial therapy history      IV rocephin 4/21-  PO cipro 4/16-4/19    Assessment   Metabolic encephalopathy  Bacteremia with CIED  Pacer lead with vegetation; not a candidate for removal  Sacral Decubitus Ulcer  Hx of ESBL E-coli UTI  - afebrile, wbc 5.3    Blood cx (4/21) strep agalactiae, group B strep, (4/22, 4/23, 4/27) no growth    Synovial fluid cx (5/2) no growth    U/A (4/28) wbc 0-4     Urine cx (4/12) ESBL E-coli      Right knee effusion  - winces with pain and decreased ROM  Plan   - received IV rocephin which has been stopped.       Goals of care change to comfort care     Transition to hospice care    ID team signing off. Please contact us with any questions.   Plan of care discussed with Dr. Diaz     Subjective:     Lethargic, Not following any commends  Unable to participate with ROS questionnaires    Review of Systems:            Symptom Y/N Comments   Symptom Y/N Comments   Fever/Chills       Chest Pain        Poor Appetite       Edema        Cough       Abdominal Pain        Sputum       Joint Pain        SOB/GUPTA       Pruritis/Rash        Nausea/vomit       
ID Progress Note    INFECTIOUS DISEASE Attending:     I agree with the above infectious disease daily progress note in its entirety as authored by and discussed in detail with the nurse practitioner.   I have reviewed pertinent laboratory studies, microbiology cultures, radiologic reports with review of the consultations and progress notes as appropriate.   I agree with today's subjective findings, physical examination, assessment and plan of care as described above and discussed extensively with the nurse practitioner.       Continue Ceftriaxone unless transferred to hospice.    Will see PRN, please call with questions.    D/w Dr. Ayala, whom I thank for the referral.    Admission Summary:   89 year old female with known Strep bacteremia with CIED.     ID team was consulted on 4/22 for bacteremia.    Transferred to hospice but later pt's family revoked hospice on 5/1  Antimicrobial therapy history      IV rocephin 4/21-  PO cipro 4/16-4/19    Assessment   Metabolic encephalopathy  Bacteremia with CIED  Pacer lead with vegetation; not a candidate for removal  Sacral Decubitus Ulcer  Hx of ESBL E-coli UTI  - afebrile, wbc 5.3    Blood cx (4/21) strep agalactiae, group B strep, (4/22, 4/23, 4/27) no growth    Synovial fluid cx (5/2) no growth    U/A (4/28) wbc 0-4     Urine cx (4/12) ESBL E-coli      Right knee effusion  - winces with pain and decreased ROM  Plan   - continue with IV rocephin 2 gm every 24 hours but unfortunately patient not improving and with clear FTT.      Appreciate re-evaluation with hospice given lack of improvement, Dr. Diaz d/w patient son 5/7 and had realistic conversation in light of patient's failure to thrive despite maximal medical efforts.    If level of care changed to hospice/comfort measures, then will stop Ceftriaxone and sign off.    Plan of care discussed with Dr. Diaz     Subjective:     Lethargic, Not following any commends  Unable to participate with ROS 
NUTRITION    RD PLAN OF CARE:   Diet: Pureed, Ensure 3x/day    Pt is admitted with Sepsis (HCC) [A41.9].    Chart reviewed for f/u, discussed in rounds - pending discharge to hospice today.  Aggressive nutrition intervention is not indicated at this time. Continue oral diet and ONS for pleasure, RD available to assist prn.   Thank you.    Denise Cruz RD     
Orthopedic Progress Note    Full consult to follow; concern for knee infection, planning to aspirate now for diagnostic and therapeutic purposes. If infected treatment is surgical washout, however, the source of infection is likely pacer and she is not a candidate for it's removal. Will need to have informed discussion with family regarding further treatment plans after fluid is analyzed.    HUYEN Wade  Orthopedic Trauma Service  [facility]  
Palliative Medicine  Patient Name: Carlie Newman  YOB: 1933  MRN: [id number]  Age: 89 y.o.  Gender: female    Date of Initial Consult: 4/23/24  Date of Service: 5/6/2024  Time: 5:05 PM  Provider: SAMUEL Buckley NP  Hospital Day: 6 (Day 13)  Same Day Re-Admit Date: 5/1/2024     Referring Provider: Dr Olivas      Ms. Newman was initially admitted to Hospital on 4/21. On 5/1 family elected to transition to comfort and she was admitted [facility] with [facility]. However, shortly after Hospice admission (1-2 hours later), family Revoked hospice to pursue tx. Ms. Newman has been in hospital since 4/21/24      Reasons for Consultation:  Goals of Care    HISTORY OF PRESENT ILLNESS (HPI):   Carlie Newman is a 89 y.o. female with a past medical history of HTN, CKD, CAD, bradycardia w/ pacemaker, L TKA recently treated for possible UTI who was admitted on 5/1/2024 from home w/ confusion/altered mental status. She has seen her PCP recently for confusion and visual/auditory hallucinations. Head CT w/out acute findings. +Bcx - group B strep. Has sacral decub ulcer. ID consulted, on cefrtiraxone. Cardiology consulted- small possible vegetation on R ventricular lead of pacemaker. They recommend conservative management w/ IV abx alone, is not candidate for pacemaker removal, too high risk. Also high risk for KD and this is not being pursued. She has thrombocytopenia as well.     4/26- remains somnolent. Tolerating tube feeds via DHT. Nephrology assisting w/ hypercalcemia.     4/29-WBC down 11.5, platelets 81, , albumin 1.5.  Calcium now normal at 9.4.  Wound care reports patient crying out in pain with turning.  Wakes to voice, makes eye contact, able to answer simple yes/no questions, denied pain.     5/1- remains somnolent, poor intake. Family remains clear, she would not want PEG. Family had elected Hospice, patient admitted GIP, NGT discontinued. ID saw patient after Hospice admission, noted 
Palliative Medicine  Patient Name: Carlie Newman  YOB: 1933  MRN: [id number]  Age: 89 y.o.  Gender: female    Date of Initial Consult: 4/23/24  Date of Service: 5/8/2024  Time: 9:05 PM  Provider: SAMUEL Buckley NP  Hospital Day: 8 (Day 13)  Same Day Re-Admit Date: 5/1/2024     Referring Provider: Dr Olivas      Ms. Newman was initially admitted to Hospital on 4/21. On 5/1 family elected to transition to comfort and she was admitted [facility] with [facility]. However, shortly after Hospice admission (1-2 hours later), family Revoked hospice to pursue tx. Ms. Newman has been in hospital since 4/21/24      Reasons for Consultation:  Goals of Care    HISTORY OF PRESENT ILLNESS (HPI):   Carlie Newman is a 89 y.o. female with a past medical history of HTN, CKD, CAD, bradycardia w/ pacemaker, L TKA recently treated for possible UTI who was admitted on 5/1/2024 from home w/ confusion/altered mental status. She has seen her PCP recently for confusion and visual/auditory hallucinations. Head CT w/out acute findings. +Bcx - group B strep. Has sacral decub ulcer. ID consulted, on cefrtiraxone. Cardiology consulted- small possible vegetation on R ventricular lead of pacemaker. They recommend conservative management w/ IV abx alone, is not candidate for pacemaker removal, too high risk. Also high risk for KD and this is not being pursued. She has thrombocytopenia as well.     4/26- remains somnolent. Tolerating tube feeds via DHT. Nephrology assisting w/ hypercalcemia.     4/29-WBC down 11.5, platelets 81, , albumin 1.5.  Calcium now normal at 9.4.  Wound care reports patient crying out in pain with turning.  Wakes to voice, makes eye contact, able to answer simple yes/no questions, denied pain.     5/1- remains somnolent, poor intake. Family remains clear, she would not want PEG. Family had elected Hospice, patient admitted GIP, NGT discontinued. ID saw patient after Hospice admission, noted 
Palliative Medicine  Patient Name: Carlie Newman  YOB: 1933  MRN: [id number]  Age: 89 y.o.  Gender: female    Date of Initial Consult: 4/23/24  Date of Service: 5/3/2024  Time: 11:20 PM  Provider: SAMUEL Buckley NP  Hospital Day: 3 (Day 13)  Same Day Re-Admit Date: 5/1/2024     Referring Provider: Dr Olivas      Ms. Newman was initially admitted to Hospital on 4/21. On 5/1 family elected to transition to comfort and she was admitted [facility] with [facility]. However, shortly after Hospice admission (1-2 hours later), family Revoked hospice to pursue tx. Ms. Newman has been in hospital since 4/21/24      Reasons for Consultation:  Goals of Care    HISTORY OF PRESENT ILLNESS (HPI):   Carlie Newman is a 89 y.o. female with a past medical history of HTN, CKD, CAD, bradycardia w/ pacemaker, L TKA recently treated for possible UTI who was admitted on 5/1/2024 from home w/ confusion/altered mental status. She has seen her PCP recently for confusion and visual/auditory hallucinations. Head CT w/out acute findings. +Bcx - group B strep. Has sacral decub ulcer. ID consulted, on cefrtiraxone. Cardiology consulted- small possible vegetation on R ventricular lead of pacemaker. They recommend conservative management w/ IV abx alone, is not candidate for pacemaker removal, too high risk. Also high risk for KD and this is not being pursued. She has thrombocytopenia as well.     4/26- remains somnolent. Tolerating tube feeds via DHT. Nephrology assisting w/ hypercalcemia.     4/29-WBC down 11.5, platelets 81, , albumin 1.5.  Calcium now normal at 9.4.  Wound care reports patient crying out in pain with turning.  Wakes to voice, makes eye contact, able to answer simple yes/no questions, denied pain.     5/1- remains somnolent, poor intake. Family remains clear, she would not want PEG. Family had elected Hospice, patient admitted GIP, NGT discontinued. ID saw patient after Hospice admission, noted 
RN (writer), charge RN, and RRT RN attempted to get labs, all unsuccessful x6. Lab work last collected 5/4 0200 by phlebotomy. Overnight MD notified, RN asked if art stick needed. MD responded,   \"Am team will take care of labs\". Will update day RN of this. No new orders at this time. Will continue to follow plan of care and maintain pt safety.   
Speech pathology note  Reviewed chart and note patient has been lethargic this week and is pending hospice consult. Do not anticipate patient will safely advance beyond puree diet given lethargy, however consider allowing favorite foods for comfort if patient awake, alert, and asking and does transition to hospice. SLP will sign off at this time, however please re-consult if further needs arise. Thank you.    MONIQUE Heredia Ed., CCC-SLP   
Spiritual Care Assessment/Progress Note  [facility]    Name: Carlie Newman MRN: [id number]    Age: 89 y.o.     Sex: female   Language: English     Date: 5/5/2024            Total Time Calculated: 15 min              Spiritual Assessment begun in [facility] 6E MED ONCOLOGY  Service Provided For: Patient not available  Referral/Consult From: Palliative Care  Encounter Overview/Reason: Palliative Care    Spiritual beliefs:      [] Involved in a jordana tradition/spiritual practice:      [] Supported by a jordana community:      [] Claims no spiritual orientation:      [] Seeking spiritual identity:           [] Adheres to an individual form of spirituality:      [x] Not able to assess:                Identified resources for coping and support system:   Support System: Family members, Palliative Care       [] Prayer                  [] Devotional reading               [] Music                  [] Guided Imagery     [] Pet visits                                        [] Other: (COMMENT)     Specific area/focus of visit   Encounter:    Crisis:    Spiritual/Emotional needs:    Ritual, Rites and Sacraments:    Grief, Loss, and Adjustments:    Ethics/Mediation:    Behavioral Health:    Palliative Care: Type: Palliative Care, Initial/Spiritual Assessment  Advance Care Planning:      I attempted to visit Carlie Newman for a palliative initial spiritual assessment. Her chart was consulted prior.  She was sleeping at the time of my visit and did not awake. No family was present.    Chaplain Kamari, Ariana, MS, BCC    
observation. Pt would not likely consume Julien ONS if ordered given poor intakes. Per chart review, pt needing 4 wks of abx. Last documented wt of 192#'s - suspect incorrect given previous wt hx, consider fluid accumulation. Rec'd to continue w/ goals of care discussion regarding long-term nutrition goals, RD to continue to follow/provide rec's prn. Continue current diet and ordered ONS - consider addition of appetite stimulant prn.     Labs: Na 134, K 3.4, Creat 0.24, Ca 10.8, H&H 26.4/68.2, MCV 68.2     (5/2) RD consulted for TF order and management. Discussed in rounds. Pt went hospice 5/1 (RD previously following prior, see however, hospice revoked per family request, plan to pursue needle aspiration of R knee effusion, possible re-admission to hospice after? NGT removed. SLP following, advanced diet to puree w/ thin liq. RD visited pt at bedside, more awake/alert. Pt amenable to try Ensure, prefers vanilla. If pt unable to adequately meet nutritional needs via PO and if congruent with goals of care, consider TF : Initiate Glucerna at 25ml/hr, advancing 10 ml/hr q4 hrs as pt tolerated to goal of 55ml/hr, flush 100ml q 4 hrs or per provider - provides 1995 kcal (100%), 82 gm pro (100%),1518 ml fluid (85%)  .      RD to monitor intakes of diet and ONS at f/u to assess appropriateness of Julien supplement in addition to Ensure 2/2 sacral PI stage 3.      Labs: Na 134, Ca 10.7, Alb 1.4, AST 66, H&H 7.8/24, MCV 67.4        Nutritionally Significant Medications:  Lovenox, NaCl      Estimated Daily Nutrient Needs:  Energy Requirements Based On: Kcal/kg  Weight Used for Energy Requirements: Admission  Energy (kcal/day): 1782 - 2079 kcal/day (30 - 35 kcal/kg - wounds)  Weight Used for Protein Requirements: Admission  Protein (g/day): 77 - 89 g/day (1.3 - 1.5 g/kg - wounds)  Method Used for Fluid Requirements: 1 ml/kcal  Fluid (ml/day): 1782 - 2079 ml/day (1ml/kcal)    Nutrition Related Findings:   Edema: Generalized 
Transcriptase Polymerase Chain Reaction (RT-PCR) based in vitro diagnostic test intended for the qualitative detection of nucleic acids from SARS-CoV-2, Influenza A, and Influenza B in nasopharyngeal and nasal swab specimens for use under the FDA's Emergency Use Authorization (EUA) only.     Fact sheet for Patients: https://www.fda.gov/media/592484/download  Fact sheet for Healthcare Providers: https://www.fda.fov/media/625391/download         Culture, Blood 1 [[id number]] Collected: 04/27/24 1021    Order Status: Completed Specimen: Blood Updated: 05/02/24 1102     Special Requests NO SPECIAL REQUESTS        Culture NO GROWTH 5 DAYS       Culture, Blood 2 [[id number]] Collected: 04/27/24 1021    Order Status: Completed Specimen: Blood Updated: 05/02/24 1102     Special Requests NO SPECIAL REQUESTS        Culture NO GROWTH 5 DAYS                Labs:     Lab Results   Component Value Date/Time    WBC 6.0 05/07/2024 12:35 AM    HGB 9.3 05/07/2024 12:35 AM    HCT 30.0 05/07/2024 12:35 AM     05/07/2024 12:35 AM    MCV 70.8 05/07/2024 12:35 AM     Lab Results   Component Value Date/Time     05/07/2024 12:35 AM    K 4.2 05/07/2024 12:35 AM     05/07/2024 12:35 AM    CO2 25 05/07/2024 12:35 AM    BUN 15 05/07/2024 12:35 AM    GFRAA >60 01/20/2022 04:29 PM    GLOB 5.3 05/02/2024 02:28 AM    ALT 50 05/02/2024 02:28 AM         Images:     XR CHEST LIMITED ONE VIEW 05/02/2024    Narrative  EXAM: XR CHEST 1 VIEW    INDICATION: supplemental O2 need vs was in RA yesterday    COMPARISON: Chest x-ray 4/27/2024 and CT 4/22/2024.    FINDINGS: A portable AP radiograph of the chest was obtained at 08:35 hours. The  patient is on a cardiac monitor. Pacemaker generator body projects over the left  chest wall with intact appearing leads traversing in expected course. The lungs  show pulmonary vascular congestion with diffuse interstitial prominence and  curly B lines with small right pleural effusion and no 
    05/02/24 0228 05/04/24 0242   WBC 9.7 5.8   HGB 7.8* 8.0*   HCT 24.0* 25.2*    362       Recent Labs     05/02/24 0228 05/03/24 0128 05/04/24 0242   * 128* 136   K 4.4 3.9 3.9   CL 97 99 101   CO2 30 26 33*   BUN 26* 19 19   MG 2.1  --   --    PHOS 3.1  --   --        Recent Labs     05/02/24 0228   ALT 50   GLOB 5.3*       No results for input(s): \"INR\", \"APTT\" in the last 72 hours.    Invalid input(s): \"PTP\"     No results for input(s): \"TIBC\", \"FERR\" in the last 72 hours.    Invalid input(s): \"FE\", \"PSAT\"     No results found for: \"RBCF\"   No results for input(s): \"PH\", \"PCO2\", \"PO2\" in the last 72 hours.  No results for input(s): \"CPK\" in the last 72 hours.    Invalid input(s): \"CPKMB\", \"CKNDX\", \"TROIQ\"  Lab Results   Component Value Date/Time    CHOL 192 04/20/2022 03:22 PM    HDL 82 04/20/2022 03:22 PM    LDL 98 04/20/2022 03:22 PM     No results found for: \"GLUCPOC\"  [unfilled]    Notes reviewed from all clinical/nonclinical/nursing services involved in patient's clinical care. Care coordination discussions were held with appropriate clinical/nonclinical/ nursing providers based on care coordination needs.         Patients current active Medications were reviewed, considered, added and adjusted based on the clinical condition today.      Home Medications were reconciled to the best of my ability given all available resources at the time of admission. Route is PO if not otherwise noted.        Medications Reviewed:     Current Facility-Administered Medications   Medication Dose Route Frequency    iopamidol (ISOVUE-300) 61 % injection 100 mL  100 mL IntraVENous ONCE PRN    HYDROmorphone HCl PF (DILAUDID) injection 1 mg  1 mg IntraVENous Q4H PRN    lidocaine PF 1 % injection 5 mL  5 mL IntraDERmal Once    sodium chloride flush 0.9 % injection 5-40 mL  5-40 mL IntraVENous PRN    ketorolac (TORADOL) injection 30 mg  30 mg IntraVENous Q6H PRN    bisacodyl (DULCOLAX) suppository 10 mg  10 mg 
IDT    Anticipatory grief assessment:   [x] Normal  / [] Maladaptive     If \"Maladaptive\" to discuss with social work during IDT    ESAS Anxiety: Anxiety Score: Not anxious    ESAS Depression: Depression Score: Not depressed        LAB AND IMAGING FINDINGS:   Objective data reviewed:  labs, images, records, medication use, vitals, and chart     FINAL COMMENTS   Thank you for allowing Palliative Medicine to participate in the care of Carlie Newman.    Only check if applicable and billing time based rather than MDM  [x] The total encounter time on this service date was _50 min minutes which was spent performing a face-to-face encounter and personally completing the provider-level activities documented in the note. This includes time spent prior to the visit and after the visit in direct care of the patient. This time does not include time spent in any separately reportable services.    Electronically signed by   SAMUEL Buckley NP  Palliative Care Team  on 5/9/2024 at 12:21 PM    
IntraVENous PRN    0.9 % sodium chloride infusion   IntraVENous PRN    potassium chloride (KLOR-CON) extended release tablet 40 mEq  40 mEq Oral PRN    Or    potassium bicarb-citric acid (EFFER-K) effervescent tablet 40 mEq  40 mEq Oral PRN    Or    potassium chloride 10 mEq/100 mL IVPB (Peripheral Line)  10 mEq IntraVENous PRN    magnesium sulfate 2000 mg in 50 mL IVPB premix  2,000 mg IntraVENous PRN    enoxaparin (LOVENOX) injection 40 mg  40 mg SubCUTAneous Daily    ondansetron (ZOFRAN-ODT) disintegrating tablet 4 mg  4 mg Oral Q8H PRN    Or    ondansetron (ZOFRAN) injection 4 mg  4 mg IntraVENous Q6H PRN    polyethylene glycol (GLYCOLAX) packet 17 g  17 g Oral Daily PRN    acetaminophen (TYLENOL) tablet 650 mg  650 mg Oral Q6H PRN    Or    acetaminophen (TYLENOL) suppository 650 mg  650 mg Rectal Q6H PRN    cefTRIAXone (ROCEPHIN) 2,000 mg in sterile water 20 mL IV syringe  2,000 mg IntraVENous Q24H    aspirin chewable tablet 81 mg  81 mg Oral Daily    citalopram (CELEXA) tablet 10 mg  10 mg Oral Daily    ferrous sulfate (IRON 325) tablet 325 mg  325 mg Oral QAM AC    furosemide (LASIX) tablet 20 mg  20 mg Oral Daily PRN    cetirizine (ZYRTEC) tablet 5 mg  5 mg Oral Daily    midodrine (PROAMATINE) tablet 2.5 mg  2.5 mg Oral BID    pantoprazole (PROTONIX) tablet 40 mg  40 mg Oral QAM AC     ______________________________________________________________________  EXPECTED LENGTH OF STAY: 7  ACTUAL LENGTH OF STAY:          1                 Sushma Madala, MD   
effusion.     US ABDOMEN LIMITED Specify organ? GALLBLADDER, LIVER    Result Date: 4/27/2024  EXAM: US ABDOMEN LIMITED INDICATION: elevated LFTs COMPARISON: Abdominal radiograph 4/24/2024, CT abdomen pelvis 4/22/2020 TECHNIQUE: Limited abdominal ultrasound. FINDINGS: Liver: echogenicity is increased.  No focal liver lesion. Main portal vein flow: Toward the liver. Fluid: No ascites. Right pleural effusion. Gallbladder: Contracted. No gallstones. No gallbladder wall thickening or pericholecystic fluid. Negative sonographic Espinosa sign. Bile ducts: There is no intra or extrahepatic biliary ductal dilatation.  The common bile duct measures 5 mm. Pancreas: The visualized portions are within normal limits. Kidneys: Right length: 10.7 cm. No hydronephrosis.     1.  Hepatic parenchymal disease. Right lobe mass seen on prior CT is not visualized on ultrasound. 2.  Contracted gallbladder. 3.  Unremarkable right kidney. 4.  Right pleural effusion.       Thank you for the opportunity to participate in the care of this patient.   Please contact with questions or concerns.      The above plan of care that has been discussed and agreed upon by Dr. Diaz.    SAMUEL Allen NP    A total time of 35 minutes was spent on today's encounter.  Greater than 50% of the time was spent on the following:  Preparing for visit and chart review.  Obtaining and/or reviewing separately obtained history  Performing a medically appropriate exam and/or evaluation  Counseling and educating a patient/family/caregiver as noted above  Referring and communicating with other professionals (not separately reported)  Independently interpreting results (not separately reported) and communicating results to the patient/family/caregiver  Care coordination (not separately reported) as noted above  Documenting clinical information in the electronic health records (e.g. problem list, visit note) on the day of the encounter

## 2024-05-09 NOTE — DISCHARGE SUMMARY
Discharge Summary       PATIENT ID: Carlie Newman  MRN: [id number]   YOB: 1933    DATE OF ADMISSION: 5/1/2024  3:49 PM    DATE OF DISCHARGE: 5/9/24   PRIMARY CARE PROVIDER: Shameka Perez MD     ATTENDING PHYSICIAN: aye hutton  DISCHARGING PROVIDER: Aye Hutton MD    To contact this individual call [phone] and ask the  to page.  If unavailable ask to be transferred the Adult Hospitalist Department.    CONSULTATIONS: IP WOUND CARE NURSE CONSULT TO EVAL  IP CONSULT TO ORTHOPEDIC SURGERY  IP CONSULT TO DIETITIAN  IP CONSULT TO PALLIATIVE CARE  IP CONSULT TO PALLIATIVE CARE  IP CONSULT TO CASE MANAGEMENT    PROCEDURES/SURGERIES: * No surgery found *    ADMITTING DIAGNOSES & HOSPITAL COURSE: Sepsis    Carlie Newman is a 89 y.o. female with past medical history significant for dyslipidemia, congestive heart failure, hypertension, dementia, dementia, sick sinus syndrome with resultant pacemaker insertion presented at the emergency room with change in mental status. Patient symptoms started few days ago and progressively getting worse. She was recently prescribed antibiotic for treatment of UTI. Family is unsure if patient has been taking the antibiotics appropriately. No fever, rigors or chills reported.      Pt d/c to hospice , after d/w ID the family revoked hospice readmitted cont current MGMT        DISCHARGE DIAGNOSES / PLAN:       D/c to hospice       PENDING TEST RESULTS:   At the time of discharge the following test results are still pending:     FOLLOW UP APPOINTMENTS:    [unfilled]     ADDITIONAL CARE RECOMMENDATIONS:     DIET:  Comfort  Oral Nutritional Supplements:     ACTIVITY: bedrest    WOUND CARE:     EQUIPMENT needed:       DISCHARGE MEDICATIONS:     Medication List        STOP taking these medications      ammonium lactate 12 % lotion  Commonly known as: LAC-HYDRIN     aspirin 81 MG chewable tablet  Commonly known as: Aspirin 81     citalopram 10 MG

## 2024-05-09 NOTE — CARE COORDINATION
Hospice RN requested transport for pt to dc to [facility].     [facility]  Transport: [facility] ([phone]) stretcher    DC folder on hard chart. Hospice RN, bedside RN, and attending dr del rio.     Iva Armstrong, MSW   or by [employer]

## 2024-05-09 NOTE — PLAN OF CARE
Problem: Occupational Therapy - Adult  Goal: By Discharge: Performs self-care activities at highest level of function for planned discharge setting.  See evaluation for individualized goals.  Description: FUNCTIONAL STATUS PRIOR TO ADMISSION:  Per family, Patient was ambulatory with walker for short distances, however per chart, pt primarily bedbound.   Receives Help From: Family, Personal care attendant, ADL Assistance: Needs assistance,  ,  ,  ,  , Toileting: Needs assistance, Homemaking Assistance: Needs assistance, Ambulation Assistance: Non-ambulatory, Transfer Assistance: Needs assistance, Active : No     HOME SUPPORT: Patient lived alone with caregivers to provide assistance.    Occupational Therapy Goals:  Initiated 5/4/2024  1.  Patient will perform self-feeding with Moderate Assist within 7 day(s).  2.  Patient will perform grooming with Moderate Assist within 7 day(s).  3.  Patient will roll in bed with mod A in prep for ADLs within 7 day(s).  4.  Patient will participate in upper extremity therapeutic exercise/activities with Moderate Assist for 10 minutes within 7 day(s).    5.  Patient will utilize energy conservation techniques during functional activities with verbal and visual cues within 7 day(s).    Outcome: Not Progressing   OCCUPATIONAL THERAPY TREATMENT  Patient: Carlie Newman (89 y.o. female)  Date: 5/6/2024  Primary Diagnosis: Sepsis ([facility]) [A41.9]       Precautions: Contact Precautions, Skin                Chart, occupational therapy assessment, plan of care, and goals were reviewed.    ASSESSMENT  Patient continues to benefit from skilled OT services and is not progressing towards goals. Pt presented sidelying in bed requiring totalA x2 to scoot up in bed and repositioning in prep for self feeding. Pt requiring totalA to wash hands at bed level, minimally able to lift UE to participate in task. Pt with some grimacing with touch to R hand and PROM of RUE. Pt self feed totalA 1 
  Problem: Physical Therapy - Adult  Goal: By Discharge: Performs mobility at highest level of function for planned discharge setting.  See evaluation for individualized goals.  Description: FUNCTIONAL STATUS PRIOR TO ADMISSION: per chart, patient with decreased mobility    HOME SUPPORT PRIOR TO ADMISSION: care attendants to assist    Physical Therapy Goals  Initiated 5/3/2024  1.  Patient will move from supine to sit and sit to supine in bed with moderate assistance within 7 day(s).    2.  Patient will perform sit to stand with moderate assistance within 7 day(s).  3.  Patient will transfer from bed to chair and chair to bed with moderate assistance using the least restrictive device within 7 day(s).  4.  Patient will ambulate with moderate assistance for 15 feet with the least restrictive device within 7 day(s).     5/8/2024 1158 by Arnaud Johnson, PT  Outcome: Not Progressing     Problem: Occupational Therapy - Adult  Goal: By Discharge: Performs self-care activities at highest level of function for planned discharge setting.  See evaluation for individualized goals.  Description: FUNCTIONAL STATUS PRIOR TO ADMISSION:  Per family, Patient was ambulatory with walker for short distances, however per chart, pt primarily bedbound.   Receives Help From: Family, Personal care attendant, ADL Assistance: Needs assistance,  ,  ,  ,  , Toileting: Needs assistance, Homemaking Assistance: Needs assistance, Ambulation Assistance: Non-ambulatory, Transfer Assistance: Needs assistance, Active : No     HOME SUPPORT: Patient lived alone with caregivers to provide assistance.    Occupational Therapy Goals:  Initiated 5/4/2024  1.  Patient will perform self-feeding with Moderate Assist within 7 day(s).  2.  Patient will perform grooming with Moderate Assist within 7 day(s).  3.  Patient will roll in bed with mod A in prep for ADLs within 7 day(s).  4.  Patient will participate in upper extremity therapeutic 
  Problem: Physical Therapy - Adult  Goal: By Discharge: Performs mobility at highest level of function for planned discharge setting.  See evaluation for individualized goals.  Description: FUNCTIONAL STATUS PRIOR TO ADMISSION: per chart, patient with decreased mobility    HOME SUPPORT PRIOR TO ADMISSION: care attendants to assist    Physical Therapy Goals  Initiated 5/3/2024  1.  Patient will move from supine to sit and sit to supine in bed with moderate assistance within 7 day(s).    2.  Patient will perform sit to stand with moderate assistance within 7 day(s).  3.  Patient will transfer from bed to chair and chair to bed with moderate assistance using the least restrictive device within 7 day(s).  4.  Patient will ambulate with moderate assistance for 15 feet with the least restrictive device within 7 day(s).     Outcome: Not Progressing       PHYSICAL THERAPY TREATMENT    Patient: Carlie Newman (89 y.o. female)  Date: 5/8/2024  Diagnosis: Sepsis ([facility]) [A41.9] Sepsis ([facility])      Precautions: Contact Precautions, Skin                      ASSESSMENT:  Patient continues to benefit from skilled PT services and is not progressing towards goals. Patient with pain with any movement, needing total assist for bed mobility.         PLAN:  Patient continues to benefit from skilled intervention to address the above impairments.  Continue treatment per established plan of care.    Recommend with staff: total care    Recommend next PT session: continue with plan of care    Recommendation for discharge: (in order for the patient to meet his/her long term goals): Therapy up to 5 days/week in Skilled nursing facility    Other factors to consider for discharge: not safe to be alone and concern for safely navigating or managing the home environment    IF patient discharges home will need the following DME: hospital bed       SUBJECTIVE:   Patient stated, \"Yes.\" When asked if she was in pain    OBJECTIVE DATA SUMMARY: 
  Problem: Physical Therapy - Adult  Goal: By Discharge: Performs mobility at highest level of function for planned discharge setting.  See evaluation for individualized goals.  Description: FUNCTIONAL STATUS PRIOR TO ADMISSION: per chart, patient with decreased mobility    HOME SUPPORT PRIOR TO ADMISSION: care attendants to assist    Physical Therapy Goals  Initiated 5/3/2024  1.  Patient will move from supine to sit and sit to supine in bed with moderate assistance within 7 day(s).    2.  Patient will perform sit to stand with moderate assistance within 7 day(s).  3.  Patient will transfer from bed to chair and chair to bed with moderate assistance using the least restrictive device within 7 day(s).  4.  Patient will ambulate with moderate assistance for 15 feet with the least restrictive device within 7 day(s).     Outcome: Not Progressing     PHYSICAL THERAPY TREATMENT    Patient: Carlie Newman (89 y.o. female)  Date: 5/6/2024  Diagnosis: Sepsis ([facility]) [A41.9] Sepsis ([facility])      Precautions: Contact Precautions, Skin                      ASSESSMENT:  Patient continues to benefit from skilled PT services and is not progressing towards goals. Patient with increased pain with any motion- attempted repositioning by rolling and supine scooting with total assist. Further mobilization deferred secondary to pain. Nursing notified         PLAN:  Patient continues to benefit from skilled intervention to address the above impairments.  Continue treatment per established plan of care.    Recommend with staff: total care    Recommend next PT session: continue with plan of care; premedicate prior to treatment    Recommendation for discharge: (in order for the patient to meet his/her long term goals): Therapy up to 5 days/week in Skilled nursing facility    Other factors to consider for discharge: concern for safely navigating or managing the home environment    IF patient discharges home will need the following DME: 
  Problem: Physical Therapy - Adult  Goal: By Discharge: Performs mobility at highest level of function for planned discharge setting.  See evaluation for individualized goals.  Description: FUNCTIONAL STATUS PRIOR TO ADMISSION: per chart, patient with decreased mobility    HOME SUPPORT PRIOR TO ADMISSION: care attendants to assist    Physical Therapy Goals  Initiated 5/3/2024  1.  Patient will move from supine to sit and sit to supine in bed with moderate assistance within 7 day(s).    2.  Patient will perform sit to stand with moderate assistance within 7 day(s).  3.  Patient will transfer from bed to chair and chair to bed with moderate assistance using the least restrictive device within 7 day(s).  4.  Patient will ambulate with moderate assistance for 15 feet with the least restrictive device within 7 day(s).     Outcome: Progressing       PHYSICAL THERAPY EVALUATION    Patient: Carlie Newman (89 y.o. female)  Date: 5/3/2024  Primary Diagnosis: Sepsis ([facility]) [A41.9]       Precautions: Restrictions/Precautions: Contact Precautions                      ASSESSMENT : Carlie Newman is a 89 y.o. female with past medical history significant for dyslipidemia, congestive heart failure, hypertension, dementia, dementia, sick sinus syndrome with resultant pacemaker insertion presented at the emergency room with change in mental status. Patient found to have positive blood cultures, infected pacemaker (not candidate for removal). She developed RLE pain at some point, noted swelling in the R knee and Orthopedics was consulted for aspiration. No hx of trauma.   DEFICITS/IMPAIRMENTS:   The patient is limited by decreased functional mobility, ROM, strength, activity tolerance, endurance, coordination, balance, increased pain levels who would benefit from PT to address these impairments. Patient with increased pain with any movement- needing max to total assist for bed mobility. Further activity deferred secondary to 
  Problem: SLP Adult - Impaired Swallowing  Goal: By Discharge: Advance to least restrictive diet without signs or symptoms of aspiration for planned discharge setting.  See evaluation for individualized goals.  Description: 1. Patient will tolerate least restrictive diet with no s/sx aspiration or penetration within 7 days  Outcome: Progressing     Speech LAnguage Pathology TREATMENT    Patient: Carlie Newman (89 y.o. female)  Date: 5/3/2024  Primary Diagnosis: Sepsis ([facility]) [A41.9]       Precautions: Aspiration                    ASSESSMENT :  Pt sitting upright in bed resting.  She was easily aroused and agreeable to eating her oatmeal from her breakfast tray.  Pt requires 1:1 assistance with feeding.  Pt functionally acceptance from the spoon with mild delay in her bolus formation and oral transit.  No anterior loss of bolus today. Pt with mild delayed swallow and intermittent double swallow without any s/s of aspiration noted today.  Pt also observed with successive straw drinking also with mild delay but functional.     Recommend continuing with a puree diet and thin liquids with straw for now.  She may be able to be upgraded to a minced and moist diet soon.  She stated her dentures are at home.  SLP following.     Patient will benefit from skilled intervention to address the above impairments.     PLAN :  Recommendations and Planned Interventions:  Diet: Puree and thin liquids  -upright for all po intake  -remain upright for at least 30 minutes after po intake  -small bites and sips  -alternate solids and liquids  -requires 1:1 assistance with feeding  -allow straw  -SLP following     Acute SLP Services: Yes, SLP will continue to follow per plan of care.    Discharge Recommendations: Continue to assess pending progress     SUBJECTIVE:   Patient stated, “hello.”    OBJECTIVE:     Past Medical History:   Diagnosis Date    Arrhythmia     bradycardia,S/P pacemaker    CHF (congestive heart failure) ([facility])     
  Problem: Safety - Adult  Goal: Free from fall injury  5/5/2024 2151 by Sterling Garcia RN  Outcome: Progressing  5/5/2024 1056 by Renee Schultz RN  Outcome: Progressing     Problem: Chronic Conditions and Co-morbidities  Goal: Patient's chronic conditions and co-morbidity symptoms are monitored and maintained or improved  5/5/2024 2151 by Sterling Garcia RN  Outcome: Progressing  Flowsheets (Taken 5/5/2024 2030)  Care Plan - Patient's Chronic Conditions and Co-Morbidity Symptoms are Monitored and Maintained or Improved: Monitor and assess patient's chronic conditions and comorbid symptoms for stability, deterioration, or improvement  5/5/2024 1056 by Renee Schultz RN  Outcome: Progressing     Problem: Pain  Goal: Verbalizes/displays adequate comfort level or baseline comfort level  Outcome: Progressing     Problem: Skin/Tissue Integrity  Goal: Absence of new skin breakdown  Description: 1.  Monitor for areas of redness and/or skin breakdown  2.  Assess vascular access sites hourly  3.  Every 4-6 hours minimum:  Change oxygen saturation probe site  4.  Every 4-6 hours:  If on nasal continuous positive airway pressure, respiratory therapy assess nares and determine need for appliance change or resting period.  Outcome: Progressing     
  Problem: Safety - Adult  Goal: Free from fall injury  Outcome: Progressing     Problem: Chronic Conditions and Co-morbidities  Goal: Patient's chronic conditions and co-morbidity symptoms are monitored and maintained or improved  Outcome: Progressing     
  Problem: Safety - Adult  Goal: Free from fall injury  Outcome: Progressing     Problem: Chronic Conditions and Co-morbidities  Goal: Patient's chronic conditions and co-morbidity symptoms are monitored and maintained or improved  Outcome: Progressing     Problem: Pain  Goal: Verbalizes/displays adequate comfort level or baseline comfort level  Outcome: Progressing     Problem: Physical Therapy - Adult  Goal: By Discharge: Performs mobility at highest level of function for planned discharge setting.  See evaluation for individualized goals.  Description: FUNCTIONAL STATUS PRIOR TO ADMISSION: per chart, patient with decreased mobility    HOME SUPPORT PRIOR TO ADMISSION: care attendants to assist    Physical Therapy Goals  Initiated 5/3/2024  1.  Patient will move from supine to sit and sit to supine in bed with moderate assistance within 7 day(s).    2.  Patient will perform sit to stand with moderate assistance within 7 day(s).  3.  Patient will transfer from bed to chair and chair to bed with moderate assistance using the least restrictive device within 7 day(s).  4.  Patient will ambulate with moderate assistance for 15 feet with the least restrictive device within 7 day(s).     5/8/2024 1158 by Arnaud Johnson, PT  Outcome: Not Progressing     Problem: Occupational Therapy - Adult  Goal: By Discharge: Performs self-care activities at highest level of function for planned discharge setting.  See evaluation for individualized goals.  Description: FUNCTIONAL STATUS PRIOR TO ADMISSION:  Per family, Patient was ambulatory with walker for short distances, however per chart, pt primarily bedbound.   Receives Help From: Family, Personal care attendant, ADL Assistance: Needs assistance,  ,  ,  ,  , Toileting: Needs assistance, Homemaking Assistance: Needs assistance, Ambulation Assistance: Non-ambulatory, Transfer Assistance: Needs assistance, Active : No     HOME SUPPORT: Patient lived alone with caregivers 
  Problem: Safety - Adult  Goal: Free from fall injury  Outcome: Progressing     Problem: Chronic Conditions and Co-morbidities  Goal: Patient's chronic conditions and co-morbidity symptoms are monitored and maintained or improved  Outcome: Progressing     Problem: Pain  Goal: Verbalizes/displays adequate comfort level or baseline comfort level  Outcome: Progressing     Problem: SLP Adult - Impaired Swallowing  Goal: By Discharge: Advance to least restrictive diet without signs or symptoms of aspiration for planned discharge setting.  See evaluation for individualized goals.  Description: 1. Patient will tolerate least restrictive diet with no s/sx aspiration or penetration within 7 days  5/2/2024 1302 by Monik Melendez, SLP  Outcome: Progressing     Problem: Nutrition Deficit:  Goal: Optimize nutritional status  Outcome: Progressing     
  Problem: Safety - Adult  Goal: Free from fall injury  Outcome: Progressing     Problem: Chronic Conditions and Co-morbidities  Goal: Patient's chronic conditions and co-morbidity symptoms are monitored and maintained or improved  Outcome: Progressing     Problem: Skin/Tissue Integrity  Goal: Absence of new skin breakdown  Description: 1.  Monitor for areas of redness and/or skin breakdown  2.  Assess vascular access sites hourly  3.  Every 4-6 hours minimum:  Change oxygen saturation probe site  4.  Every 4-6 hours:  If on nasal continuous positive airway pressure, respiratory therapy assess nares and determine need for appliance change or resting period.  Outcome: Progressing     Problem: Pain  Goal: Verbalizes/displays adequate comfort level or baseline comfort level  Outcome: Not Progressing     Problem: Nutrition Deficit:  Goal: Optimize nutritional status  Outcome: Not Progressing     
Characteristics: Double swallow;Easily fatigued;Poor endurance  Effective Modifications: Alternate liquids/solids  Cues for Modifications: None    Respiratory Status/Airway:  Nasal cannula, 4LPM    Functional Oral Intake Scale (FOIS): 5--Total oral diet with multiple consistencies but requiring special preparation or compensations    After treatment:   Patient left in no apparent distress in bed, Call bell left within reach, Nursing notified, and Bed alarm activated  COMMUNICATION/EDUCATION:   Patient was educated regarding her deficit(s) of swallow function.  She demonstrated Fair understanding as evidenced by Limited understanding/response due to cognitive status.  The patient's plan of care including recommendations, planned interventions, and recommended diet changes were discussed with: Registered nurse and Physician  Patient is unable to participate in goal setting and plan of care    Thank you,  Monik Melendez M.Ed, CCC-SLP  Speech Language Pathologist  Minutes: 25     Problem: SLP Adult - Impaired Swallowing  Goal: By Discharge: Advance to least restrictive diet without signs or symptoms of aspiration for planned discharge setting.  See evaluation for individualized goals.  Description: 1. Patient will tolerate least restrictive diet with no s/sx aspiration or penetration within 7 days  Outcome: Progressing  
from any help, physical or verbal, however minor and for whatever reason.  3. The need for supervision renders the patient not independent.  4. A patient's performance should be established using the best available evidence. Asking the patient, friends/relatives and nurses are the usual sources, but direct observation and common sense are also important. However direct testing is not needed.  5. Usually the patient's performance over the preceding 24-48 hours is important, but occasionally longer periods will be relevant.  6. Middle categories imply that the patient supplies over 50 per cent of the effort.  7. Use of aids to be independent is allowed.    Score Interpretation (from Yolis 1997)    Independent   60-79 Minimally independent   40-59 Partially dependent   20-39 Very dependent   <20 Totally dependent     -Priti Domingo., Barthel, DTeodoroW. (1965). Functional evaluation: the Barthel Index. Md State Med J (142.  -KACI Lagos, OSMAR Tavares (1997). The Barthel activities of daily living index: self-reporting versus actual performance in the old (> or = 75 years). Journal of American Geriatric Society 45(7), 832-836.   -ANTONIO KellyF, SARAH Milian., Lance, J.A., Holloway, A.J. (1999). Measuring the change in disability after inpatient rehabilitation; comparison of the responsiveness of the Barthel Index and Functional [place] Measure. Journal of Neurology, Neurosurgery, and Psychiatry, 66(4), 480-484.  -ALIYA Subramanian, SONIA Tavares.AZAEL, & Evaristo, M.A. (2004) Assessment of post-stroke quality of life in cost-effectiveness studies: The usefulness of the Barthel Index and the EuroQoL-5D. Quality of Life Research, 13, 427-43                                                                                                                                                                                                                                 Pain Rating:  Not rated, but observed